# Patient Record
Sex: MALE | Race: WHITE | NOT HISPANIC OR LATINO | Employment: OTHER | URBAN - METROPOLITAN AREA
[De-identification: names, ages, dates, MRNs, and addresses within clinical notes are randomized per-mention and may not be internally consistent; named-entity substitution may affect disease eponyms.]

---

## 2019-06-08 ENCOUNTER — APPOINTMENT (OUTPATIENT)
Dept: GENERAL RADIOLOGY | Facility: HOSPITAL | Age: 74
End: 2019-06-08

## 2019-06-08 ENCOUNTER — HOSPITAL ENCOUNTER (INPATIENT)
Facility: HOSPITAL | Age: 74
LOS: 11 days | Discharge: HOME HEALTH | End: 2019-06-19
Attending: INTERNAL MEDICINE | Admitting: INTERNAL MEDICINE

## 2019-06-08 ENCOUNTER — APPOINTMENT (OUTPATIENT)
Dept: CARDIOLOGY | Facility: HOSPITAL | Age: 74
End: 2019-06-08

## 2019-06-08 ENCOUNTER — ANCILLARY PROCEDURE (OUTPATIENT)
Dept: SPEECH THERAPY | Facility: HOSPITAL | Age: 74
End: 2019-06-08

## 2019-06-08 DIAGNOSIS — D49.1 LARYNGEAL NEOPLASM: ICD-10-CM

## 2019-06-08 DIAGNOSIS — Z74.09 IMPAIRED MOBILITY AND ADLS: ICD-10-CM

## 2019-06-08 DIAGNOSIS — I48.91 ATRIAL FIBRILLATION WITH RVR (HCC): Primary | ICD-10-CM

## 2019-06-08 DIAGNOSIS — I48.91 ATRIAL FIBRILLATION WITH RVR (HCC): ICD-10-CM

## 2019-06-08 DIAGNOSIS — Z78.9 IMPAIRED MOBILITY AND ADLS: ICD-10-CM

## 2019-06-08 DIAGNOSIS — Z74.09 IMPAIRED FUNCTIONAL MOBILITY, BALANCE, GAIT, AND ENDURANCE: ICD-10-CM

## 2019-06-08 PROBLEM — L03.90 CELLULITIS: Status: ACTIVE | Noted: 2019-06-08

## 2019-06-08 PROBLEM — J44.9 COPD (CHRONIC OBSTRUCTIVE PULMONARY DISEASE) (HCC): Status: ACTIVE | Noted: 2019-06-08

## 2019-06-08 LAB
ALBUMIN SERPL-MCNC: 3.4 G/DL (ref 3.5–5.2)
ALBUMIN/GLOB SERPL: 1.1 G/DL
ALP SERPL-CCNC: 158 U/L (ref 39–117)
ALT SERPL W P-5'-P-CCNC: 47 U/L (ref 1–41)
AMYLASE SERPL-CCNC: 65 U/L (ref 28–100)
ANION GAP SERPL CALCULATED.3IONS-SCNC: 11 MMOL/L
APTT PPP: 150.8 SECONDS (ref 85–120)
ARTERIAL PATENCY WRIST A: ABNORMAL
ARTERIAL PATENCY WRIST A: ABNORMAL
AST SERPL-CCNC: 34 U/L (ref 1–40)
ATMOSPHERIC PRESS: ABNORMAL MMHG
ATMOSPHERIC PRESS: ABNORMAL MMHG
BASE EXCESS BLDA CALC-SCNC: -2 MMOL/L (ref 0–2)
BASE EXCESS BLDA CALC-SCNC: 1 MMOL/L (ref 0–2)
BASOPHILS # BLD AUTO: 0.05 10*3/MM3 (ref 0–0.2)
BASOPHILS # BLD AUTO: 0.07 10*3/MM3 (ref 0–0.2)
BASOPHILS NFR BLD AUTO: 0.4 % (ref 0–1.5)
BASOPHILS NFR BLD AUTO: 0.6 % (ref 0–1.5)
BDY SITE: ABNORMAL
BDY SITE: ABNORMAL
BH CV ECHO MEAS - AO ROOT AREA (BSA CORRECTED): 1.9
BH CV ECHO MEAS - AO ROOT AREA: 10.3 CM^2
BH CV ECHO MEAS - AO ROOT DIAM: 3.6 CM
BH CV ECHO MEAS - BSA(HAYCOCK): 1.9 M^2
BH CV ECHO MEAS - BSA: 1.9 M^2
BH CV ECHO MEAS - BZI_BMI: 27.6 KILOGRAMS/M^2
BH CV ECHO MEAS - BZI_METRIC_HEIGHT: 167.6 CM
BH CV ECHO MEAS - BZI_METRIC_WEIGHT: 77.6 KG
BH CV ECHO MEAS - EDV(CUBED): 65.1 ML
BH CV ECHO MEAS - EDV(MOD-SP2): 141 ML
BH CV ECHO MEAS - EDV(MOD-SP4): 107 ML
BH CV ECHO MEAS - EDV(TEICH): 70.9 ML
BH CV ECHO MEAS - EF(CUBED): 14.8 %
BH CV ECHO MEAS - EF(MOD-BP): 33 %
BH CV ECHO MEAS - EF(MOD-SP2): 39 %
BH CV ECHO MEAS - EF(MOD-SP4): 30.8 %
BH CV ECHO MEAS - EF(TEICH): 11.9 %
BH CV ECHO MEAS - ESV(CUBED): 55.5 ML
BH CV ECHO MEAS - ESV(MOD-SP2): 86 ML
BH CV ECHO MEAS - ESV(MOD-SP4): 74 ML
BH CV ECHO MEAS - ESV(TEICH): 62.5 ML
BH CV ECHO MEAS - FS: 5.2 %
BH CV ECHO MEAS - IVS/LVPW: 1
BH CV ECHO MEAS - IVSD: 1.1 CM
BH CV ECHO MEAS - LA DIMENSION: 3.7 CM
BH CV ECHO MEAS - LA/AO: 1
BH CV ECHO MEAS - LAD MAJOR: 4.5 CM
BH CV ECHO MEAS - LAT PEAK E' VEL: 10.2 CM/SEC
BH CV ECHO MEAS - LATERAL E/E' RATIO: 9
BH CV ECHO MEAS - LV DIASTOLIC VOL/BSA (35-75): 57.2 ML/M^2
BH CV ECHO MEAS - LV MASS(C)D: 155.5 GRAMS
BH CV ECHO MEAS - LV MASS(C)DI: 83.1 GRAMS/M^2
BH CV ECHO MEAS - LV MAX PG: 3.1 MMHG
BH CV ECHO MEAS - LV MEAN PG: 1.2 MMHG
BH CV ECHO MEAS - LV SYSTOLIC VOL/BSA (12-30): 39.5 ML/M^2
BH CV ECHO MEAS - LV V1 MAX: 88.4 CM/SEC
BH CV ECHO MEAS - LV V1 MEAN: 48.8 CM/SEC
BH CV ECHO MEAS - LV V1 VTI: 12.2 CM
BH CV ECHO MEAS - LVIDD: 4 CM
BH CV ECHO MEAS - LVIDS: 3.8 CM
BH CV ECHO MEAS - LVLD AP2: 8.9 CM
BH CV ECHO MEAS - LVLD AP4: 8.6 CM
BH CV ECHO MEAS - LVLS AP2: 8.1 CM
BH CV ECHO MEAS - LVLS AP4: 7.2 CM
BH CV ECHO MEAS - LVOT AREA (M): 3.5 CM^2
BH CV ECHO MEAS - LVOT AREA: 3.4 CM^2
BH CV ECHO MEAS - LVOT DIAM: 2.1 CM
BH CV ECHO MEAS - LVPWD: 1.1 CM
BH CV ECHO MEAS - MED PEAK E' VEL: 5.8 CM/SEC
BH CV ECHO MEAS - MEDIAL E/E' RATIO: 15.7
BH CV ECHO MEAS - MR MAX PG: 72 MMHG
BH CV ECHO MEAS - MR MAX VEL: 422.9 CM/SEC
BH CV ECHO MEAS - MR MEAN PG: 46.1 MMHG
BH CV ECHO MEAS - MR MEAN VEL: 316.8 CM/SEC
BH CV ECHO MEAS - MR VTI: 120.5 CM
BH CV ECHO MEAS - MV DEC TIME: 0.13 SEC
BH CV ECHO MEAS - MV E MAX VEL: 93.3 CM/SEC
BH CV ECHO MEAS - RAP SYSTOLE: 8 MMHG
BH CV ECHO MEAS - RVDD: 3.1 CM
BH CV ECHO MEAS - RVSP: 38 MMHG
BH CV ECHO MEAS - SI(CUBED): 5.1 ML/M^2
BH CV ECHO MEAS - SI(LVOT): 22.4 ML/M^2
BH CV ECHO MEAS - SI(MOD-SP2): 29.4 ML/M^2
BH CV ECHO MEAS - SI(MOD-SP4): 17.6 ML/M^2
BH CV ECHO MEAS - SI(TEICH): 4.5 ML/M^2
BH CV ECHO MEAS - SV(CUBED): 9.6 ML
BH CV ECHO MEAS - SV(LVOT): 42 ML
BH CV ECHO MEAS - SV(MOD-SP2): 55 ML
BH CV ECHO MEAS - SV(MOD-SP4): 33 ML
BH CV ECHO MEAS - SV(TEICH): 8.4 ML
BH CV ECHO MEAS - TAPSE (>1.6): 1.8 CM2
BH CV ECHO MEAS - TR MAX PG: 30 MMHG
BH CV ECHO MEAS - TR MAX VEL: 272.6 CM/SEC
BH CV ECHO MEASUREMENTS AVERAGE E/E' RATIO: 11.66
BH CV LOWER VASCULAR LEFT COMMON FEMORAL AUGMENT: NORMAL
BH CV LOWER VASCULAR LEFT COMMON FEMORAL COMPRESS: NORMAL
BH CV LOWER VASCULAR LEFT COMMON FEMORAL PHASIC: NORMAL
BH CV LOWER VASCULAR LEFT COMMON FEMORAL SPONT: NORMAL
BH CV LOWER VASCULAR LEFT DISTAL FEMORAL AUGMENT: NORMAL
BH CV LOWER VASCULAR LEFT DISTAL FEMORAL COMPRESS: NORMAL
BH CV LOWER VASCULAR LEFT DISTAL FEMORAL PHASIC: NORMAL
BH CV LOWER VASCULAR LEFT DISTAL FEMORAL SPONT: NORMAL
BH CV LOWER VASCULAR LEFT GASTRONEMIUS COMPRESS: NORMAL
BH CV LOWER VASCULAR LEFT GREATER SAPH AK COMPRESS: NORMAL
BH CV LOWER VASCULAR LEFT GREATER SAPH BK COMPRESS: NORMAL
BH CV LOWER VASCULAR LEFT LESSER SAPH COMPRESS: NORMAL
BH CV LOWER VASCULAR LEFT MID FEMORAL AUGMENT: NORMAL
BH CV LOWER VASCULAR LEFT MID FEMORAL COMPRESS: NORMAL
BH CV LOWER VASCULAR LEFT MID FEMORAL PHASIC: NORMAL
BH CV LOWER VASCULAR LEFT MID FEMORAL SPONT: NORMAL
BH CV LOWER VASCULAR LEFT POPLITEAL AUGMENT: NORMAL
BH CV LOWER VASCULAR LEFT POPLITEAL COMPRESS: NORMAL
BH CV LOWER VASCULAR LEFT POPLITEAL PHASIC: NORMAL
BH CV LOWER VASCULAR LEFT POPLITEAL SPONT: NORMAL
BH CV LOWER VASCULAR LEFT PROFUNDA FEMORAL COMPRESS: NORMAL
BH CV LOWER VASCULAR LEFT PROFUNDA FEMORAL PHASIC: NORMAL
BH CV LOWER VASCULAR LEFT PROFUNDA FEMORAL SPONT: NORMAL
BH CV LOWER VASCULAR LEFT PROXIMAL FEMORAL AUGMENT: NORMAL
BH CV LOWER VASCULAR LEFT PROXIMAL FEMORAL COMPRESS: NORMAL
BH CV LOWER VASCULAR LEFT PROXIMAL FEMORAL PHASIC: NORMAL
BH CV LOWER VASCULAR LEFT PROXIMAL FEMORAL SPONT: NORMAL
BH CV LOWER VASCULAR LEFT SAPHENOFEMORAL JUNCTION COMPRESS: NORMAL
BH CV LOWER VASCULAR LEFT SAPHENOFEMORAL JUNCTION PHASIC: NORMAL
BH CV LOWER VASCULAR LEFT SAPHENOFEMORAL JUNCTION SPONT: NORMAL
BH CV LOWER VASCULAR RIGHT COMMON FEMORAL COMPRESS: NORMAL
BH CV LOWER VASCULAR RIGHT COMMON FEMORAL PHASIC: NORMAL
BH CV LOWER VASCULAR RIGHT COMMON FEMORAL SPONT: NORMAL
BH CV LOWER VASCULAR RIGHT DISTAL FEMORAL AUGMENT: NORMAL
BH CV LOWER VASCULAR RIGHT DISTAL FEMORAL COMPRESS: NORMAL
BH CV LOWER VASCULAR RIGHT DISTAL FEMORAL PHASIC: NORMAL
BH CV LOWER VASCULAR RIGHT DISTAL FEMORAL SPONT: NORMAL
BH CV LOWER VASCULAR RIGHT GASTRONEMIUS COMPRESS: NORMAL
BH CV LOWER VASCULAR RIGHT GREATER SAPH AK COMPRESS: NORMAL
BH CV LOWER VASCULAR RIGHT GREATER SAPH BK COMPRESS: NORMAL
BH CV LOWER VASCULAR RIGHT LESSER SAPH COMPRESS: NORMAL
BH CV LOWER VASCULAR RIGHT MID FEMORAL AUGMENT: NORMAL
BH CV LOWER VASCULAR RIGHT MID FEMORAL COMPRESS: NORMAL
BH CV LOWER VASCULAR RIGHT MID FEMORAL PHASIC: NORMAL
BH CV LOWER VASCULAR RIGHT MID FEMORAL SPONT: NORMAL
BH CV LOWER VASCULAR RIGHT POPLITEAL AUGMENT: NORMAL
BH CV LOWER VASCULAR RIGHT POPLITEAL COMPRESS: NORMAL
BH CV LOWER VASCULAR RIGHT POPLITEAL PHASIC: NORMAL
BH CV LOWER VASCULAR RIGHT POPLITEAL SPONT: NORMAL
BH CV LOWER VASCULAR RIGHT PROFUNDA FEMORAL COMPRESS: NORMAL
BH CV LOWER VASCULAR RIGHT PROFUNDA FEMORAL PHASIC: NORMAL
BH CV LOWER VASCULAR RIGHT PROFUNDA FEMORAL SPONT: NORMAL
BH CV LOWER VASCULAR RIGHT PROXIMAL FEMORAL AUGMENT: NORMAL
BH CV LOWER VASCULAR RIGHT PROXIMAL FEMORAL COMPRESS: NORMAL
BH CV LOWER VASCULAR RIGHT PROXIMAL FEMORAL PHASIC: NORMAL
BH CV LOWER VASCULAR RIGHT PROXIMAL FEMORAL SPONT: NORMAL
BH CV LOWER VASCULAR RIGHT SAPHENOFEMORAL JUNCTION COMPRESS: NORMAL
BH CV LOWER VASCULAR RIGHT SAPHENOFEMORAL JUNCTION PHASIC: NORMAL
BH CV LOWER VASCULAR RIGHT SAPHENOFEMORAL JUNCTION SPONT: NORMAL
BH CV XLRA - RV BASE: 5.3 CM
BH CV XLRA - RV LENGTH: 6.9 CM
BH CV XLRA - RV MID: 3.6 CM
BH CV XLRA - TDI S': 17.1 CM/SEC
BILIRUB SERPL-MCNC: 0.7 MG/DL (ref 0.2–1.2)
BODY TEMPERATURE: 37 C
BODY TEMPERATURE: 37 C
BUN BLD-MCNC: 18 MG/DL (ref 8–23)
BUN/CREAT SERPL: 12.6 (ref 7–25)
CA-I SERPL ISE-MCNC: 1.25 MMOL/L (ref 1.12–1.32)
CALCIUM SPEC-SCNC: 9 MG/DL (ref 8.6–10.5)
CHLORIDE SERPL-SCNC: 101 MMOL/L (ref 98–107)
CHOLEST SERPL-MCNC: 108 MG/DL (ref 0–200)
CK SERPL-CCNC: 231 U/L (ref 20–200)
CO2 BLDA-SCNC: 26.2 MMOL/L (ref 23–27)
CO2 BLDA-SCNC: 26.7 MMOL/L (ref 23–27)
CO2 SERPL-SCNC: 26 MMOL/L (ref 22–29)
COHGB MFR BLD: 1.9 % (ref 0–2)
COHGB MFR BLD: 2.9 % (ref 0–2)
CREAT BLD-MCNC: 1.43 MG/DL (ref 0.76–1.27)
D-LACTATE SERPL-SCNC: 0.8 MMOL/L (ref 0.5–2)
DEPRECATED RDW RBC AUTO: 49.1 FL (ref 37–54)
DEPRECATED RDW RBC AUTO: 50 FL (ref 37–54)
EOSINOPHIL # BLD AUTO: 0.29 10*3/MM3 (ref 0–0.4)
EOSINOPHIL # BLD AUTO: 0.29 10*3/MM3 (ref 0–0.4)
EOSINOPHIL NFR BLD AUTO: 2.6 % (ref 0.3–6.2)
EOSINOPHIL NFR BLD AUTO: 2.7 % (ref 0.3–6.2)
ERYTHROCYTE [DISTWIDTH] IN BLOOD BY AUTOMATED COUNT: 15.3 % (ref 12.3–15.4)
ERYTHROCYTE [DISTWIDTH] IN BLOOD BY AUTOMATED COUNT: 15.5 % (ref 12.3–15.4)
GFR SERPL CREATININE-BSD FRML MDRD: 48 ML/MIN/1.73
GLOBULIN UR ELPH-MCNC: 3 GM/DL
GLUCOSE BLD-MCNC: 127 MG/DL (ref 65–99)
GLUCOSE BLDC GLUCOMTR-MCNC: 121 MG/DL (ref 70–130)
GLUCOSE BLDC GLUCOMTR-MCNC: 130 MG/DL (ref 70–130)
HBA1C MFR BLD: 6.6 % (ref 4.8–5.6)
HCO3 BLDA-SCNC: 24.7 MMOL/L (ref 20–26)
HCO3 BLDA-SCNC: 25.5 MMOL/L (ref 20–26)
HCT VFR BLD AUTO: 48 % (ref 37.5–51)
HCT VFR BLD AUTO: 48.9 % (ref 37.5–51)
HCT VFR BLD CALC: 47.9 %
HCT VFR BLD CALC: 48.2 %
HDLC SERPL-MCNC: 59 MG/DL (ref 40–60)
HGB BLD-MCNC: 15.2 G/DL (ref 13–17.7)
HGB BLD-MCNC: 15.3 G/DL (ref 13–17.7)
HGB BLDA-MCNC: 15.6 G/DL (ref 13.5–17.5)
HGB BLDA-MCNC: 15.7 G/DL (ref 13.5–17.5)
HOROWITZ INDEX BLD+IHG-RTO: 36 %
HOROWITZ INDEX BLD+IHG-RTO: 44 %
IMM GRANULOCYTES # BLD AUTO: 0.04 10*3/MM3 (ref 0–0.05)
IMM GRANULOCYTES # BLD AUTO: 0.05 10*3/MM3 (ref 0–0.05)
IMM GRANULOCYTES NFR BLD AUTO: 0.4 % (ref 0–0.5)
IMM GRANULOCYTES NFR BLD AUTO: 0.5 % (ref 0–0.5)
INR PPP: 1.3 (ref 0.85–1.16)
INR PPP: 1.34 (ref 0.85–1.16)
LDLC SERPL CALC-MCNC: 37 MG/DL (ref 0–100)
LDLC/HDLC SERPL: 0.62 {RATIO}
LIPASE SERPL-CCNC: 46 U/L (ref 13–60)
LYMPHOCYTES # BLD AUTO: 3.5 10*3/MM3 (ref 0.7–3.1)
LYMPHOCYTES # BLD AUTO: 3.66 10*3/MM3 (ref 0.7–3.1)
LYMPHOCYTES NFR BLD AUTO: 32.2 % (ref 19.6–45.3)
LYMPHOCYTES NFR BLD AUTO: 32.4 % (ref 19.6–45.3)
MAGNESIUM SERPL-MCNC: 2.1 MG/DL (ref 1.6–2.4)
MAXIMAL PREDICTED HEART RATE: 144 BPM
MCH RBC QN AUTO: 27.7 PG (ref 26.6–33)
MCH RBC QN AUTO: 27.9 PG (ref 26.6–33)
MCHC RBC AUTO-ENTMCNC: 31.3 G/DL (ref 31.5–35.7)
MCHC RBC AUTO-ENTMCNC: 31.7 G/DL (ref 31.5–35.7)
MCV RBC AUTO: 88.2 FL (ref 79–97)
MCV RBC AUTO: 88.4 FL (ref 79–97)
METHGB BLD QL: 0.5 % (ref 0–1.5)
METHGB BLD QL: 0.5 % (ref 0–1.5)
MODALITY: ABNORMAL
MODALITY: ABNORMAL
MONOCYTES # BLD AUTO: 0.79 10*3/MM3 (ref 0.1–0.9)
MONOCYTES # BLD AUTO: 0.81 10*3/MM3 (ref 0.1–0.9)
MONOCYTES NFR BLD AUTO: 7 % (ref 5–12)
MONOCYTES NFR BLD AUTO: 7.5 % (ref 5–12)
NEUTROPHILS # BLD AUTO: 6.15 10*3/MM3 (ref 1.7–7)
NEUTROPHILS # BLD AUTO: 6.47 10*3/MM3 (ref 1.7–7)
NEUTROPHILS NFR BLD AUTO: 56.5 % (ref 42.7–76)
NEUTROPHILS NFR BLD AUTO: 57.2 % (ref 42.7–76)
NOTE: ABNORMAL
NOTE: ABNORMAL
NRBC BLD AUTO-RTO: 0 /100 WBC (ref 0–0.2)
NRBC BLD AUTO-RTO: 0 /100 WBC (ref 0–0.2)
NT-PROBNP SERPL-MCNC: 4193 PG/ML (ref 5–1800)
OXYHGB MFR BLDV: 86.7 % (ref 94–99)
OXYHGB MFR BLDV: 92.4 % (ref 94–99)
PCO2 BLDA: 39.3 MM HG
PCO2 BLDA: 48.3 MM HG
PCO2 TEMP ADJ BLD: 39.3 MM HG (ref 35–48)
PCO2 TEMP ADJ BLD: 48.3 MM HG (ref 35–48)
PH BLDA: 7.32 PH UNITS (ref 7.35–7.45)
PH BLDA: 7.42 PH UNITS (ref 7.35–7.45)
PH, TEMP CORRECTED: 7.32 PH UNITS
PH, TEMP CORRECTED: 7.42 PH UNITS
PHOSPHATE SERPL-MCNC: 2.8 MG/DL (ref 2.5–4.5)
PLATELET # BLD AUTO: 202 10*3/MM3 (ref 140–450)
PLATELET # BLD AUTO: 212 10*3/MM3 (ref 140–450)
PMV BLD AUTO: 10.9 FL (ref 6–12)
PMV BLD AUTO: 11.8 FL (ref 6–12)
PO2 BLDA: 59.9 MM HG (ref 83–108)
PO2 BLDA: 73 MM HG (ref 83–108)
PO2 TEMP ADJ BLD: 59.9 MM HG (ref 83–108)
PO2 TEMP ADJ BLD: 73 MM HG (ref 83–108)
POTASSIUM BLD-SCNC: 4.8 MMOL/L (ref 3.5–5.2)
PROCALCITONIN SERPL-MCNC: 0.04 NG/ML (ref 0.1–0.25)
PROT SERPL-MCNC: 6.4 G/DL (ref 6–8.5)
PROTHROMBIN TIME: 15.6 SECONDS (ref 11.2–14.3)
PROTHROMBIN TIME: 16 SECONDS (ref 11.2–14.3)
RBC # BLD AUTO: 5.44 10*6/MM3 (ref 4.14–5.8)
RBC # BLD AUTO: 5.53 10*6/MM3 (ref 4.14–5.8)
SODIUM BLD-SCNC: 138 MMOL/L (ref 136–145)
STRESS TARGET HR: 122 BPM
T4 FREE SERPL-MCNC: 1.43 NG/DL (ref 0.93–1.7)
TRIGL SERPL-MCNC: 61 MG/DL (ref 0–150)
TROPONIN T SERPL-MCNC: <0.01 NG/ML (ref 0–0.03)
TSH SERPL DL<=0.05 MIU/L-ACNC: 3.73 MIU/ML (ref 0.27–4.2)
UFH PPP CHRO-ACNC: 0.75 IU/ML (ref 0.3–0.7)
UFH PPP CHRO-ACNC: 0.86 IU/ML (ref 0.3–0.7)
UFH PPP CHRO-ACNC: 0.89 IU/ML (ref 0.3–0.7)
VENTILATOR MODE: ABNORMAL
VENTILATOR MODE: ABNORMAL
VLDLC SERPL-MCNC: 12.2 MG/DL
WBC NRBC COR # BLD: 10.87 10*3/MM3 (ref 3.4–10.8)
WBC NRBC COR # BLD: 11.3 10*3/MM3 (ref 3.4–10.8)

## 2019-06-08 PROCEDURE — 85610 PROTHROMBIN TIME: CPT | Performed by: NURSE PRACTITIONER

## 2019-06-08 PROCEDURE — 93306 TTE W/DOPPLER COMPLETE: CPT

## 2019-06-08 PROCEDURE — 36600 WITHDRAWAL OF ARTERIAL BLOOD: CPT

## 2019-06-08 PROCEDURE — 87070 CULTURE OTHR SPECIMN AEROBIC: CPT | Performed by: INTERNAL MEDICINE

## 2019-06-08 PROCEDURE — 87040 BLOOD CULTURE FOR BACTERIA: CPT | Performed by: NURSE PRACTITIONER

## 2019-06-08 PROCEDURE — 85520 HEPARIN ASSAY: CPT

## 2019-06-08 PROCEDURE — 92612 ENDOSCOPY SWALLOW (FEES) VID: CPT

## 2019-06-08 PROCEDURE — 94799 UNLISTED PULMONARY SVC/PX: CPT

## 2019-06-08 PROCEDURE — 99223 1ST HOSP IP/OBS HIGH 75: CPT | Performed by: INTERNAL MEDICINE

## 2019-06-08 PROCEDURE — 82150 ASSAY OF AMYLASE: CPT | Performed by: NURSE PRACTITIONER

## 2019-06-08 PROCEDURE — 93970 EXTREMITY STUDY: CPT

## 2019-06-08 PROCEDURE — 99291 CRITICAL CARE FIRST HOUR: CPT | Performed by: INTERNAL MEDICINE

## 2019-06-08 PROCEDURE — 71045 X-RAY EXAM CHEST 1 VIEW: CPT

## 2019-06-08 PROCEDURE — 25010000002 HEPARIN (PORCINE) IN NACL 25000-0.45 UT/250ML-% SOLUTION

## 2019-06-08 PROCEDURE — 84484 ASSAY OF TROPONIN QUANT: CPT | Performed by: NURSE PRACTITIONER

## 2019-06-08 PROCEDURE — 99221 1ST HOSP IP/OBS SF/LOW 40: CPT | Performed by: INTERNAL MEDICINE

## 2019-06-08 PROCEDURE — 93970 EXTREMITY STUDY: CPT | Performed by: INTERNAL MEDICINE

## 2019-06-08 PROCEDURE — 93005 ELECTROCARDIOGRAM TRACING: CPT | Performed by: NURSE PRACTITIONER

## 2019-06-08 PROCEDURE — 82962 GLUCOSE BLOOD TEST: CPT

## 2019-06-08 PROCEDURE — 83605 ASSAY OF LACTIC ACID: CPT | Performed by: NURSE PRACTITIONER

## 2019-06-08 PROCEDURE — 25010000002 HEPARIN (PORCINE) IN NACL 25000-0.45 UT/250ML-% SOLUTION: Performed by: NURSE PRACTITIONER

## 2019-06-08 PROCEDURE — 94640 AIRWAY INHALATION TREATMENT: CPT

## 2019-06-08 PROCEDURE — 84443 ASSAY THYROID STIM HORMONE: CPT | Performed by: NURSE PRACTITIONER

## 2019-06-08 PROCEDURE — 87205 SMEAR GRAM STAIN: CPT | Performed by: NURSE PRACTITIONER

## 2019-06-08 PROCEDURE — 25010000002 MEROPENEM

## 2019-06-08 PROCEDURE — 80053 COMPREHEN METABOLIC PANEL: CPT | Performed by: NURSE PRACTITIONER

## 2019-06-08 PROCEDURE — 25010000002 AMIODARONE IN DEXTROSE 5% 360-4.14 MG/200ML-% SOLUTION: Performed by: NURSE PRACTITIONER

## 2019-06-08 PROCEDURE — 82805 BLOOD GASES W/O2 SATURATION: CPT

## 2019-06-08 PROCEDURE — 84145 PROCALCITONIN (PCT): CPT | Performed by: NURSE PRACTITIONER

## 2019-06-08 PROCEDURE — 84100 ASSAY OF PHOSPHORUS: CPT | Performed by: NURSE PRACTITIONER

## 2019-06-08 PROCEDURE — 94660 CPAP INITIATION&MGMT: CPT

## 2019-06-08 PROCEDURE — 92610 EVALUATE SWALLOWING FUNCTION: CPT

## 2019-06-08 PROCEDURE — 85025 COMPLETE CBC W/AUTO DIFF WBC: CPT | Performed by: NURSE PRACTITIONER

## 2019-06-08 PROCEDURE — 25010000002 VANCOMYCIN 10 G RECONSTITUTED SOLUTION

## 2019-06-08 PROCEDURE — 93306 TTE W/DOPPLER COMPLETE: CPT | Performed by: INTERNAL MEDICINE

## 2019-06-08 PROCEDURE — 85520 HEPARIN ASSAY: CPT | Performed by: NURSE PRACTITIONER

## 2019-06-08 PROCEDURE — 25010000002 AMIODARONE IN DEXTROSE 5% 360-4.14 MG/200ML-% SOLUTION

## 2019-06-08 PROCEDURE — 83690 ASSAY OF LIPASE: CPT | Performed by: NURSE PRACTITIONER

## 2019-06-08 PROCEDURE — 84439 ASSAY OF FREE THYROXINE: CPT | Performed by: NURSE PRACTITIONER

## 2019-06-08 PROCEDURE — 87070 CULTURE OTHR SPECIMN AEROBIC: CPT | Performed by: NURSE PRACTITIONER

## 2019-06-08 PROCEDURE — 80061 LIPID PANEL: CPT | Performed by: NURSE PRACTITIONER

## 2019-06-08 PROCEDURE — 5A09357 ASSISTANCE WITH RESPIRATORY VENTILATION, LESS THAN 24 CONSECUTIVE HOURS, CONTINUOUS POSITIVE AIRWAY PRESSURE: ICD-10-PCS | Performed by: INTERNAL MEDICINE

## 2019-06-08 PROCEDURE — 25010000002 DIGOXIN PER 500 MCG: Performed by: INTERNAL MEDICINE

## 2019-06-08 PROCEDURE — 83735 ASSAY OF MAGNESIUM: CPT | Performed by: NURSE PRACTITIONER

## 2019-06-08 PROCEDURE — 87205 SMEAR GRAM STAIN: CPT | Performed by: INTERNAL MEDICINE

## 2019-06-08 PROCEDURE — 82330 ASSAY OF CALCIUM: CPT | Performed by: NURSE PRACTITIONER

## 2019-06-08 PROCEDURE — 82550 ASSAY OF CK (CPK): CPT | Performed by: NURSE PRACTITIONER

## 2019-06-08 PROCEDURE — 83036 HEMOGLOBIN GLYCOSYLATED A1C: CPT | Performed by: NURSE PRACTITIONER

## 2019-06-08 PROCEDURE — 83880 ASSAY OF NATRIURETIC PEPTIDE: CPT | Performed by: NURSE PRACTITIONER

## 2019-06-08 PROCEDURE — 25010000002 AMIODARONE IN DEXTROSE 5% 150-4.21 MG/100ML-% SOLUTION: Performed by: NURSE PRACTITIONER

## 2019-06-08 PROCEDURE — 93010 ELECTROCARDIOGRAM REPORT: CPT | Performed by: INTERNAL MEDICINE

## 2019-06-08 PROCEDURE — 85730 THROMBOPLASTIN TIME PARTIAL: CPT | Performed by: NURSE PRACTITIONER

## 2019-06-08 RX ORDER — METOPROLOL TARTRATE 50 MG/1
50 TABLET, FILM COATED ORAL 2 TIMES DAILY
COMMUNITY
End: 2019-06-19 | Stop reason: HOSPADM

## 2019-06-08 RX ORDER — IPRATROPIUM BROMIDE AND ALBUTEROL SULFATE 2.5; .5 MG/3ML; MG/3ML
3 SOLUTION RESPIRATORY (INHALATION)
Status: DISCONTINUED | OUTPATIENT
Start: 2019-06-08 | End: 2019-06-08

## 2019-06-08 RX ORDER — BUMETANIDE 0.25 MG/ML
2.5 INJECTION INTRAMUSCULAR; INTRAVENOUS ONCE
Status: COMPLETED | OUTPATIENT
Start: 2019-06-08 | End: 2019-06-08

## 2019-06-08 RX ORDER — FUROSEMIDE 20 MG/1
20 TABLET ORAL DAILY
COMMUNITY
End: 2019-09-27 | Stop reason: DRUGHIGH

## 2019-06-08 RX ORDER — SODIUM CHLORIDE 0.9 % (FLUSH) 0.9 %
3-10 SYRINGE (ML) INJECTION AS NEEDED
Status: DISCONTINUED | OUTPATIENT
Start: 2019-06-08 | End: 2019-06-19

## 2019-06-08 RX ORDER — ESOMEPRAZOLE MAGNESIUM 40 MG/1
40 CAPSULE, DELAYED RELEASE ORAL
COMMUNITY
End: 2020-01-03 | Stop reason: ALTCHOICE

## 2019-06-08 RX ORDER — ATORVASTATIN CALCIUM 20 MG/1
20 TABLET, FILM COATED ORAL NIGHTLY
COMMUNITY
End: 2020-01-10 | Stop reason: SDUPTHER

## 2019-06-08 RX ORDER — HEPARIN SODIUM 1000 [USP'U]/ML
40 INJECTION, SOLUTION INTRAVENOUS; SUBCUTANEOUS AS NEEDED
Status: DISCONTINUED | OUTPATIENT
Start: 2019-06-08 | End: 2019-06-08 | Stop reason: SDUPTHER

## 2019-06-08 RX ORDER — LABETALOL HYDROCHLORIDE 5 MG/ML
20 INJECTION, SOLUTION INTRAVENOUS
Status: DISCONTINUED | OUTPATIENT
Start: 2019-06-08 | End: 2019-06-12

## 2019-06-08 RX ORDER — METOPROLOL TARTRATE 5 MG/5ML
5 INJECTION INTRAVENOUS ONCE
Status: COMPLETED | OUTPATIENT
Start: 2019-06-08 | End: 2019-06-08

## 2019-06-08 RX ORDER — AMITRIPTYLINE HYDROCHLORIDE 100 MG/1
100 TABLET, FILM COATED ORAL NIGHTLY
COMMUNITY
End: 2019-06-27 | Stop reason: DRUGHIGH

## 2019-06-08 RX ORDER — LABETALOL HYDROCHLORIDE 5 MG/ML
10 INJECTION, SOLUTION INTRAVENOUS ONCE
Status: COMPLETED | OUTPATIENT
Start: 2019-06-08 | End: 2019-06-08

## 2019-06-08 RX ORDER — AMLODIPINE BESYLATE 2.5 MG/1
2.5 TABLET ORAL DAILY
COMMUNITY
End: 2019-06-19 | Stop reason: HOSPADM

## 2019-06-08 RX ORDER — LEVALBUTEROL TARTRATE 45 UG/1
1-2 AEROSOL, METERED ORAL EVERY 6 HOURS PRN
COMMUNITY
End: 2021-01-01

## 2019-06-08 RX ORDER — HEPARIN SODIUM 1000 [USP'U]/ML
80 INJECTION, SOLUTION INTRAVENOUS; SUBCUTANEOUS AS NEEDED
Status: DISCONTINUED | OUTPATIENT
Start: 2019-06-08 | End: 2019-06-08 | Stop reason: SDUPTHER

## 2019-06-08 RX ORDER — PANTOPRAZOLE SODIUM 40 MG/10ML
40 INJECTION, POWDER, LYOPHILIZED, FOR SOLUTION INTRAVENOUS
Status: DISCONTINUED | OUTPATIENT
Start: 2019-06-09 | End: 2019-06-13

## 2019-06-08 RX ORDER — AMITRIPTYLINE HYDROCHLORIDE 25 MG/1
25 TABLET, FILM COATED ORAL NIGHTLY
Status: DISCONTINUED | OUTPATIENT
Start: 2019-06-08 | End: 2019-06-19

## 2019-06-08 RX ORDER — IPRATROPIUM BROMIDE AND ALBUTEROL SULFATE 2.5; .5 MG/3ML; MG/3ML
3 SOLUTION RESPIRATORY (INHALATION)
Status: DISCONTINUED | OUTPATIENT
Start: 2019-06-08 | End: 2019-06-12

## 2019-06-08 RX ORDER — DIGOXIN 0.25 MG/ML
500 INJECTION INTRAMUSCULAR; INTRAVENOUS ONCE
Status: COMPLETED | OUTPATIENT
Start: 2019-06-08 | End: 2019-06-08

## 2019-06-08 RX ORDER — SODIUM CHLORIDE 0.9 % (FLUSH) 0.9 %
3 SYRINGE (ML) INJECTION EVERY 12 HOURS SCHEDULED
Status: DISCONTINUED | OUTPATIENT
Start: 2019-06-08 | End: 2019-06-19

## 2019-06-08 RX ADMIN — LABETALOL 20 MG/4 ML (5 MG/ML) INTRAVENOUS SYRINGE 20 MG: at 18:21

## 2019-06-08 RX ADMIN — DIGOXIN 500 MCG: 0.25 INJECTION INTRAMUSCULAR; INTRAVENOUS at 08:15

## 2019-06-08 RX ADMIN — HEPARIN SODIUM 18 UNITS/KG/HR: 10000 INJECTION, SOLUTION INTRAVENOUS at 05:33

## 2019-06-08 RX ADMIN — VANCOMYCIN HYDROCHLORIDE 1500 MG: 10 INJECTION, POWDER, LYOPHILIZED, FOR SOLUTION INTRAVENOUS at 04:32

## 2019-06-08 RX ADMIN — LABETALOL 20 MG/4 ML (5 MG/ML) INTRAVENOUS SYRINGE 20 MG: at 10:49

## 2019-06-08 RX ADMIN — IPRATROPIUM BROMIDE AND ALBUTEROL SULFATE 3 ML: 2.5; .5 SOLUTION RESPIRATORY (INHALATION) at 07:04

## 2019-06-08 RX ADMIN — AMIODARONE HYDROCHLORIDE 1 MG/MIN: 1.8 INJECTION, SOLUTION INTRAVENOUS at 03:04

## 2019-06-08 RX ADMIN — LABETALOL 20 MG/4 ML (5 MG/ML) INTRAVENOUS SYRINGE 20 MG: at 14:17

## 2019-06-08 RX ADMIN — LABETALOL 20 MG/4 ML (5 MG/ML) INTRAVENOUS SYRINGE 10 MG: at 09:58

## 2019-06-08 RX ADMIN — MEROPENEM 1 G: 1 INJECTION, POWDER, FOR SOLUTION INTRAVENOUS at 18:18

## 2019-06-08 RX ADMIN — HEPARIN SODIUM 16 UNITS/KG/HR: 10000 INJECTION, SOLUTION INTRAVENOUS at 21:55

## 2019-06-08 RX ADMIN — METOPROLOL TARTRATE 5 MG: 5 INJECTION INTRAVENOUS at 05:33

## 2019-06-08 RX ADMIN — IPRATROPIUM BROMIDE AND ALBUTEROL SULFATE 3 ML: 2.5; .5 SOLUTION RESPIRATORY (INHALATION) at 12:55

## 2019-06-08 RX ADMIN — AMIODARONE HYDROCHLORIDE 1 MG/MIN: 1.8 INJECTION, SOLUTION INTRAVENOUS at 08:16

## 2019-06-08 RX ADMIN — LABETALOL 20 MG/4 ML (5 MG/ML) INTRAVENOUS SYRINGE 20 MG: at 22:29

## 2019-06-08 RX ADMIN — SODIUM CHLORIDE, PRESERVATIVE FREE 3 ML: 5 INJECTION INTRAVENOUS at 20:07

## 2019-06-08 RX ADMIN — IPRATROPIUM BROMIDE AND ALBUTEROL SULFATE 3 ML: 2.5; .5 SOLUTION RESPIRATORY (INHALATION) at 21:03

## 2019-06-08 RX ADMIN — BUMETANIDE 2.5 MG: 0.25 INJECTION INTRAMUSCULAR; INTRAVENOUS at 09:57

## 2019-06-08 RX ADMIN — LABETALOL 20 MG/4 ML (5 MG/ML) INTRAVENOUS SYRINGE 20 MG: at 22:10

## 2019-06-08 RX ADMIN — MEROPENEM 1 G: 1 INJECTION, POWDER, FOR SOLUTION INTRAVENOUS at 03:59

## 2019-06-08 RX ADMIN — AMIODARONE HYDROCHLORIDE 0.5 MG/MIN: 1.8 INJECTION, SOLUTION INTRAVENOUS at 14:17

## 2019-06-08 RX ADMIN — LABETALOL 20 MG/4 ML (5 MG/ML) INTRAVENOUS SYRINGE 20 MG: at 21:49

## 2019-06-08 RX ADMIN — MEROPENEM 1 G: 1 INJECTION, POWDER, FOR SOLUTION INTRAVENOUS at 09:59

## 2019-06-08 RX ADMIN — AMIODARONE HYDROCHLORIDE 150 MG: 1.5 INJECTION, SOLUTION INTRAVENOUS at 03:03

## 2019-06-08 RX ADMIN — AMITRIPTYLINE HYDROCHLORIDE 25 MG: 25 TABLET, FILM COATED ORAL at 21:07

## 2019-06-08 NOTE — PLAN OF CARE
Problem: Patient Care Overview  Goal: Plan of Care Review  Outcome: Ongoing (interventions implemented as appropriate)   06/08/19 0549   Coping/Psychosocial   Plan of Care Reviewed With patient;daughter;significant other   Plan of Care Review   Progress no change   OTHER   Outcome Summary Patient to floor from [facility] ED at 0245. Alert/oriented x 4, able to make needs known, answers questions appropriately. Sat was 82% on RA, increased to 91% on 4L. Patient was tachypnic with audible expiratory wheezing. Admits to chronic SOA at home, worsening with activity, no O2 use at home, chronic smoker. He does have 3+ edema in lower extremities and redness to RLE with heat, possibly cellulitis, which is why he presented to ED initially. Family at bedside do not seem aware of health diagnoses and medications at home. Patient on Heparin drip and Cardizem on admission, switched to Amiodorone IV. Chest XR and wound culture of RLE performed, results pending. HR maintains in 130's-160's in atrial fibrillation with RVR. Plan is for further diagnostic testing including venous duplex and possible Echo today. Will continue to monitor.        Problem: Fall Risk (Adult)  Goal: Identify Related Risk Factors and Signs and Symptoms  Outcome: Ongoing (interventions implemented as appropriate)   06/08/19 0549   Fall Risk (Adult)   Related Risk Factors (Fall Risk) age-related changes;depression/anxiety;fatigue/slow reaction;gait/mobility problems;homeostatic imbalance;environment unfamiliar   Signs and Symptoms (Fall Risk) presence of risk factors     Goal: Absence of Fall  Outcome: Ongoing (interventions implemented as appropriate)   06/08/19 0549   Fall Risk (Adult)   Absence of Fall making progress toward outcome       Problem: Skin Injury Risk (Adult)  Goal: Identify Related Risk Factors and Signs and Symptoms  Outcome: Ongoing (interventions implemented as appropriate)   06/08/19 0549   Skin Injury Risk (Adult)   Related Risk Factors  (Skin Injury Risk) advanced age;critical care admission;edema;mobility impaired;tissue perfusion altered     Goal: Skin Health and Integrity  Outcome: Ongoing (interventions implemented as appropriate)   06/08/19 0508   Skin Injury Risk (Adult)   Skin Health and Integrity making progress toward outcome       Problem: Chronic Obstructive Pulmonary Disease (Adult)  Goal: Signs and Symptoms of Listed Potential Problems Will be Absent, Minimized or Managed (Chronic Obstructive Pulmonary Disease)  Outcome: Ongoing (interventions implemented as appropriate)   06/08/19 0598   Goal/Outcome Evaluation   Problems Assessed (Chronic Obstructive Pulmonary Disease (COPD)) all   Problems Present (COPD, Bronch/Emphy) functional deficit;respiratory compromise

## 2019-06-08 NOTE — H&P
"  CRITICAL CARE ADMISSION NOTE    Chief Complaint     Atrial fibrillation with RVR (CMS/[facility])    History of Present Illness     Mr. Jones is a 73 yo male with with limited knowledge of PMH but known COPD and HTN who presents as a transfer from [facility] secondary to Atrial Fibrillation with RVR and cellulitis of right lower calf and foot. According to patient and family who are poor historians, patient developed bilateral foot swelling approximately 1 week ago. Around that time he was placed on Bactrim, a steroid and pain medications with minimal and possibly worsening symptoms per family. With his ongoing issues he presented to OSH for medical attention. On arrival to ED he was found to be in A. Fib with RVR. It appears he was given Diltiazem pushes a couple of times however he remained in A. Fib with RVR. At some point he was started on a Heparin gtt however documentation sent from OSH is very limited. He was transferred to [facility] for further evaluation and management.    Of note, patient had what sounds like a syncopal episode around the same day that he developed his leg swelling. According to fiance and daughter he often has episodes of \"smothering\" and they believe this episode of passing out was as a result of his breathing difficulties.     On arrival patient remains in A. Fib with RVR. He is hemodynamically stable otherwise. He is on supplemental oxygen.  He notes in the past that he has had intermittent leg swelling.    Problem List, Surgical History, Family, Social History, and ROS     Past Medical History:   Diagnosis Date   • COPD (chronic obstructive pulmonary disease) (CMS/[facility])    • Hearing loss    • Hoarse voice quality    • Hypertension      Past Surgical History:   Procedure Laterality Date   • HEMORRHOIDECTOMY         Allergies   Allergen Reactions   • Amoxicillin Unknown (See Comments)     unknown   • Penicillins Unknown (See Comments)     unknown     No current facility-administered " "medications on file prior to encounter.      Current Outpatient Medications on File Prior to Encounter   Medication Sig   • amitriptyline (ELAVIL) 100 MG tablet Take 100 mg by mouth Every Night.   • amLODIPine (NORVASC) 2.5 MG tablet Take 2.5 mg by mouth Daily.   • atorvastatin (LIPITOR) 20 MG tablet Take 20 mg by mouth Every Night.   • esomeprazole (nexIUM) 40 MG capsule Take 40 mg by mouth Every Morning Before Breakfast.   • Fluticasone Furoate-Vilanterol (BREO ELLIPTA) 100-25 MCG/INH inhaler Inhale 1 puff Daily.   • furosemide (LASIX) 20 MG tablet Take 20 mg by mouth Daily.   • ipratropium (ATROVENT) 0.02 % nebulizer solution Take 500 mcg by nebulization 4 (Four) Times a Day.   • levalbuterol (XOPENEX HFA) 45 MCG/ACT inhaler Inhale 1-2 puffs Every 6 (Six) Hours As Needed for Wheezing or Shortness of Air.   • metoprolol tartrate (LOPRESSOR) 50 MG tablet Take 50 mg by mouth 2 (Two) Times a Day.     MEDICATION LIST AND ALLERGIES REVIEWED.    Family History   Problem Relation Age of Onset   • Heart disease Mother      Social History     Tobacco Use   • Smoking status: Current Every Day Smoker   Substance Use Topics   • Alcohol use: No     Frequency: Never   • Drug use: No     Social History     Social History Narrative   • Not on file     FAMILY AND SOCIAL HISTORY REVIEWED.    Review of Systems   Respiratory: Positive for cough, choking, shortness of breath and wheezing.    Musculoskeletal: Positive for gait problem.   Skin: Positive for wound (right lower extremity).     AS ABOVE OR ALL OTHER SYSTEMS REVIEWED AND ARE NEGATIVE.    Physical Exam and Clinical Information   /78 (BP Location: Left arm, Patient Position: Lying)   Pulse (!) 140   Temp 99.1 °F (37.3 °C) (Oral)   Resp 24   Ht 167.6 cm (66\")   Wt 77.9 kg (171 lb 11.8 oz)   SpO2 93%   BMI 27.72 kg/m²     Objective:  General Appearance:  In no acute distress.    Vital signs: (most recent): Blood pressure 108/78, pulse (!) 140, temperature 99.1 °F " "(37.3 °C), temperature source Oral, resp. rate 26, height 167.6 cm (66\"), weight 77.9 kg (171 lb 11.8 oz), SpO2 93 %.    HEENT: Normal HEENT exam.  (Nasal cannula O2)    Lungs:  Normal effort and normal respiratory rate.  He is not in respiratory distress.  There are decreased breath sounds.  No rales, wheezes or rhonchi.    Heart: Tachycardia.  Irregular rhythm.  S1 normal and S2 normal.  No murmur, gallop or friction rub.   Chest: Symmetric chest wall expansion.   Abdomen: Abdomen is soft and non-distended.  Bowel sounds are normal.   There is no abdominal tenderness.   There is no mass. There is no splenomegaly. There is no hepatomegaly.   Extremities: There is dependent edema.  There is no deformity.  (Bilateral lower extremity edema right greater than left with some erythema and slight bruising of right foot particularly laterally.  There are no open wounds.  Appreciate no venous cords)  Neurological: Patient is alert and oriented to person, place and time.    Pupils:  Pupils are equal, round, and reactive to light.    Skin:  Warm and dry.              Lab Results   Component Value Date    LACTATE 0.8 06/08/2019        IMAGES: Vascular congestion with possible chronic changes.  Cardiac silhouette at the upper limits of normal in size    I reviewed the patient's results and images.     Assesment     Active Hospital Problems    Diagnosis   • **Atrial fibrillation with RVR (CMS/[facility])   • Cellulitis of Right Lower Leg and Foot   • COPD (chronic obstructive pulmonary disease) (CMS/[facility])     Plan/Recommendations     Gema Duong, HOLLY, AGACNP-BC, APRN    74-year-old male presents to [facility] emergency department with atrial fibrillation and RVR as well as lower extremity edema and possible right lower extremity cellulitis.  Another possibility is DVT.  He has no known cardiac history.  Ventricular rate has been difficult to control with amiodarone.  He does appear to have underlying COPD.    1. ICU " admission  2. Vancomycin and Merrem  3. Bronchodilators  4. Amiodarone drip  5. Heparin drip  6. Echocardiogram  7. Lower extremity duplex Dopplers  8. Blood cultures  9. Cardiology consultation  10. Discussed with patient and family    Critical Care time spent in direct patient care: 40 minutes (excluding procedure time, if applicable) including high complexity decision making to assess, manipulate, and support vital organ system failure in this individual who has impairment of one or more vital organ systems such that there is a high probability of imminent or life threatening deterioration in the patient’s condition.  I have seen and examined patient, performing a face-to-face diagnostic evaluation with plan of care reviewed and developed with APRN and nursing staff. I have addended and modified the above history of present illness, physical examination, and assessment and plan to reflect my findings and impressions.    Aashish William MD  Pulmonary and Critical Care Medicine

## 2019-06-08 NOTE — PLAN OF CARE
Problem: Patient Care Overview  Goal: Plan of Care Review  Outcome: Ongoing (interventions implemented as appropriate)   06/08/19 4593   Coping/Psychosocial   Plan of Care Reviewed With patient;family   SLP FEES evaluation completed. Will continue to address dysphagia. Please see note for further details and recommendations.

## 2019-06-08 NOTE — PROGRESS NOTES
"Pharmacokinetic Consult - Vancomycin Dosing  Stef Jones is a 74 y.o. male who has been consulted for vancomycin dosing for complicated skin and soft tissue infection (goal trough 10-15 mcg/mL).  Ht - 167.6 cm (66\")  Wt - 77.9 kg (171 lb 11.8 oz)    Current Antimicrobial Therapy  Meropenem 1 gm IV q8h (extended infusion) - day 1 (started 6/8)  Vancomycin 1250 mg IV q24h - day 1 (started 6/8)    Allergies  Amoxicillin and Penicillins    Microbiology and Radiology  Microbiology Results (last 10 days)       Procedure Component Value - Date/Time    Wound Culture - Wound, Foot, Right [[id number]] Collected:  06/08/19 0349    Lab Status:  Preliminary result Specimen:  Wound from Foot, Right Updated:  06/08/19 0555     Gram Stain No WBCs or organisms seen          Relevant clinical data and objective history reviewed:  Results from last 7 days   Lab Units 06/08/19  0343   BUN mg/dL 18   CREATININE mg/dL 1.43*    Estimated Creatinine Clearance: 43.1 mL/min (A) (by C-G formula based on SCr of 1.43 mg/dL (H)).   Intake & Output (last 3 days)         06/05 0701 - 06/06 0700 06/06 0701 - 06/07 0700 06/07 0701 - 06/08 0700    P.O.   40    IV Piggyback   100    Total Intake(mL/kg)   140 (1.8)    Urine (mL/kg/hr)   200    Total Output   200    Net   -60                 Asessment/Plan  1. Will give vancomycin 1500 mg IV once (given in ED 6/8 at 0432)                                                followed by      Vancomycin 1250 mg IV q 24 hours    2. Vancomycin trough is scheduled 6/10 at 0530 prior to the 3rd dose    Cb Eli, PharmD, BCPS  6/8/2019  6:11 AM   "

## 2019-06-08 NOTE — MBS/VFSS/FEES
Acute Care - Speech Language Pathology   Swallow Initial Evaluation [facility]   Fiberoptic Endoscopic Evaluation of Swallowing (FEES)     Patient Name: Stef Jones  : 1945  MRN: [id number]  Today's Date: 2019               Admit Date: 2019                   Visit Dx:   No diagnosis found.  Patient Active Problem List   Diagnosis   • Cellulitis of Right Lower Leg and Foot   • A Fib w/RVR   • COPD (chronic obstructive pulmonary disease) (CMS/HCC)     Past Medical History:   Diagnosis Date   • COPD (chronic obstructive pulmonary disease) (CMS/HCC)    • Hearing loss    • Hoarse voice quality    • Hypertension      Past Surgical History:   Procedure Laterality Date   • HEMORRHOIDECTOMY          SWALLOW EVALUATION (last 72 hours)      SLP Adult Swallow Evaluation     Row Name 19 1440 19 0900          Document Type  evaluation  -MP  evaluation  -MP    Subjective Information  no complaints  -MP  no complaints  -MP    Patient Observations  alert;cooperative  -MP  alert;cooperative  -MP    Patient/Family Observations  Family present  -MP  Dtr present  -MP    Patient Effort  good  -MP  good  -MP          Patient Profile Reviewed  yes  -MP  yes  -MP    Pertinent History Of Current Problem  See previous eval  -MP  Adm  w/ Afib w/ RVR. Hx COPD, HTN. Possible DVT. Failed [place] Swallow screen.  -MP    Current Method of Nutrition  --  NPO  -MP    Precautions/Limitations, Vision  --  WFL;for purposes of eval  -MP    Precautions/Limitations, Hearing  --  WFL;for purposes of eval  -MP    Prior Level of Function-Communication  --  other (see comments) baseline unknown  -MP    Prior Level of Function-Swallowing  --  other (see comments) pt/dtr report occasional coughing/choking w/ liquids  -MP    Plans/Goals Discussed with  --  patient;family;agreed upon  -MP    Barriers to Rehab  --  none identified  -MP    Patient's Goals for Discharge  --  patient did not state  -MP    Family Goals for Discharge   --  family did not state  -MP          Additional Documentation  Pain Scale: FACES Pre/Post-Treatment (Group)  -MP  Pain Scale: FACES Pre/Post-Treatment (Group)  -MP          Pain: FACES Scale, Pretreatment  0-->no hurt  -MP  2-->hurts little bit  -MP    Pain: FACES Scale, Post-Treatment  0-->no hurt  -MP  2-->hurts little bit  -MP          Dentition Assessment  --  missing teeth;teeth are in poor condition  -MP    Secretion Management  --  WNL/WFL  -MP    Mucosal Quality  --  moist, healthy  -MP          Oral Motor General Assessment  --  WFL  -MP          Respiratory Support Currently in Use  --  nasal cannula  -MP    Eating/Swallowing Skills  --  fed by SLP  -MP    Positioning During Eating  --  upright in bed  -MP    Utensils Used  --  spoon;cup  -MP    Consistencies Trialed  --  thin liquids;nectar/syrup-thick liquids;pureed  -MP          Pharyngeal Phase  --  suspected pharyngeal impairment  -MP    Clinical Swallow Evaluation Summary  --  Clinical swallow evaluation completed. Pt given trials of ice chipx1, thin via tsp, NTL via tsp/cup, and puree. Throat clear following ice chip. Immediate cough w/ thin, NTL via cup, and puree. Cannot make safe diet recommendation w/o instrumental evaluation. Pt/family in agreement w/ FEES. Spoke to RN @ ~1130 and pt had been placed on BiPAP. SLP will proceed w/ FEES when pt appropriate. Until FEES, rec NPO w/ meds alternate route.  -MP          Pharyngeal Phase Concerns  --  throat clear;cough  -MP    Cough  --  thin;nectar;pudding  -MP    Throat Clear  --  thin  -MP          Risks/Benefits Reviewed  risks/benefits explained;patient;agreed to eval  -MP  --    Nasal Entry  right:  -MP  --    Special Considerations  Scope #918  -MP  --          Anatomic Considerations  anatomic deviation observed (see comments);other (see comments)  -MP  --    Comment  Upon entry of pharynx, observed what appeared to be thick dried secretions t/o hypopharynx. Pt given tsp thin H2O in attempt  to loosen secretions. Pt could not cough up to mouth. RN NT suctioned, which cleared a portion of secretions. ? if remaining material in laryngeal vestibule & on vocal folds were dried secretions or abnormal lesions. Please see pictures.  -MP  --    Velopharyngeal  WFL  -MP  --    Base of Tongue  symmetrical  -MP  --    Epiglottis  WFL  -MP  --    Laryngeal Function Breathing  symmetrical  -MP  --    Laryngeal Function Phonation  symmetrical  -MP  --    Laryngeal Function to Breath Hold  other (see comments) DNA  -MP  --    Secretion Rating Scale (Trey et al. 1996)  3- secretions inside the laryngeal vestibule/aspiration, not cleared Please see antomic considerations comment  -MP  --    Secretion Description  thick;discolored  -MP  --    Ice Chips  aspirated  -MP  --    Spontaneous Swallow  frequency adequate  -MP  --    Sensory  sensed scope  -MP  --    Utensils Used  Spoon;Cup  -MP  --    Consistencies Trialed  thin liquids;nectar-thick liquids;honey-thick liquids;pudding/puree  -MP  --          Oral Phase  WFL  -MP  --    Oral Phase, Comment  for liquids & puree. DNT solids. Edentulous  -MP  --          Initiation of Pharyngeal Swallow  bolus in valleculae  -MP  --    Pharyngeal Phase  impaired pharyngeal phase of swallowing  -MP  --    Penetration During the Swallow  nectar-thick liquids;secondary to delayed swallow initiation or mistiming;secondary to reduced vestibular closure;other (see comments) deep  -MP  --    Aspiration During the Swallow  thin liquids;secondary to delayed swallow initiation or mistiming;other (see comments);secondary to reduced vestibular closure  -MP  --    Response to Aspiration  cough  -MP  --    Attempted Compensatory Maneuvers  bolus size;bolus presentation style  -MP  --    Response to Attempted Compensatory Maneuvers  did not prevent penetration;did not prevent aspiration  -MP  --    FEES Summary  Pt presents w/ moderate pharyngeal dysphagia. Please see anatomic considerations  comment above re: secretions v. ? abnormal lesions. Delay & reduced laryngeal vestibular closure, as well as ? some discoordination of breathing/swallowing, resulted in aspiration during the swallow w/ thin liquid and deep penetration during w/ nectar-thick liquid. Unable to fully clear w/ cued cough/throat clear. No penetration/aspiration w/ HTL or pudding. DNT solid as pt agitation w/ eval began to increase, though do not suspect significant pharyngeal difficuly w/ solids. Observed what appeared to be retrograde flow to the pyriforms (c/w known GERD), which pt was able to clear w/ cued swallow, and did not appear to significantly impact swallow safety. Rec dysphagia level III diet w/ HTL. Meds crushed in pudding. SLP will continue to follow.  -MP  --          SLP Swallowing Diagnosis  moderate;pharyngeal dysfunction  -MP  suspected pharyngeal dysfunction  -MP    Functional Impact  risk of aspiration/pneumonia  -MP  risk of aspiration/pneumonia  -MP    Rehab Potential/Prognosis, Swallowing  good, to achieve stated therapy goals  -MP  good, to achieve stated therapy goals  -MP    Swallow Criteria for Skilled Therapeutic Interventions Met  demonstrates skilled criteria  -MP  demonstrates skilled criteria  -MP          Therapy Frequency (Swallow)  5 days per week  -MP  --    Predicted Duration Therapy Intervention (Days)  until discharge  -MP  --    SLP Diet Recommendation  puree;honey thick liquids  -MP  NPO  -MP    Recommended Diagnostics  --  FEES  -MP    Recommended Precautions and Strategies  upright posture during/after eating;small bites of food and sips of liquid;no straw;other (see comments) reflux precautions  -MP  other (see comments) Oral care BID/PRN  -MP    SLP Rec. for Method of Medication Administration  meds crushed;with pudding or applesauce  -MP  meds via alternate route  -MP    Monitor for Signs of Aspiration  yes;notify SLP if any concerns  -MP  --    Anticipated Dischage Disposition   unknown;anticipate therapy at next level of care  -MP  unknown  -MP      User Key  (r) = Recorded By, (t) = Taken By, (c) = Cosigned By    Initials Name Effective Dates    Claude Hawkins, MS, CFY-SLP 08/15/18 -           EDUCATION  The patient has been educated in the following areas:   Dysphagia (Swallowing Impairment) Modified Diet Instruction.    SLP Recommendation and Plan  SLP Swallowing Diagnosis: moderate, pharyngeal dysfunction  SLP Diet Recommendation: puree, honey thick liquids  Recommended Precautions and Strategies: upright posture during/after eating, small bites of food and sips of liquid, no straw, other (see comments)(reflux precautions)  SLP Rec. for Method of Medication Administration: meds crushed, with pudding or applesauce     Monitor for Signs of Aspiration: yes, notify SLP if any concerns  Recommended Diagnostics: FEES  Swallow Criteria for Skilled Therapeutic Interventions Met: demonstrates skilled criteria  Anticipated Dischage Disposition: unknown, anticipate therapy at next level of care  Rehab Potential/Prognosis, Swallowing: good, to achieve stated therapy goals  Therapy Frequency (Swallow): 5 days per week  Predicted Duration Therapy Intervention (Days): until discharge       Plan of Care Reviewed With: patient, family  Plan of Care Review  Plan of Care Reviewed With: patient, family    SLP GOALS     Row Name 06/08/19 1440             Oral Nutrition/Hydration Goal 1 (SLP)    Oral Nutrition/Hydration Goal 1, SLP  LTG: Pt will return to regular diet, thin liquids w/ no overt s/sxs aspiration or distress w/ 100% acc and no cues  -MP      Time Frame (Oral Nutrition/Hydration Goal 1, SLP)  by discharge  -MP         Oral Nutrition/Hydration Goal 2 (SLP)    Oral Nutrition/Hydration Goal 2, SLP  Pt will tolerate puree, some mashed and HTL w/ no overt s/sxs aspiration or distress w/ 100% acc and no cues  -MP      Time Frame (Oral Nutrition/Hydration Goal 2, SLP)  short term goal (STG);by  discharge  -MP         Oral Nutrition/Hydration Goal (SLP)    Oral Nutrition/Hydration Goal, SLP  Pt will tolerate therapeutic trials of thin H2O w/ no overt s/sxs aspiration or distress w/ 60% acc and no cues in order to determine readiness for repeat instrumental eval  -MP      Time Frame (Oral Nutrition/Hydration Goal, SLP)  short term goal (STG);by discharge  -MP         Pharyngeal Strengthening Exercise Goal 1 (SLP)    Activity (Pharyngeal Strengthening Goal 1, SLP)  increase timing;increase closure at entrance to airway/closure of airway at glottis  -MP      Increase Timing  prepping - 3 second prep or suck swallow or 3-step swallow  -MP      Increase Closure at Entrance to Airway/Closure of Airway at Glottis  super-supraglottic swallow;hard effortful swallow  -MP      [place]/Accuracy (Pharyngeal Strengthening Goal 1, SLP)  with minimal cues (75-90% accuracy)  -MP      Time Frame (Pharyngeal Strengthening Goal 1, SLP)  short term goal (STG);by discharge  -MP        User Key  (r) = Recorded By, (t) = Taken By, (c) = Cosigned By    Initials Name Provider Type    Claude Hawkins MS, CFY-SLP Speech and Language Pathologist             Time Calculation:   Time Calculation- SLP     Row Name 06/08/19 1616 06/08/19 1256          Time Calculation- SLP    SLP Start Time  1440  -MP  0900  -MP     SLP Received On  06/08/19  -MP  06/08/19  -MP       User Key  (r) = Recorded By, (t) = Taken By, (c) = Cosigned By    Initials Name Provider Type    Claude Hawkins MS, CFY-SLP Speech and Language Pathologist          Therapy Charges for Today     Code Description Service Date Service Provider Modifiers Qty    31905264025 HC ST EVAL ORAL PHARYNG SWALLOW 3 6/8/2019 Claude Pagan MS, ALEJANDRA-SLP GN 1    [id number] HC ST FIBEROPTIC ENDO EVAL SWALL 5 6/8/2019 Claude Pagan MS, CFY-SLP GN 1               Claude Pagan MS, CFY-SLP  6/8/2019

## 2019-06-08 NOTE — CONSULTS
"[facility]  Cardiology Consult Note  [facility] 2B ICU          Patient Identification:  Stef Jones      [id number]  74 y.o.        male  1945       Date of Consultation:  06/08/19    Reason for Consultation:  Afib with RVR  PCP: Lexx Monson MD  Primary cardiologist: n/a  History of Present Illness:     Mr. Jones is a 73 y/o male with pmhx of hypertension and COPD who was transferred from [facility] for Afib with RVR and cellulitis of right lower calf/foot. He reportedly developed bilateral foot swelling about one week ago and was treated with bactrim, steroid and pain medications with minimal improvement. He therefore presented to OSH for further evaluation at which time he was noted to be in Afib with RVR. He was given IV diltiazem without improvement. He was placed on heparin gtt and transferred to [facility] for higher level of care. He also had a syncopal episode yesterday \"due to smothering\" and coughing. Cardiology is consulted for Afib with RVR. He was started on amiodarone gtt at 04:00, given one time dose of IV digoxin 500 mcg but remains in RVR.    Currently resting in bed. Reports feeling short of breath. Denies CP, LH, dizziness, palpitations, near syncope, PND or orthopnea. Denies cough, wheezing, fever or chills. + Lower extremity edema x several days. No known h/o CAD, Afib or other cardiac issues. Reports having negative stress test two years ago. No other cardiac workup.  No h/o TIA or CVAs. Denies h/o GI or brain bleeding, frequent falls.     Past History:  Past Medical History:   Diagnosis Date   • COPD (chronic obstructive pulmonary disease) (CMS/HCC)    • Hearing loss    • Hoarse voice quality    • Hypertension      Past Surgical History:   Procedure Laterality Date   • HEMORRHOIDECTOMY       Allergies   Allergen Reactions   • Amoxicillin Unknown (See Comments)     unknown   • Penicillins Unknown (See Comments)     unknown     Social " History     Socioeconomic History   • Marital status:      Spouse name: Not on file   • Number of children: Not on file   • Years of education: Not on file   • Highest education level: Not on file   Tobacco Use   • Smoking status: Current Every Day Smoker   Substance and Sexual Activity   • Alcohol use: No     Frequency: Never   • Drug use: No   • Sexual activity: Defer     Family History   Problem Relation Age of Onset   • Heart disease Mother      Medications:  Medications Prior to Admission   Medication Sig Dispense Refill Last Dose   • amitriptyline (ELAVIL) 100 MG tablet Take 100 mg by mouth Every Night.      • amLODIPine (NORVASC) 2.5 MG tablet Take 2.5 mg by mouth Daily.      • atorvastatin (LIPITOR) 20 MG tablet Take 20 mg by mouth Every Night.      • esomeprazole (nexIUM) 40 MG capsule Take 40 mg by mouth Every Morning Before Breakfast.      • Fluticasone Furoate-Vilanterol (BREO ELLIPTA) 100-25 MCG/INH inhaler Inhale 1 puff Daily.      • furosemide (LASIX) 20 MG tablet Take 20 mg by mouth Daily.      • ipratropium (ATROVENT) 0.02 % nebulizer solution Take 500 mcg by nebulization 4 (Four) Times a Day.      • levalbuterol (XOPENEX HFA) 45 MCG/ACT inhaler Inhale 1-2 puffs Every 6 (Six) Hours As Needed for Wheezing or Shortness of Air.      • metoprolol tartrate (LOPRESSOR) 50 MG tablet Take 50 mg by mouth 2 (Two) Times a Day.        Current medications:    [START ON 6/10/2019] Pharmacy Consult  Does not apply Once   bumetanide 2.5 mg Intravenous Once   ipratropium-albuterol 3 mL Nebulization 4x Daily - RT   labetalol 10 mg Intravenous Once   meropenem 1 g Intravenous Q8H   sodium chloride 3 mL Intravenous Q12H   [START ON 6/9/2019] vancomycin 15 mg/kg Intravenous Q24H     Current IV drips:    amiodarone 0.5 mg/min Last Rate: 0.5 mg/min (06/08/19 0823)   heparin 18 Units/kg/hr Last Rate: 18 Units/kg/hr (06/08/19 4460)   Pharmacy to Dose Heparin     Pharmacy Consult         Review of  "Systems:    Constitutional no fever,  no weight loss   Skin no rash   Otolaryngeal no difficulty swallowing   Cardiovascular See HPI   Pulmonary no cough, no sputum production   Gastrointestinal no constipation, no diarrhea   Genitourinary no dysuria, no hematuria   Hematologic no easy bruisability, no abnormal bleeding   Musculoskeletal no muscle pain   Neurologic no dizziness, no falls     Physical exam:    /86   Pulse (!) 135   Temp 97.7 °F (36.5 °C) (Oral)   Resp 28   Ht 167.6 cm (65.98\")   Wt 77.9 kg (171 lb 11.8 oz)   SpO2 91%   BMI 27.73 kg/m²  Body mass index is 27.73 kg/m².   Oxygen saturation   SpO2  Min: 89 %  Max: 95 %    General Appearance:   · well developed  · well nourished  HENT:   · oropharynx moist  · lips not cyanotic  Neck:  · thyroid not enlarged  · supple  Respiratory:  · no respiratory distress  · normal breath sounds  · no rales  Cardiovascular:  · no jugular venous distention  · irregularly irregular, tachycardic  · apical impulse normal  · S1 normal, S2 normal  · no S3, no S4   · no murmur  · no rub, no thrill  · carotid pulses normal; no bruit  · pedal pulses 1+ DP and TP bilaterally.   · lower extremity edema: 2+    Gastrointestinal:   · bowel sounds normal  · non-tender  · no hepatomegaly, no splenomegaly  Musculoskeletal:  · no clubbing of fingers.   · normocephalic, head atraumatic  Skin:   · warm, dry. Mild erythema to LLE.  Psychiatric:  · judgement and insight appropriate  · normal mood and affect    Cardiographics:     ECG  (personally reviewed) Afib with  bpm   Telemetry:  (personally reviewed) Afib 120-130's   ECHO:         CATH:     CARDIOLITE:      Lab Review:     Results from last 7 days   Lab Units 06/08/19  0343   WBC 10*3/mm3 10.87*   HEMOGLOBIN g/dL 15.2   HEMATOCRIT % 48.0          Results from last 7 days   Lab Units 06/08/19  0343   SODIUM mmol/L 138   BUN mg/dL 18   CREATININE mg/dL 1.43*   GLUCOSE mg/dL 127*      Estimated Creatinine Clearance: " 43.1 mL/min (A) (by C-G formula based on SCr of 1.43 mg/dL (H)).   Lab Results   Component Value Date    CHOL 108 06/08/2019    TRIG 61 06/08/2019    HDL 59 06/08/2019    LDL 37 06/08/2019        Results from last 7 days   Lab Units 06/08/19  0615 06/08/19  0343   INR  1.34* 1.30*        Lab Results   Component Value Date    TSH 3.730 06/08/2019        Lab Results   Component Value Date    HGBA1C 6.60 (H) 06/08/2019           No results found for: DIGOXIN   No results found for: DDIMERQUANT     Imaging:  All pertinent imaging studies were personally reviewed.    Assessment:      A Fib w/RVR    Cellulitis of Right Lower Leg and Foot    COPD (chronic obstructive pulmonary disease) (CMS/[facility])      Initial cardiac assessment:  Mr. Jones is a 73 y/o male with pmhx of hypertension and COPD who was transferred from [facility] for Afib with RVR and cellulitis of right lower calf/foot.    Recommendations:  1. Afib with RVR  - newly diagnosed, incidentally found  - Treated with Cardizem gtt at OSH, now on amio drip and given IV digoxin 500 mcg x 1.   - Asymptomatic and hemodynamically stable.   - CHADSVASC = 3 (age, HTN), on heparin gtt  - Echo shows ejection fraction of mid 30s, moderate RV enlargement and RV dysfunction.  - Continue Amiodarone gtt. To receive one time dose of IV labetalol now. May add esmolol gtt if needed.   - Unclear how long he has been in Afib. Will defer ECV at this time. Consider DENA/ECV on Monday if still in Afib.     Will need to initiate guideline directed medical therapy for systolic dysfunction once blood pressure stable and respiratory status improve, likely will need cardioselective beta-blocker such as bisoprolol    2. Lower extremity edema  - Duplex negative for DVT (preliminary)  - Echo pending  - IV Bumex x 1.     3. Hypertension  - stable    4. Syncope  - reportedly had syncopal episode yesterday.     5. Lower extremity cellulitis  - per admitting  6. COPD  -  Per admitting      Thank  you for allowing us to share in Mr. Jones's care.    Discussed with patient's nurse and patient's family      Scribed for Yovanny Solares MD by HIMA Ruiz, APRN. 6/8/2019  9:47 AM    I,Yovanny Solares M.D., personally performed the services described in this documentation as scribed by the above named individual in my presence, and it is both accurate and complete.    Yovanny Solares MD, [facility]  06/08/19  3:41 PM

## 2019-06-08 NOTE — PROGRESS NOTES
HEPARIN INFUSION  Stef Jones is a  74 y.o. male receiving heparin infusion.     Therapy for (VTE/Cardiac):   VTE  Patient Weight: 77.9 kg  Initial Bolus (Y/N):   No (bolus/drip at OSH)  Any Bolus (Y/N):   Yes         Signs or Symptoms of Bleeding: none noted  VTE (PE/DVT)   Initial Bolus: 80 units/kg (Max 10,000 units)  Initial rate: 18 units/kg/hr (Max 1,500 units/hr)   Anti -Xa (IU/mL) Bolus Dose Stop Infusion Rate Change Repeat Anti-Xa      ?0.19 80 units/kg 0 hrs Increase rate by   4 units/kg/hr 6 hrs      0.2 - 0.9 40 units/kg 0 hrs Increase rate by 2 units/kg/hr 6 hrs    0.3 - 0.7 0 0 hrs No change 6 hrs      0.71 - 0.99 0  0 hrs Decrease rate by 2 units/kg/hr 6 hrs    >1 0 Hold 1 hr Decrease rate by 3 units/kg/hr 6 hrs      Results from last 7 days   Lab Units 06/08/19  0615 06/08/19  0343   INR  1.34* 1.30*   HEMOGLOBIN g/dL  --  15.3  15.2   HEMATOCRIT %  --  48.9  48.0   PLATELETS 10*3/mm3  --  202  212       Date   Time   Anti-Xa Current Rate (Unit/kg/hr) Bolus   (Units) Rate Change   (Unit/kg/hr) New Rate (Unit/kg/hr) Next anti-Xa Comments  Pump Check Daily   6/8 0343 0.75 (was on heparin at OSH but has been off since arriving at [facility]) 6400  (given at OSH) +18 18 1200 Nick 5536 -acb   6/8 1254 0.89 18 0 -2 16 1900 Gaudencio Vinson, no hold                Thank you for this consult.  Lino Pratt IV, PharmD, BCPS  6/8/2019  12:59 PM

## 2019-06-08 NOTE — PROGRESS NOTES
INTENSIVIST   PROGRESS NOTE     Hospital:  LOS: 0 days      S     Mr. Stef Jones, 74 y.o. male is followed for:  No chief complaint on file.      A Fib w/RVR    Cellulitis of Right Lower Leg and Foot    COPD (chronic obstructive pulmonary disease) (CMS/[facility])    As an Intensivist, we provide an integrated approach to the ICU patient and family, medical management of comorbid conditions, lead interdisciplinary rounds and coordinate the care with all other services, including those from other specialists.     Interval History:  He remains in AFib with RVR.  BiPAP started this morning.  Dyspnea  Denies Chest Pain.     The patient's relevant past medical, surgical and social history were reviewed and updated in Epic as appropriate.     ROS:   Constitutional: Negative for fever.   Respiratory: Dyspnea.   Cardiovascular: Negative for chest pain.   Gastrointestinal: Negative for  nausea, vomiting and diarrhea.        O     Vitals:  Temp: 96.9 °F (36.1 °C) (06/08/19 1200) Temp  Min: 96.9 °F (36.1 °C)  Max: 99.1 °F (37.3 °C)   BP: 120/98 (06/08/19 1215) BP  Min: 80/44  Max: 151/111   Pulse: (!) 124 (06/08/19 1255) Pulse  Min: 110  Max: 163   Resp: 26 (06/08/19 1200) Resp  Min: 24  Max: 28   SpO2: 97 % (06/08/19 1255) SpO2  Min: 82 %  Max: 97 %   Device: NPPV/NIV (06/08/19 1255)    Flow Rate: 6 (06/08/19 1000) Flow (L/min)  Min: 2  Max: 6     Intake/Ouptut 24 hrs (7:00AM - 6:59 AM)  Intake/Output       06/07/19 0700 - 06/08/19 0659 06/08/19 0700 - 06/09/19 0659    Intake (ml) 946 346.3    Output (ml) 350 --    Net (ml) 596 346.3    Last Weight  77.9 kg (171 lb 11.8 oz)  77.6 kg (171 lb)           Medications (drips):    amiodarone Last Rate: 0.5 mg/min (06/08/19 0823)   heparin Last Rate: 16 Units/kg/hr (06/08/19 1342)   Pharmacy to Dose Heparin    Pharmacy Consult        Physical Examination  Telemetry:  Rhythm: atrial rhythm (06/08/19 1200)  Atrial Rhythm: atrial fibrillation, atrial tachycardia (06/08/19 1200)       Constitutional:  No acute distress.   Cardiovascular: IRR. Normal heart sounds.  No murmurs, gallop or rub.   Respiratory: No respiratory distress.  Normal breath sounds  No adventitious sounds    Abdominal:  Soft with no tenderness  No distension. No HSM.   Extremities: Warm with no cyanosis.  Edema.   Neurological:   Alert and Oriented to person, place, and time.               Lines/Drains/Airways: Peripheral IV(s)     Hematology:  Results from last 7 days   Lab Units 06/08/19  0343   WBC 10*3/mm3 11.30*  10.87*   HEMOGLOBIN g/dL 15.3  15.2   MCV fL 88.4  88.2   PLATELETS 10*3/mm3 202  212       Chemistry:  Estimated Creatinine Clearance: 43.1 mL/min (A) (by C-G formula based on SCr of 1.43 mg/dL (H)).    Results from last 7 days   Lab Units 06/08/19  0343   SODIUM mmol/L 138   POTASSIUM mmol/L 4.8   CHLORIDE mmol/L 101   CO2 mmol/L 26.0   ANION GAP mmol/L 11.0   GLUCOSE mg/dL 127*   CALCIUM mg/dL 9.0     Results from last 7 days   Lab Units 06/08/19  0343   BUN mg/dL 18   CREATININE mg/dL 1.43*     Results from last 7 days   Lab Units 06/08/19  0343   MAGNESIUM mg/dL 2.1   PHOSPHORUS mg/dL 2.8       Hepatic:  Results from last 7 days   Lab Units 06/08/19  0343   ALK PHOS U/L 158*   BILIRUBIN mg/dL 0.7   ALT (SGPT) U/L 47*   AST (SGOT) U/L 34   ALBUMIN g/dL 3.40*       Arterial Blood Gases:  Results from last 7 days   Lab Units 06/08/19  1106 06/08/19  0331   PH, ARTERIAL pH units 7.318* 7.420   PCO2, ARTERIAL mm Hg 48.3 39.3   PO2 ART mm Hg 59.9* 73.0*   FIO2 % 44 36       Cardiac:  Results from last 7 days   Lab Units 06/08/19  0343   CK TOTAL U/L 231*   TROPONIN T ng/mL <0.010     Results from last 7 days   Lab Units 06/08/19  0343   PROBNP pg/mL 4,193.0*       Lab Results   Lab Value Date/Time    PROCALCITO 0.04 (L) 06/08/2019 0343     Lab Results   Lab Value Date/Time    LACTATE 0.8 06/08/2019 0343      Images:  Xr Chest 1 View    Result Date: 6/8/2019  1. Diffuse and fairly uniform pulmonary  interstitial disease. From this study it is uncertain if this represents a diffuse pattern of mild interstitial edema, pulmonary fibrosis, etc. If the patient's clinical picture is not clear, consider chest CT scan. 2. Clearly calcified small pulmonary nodules and more questionable left upper and mid lung nodules, but favored to be partly calcified also. Either comparison with outside radiograph or follow-up CT is suggested.  DICTATED:   06/08/2019 EDITED/ls :   06/08/2019        Echo:  Results for orders placed during the hospital encounter of 06/08/19   Adult Transthoracic Echo Complete W/ Cont if Necessary Per Protocol    Narrative · Calculated EF = 33.0%.  · Left ventricular systolic function is severely decreased.  · Left ventricular diastolic dysfunction (grade II) consistent with   pseudonormalization.  · Moderately reduced right ventricular systolic function noted.  · Right ventricular cavity is moderately dilated.  · Moderate mitral valve regurgitation is present  · Mild to moderate tricuspid valve regurgitation is present.  · Estimated right ventricular systolic pressure from tricuspid   regurgitation is mildly elevated (35-45 mmHg).          Results: Reviewed.  I reviewed the patient's new laboratory and imaging results.  I independently reviewed the patient's new images.    Medications: Reviewed.    Assessment/Plan   A / P     Assessment:    74 y.o.male, admitted on 6/8/2019 with A-fib (CMS/HCC) [I48.91]  Atrial fibrillation with rapid ventricular response (CMS/HCC) [I48.91]:       1. Respiratory failure  1. COPD  2. Bronchodilators  2. Cardiac:  1. A Fib with RVR  1. Anticoagulation on Heparin gtt.   2. Cardiomyopathy, EF 33%  3. TR  4. HTN on CCB  5. Dyslipidemia on statins  3. Cellulitis R lower extremity and foot.  1. Unlikely sepsis. Negative PCT on admission.  2. Started 1 week prior to admission, and was treated with TMP-SMX  4. Antibiotics: Vanco + MRP  5. GERD on PPI  6. Glucose: T2D per A1c  criteria    Results from last 7 days   Lab Units 06/08/19  1131   GLUCOSE mg/dL 121     Lab Results   Lab Value Date/Time    HGBA1C 6.60 (H) 06/08/2019 0343       Diet: NPO Diet   Advance Directives: Code Status and Medical Interventions:   Ordered at: 06/08/19 0320     Code Status:    CPR     Medical Interventions (Level of Support Prior to Arrest):    Full        Plan:    1. BiPAP   1. Gas exchange in AM  2. Cardiac:  1. Rate control  2. Diuretics as tolerated  3. Follow up proBNP in AM  4. TSH normal on admission.  3. Empiric Abs  1. ? Follow up PCT in AM  4. Goal: Glucose < 180 mg/dL.   1. Start with Correction Insulin Scale  5. Disposition: Keep in ICU.    Plan of care and goals reviewed during interdisciplinary rounds.  I discussed the patient's findings and my recommendations with patient and nursing staff    Level of Risk is High due to:  illness with threat to life or bodily function.     Time: was greater than 40 minutes.    (This excludes time spent performing separately reportable procedures and services).  Patient was at high risk of imminent or life-threatening deterioration in his condition due to Respiratory failure.     Stanislaw Torrez MD, FACP, FCCP, CNSC  Intensive Care Medicine, Nutrition Support and Pulmonary Medicine     [x]  Primary Attending  []  Consultant

## 2019-06-08 NOTE — PLAN OF CARE
Problem: Patient Care Overview  Goal: Plan of Care Review  Outcome: Ongoing (interventions implemented as appropriate)   06/08/19 0549 06/08/19 1800 06/08/19 0706   Coping/Psychosocial   Plan of Care Reviewed With --  patient --    Plan of Care Review   Progress no change --  --    OTHER   Outcome Summary --  --  O2 improved with Bipap, HR maintains in 120's, Family members are anxious and questioning nursing actions, family also trying to direct nursing care provided however they seem to possess little education and knowledge concerning to pt, heparin drip at 16 u/kg/hr, amio on, prn meds also given for HR, BP has remained stable, WCTM.

## 2019-06-08 NOTE — DISCHARGE INSTR - DIET
FEES   6/8/19   Reason for Referral  Patient was referred for a FEES to assess the efficiency of his/her swallow function, rule out aspiration and make recommendations regarding safe dietary consistencies, effective compensatory strategies, and safe eating environment.         Recommendations/Treatment  SLP Swallowing Diagnosis: moderate, pharyngeal dysfunction  Functional Impact: risk of aspiration/pneumonia  Rehab Potential/Prognosis, Swallowing: good, to achieve stated therapy goals  Swallow Criteria for Skilled Therapeutic Interventions Met: demonstrates skilled criteria  Therapy Frequency (Swallow): 5 days per week  Predicted Duration Therapy Intervention (Days): until discharge  SLP Diet Recommendation: puree, honey thick liquids  Recommended Diagnostics: FEES  Recommended Precautions and Strategies: upright posture during/after eating, small bites of food and sips of liquid, no straw, other (see comments)(reflux precautions)  SLP Rec. for Method of Medication Administration: meds crushed, with pudding or applesauce  Monitor for Signs of Aspiration: yes, notify SLP if any concerns  Anticipated Dischage Disposition: unknown, anticipate therapy at next level of care    Instrumental Set-up  Risks/Benefits Reviewed: risks/benefits explained, patient, agreed to eval  Nasal Entry: right:  Anatomic Considerations: anatomic deviation observed (see comments), other (see comments)  Comment: Upon entry of pharynx, observed what appeared to be thick dried secretions t/o hypopharynx. Pt given tsp thin H2O in attempt to loosen secretions. Pt could not cough up to mouth. RN NT suctioned, which cleared a portion of secretions. ? if remaining material in laryngeal vestibule & on vocal folds were dried secretions or abnormal lesions. Please see pictures.  Utensils Used: Spoon, Cup  Consistencies Trialed: thin liquids, nectar-thick liquids, honey-thick liquids, pudding/puree    Oral Preparation/ Oral Phase  Oral Phase: WFL  Oral  Phase, Comment: for liquids & puree. DNT solids. Edentulous    Pharyngeal Phase  Initiation of Pharyngeal Swallow: bolus in valleculae  Pharyngeal Phase: impaired pharyngeal phase of swallowing  Penetration During the Swallow: nectar-thick liquids, secondary to delayed swallow initiation or mistiming, secondary to reduced vestibular closure, other (see comments)(deep)  Aspiration During the Swallow: thin liquids, secondary to delayed swallow initiation or mistiming, other (see comments), secondary to reduced vestibular closure  Response to Aspiration: cough  Attempted Compensatory Maneuvers: bolus size, bolus presentation style  Response to Attempted Compensatory Maneuvers: did not prevent penetration, did not prevent aspiration  FEES Summary: Pt presents w/ moderate pharyngeal dysphagia. Please see anatomic considerations comment above re: secretions v. ? abnormal lesions. Delay & reduced laryngeal vestibular closure, as well as ? some discoordination of breathing/swallowing, resulted in aspiration during the swallow w/ thin liquid and deep penetration during w/ nectar-thick liquid. Unable to fully clear w/ cued cough/throat clear. No penetration/aspiration w/ HTL or pudding. DNT solid as pt agitation w/ eval began to increase, though do not suspect significant pharyngeal difficuly w/ solids. Observed what appeared to be retrograde flow to the pyriforms (c/w known GERD), which pt was able to clear w/ cued swallow, and did not appear to significantly impact swallow safety. Rec dysphagia level III diet w/ HTL. Meds crushed in pudding. SLP will continue to follow.

## 2019-06-08 NOTE — NURSING NOTE
"  ACC REVIEW REPORT: [facility]        PATIENT NAME: Stef Jones    PATIENT ID: [id number]    BED: [id number]    BED TYPE: ICU    BED GIVEN TO: RUSLAN SALMON RN    TIME BED GIVEN: 0030    YOB: 1945    AGE: 74    GENDER: MALE    PREVIOUS ADMIT TO [facility]: N    PREVIOUS ADMISSION DATE:     PATIENT CLASS: INPT    TODAY'S DATE: 6/8/2019    TRANSFER DATE: 6/8/19    ETA: WILL CALL WHEN PT LEAVES OSH    TRANSFERRING FACILITY: [facility] FACILITY PHONE # : [phone]    TRANSFERRING MD: WOLF    DATE/TIME REQUEST RECEIVED: 6/7/19 @7966    [facility] RN: DIVYA PARRA RN    REPORT FROM: RUSLAN SALMON RN    TIME REPORT TAKEN: 0030    DIAGNOSIS: AFIB RVR    REASON FOR TRANSFER TO [facility]: HIGHER LEVEL OF CARE     TRANSPORTATION: AMBULANCE    CLINICAL REASON FOR TRANSFER TO [facility]:       CLINICAL INFORMATION    HEIGHT: 5'6\"    WEIGHT: 80KG    ALLERGIES: PCN    SMALL: N    INFECTIOUS DISEASE: N    ISOLATION: N    1ST VITAL SIGNS:   TIME:   TEMP:   PULSE:   B/P:   RESP:     LAST VITAL SIGNS:  TIME: 0030  TEMP: 98.1  PULSE: 130-160  B/P: 104/70  RESP:     LAB INFORMATION: D-DIMER 866      CULTURE INFORMATION:     MEDS/IV FLUIDS: 18G RAC  CARDIZIEM 10MG/HR  HEPARIN 28.8ML/HR  HEPARIN 6400 UNIT BOLUS      CARDIAC SYSTEM:    CHEST PAIN: N    RATE: N    SCALE: N    RHYTHM: AFIB    Is patient taking or has patient been given any drugs that could increase bleeding? N  (Plavix, Brilinta, Effient, Eliquis, Xarelto, Warfarin, Integrilin, Angiomax)    DRUG:      DOSE/FREQUENCY:     CARDIAC ENZYMES:    DATE: 6/7/19  TIME: 2215  CK:   CKMB: 9.1  JIM:   TROP:     DATE:   TIME:   CK:   CKMB:   JIM:   TROP:       HEART CATH:     HEART CATH DATE:     HEART CATH RESULTS:     LAD:   RCA:   CX:   LMAIN:   EF:     SWAN:     SITE:   SIZE:    DATE INSERTED:     ARTLINE:     SITE:   SIZE:   DATE INSERTED:     SHEATH:    SITE:   SIZE:   DATE INSERTED:         VASOSEAL:    SITE:   DATE INSERTED:     EXTERNAL PACEMAKER: "     RATE:   EXT PACER ON:     MODE:    DATE INSERTED:   OUTPUT SETTING:   SENSITIVITY SETTING:   SENSITIVITY TYPE:     IABP:    SITE:   AUG PRESSURE:   DATE INSERTED:     CARDIAC NOTES:       RESPIRATORY SYSTEM:    LUNG SOUNDS:    CLEAR:   CRACKLES:   WHEEZES: THROUGHOUT  RHONCHI:   DIMINISHED:     ABG DATE:         ABG TIME:     ABG RESULTS:    PH:   PO2:   PCO2:   HCO3:   O2 SAT:       ETT:     ETT SIZE:     ORAL:     NASAL:     SECURED AT MEASUREMENT (CM):     ON VENTILATOR:    TV:   FI02:   RATE:   PEEP:     OXYGEN: 2L    O2 SAT: 92    ADMINISTRATION ROUTE: NASAL CANNULA    IMAGING FINDINGS:     PNEUMO LOCATION:     PNEUMO SIZE:     PNEUMO CHEST TUBE SEAL TYPE:     RADIOLOGY RESULTS:     RESPIRATORY STATUS:       CNS/MUSCULOSKELETAL    ALERT AND ORIENTED:    PERSON: Y  PLACE: Y  TIME: Y    INJURY:  WHERE:     ANGELY COMA SCALE:    E: 4  M: 6  V: 5    STROKE SCALE:     SIZE OF HEMORRHAGE:     SYMPTOMS: (CHOOSE APPROPRIATE)    ASPHASIA:   ATAXIA:   DYSARTHRIA:   DYSPHASIA:   HEADACHE:   PARALYSIS:   SEIZURE:   SYNCOPE:   VERTIGO:   VISION CHANGE:        EXTREMITY WEAKNESS:    LEFT ARM:   RIGHT ARM:   LEFT LEG:   RIGHT LEG:     CAT SCAN RESULTS:     MRI RESULTS:     CNS/MUSCULOSKELETAL NOTES:       GI//GY      ABDOMINAL PAIN: N    VOMITING: N    DIARRHEA: N    NAUSEA: N    BOWEL SOUNDS: ACTIVE    OCCULT STOOL: N    VAGINAL BLEEDING: N/A    TESTICULAR PAIN: N    HEMATURIA: N    NG TUBE:    SIZE:   DATE INSERTED:       ULTRASOUND:     ULTRASOUND RESULTS:       ACUTE ABDOMEN:     ACUTE ABDOMEN RESULTS:       CT SCAN:     CT SCAN RESULTS:       GI//GY NOTES:     PAST MEDICAL HISTORY: COPD    OTHER SYMPTOM NOTES:     ADDITIONAL NOTES:           Mary Jane Robbins RN  6/8/2019  12:37 AM

## 2019-06-08 NOTE — THERAPY EVALUATION
Acute Care - Speech Language Pathology   Swallow Initial Evaluation [facility]   Clinical Swallow Evaluation     Patient Name: Stef Jones  : 1945  MRN: [id number]  Today's Date: 2019               Admit Date: 2019    Visit Dx:   No diagnosis found.  Patient Active Problem List   Diagnosis   • Cellulitis of Right Lower Leg and Foot   • A Fib w/RVR   • COPD (chronic obstructive pulmonary disease) (CMS/HCC)     Past Medical History:   Diagnosis Date   • COPD (chronic obstructive pulmonary disease) (CMS/HCC)    • Hearing loss    • Hoarse voice quality    • Hypertension      Past Surgical History:   Procedure Laterality Date   • HEMORRHOIDECTOMY          SWALLOW EVALUATION (last 72 hours)      SLP Adult Swallow Evaluation     Row Name 19 0900                   Rehab Evaluation    Document Type  evaluation  -MP        Subjective Information  no complaints  -MP        Patient Observations  alert;cooperative  -MP        Patient/Family Observations  Dtr present  -MP        Patient Effort  good  -MP           General Information    Patient Profile Reviewed  yes  -MP        Pertinent History Of Current Problem  Adm  w/ Afib w/ RVR. Hx COPD, HTN. Possible DVT. Failed [facility] Swallow screen.  -MP        Current Method of Nutrition  NPO  -MP        Precautions/Limitations, Vision  WFL;for purposes of eval  -MP        Precautions/Limitations, Hearing  WFL;for purposes of eval  -MP        Prior Level of Function-Communication  other (see comments) baseline unknown  -MP        Prior Level of Function-Swallowing  other (see comments) pt/dtr report occasional coughing/choking w/ liquids  -MP        Plans/Goals Discussed with  patient;family;agreed upon  -MP        Barriers to Rehab  none identified  -MP        Patient's Goals for Discharge  patient did not state  -MP        Family Goals for Discharge  family did not state  -MP           Pain Assessment    Additional Documentation  Pain Scale: FACES  Pre/Post-Treatment (Group)  -MP           Pain Scale: FACES Pre/Post-Treatment    Pain: FACES Scale, Pretreatment  2-->hurts little bit  -MP        Pain: FACES Scale, Post-Treatment  2-->hurts little bit  -MP           Oral Motor and Function    Dentition Assessment  missing teeth;teeth are in poor condition  -MP        Secretion Management  WNL/WFL  -MP        Mucosal Quality  moist, healthy  -MP           Oral Musculature and Cranial Nerve Assessment    Oral Motor General Assessment  WFL  -MP           General Eating/Swallowing Observations    Respiratory Support Currently in Use  nasal cannula  -MP        Eating/Swallowing Skills  fed by SLP  -MP        Positioning During Eating  upright in bed  -MP        Utensils Used  spoon;cup  -MP        Consistencies Trialed  thin liquids;nectar/syrup-thick liquids;pureed  -MP           Clinical Swallow Eval    Pharyngeal Phase  suspected pharyngeal impairment  -MP        Clinical Swallow Evaluation Summary  Clinical swallow evaluation completed. Pt given trials of ice chipx1, thin via tsp, NTL via tsp/cup, and puree. Throat clear following ice chip. Immediate cough w/ thin, NTL via cup, and puree. Cannot make safe diet recommendation w/o instrumental evaluation. Pt/family in agreement w/ FEES. Spoke to RN @ ~1130 and pt had been placed on BiPAP. SLP will proceed w/ FEES when pt appropriate. Until FEES, rec NPO w/ meds alternate route.  -MP           Pharyngeal Phase Concerns    Pharyngeal Phase Concerns  throat clear;cough  -MP        Cough  thin;nectar;pudding  -MP        Throat Clear  thin  -MP           Clinical Impression    SLP Swallowing Diagnosis  suspected pharyngeal dysfunction  -MP        Functional Impact  risk of aspiration/pneumonia  -MP        Rehab Potential/Prognosis, Swallowing  good, to achieve stated therapy goals  -MP        Swallow Criteria for Skilled Therapeutic Interventions Met  demonstrates skilled criteria  -MP           Recommendations    SLP  Diet Recommendation  NPO  -MP        Recommended Diagnostics  FEES  -MP        Recommended Precautions and Strategies  other (see comments) Oral care BID/PRN  -MP        SLP Rec. for Method of Medication Administration  meds via alternate route  -MP        Anticipated Dischage Disposition  unknown  -          User Key  (r) = Recorded By, (t) = Taken By, (c) = Cosigned By    Initials Name Effective Dates    Claude Hawkins MS, ALEJANDRA-SLP 08/15/18 -           EDUCATION  The patient has been educated in the following areas:   Dysphagia (Swallowing Impairment) NPO rationale.    SLP Recommendation and Plan  SLP Swallowing Diagnosis: suspected pharyngeal dysfunction  SLP Diet Recommendation: NPO  Recommended Precautions and Strategies: other (see comments)(Oral care BID/PRN)  SLP Rec. for Method of Medication Administration: meds via alternate route        Recommended Diagnostics: FEES  Swallow Criteria for Skilled Therapeutic Interventions Met: demonstrates skilled criteria  Anticipated Dischage Disposition: unknown  Rehab Potential/Prognosis, Swallowing: good, to achieve stated therapy goals             Plan of Care Reviewed With: patient, family  Plan of Care Review  Plan of Care Reviewed With: patient, family           Time Calculation:   Time Calculation- SLP     Row Name 06/08/19 1256             Time Calculation- SLP    SLP Start Time  0900  -      SLP Received On  06/08/19  -        User Key  (r) = Recorded By, (t) = Taken By, (c) = Cosigned By    Initials Name Provider Type    Claude Hawkins MS, CFY-SLP Speech and Language Pathologist          Therapy Charges for Today     Code Description Service Date Service Provider Modifiers Qty    [id number] HC ST EVAL ORAL PHARYNG SWALLOW 3 6/8/2019 Claude Pagan MS, CFDALTON-SLP GN 1               Claude Pagna MS, ALEJANDRA-SLP  6/8/2019

## 2019-06-08 NOTE — PLAN OF CARE
Problem: Patient Care Overview  Goal: Plan of Care Review  Outcome: Ongoing (interventions implemented as appropriate)   06/08/19 4482   Coping/Psychosocial   Plan of Care Reviewed With patient;family   SLP evaluation completed. Will address dysphagia via FEES when pt appropriate. Please see note for further details and recommendations.

## 2019-06-09 ENCOUNTER — APPOINTMENT (OUTPATIENT)
Dept: GENERAL RADIOLOGY | Facility: HOSPITAL | Age: 74
End: 2019-06-09

## 2019-06-09 LAB
ANION GAP SERPL CALCULATED.3IONS-SCNC: 11 MMOL/L
ARTERIAL PATENCY WRIST A: ABNORMAL
ATMOSPHERIC PRESS: ABNORMAL MMHG
BASE EXCESS BLDA CALC-SCNC: 1.1 MMOL/L (ref 0–2)
BASOPHILS # BLD AUTO: 0.06 10*3/MM3 (ref 0–0.2)
BASOPHILS NFR BLD AUTO: 0.6 % (ref 0–1.5)
BDY SITE: ABNORMAL
BODY TEMPERATURE: 37 C
BUN BLD-MCNC: 18 MG/DL (ref 8–23)
BUN/CREAT SERPL: 15.1 (ref 7–25)
CALCIUM SPEC-SCNC: 8.4 MG/DL (ref 8.6–10.5)
CHLORIDE SERPL-SCNC: 104 MMOL/L (ref 98–107)
CO2 BLDA-SCNC: 29.7 MMOL/L (ref 23–27)
CO2 SERPL-SCNC: 24 MMOL/L (ref 22–29)
COHGB MFR BLD: 1.3 % (ref 0–2)
CREAT BLD-MCNC: 1.19 MG/DL (ref 0.76–1.27)
DEPRECATED RDW RBC AUTO: 49.8 FL (ref 37–54)
EOSINOPHIL # BLD AUTO: 0.22 10*3/MM3 (ref 0–0.4)
EOSINOPHIL NFR BLD AUTO: 2.4 % (ref 0.3–6.2)
EPAP: 6
ERYTHROCYTE [DISTWIDTH] IN BLOOD BY AUTOMATED COUNT: 15.1 % (ref 12.3–15.4)
GFR SERPL CREATININE-BSD FRML MDRD: 59 ML/MIN/1.73
GLUCOSE BLD-MCNC: 90 MG/DL (ref 65–99)
GLUCOSE BLDC GLUCOMTR-MCNC: 108 MG/DL (ref 70–130)
GLUCOSE BLDC GLUCOMTR-MCNC: 117 MG/DL (ref 70–130)
GLUCOSE BLDC GLUCOMTR-MCNC: 149 MG/DL (ref 70–130)
GLUCOSE BLDC GLUCOMTR-MCNC: 80 MG/DL (ref 70–130)
HCO3 BLDA-SCNC: 28.1 MMOL/L (ref 20–26)
HCT VFR BLD AUTO: 46.7 % (ref 37.5–51)
HCT VFR BLD CALC: 45.5 %
HGB BLD-MCNC: 14.1 G/DL (ref 13–17.7)
HGB BLDA-MCNC: 14.8 G/DL (ref 13.5–17.5)
HOROWITZ INDEX BLD+IHG-RTO: 55 %
IMM GRANULOCYTES # BLD AUTO: 0.04 10*3/MM3 (ref 0–0.05)
IMM GRANULOCYTES NFR BLD AUTO: 0.4 % (ref 0–0.5)
IPAP: 14
LYMPHOCYTES # BLD AUTO: 2.79 10*3/MM3 (ref 0.7–3.1)
LYMPHOCYTES NFR BLD AUTO: 29.8 % (ref 19.6–45.3)
MAGNESIUM SERPL-MCNC: 2 MG/DL (ref 1.6–2.4)
MCH RBC QN AUTO: 27.4 PG (ref 26.6–33)
MCHC RBC AUTO-ENTMCNC: 30.2 G/DL (ref 31.5–35.7)
MCV RBC AUTO: 90.9 FL (ref 79–97)
METHGB BLD QL: 0.8 % (ref 0–1.5)
MODALITY: ABNORMAL
MONOCYTES # BLD AUTO: 0.74 10*3/MM3 (ref 0.1–0.9)
MONOCYTES NFR BLD AUTO: 7.9 % (ref 5–12)
NEUTROPHILS # BLD AUTO: 5.5 10*3/MM3 (ref 1.7–7)
NEUTROPHILS NFR BLD AUTO: 58.9 % (ref 42.7–76)
NOTE: ABNORMAL
NRBC BLD AUTO-RTO: 0 /100 WBC (ref 0–0.2)
NT-PROBNP SERPL-MCNC: 4729 PG/ML (ref 5–1800)
OXYHGB MFR BLDV: 96.3 % (ref 94–99)
PCO2 BLDA: 52.8 MM HG
PCO2 TEMP ADJ BLD: 52.8 MM HG (ref 35–48)
PH BLDA: 7.33 PH UNITS (ref 7.35–7.45)
PH, TEMP CORRECTED: 7.33 PH UNITS
PHOSPHATE SERPL-MCNC: 3.2 MG/DL (ref 2.5–4.5)
PLATELET # BLD AUTO: 163 10*3/MM3 (ref 140–450)
PMV BLD AUTO: 10.8 FL (ref 6–12)
PO2 BLDA: 106 MM HG (ref 83–108)
PO2 TEMP ADJ BLD: 106 MM HG (ref 83–108)
POTASSIUM BLD-SCNC: 4.2 MMOL/L (ref 3.5–5.2)
PROCALCITONIN SERPL-MCNC: 0.07 NG/ML (ref 0.1–0.25)
RBC # BLD AUTO: 5.14 10*6/MM3 (ref 4.14–5.8)
SODIUM BLD-SCNC: 139 MMOL/L (ref 136–145)
TROPONIN T SERPL-MCNC: <0.01 NG/ML (ref 0–0.03)
UFH PPP CHRO-ACNC: 0.52 IU/ML (ref 0.3–0.7)
UFH PPP CHRO-ACNC: 0.71 IU/ML (ref 0.3–0.7)
UFH PPP CHRO-ACNC: 0.73 IU/ML (ref 0.3–0.7)
VENTILATOR MODE: ABNORMAL
WBC NRBC COR # BLD: 9.35 10*3/MM3 (ref 3.4–10.8)

## 2019-06-09 PROCEDURE — 71045 X-RAY EXAM CHEST 1 VIEW: CPT

## 2019-06-09 PROCEDURE — 99291 CRITICAL CARE FIRST HOUR: CPT | Performed by: INTERNAL MEDICINE

## 2019-06-09 PROCEDURE — 84145 PROCALCITONIN (PCT): CPT | Performed by: INTERNAL MEDICINE

## 2019-06-09 PROCEDURE — 25010000002 VANCOMYCIN 10 G RECONSTITUTED SOLUTION

## 2019-06-09 PROCEDURE — 25010000002 AMIODARONE IN DEXTROSE 5% 150-4.21 MG/100ML-% SOLUTION: Performed by: INTERNAL MEDICINE

## 2019-06-09 PROCEDURE — 83880 ASSAY OF NATRIURETIC PEPTIDE: CPT | Performed by: INTERNAL MEDICINE

## 2019-06-09 PROCEDURE — 36600 WITHDRAWAL OF ARTERIAL BLOOD: CPT

## 2019-06-09 PROCEDURE — 85025 COMPLETE CBC W/AUTO DIFF WBC: CPT | Performed by: INTERNAL MEDICINE

## 2019-06-09 PROCEDURE — 83735 ASSAY OF MAGNESIUM: CPT | Performed by: INTERNAL MEDICINE

## 2019-06-09 PROCEDURE — 85520 HEPARIN ASSAY: CPT

## 2019-06-09 PROCEDURE — 82805 BLOOD GASES W/O2 SATURATION: CPT

## 2019-06-09 PROCEDURE — 25010000002 AMIODARONE IN DEXTROSE 5% 360-4.14 MG/200ML-% SOLUTION: Performed by: NURSE PRACTITIONER

## 2019-06-09 PROCEDURE — 84484 ASSAY OF TROPONIN QUANT: CPT | Performed by: INTERNAL MEDICINE

## 2019-06-09 PROCEDURE — 94660 CPAP INITIATION&MGMT: CPT

## 2019-06-09 PROCEDURE — 99233 SBSQ HOSP IP/OBS HIGH 50: CPT | Performed by: INTERNAL MEDICINE

## 2019-06-09 PROCEDURE — 82962 GLUCOSE BLOOD TEST: CPT

## 2019-06-09 PROCEDURE — 84100 ASSAY OF PHOSPHORUS: CPT | Performed by: INTERNAL MEDICINE

## 2019-06-09 PROCEDURE — 94799 UNLISTED PULMONARY SVC/PX: CPT

## 2019-06-09 PROCEDURE — 25010000002 HEPARIN (PORCINE) IN NACL 25000-0.45 UT/250ML-% SOLUTION

## 2019-06-09 PROCEDURE — 25010000002 DIGOXIN PER 500 MCG: Performed by: INTERNAL MEDICINE

## 2019-06-09 PROCEDURE — 25010000002 MEROPENEM

## 2019-06-09 PROCEDURE — 80048 BASIC METABOLIC PNL TOTAL CA: CPT | Performed by: INTERNAL MEDICINE

## 2019-06-09 RX ORDER — BUMETANIDE 0.25 MG/ML
2.5 INJECTION INTRAMUSCULAR; INTRAVENOUS ONCE
Status: COMPLETED | OUTPATIENT
Start: 2019-06-09 | End: 2019-06-09

## 2019-06-09 RX ORDER — DIGOXIN 0.25 MG/ML
125 INJECTION INTRAMUSCULAR; INTRAVENOUS EVERY 6 HOURS
Status: COMPLETED | OUTPATIENT
Start: 2019-06-09 | End: 2019-06-09

## 2019-06-09 RX ORDER — DIGOXIN 0.25 MG/ML
250 INJECTION INTRAMUSCULAR; INTRAVENOUS ONCE
Status: COMPLETED | OUTPATIENT
Start: 2019-06-09 | End: 2019-06-09

## 2019-06-09 RX ADMIN — PANTOPRAZOLE SODIUM 40 MG: 40 INJECTION, POWDER, FOR SOLUTION INTRAVENOUS at 06:02

## 2019-06-09 RX ADMIN — LABETALOL 20 MG/4 ML (5 MG/ML) INTRAVENOUS SYRINGE 20 MG: at 15:34

## 2019-06-09 RX ADMIN — MEROPENEM 1 G: 1 INJECTION, POWDER, FOR SOLUTION INTRAVENOUS at 01:49

## 2019-06-09 RX ADMIN — IPRATROPIUM BROMIDE AND ALBUTEROL SULFATE 3 ML: 2.5; .5 SOLUTION RESPIRATORY (INHALATION) at 13:12

## 2019-06-09 RX ADMIN — HEPARIN SODIUM 13 UNITS/KG/HR: 10000 INJECTION, SOLUTION INTRAVENOUS at 20:48

## 2019-06-09 RX ADMIN — AMITRIPTYLINE HYDROCHLORIDE 25 MG: 25 TABLET, FILM COATED ORAL at 20:47

## 2019-06-09 RX ADMIN — MEROPENEM 1 G: 1 INJECTION, POWDER, FOR SOLUTION INTRAVENOUS at 09:00

## 2019-06-09 RX ADMIN — DIGOXIN 125 MCG: 0.25 INJECTION INTRAMUSCULAR; INTRAVENOUS at 21:00

## 2019-06-09 RX ADMIN — DIGOXIN 250 MCG: 0.25 INJECTION INTRAMUSCULAR; INTRAVENOUS at 08:44

## 2019-06-09 RX ADMIN — BUMETANIDE 2.5 MG: 0.25 INJECTION INTRAMUSCULAR; INTRAVENOUS at 12:59

## 2019-06-09 RX ADMIN — AMIODARONE HYDROCHLORIDE 0.5 MG/MIN: 1.8 INJECTION, SOLUTION INTRAVENOUS at 01:49

## 2019-06-09 RX ADMIN — SODIUM CHLORIDE, PRESERVATIVE FREE 3 ML: 5 INJECTION INTRAVENOUS at 08:48

## 2019-06-09 RX ADMIN — MEROPENEM 1 G: 1 INJECTION, POWDER, FOR SOLUTION INTRAVENOUS at 17:49

## 2019-06-09 RX ADMIN — IPRATROPIUM BROMIDE AND ALBUTEROL SULFATE 3 ML: 2.5; .5 SOLUTION RESPIRATORY (INHALATION) at 08:47

## 2019-06-09 RX ADMIN — SODIUM CHLORIDE, PRESERVATIVE FREE 3 ML: 5 INJECTION INTRAVENOUS at 21:02

## 2019-06-09 RX ADMIN — DIGOXIN 125 MCG: 0.25 INJECTION INTRAMUSCULAR; INTRAVENOUS at 15:36

## 2019-06-09 RX ADMIN — AMIODARONE HYDROCHLORIDE 0.5 MG/MIN: 1.8 INJECTION, SOLUTION INTRAVENOUS at 12:47

## 2019-06-09 RX ADMIN — IPRATROPIUM BROMIDE AND ALBUTEROL SULFATE 3 ML: 2.5; .5 SOLUTION RESPIRATORY (INHALATION) at 16:32

## 2019-06-09 RX ADMIN — VANCOMYCIN HYDROCHLORIDE 1250 MG: 10 INJECTION, POWDER, LYOPHILIZED, FOR SOLUTION INTRAVENOUS at 06:02

## 2019-06-09 RX ADMIN — AMIODARONE HYDROCHLORIDE 150 MG: 1.5 INJECTION, SOLUTION INTRAVENOUS at 11:18

## 2019-06-09 NOTE — PROGRESS NOTES
INTENSIVIST   PROGRESS NOTE     Hospital:  LOS: 1 day      S     Mr. Stef Jones, 74 y.o. male is followed for:  No chief complaint on file.      A Fib w/RVR    Cellulitis of Right Lower Leg and Foot    COPD (chronic obstructive pulmonary disease) (CMS/[facility])    As an Intensivist, we provide an integrated approach to the ICU patient and family, medical management of comorbid conditions, lead interdisciplinary rounds and coordinate the care with all other services, including those from other specialists.     Interval History:  He looks more comfortable than yesterday.  Still high HR ~ 140s, but no chest pain.  Labetalol, did not work, controlling his HR.     The patient's relevant past medical, surgical and social history were reviewed and updated in Epic as appropriate.     ROS:   Constitutional: Negative for fever.   Respiratory: Dyspnea.   Cardiovascular: Negative for chest pain.   Gastrointestinal: Negative for  nausea, vomiting and diarrhea.        O     Vitals:  Temp: 97.2 °F (36.2 °C) (06/09/19 0400) Temp  Min: 97.2 °F (36.2 °C)  Max: 97.9 °F (36.6 °C)   BP: 94/75 (06/09/19 1100) BP  Min: 56/42  Max: 139/58   Pulse: (!) 134 (06/09/19 1100) Pulse  Min: 112  Max: 156   Resp: 24 (06/09/19 1000) Resp  Min: 15  Max: 26   SpO2: 93 % (06/09/19 1100) SpO2  Min: 89 %  Max: 100 %   Device: humidified, nasal cannula (06/09/19 1100)    Flow Rate: 4 (06/09/19 1100) Flow (L/min)  Min: 4  Max: 6     Intake/Ouptut 24 hrs (7:00AM - 6:59 AM)  Intake/Output       06/08/19 0700 - 06/09/19 0659 06/09/19 0700 - 06/10/19 0659    Intake (ml) 1910 1033.4    Output (ml) 2850 250    Net (ml) -940 783.4    Last Weight  76.7 kg (169 lb 1.5 oz)  --           Medications (drips):    amiodarone Last Rate: 0.5 mg/min (06/09/19 0149)   heparin Last Rate: 14 Units/kg/hr (06/08/19 2322)   Pharmacy to Dose Heparin    Pharmacy Consult        Physical Examination  Telemetry:  Rhythm: atrial rhythm, sinus tachycardia (06/09/19 0800)  Atrial  Rhythm: atrial fibrillation(afib vs tachycardia) (06/09/19 0800)      Constitutional:  No acute distress.   Cardiovascular: IRR. Normal heart sounds.  No murmurs, gallop or rub.   Respiratory: No respiratory distress.  Normal breath sounds  No adventitious sounds    Abdominal:  Soft with no tenderness  No distension. No HSM.   Extremities: Warm with no cyanosis.  Edema.   Neurological:   Alert and Oriented to person, place, and time.               Lines/Drains/Airways: Peripheral IV(s)     Hematology:  Results from last 7 days   Lab Units 06/09/19  0615 06/08/19  0343   WBC 10*3/mm3 9.35 11.30*  10.87*   HEMOGLOBIN g/dL 14.1 15.3  15.2   MCV fL 90.9 88.4  88.2   PLATELETS 10*3/mm3 163 202  212       Chemistry:  Estimated Creatinine Clearance: 51.5 mL/min (by C-G formula based on SCr of 1.19 mg/dL).    Results from last 7 days   Lab Units 06/09/19  0615 06/08/19  0343   SODIUM mmol/L 139 138   POTASSIUM mmol/L 4.2 4.8   CHLORIDE mmol/L 104 101   CO2 mmol/L 24.0 26.0   ANION GAP mmol/L 11.0 11.0   GLUCOSE mg/dL 90 127*   CALCIUM mg/dL 8.4* 9.0     Results from last 7 days   Lab Units 06/09/19  0615 06/08/19  0343   BUN mg/dL 18 18   CREATININE mg/dL 1.19 1.43*     Results from last 7 days   Lab Units 06/09/19  0615 06/08/19  0343   MAGNESIUM mg/dL 2.0 2.1   PHOSPHORUS mg/dL 3.2 2.8     Cardiac:  Results from last 7 days   Lab Units 06/09/19  0615 06/08/19  0343   CK TOTAL U/L  --  231*   TROPONIN T ng/mL <0.010 <0.010     Results from last 7 days   Lab Units 06/09/19  0615 06/08/19  0343   PROBNP pg/mL 4,729.0* 4,193.0*       Lab Results   Lab Value Date/Time    PROCALCITO 0.07 (L) 06/09/2019 0615    PROCALCITO 0.04 (L) 06/08/2019 0343     Images:  Xr Chest 1 View    Result Date: 6/9/2019  Increased markings again seen diffusely throughout the lung fields bilaterally. No no focal right opacification present.       Xr Chest 1 View    Result Date: 6/8/2019  1. Diffuse and fairly uniform pulmonary interstitial  disease. From this study it is uncertain if this represents a diffuse pattern of mild interstitial edema, pulmonary fibrosis, etc. If the patient's clinical picture is not clear, consider chest CT scan. 2. Clearly calcified small pulmonary nodules and more questionable left upper and mid lung nodules, but favored to be partly calcified also. Either comparison with outside radiograph or follow-up CT is suggested.  DICTATED:   06/08/2019 EDITED/ls :   06/08/2019  This report was finalized on 6/8/2019 10:53 PM by DR. Og Garay MD.        Echo:  Results for orders placed during the hospital encounter of 06/08/19   Adult Transthoracic Echo Complete W/ Cont if Necessary Per Protocol    Narrative · Calculated EF = 33.0%.  · Left ventricular systolic function is severely decreased.  · Left ventricular diastolic dysfunction (grade II) consistent with   pseudonormalization.  · Moderately reduced right ventricular systolic function noted.  · Right ventricular cavity is moderately dilated.  · Moderate mitral valve regurgitation is present  · Mild to moderate tricuspid valve regurgitation is present.  · Estimated right ventricular systolic pressure from tricuspid   regurgitation is mildly elevated (35-45 mmHg).          Results: Reviewed.  I reviewed the patient's new laboratory and imaging results.  I independently reviewed the patient's new images.    Medications: Reviewed.    Assessment/Plan   A / P     Assessment:    74 y.o.male, admitted on 6/8/2019 with A-fib (CMS/HCC) [I48.91]  Atrial fibrillation with rapid ventricular response (CMS/HCC) [I48.91]:       1. Respiratory failure  1. COPD  2. Bronchodilators  2. Cardiac:  1. A Fib with RVR  1. Anticoagulation on Heparin gtt.   2. Cardiomyopathy, EF 33%  3. TR  4. HTN on CCB  5. Dyslipidemia on statins  3. Cellulitis R lower extremity and foot.  1. Unlikely sepsis. Negative PCT on admission.  2. Started 1 week prior to admission, and was treated with TMP-SMX  4. Antibiotics:  Vanco + MRP  5. GERD on PPI  6. Glucose: T2D per A1c criteria    Results from last 7 days   Lab Units 06/09/19  1122 06/08/19  1959 06/08/19  1131   GLUCOSE mg/dL 149* 130 121     Lab Results   Lab Value Date/Time    HGBA1C 6.60 (H) 06/08/2019 0343       Diet: Diet Dysphagia; III - Pureed With Some Mashed; Honey Thick; Cardiac, Consistent Carbohydrate  NPO Diet   Advance Directives: Code Status and Medical Interventions:   Ordered at: 06/08/19 0320     Code Status:    CPR     Medical Interventions (Level of Support Prior to Arrest):    Full        Plan:    1. BiPAP prn  2. Cardiac:  1. Rate control  2. Diuretics as tolerated  3. Follow up proBNP in AM  4. Probable DENA and ECV tomorrow, if no better, as per Cardiology.  3. Continue Empiric Abs  4. Goal: Glucose < 180 mg/dL.   5. Disposition: Keep in ICU.    Plan of care and goals reviewed during interdisciplinary rounds.  I discussed the patient's findings and my recommendations with patient and nursing staff    Level of Risk is High due to:  illness with threat to life or bodily function.     Time: was greater than 32 minutes.    (This excludes time spent performing separately reportable procedures and services).  Patient was at high risk of imminent or life-threatening deterioration in his condition due to Respiratory failure.     Stanislaw Torrez MD, FACP, FCCP, CNSC  Intensive Care Medicine, Nutrition Support and Pulmonary Medicine     [x]  Primary Attending  []  Consultant

## 2019-06-09 NOTE — PLAN OF CARE
Problem: Patient Care Overview  Goal: Plan of Care Review  Outcome: Ongoing (interventions implemented as appropriate)   06/09/19 4562   Coping/Psychosocial   Plan of Care Reviewed With patient;daughter   Plan of Care Review   Progress improving   OTHER   Outcome Summary Patient VSS tonight though does continue to be tachycardic vs atrial fibrillation at times. HR averaging 130's. He was tried on Labetalol, but after 4 consecutive doses with no change to HR this was abandoned and he continues on Amiodorone gtt. Plan for this is potential cardioversion with Cardiology who is consulted for this. He has been compliant with BiPAP tonight and O2 has been >95%. Continues on Heparin gtt. Adequate UO tonight and has not required cath, but practitioner has advised that if we need to cath him again to anchor due to his hypospadia and potential for trauma. Lower extremities still edematous and RLE redness/rash contiues but seems improved. Family continues to ask repetitive questions and require constant education. They seem to spread misinformation among themselves and attempt to direct care based on these incorrect assumptions. I have spent quite some time attempting to clue them in on patient plan but at this time I am unsure if they are receptive to this education. I have asked them to please pick a central member to be responsible for providing the rest of the family information and healthcare team will direct our education with this person. Марина, youngest daughter will be this contact and her information was added to patient chart with his permission. I asked most of his family to leave early on in the shift, and allowed only one person to stay with him as they seem to be quite disruptive to patient rest. Patient agreeable to this. I have asked him to consider his treatment options and to take more active role in decision-making. While reviewing his FEES imaging I do have some concern for possible cancerous lesions.  Will recommend GI consult. Will continue to monitor patient progress.

## 2019-06-09 NOTE — PROGRESS NOTES
HEPARIN INFUSION  Stef Jones is a  74 y.o. male receiving heparin infusion.     Therapy for (VTE/Cardiac):   VTE  Patient Weight: 77.9 kg  Initial Bolus (Y/N):   No (bolus/drip at OSH)  Any Bolus (Y/N):   Yes         Signs or Symptoms of Bleeding: none noted  VTE (PE/DVT)   Initial Bolus: 80 units/kg (Max 10,000 units)  Initial rate: 18 units/kg/hr (Max 1,500 units/hr)   Anti -Xa (IU/mL) Bolus Dose Stop Infusion Rate Change Repeat Anti-Xa      ?0.19 80 units/kg 0 hrs Increase rate by   4 units/kg/hr 6 hrs      0.2 - 0.9 40 units/kg 0 hrs Increase rate by 2 units/kg/hr 6 hrs    0.3 - 0.7 0 0 hrs No change 6 hrs      0.71 - 0.99 0  0 hrs Decrease rate by 2 units/kg/hr 6 hrs    >1 0 Hold 1 hr Decrease rate by 3 units/kg/hr 6 hrs      Results from last 7 days   Lab Units 06/09/19  0615 06/08/19  0615 06/08/19  0343   INR   --  1.34* 1.30*   HEMOGLOBIN g/dL 14.1  --  15.3  15.2   HEMATOCRIT % 46.7  --  48.9  48.0   PLATELETS 10*3/mm3 163  --  202  212       Date   Time   Anti-Xa Current Rate (Unit/kg/hr) Bolus   (Units) Rate Change   (Unit/kg/hr) New Rate (Unit/kg/hr) Next anti-Xa Comments  Pump Check Daily   6/8 0343 0.75 (was on heparin at OSH but has been off since arriving at [facility]) 6400  (given at OSH) +18 18 1200 [place] [id number] -b   6/8 1254 0.89 18 0 -2 16 1900 Gaudencio Vinson, no hold    1344 -- 18 0 -2 16 2000 Gaudencio Vinson, adjusted and confirmed pump. Cosigned.   6/8 2014 0.86 16 -- -2 14 0600 [place] [id number] -b   6/9 0739 0.71 14 -- -- 14 1200 Gaudencio Bustillos, no change   6/9 1307 0.73 14 -- -1 13 1900 Gaudencio Bustillos     Thank you for this consult.  Lino Pratt IV, PharmD, BCPS  6/9/2019  1:09 PM

## 2019-06-09 NOTE — PROGRESS NOTES
[facility] at [facility]  Cardiology Progress Note      Chief Complaint/Reason for service:    · Afib with RVR         No acute events overnight. Reports breathing is a little improved. Denies palpitations, lh, dizziness, chest pain.     Past medical, surgical, social and family history reviewed in the patient's electronic medical record.    Review of Systems:   The following systems were reviewed and negative;  constitution, respiratory and cardiovascular except as noted in HPI       Vital Sign Min/Max for last 24 hours  Temp  Min: 96.9 °F (36.1 °C)  Max: 97.9 °F (36.6 °C)   BP  Min: 56/42  Max: 151/111   Pulse  Min: 110  Max: 156   Resp  Min: 15  Max: 28   SpO2  Min: 82 %  Max: 100 %   Flow (L/min)  Min: 6  Max: 6      Intake/Output Summary (Last 24 hours) at 6/9/2019 0815  Last data filed at 6/9/2019 0602  Gross per 24 hour   Intake 1802 ml   Output 2850 ml   Net -1048 ml           Physical Exam   Constitutional: He appears well-developed and well-nourished.   HENT:   Head: Normocephalic and atraumatic.   Neck: Neck supple.   Cardiovascular: An irregularly irregular rhythm present. Tachycardia present.   Pulmonary/Chest: Effort normal and breath sounds normal.   Abdominal: Soft. Bowel sounds are normal.   Musculoskeletal: He exhibits no edema.   Neurological: He is alert.   Skin: Skin is warm and dry.       Tele:  Afib 130-140's    Results Review (reviewed the patient's recent labs in the electronic medical record):     EKG:  NSR     CXR: reviewed      Results from last 7 days   Lab Units 06/09/19  0615 06/08/19  0343   SODIUM mmol/L 139 138   POTASSIUM mmol/L 4.2 4.8   CHLORIDE mmol/L 104 101   BUN mg/dL 18 18   CREATININE mg/dL 1.19 1.43*   MAGNESIUM mg/dL 2.0 2.1     Results from last 7 days   Lab Units 06/09/19  0615 06/08/19  0343   TROPONIN T ng/mL <0.010 <0.010     Results from last 7 days   Lab Units 06/09/19  0615 06/08/19  0343   WBC 10*3/mm3 9.35 11.30*  10.87*   HEMOGLOBIN  g/dL 14.1 15.3  15.2   HEMATOCRIT % 46.7 48.9  48.0   PLATELETS 10*3/mm3 163 202  212       Lab Results   Component Value Date    HGBA1C 6.60 (H) 06/08/2019       Lab Results   Component Value Date    CHOL 108 06/08/2019    TRIG 61 06/08/2019    HDL 59 06/08/2019    LDL 37 06/08/2019       Active Hospital Problems    Diagnosis POA   • **A Fib w/RVR [I48.91] Yes   • Cellulitis of Right Lower Leg and Foot [L03.90] Yes   • COPD (chronic obstructive pulmonary disease) (CMS/HCC) [J44.9] Yes     Initial cardiac assessment:  Mr. Jones is a 75 y/o male with pmhx of hypertension and COPD who was transferred from [facility] for Afib with RVR and cellulitis of right lower calf/foot.     Recommendations:  1. Afib with RVR  - newly diagnosed, incidentally found  - Treated with Cardizem gtt at OSH, now on amio drip and given IV digoxin 500 mcg x 1.   - Asymptomatic and hemodynamically stable.   - CHADSVASC = 3 (age, HTN), on heparin gtt  - Echo shows ejection fraction of mid 30s, moderate RV enlargement and RV dysfunction.  - Continue Amiodarone gtt.  BP marginal and prevents use of esmolol gtt. Consider additional IV digoxin. Re-bolus amiodarone if needed  - Plan for DENA/ECV on Monday if still in Afib.     2. Acute systolic heart failure  - Echo shows ejection fraction of mid 30s, moderate RV enlargement and RV dysfunction.  - could be tachycardia induced. Will need repeat echo once maintaining NSR, may also need ischemic evaluation.  - Will need to initiate guideline directed medical therapy for systolic dysfunction once blood pressure stable and respiratory status improve, likely will need cardioselective beta-blocker such as bisoprolol    3. Hypertension  - stable     4. Syncope  - reportedly had syncopal episode seems to have been triggered by prolonged coughing..      5. Lower extremity cellulitis  - per admitting    6. COPD  -  Per admitting     Electronically signed by MAYI Borges, 06/09/19, 8:16  "AM.    ___________________________________________________________  The patient was seen and examined by me.  Agree and verified/discussed key components of E/M as outlined by the Nurse practitioner/PA.    BP 99/64   Pulse (!) 137   Temp 97.2 °F (36.2 °C) (Axillary)   Resp 20   Ht 167.6 cm (66\")   Wt 76.7 kg (169 lb 1.5 oz)   SpO2 95%   BMI 27.29 kg/m²     General Appearance:   · well developed  · well nourished  Neck:  · thyroid not enlarged  · supple  Respiratory:  · no respiratory distress  · normal breath sounds  · no rales  Cardiovascular:  · no jugular venous distention  · regular rhythm  · apical impulse normal  · S1 normal, S2 normal  · no S3, no S4   · no murmur  · no rub, no thrill  · carotid pulses normal; no bruit  · pedal pulses normal  · lower extremity edema: none  .   Skin:   · warm, dry        Plan:    Remains critically ill with hypoxic respiratory failure  A. fib with RVR, rate still in the 140s  Digoxin IV will continue today, monitor digoxin level every other day  Reload with amiodarone 150 mg IV over 10 minutes today  Plan for DENA cardioversion tomorrow if still in A. fib with RVR  Patient has newly diagnosed cardiomyopathy, EF 25 to 30%, will need ischemic evaluation once respiratory status is stable.    Yovanny Solares MD, FACC  [facility]       "

## 2019-06-09 NOTE — PLAN OF CARE
Problem: Patient Care Overview  Goal: Plan of Care Review  Outcome: Ongoing (interventions implemented as appropriate)   06/09/19 1791   Coping/Psychosocial   Plan of Care Reviewed With patient;family   Plan of Care Review   Progress improving   OTHER   Outcome Summary Pt BP/SpO2/Temp remained stable throughout the shift. HR remains 120s-180s afib/sinus tach. Pt was weaned down to 3L NC. Remains on Heparin and Amio gtts. UO was still every hour 100-200 mL, but was having some distention and pain, bladder scanned 382 mL, then anchored a tripp because it would of been his third in&out cath. RLE redness/rash still present. Dr. Solares will do a DENA and possible cardioversion tomorrow. Will continue to monitor.     Goal: Individualization and Mutuality  Outcome: Ongoing (interventions implemented as appropriate)    Goal: Discharge Needs Assessment  Outcome: Ongoing (interventions implemented as appropriate)    Goal: Interprofessional Rounds/Family Conf  Outcome: Ongoing (interventions implemented as appropriate)

## 2019-06-10 ENCOUNTER — APPOINTMENT (OUTPATIENT)
Dept: GENERAL RADIOLOGY | Facility: HOSPITAL | Age: 74
End: 2019-06-10

## 2019-06-10 ENCOUNTER — APPOINTMENT (OUTPATIENT)
Dept: CARDIOLOGY | Facility: HOSPITAL | Age: 74
End: 2019-06-10

## 2019-06-10 PROBLEM — R13.10 DYSPHAGIA: Status: ACTIVE | Noted: 2019-06-10

## 2019-06-10 LAB
ALBUMIN SERPL-MCNC: 2.9 G/DL (ref 3.5–5.2)
ALBUMIN SERPL-MCNC: 3.1 G/DL (ref 3.5–5.2)
ALBUMIN/GLOB SERPL: 1 G/DL
ALBUMIN/GLOB SERPL: 1.1 G/DL
ALP SERPL-CCNC: 139 U/L (ref 39–117)
ALP SERPL-CCNC: 150 U/L (ref 39–117)
ALT SERPL W P-5'-P-CCNC: 30 U/L (ref 1–41)
ALT SERPL W P-5'-P-CCNC: 32 U/L (ref 1–41)
ANION GAP SERPL CALCULATED.3IONS-SCNC: 11 MMOL/L
ANION GAP SERPL CALCULATED.3IONS-SCNC: 9 MMOL/L
AST SERPL-CCNC: 21 U/L (ref 1–40)
AST SERPL-CCNC: 23 U/L (ref 1–40)
BACTERIA SPEC AEROBE CULT: NORMAL
BACTERIA SPEC RESP CULT: NORMAL
BASOPHILS # BLD AUTO: 0.04 10*3/MM3 (ref 0–0.2)
BASOPHILS NFR BLD AUTO: 0.5 % (ref 0–1.5)
BH CV ECHO MEAS - AO ROOT DIAM: 3.5 CM
BH CV ECHO MEAS - BSA(HAYCOCK): 1.9 M^2
BH CV ECHO MEAS - BSA: 1.9 M^2
BH CV ECHO MEAS - BZI_BMI: 28.5 KILOGRAMS/M^2
BH CV ECHO MEAS - BZI_METRIC_HEIGHT: 165.1 CM
BH CV ECHO MEAS - BZI_METRIC_WEIGHT: 77.6 KG
BH CV ECHO MEAS - RVSP: 42 MMHG
BH CV ECHO MEAS - TR MAX PG: 39 MMHG
BH CV ECHO MEAS - TR MAX VEL: 315.9 CM/SEC
BILIRUB SERPL-MCNC: 0.5 MG/DL (ref 0.2–1.2)
BILIRUB SERPL-MCNC: 0.6 MG/DL (ref 0.2–1.2)
BUN BLD-MCNC: 16 MG/DL (ref 8–23)
BUN BLD-MCNC: 16 MG/DL (ref 8–23)
BUN/CREAT SERPL: 12.8 (ref 7–25)
BUN/CREAT SERPL: 13 (ref 7–25)
CALCIUM SPEC-SCNC: 8.6 MG/DL (ref 8.6–10.5)
CALCIUM SPEC-SCNC: 8.7 MG/DL (ref 8.6–10.5)
CHLORIDE SERPL-SCNC: 100 MMOL/L (ref 98–107)
CHLORIDE SERPL-SCNC: 101 MMOL/L (ref 98–107)
CO2 SERPL-SCNC: 29 MMOL/L (ref 22–29)
CO2 SERPL-SCNC: 31 MMOL/L (ref 22–29)
CREAT BLD-MCNC: 1.23 MG/DL (ref 0.76–1.27)
CREAT BLD-MCNC: 1.25 MG/DL (ref 0.76–1.27)
DEPRECATED RDW RBC AUTO: 48.9 FL (ref 37–54)
DIGOXIN SERPL-MCNC: 1.87 NG/ML (ref 0.6–1.2)
EOSINOPHIL # BLD AUTO: 0.15 10*3/MM3 (ref 0–0.4)
EOSINOPHIL NFR BLD AUTO: 1.7 % (ref 0.3–6.2)
ERYTHROCYTE [DISTWIDTH] IN BLOOD BY AUTOMATED COUNT: 15 % (ref 12.3–15.4)
GFR SERPL CREATININE-BSD FRML MDRD: 56 ML/MIN/1.73
GFR SERPL CREATININE-BSD FRML MDRD: 57 ML/MIN/1.73
GLOBULIN UR ELPH-MCNC: 2.8 GM/DL
GLOBULIN UR ELPH-MCNC: 2.9 GM/DL
GLUCOSE BLD-MCNC: 96 MG/DL (ref 65–99)
GLUCOSE BLD-MCNC: 99 MG/DL (ref 65–99)
GLUCOSE BLDC GLUCOMTR-MCNC: 102 MG/DL (ref 70–130)
GLUCOSE BLDC GLUCOMTR-MCNC: 111 MG/DL (ref 70–130)
GLUCOSE BLDC GLUCOMTR-MCNC: 86 MG/DL (ref 70–130)
GRAM STN SPEC: NORMAL
HCT VFR BLD AUTO: 49.4 % (ref 37.5–51)
HGB BLD-MCNC: 15.4 G/DL (ref 13–17.7)
IMM GRANULOCYTES # BLD AUTO: 0.02 10*3/MM3 (ref 0–0.05)
IMM GRANULOCYTES NFR BLD AUTO: 0.2 % (ref 0–0.5)
LYMPHOCYTES # BLD AUTO: 2.22 10*3/MM3 (ref 0.7–3.1)
LYMPHOCYTES NFR BLD AUTO: 25.1 % (ref 19.6–45.3)
MAGNESIUM SERPL-MCNC: 1.9 MG/DL (ref 1.6–2.4)
MAGNESIUM SERPL-MCNC: 1.9 MG/DL (ref 1.6–2.4)
MCH RBC QN AUTO: 27.6 PG (ref 26.6–33)
MCHC RBC AUTO-ENTMCNC: 31.2 G/DL (ref 31.5–35.7)
MCV RBC AUTO: 88.5 FL (ref 79–97)
MONOCYTES # BLD AUTO: 0.82 10*3/MM3 (ref 0.1–0.9)
MONOCYTES NFR BLD AUTO: 9.3 % (ref 5–12)
NEUTROPHILS # BLD AUTO: 5.58 10*3/MM3 (ref 1.7–7)
NEUTROPHILS NFR BLD AUTO: 63.2 % (ref 42.7–76)
NRBC BLD AUTO-RTO: 0 /100 WBC (ref 0–0.2)
NT-PROBNP SERPL-MCNC: 3904 PG/ML (ref 5–1800)
PHOSPHATE SERPL-MCNC: 2.7 MG/DL (ref 2.5–4.5)
PHOSPHATE SERPL-MCNC: 2.8 MG/DL (ref 2.5–4.5)
PLATELET # BLD AUTO: 175 10*3/MM3 (ref 140–450)
PMV BLD AUTO: 10.7 FL (ref 6–12)
POTASSIUM BLD-SCNC: 4.3 MMOL/L (ref 3.5–5.2)
POTASSIUM BLD-SCNC: 4.4 MMOL/L (ref 3.5–5.2)
PROT SERPL-MCNC: 5.7 G/DL (ref 6–8.5)
PROT SERPL-MCNC: 6 G/DL (ref 6–8.5)
RBC # BLD AUTO: 5.58 10*6/MM3 (ref 4.14–5.8)
SODIUM BLD-SCNC: 140 MMOL/L (ref 136–145)
SODIUM BLD-SCNC: 141 MMOL/L (ref 136–145)
UFH PPP CHRO-ACNC: 0.3 IU/ML (ref 0.3–0.7)
UFH PPP CHRO-ACNC: 0.38 IU/ML (ref 0.3–0.7)
UFH PPP CHRO-ACNC: 0.46 IU/ML (ref 0.3–0.7)
UFH PPP CHRO-ACNC: 0.55 IU/ML (ref 0.3–0.7)
VANCOMYCIN TROUGH SERPL-MCNC: 11.6 MCG/ML (ref 5–20)
WBC NRBC COR # BLD: 8.83 10*3/MM3 (ref 3.4–10.8)

## 2019-06-10 PROCEDURE — 99233 SBSQ HOSP IP/OBS HIGH 50: CPT | Performed by: INTERNAL MEDICINE

## 2019-06-10 PROCEDURE — 25010000002 VANCOMYCIN 10 G RECONSTITUTED SOLUTION

## 2019-06-10 PROCEDURE — 25010000002 AMIODARONE IN DEXTROSE 5% 150-4.21 MG/100ML-% SOLUTION: Performed by: INTERNAL MEDICINE

## 2019-06-10 PROCEDURE — 25010000002 MIDAZOLAM PER 1 MG: Performed by: INTERNAL MEDICINE

## 2019-06-10 PROCEDURE — 25010000002 HEPARIN (PORCINE) IN NACL 25000-0.45 UT/250ML-% SOLUTION

## 2019-06-10 PROCEDURE — 83735 ASSAY OF MAGNESIUM: CPT | Performed by: INTERNAL MEDICINE

## 2019-06-10 PROCEDURE — 25010000002 FENTANYL CITRATE (PF) 100 MCG/2ML SOLUTION: Performed by: INTERNAL MEDICINE

## 2019-06-10 PROCEDURE — 93312 ECHO TRANSESOPHAGEAL: CPT

## 2019-06-10 PROCEDURE — 93005 ELECTROCARDIOGRAM TRACING: CPT | Performed by: INTERNAL MEDICINE

## 2019-06-10 PROCEDURE — 97165 OT EVAL LOW COMPLEX 30 MIN: CPT

## 2019-06-10 PROCEDURE — 82962 GLUCOSE BLOOD TEST: CPT

## 2019-06-10 PROCEDURE — 25010000002 AMIODARONE IN DEXTROSE 5% 360-4.14 MG/200ML-% SOLUTION: Performed by: INTERNAL MEDICINE

## 2019-06-10 PROCEDURE — 93312 ECHO TRANSESOPHAGEAL: CPT | Performed by: INTERNAL MEDICINE

## 2019-06-10 PROCEDURE — 85025 COMPLETE CBC W/AUTO DIFF WBC: CPT | Performed by: INTERNAL MEDICINE

## 2019-06-10 PROCEDURE — 93325 DOPPLER ECHO COLOR FLOW MAPG: CPT | Performed by: INTERNAL MEDICINE

## 2019-06-10 PROCEDURE — 84100 ASSAY OF PHOSPHORUS: CPT | Performed by: INTERNAL MEDICINE

## 2019-06-10 PROCEDURE — 92960 CARDIOVERSION ELECTRIC EXT: CPT | Performed by: INTERNAL MEDICINE

## 2019-06-10 PROCEDURE — 97116 GAIT TRAINING THERAPY: CPT

## 2019-06-10 PROCEDURE — 94799 UNLISTED PULMONARY SVC/PX: CPT

## 2019-06-10 PROCEDURE — 80053 COMPREHEN METABOLIC PANEL: CPT | Performed by: NURSE PRACTITIONER

## 2019-06-10 PROCEDURE — 83880 ASSAY OF NATRIURETIC PEPTIDE: CPT | Performed by: INTERNAL MEDICINE

## 2019-06-10 PROCEDURE — 80053 COMPREHEN METABOLIC PANEL: CPT | Performed by: INTERNAL MEDICINE

## 2019-06-10 PROCEDURE — 84100 ASSAY OF PHOSPHORUS: CPT | Performed by: NURSE PRACTITIONER

## 2019-06-10 PROCEDURE — 71045 X-RAY EXAM CHEST 1 VIEW: CPT

## 2019-06-10 PROCEDURE — 97162 PT EVAL MOD COMPLEX 30 MIN: CPT

## 2019-06-10 PROCEDURE — 93010 ELECTROCARDIOGRAM REPORT: CPT | Performed by: INTERNAL MEDICINE

## 2019-06-10 PROCEDURE — 85520 HEPARIN ASSAY: CPT

## 2019-06-10 PROCEDURE — 25010000002 MEROPENEM

## 2019-06-10 PROCEDURE — 80162 ASSAY OF DIGOXIN TOTAL: CPT | Performed by: NURSE PRACTITIONER

## 2019-06-10 PROCEDURE — 93325 DOPPLER ECHO COLOR FLOW MAPG: CPT

## 2019-06-10 PROCEDURE — 25010000002 MAGNESIUM SULFATE IN D5W 1G/100ML (PREMIX) 1-5 GM/100ML-% SOLUTION: Performed by: NURSE PRACTITIONER

## 2019-06-10 PROCEDURE — 25010000002 MIDAZOLAM PER 1 MG

## 2019-06-10 PROCEDURE — 25010000002 FENTANYL CITRATE (PF) 100 MCG/2ML SOLUTION

## 2019-06-10 PROCEDURE — 25010000002 AMIODARONE IN DEXTROSE 5% 360-4.14 MG/200ML-% SOLUTION: Performed by: NURSE PRACTITIONER

## 2019-06-10 PROCEDURE — 93321 DOPPLER ECHO F-UP/LMTD STD: CPT

## 2019-06-10 PROCEDURE — 5A2204Z RESTORATION OF CARDIAC RHYTHM, SINGLE: ICD-10-PCS | Performed by: INTERNAL MEDICINE

## 2019-06-10 PROCEDURE — 94660 CPAP INITIATION&MGMT: CPT

## 2019-06-10 PROCEDURE — 80202 ASSAY OF VANCOMYCIN: CPT

## 2019-06-10 PROCEDURE — B24BZZ4 ULTRASONOGRAPHY OF HEART WITH AORTA, TRANSESOPHAGEAL: ICD-10-PCS | Performed by: INTERNAL MEDICINE

## 2019-06-10 PROCEDURE — 92526 ORAL FUNCTION THERAPY: CPT

## 2019-06-10 PROCEDURE — 93321 DOPPLER ECHO F-UP/LMTD STD: CPT | Performed by: INTERNAL MEDICINE

## 2019-06-10 PROCEDURE — 83735 ASSAY OF MAGNESIUM: CPT | Performed by: NURSE PRACTITIONER

## 2019-06-10 RX ORDER — MIDAZOLAM HYDROCHLORIDE 1 MG/ML
1 INJECTION INTRAMUSCULAR; INTRAVENOUS ONCE
Status: COMPLETED | OUTPATIENT
Start: 2019-06-10 | End: 2019-06-10

## 2019-06-10 RX ORDER — MAGNESIUM SULFATE 1 G/100ML
1 INJECTION INTRAVENOUS ONCE
Status: COMPLETED | OUTPATIENT
Start: 2019-06-10 | End: 2019-06-10

## 2019-06-10 RX ORDER — OXYMETAZOLINE HYDROCHLORIDE 0.05 G/100ML
2 SPRAY NASAL 2 TIMES DAILY PRN
Status: DISPENSED | OUTPATIENT
Start: 2019-06-10 | End: 2019-06-13

## 2019-06-10 RX ORDER — MIDAZOLAM HYDROCHLORIDE 1 MG/ML
INJECTION INTRAMUSCULAR; INTRAVENOUS
Status: COMPLETED
Start: 2019-06-10 | End: 2019-06-10

## 2019-06-10 RX ORDER — MIDAZOLAM HYDROCHLORIDE 1 MG/ML
2 INJECTION INTRAMUSCULAR; INTRAVENOUS ONCE
Status: COMPLETED | OUTPATIENT
Start: 2019-06-10 | End: 2019-06-10

## 2019-06-10 RX ORDER — FENTANYL CITRATE 50 UG/ML
25 INJECTION, SOLUTION INTRAMUSCULAR; INTRAVENOUS ONCE
Status: COMPLETED | OUTPATIENT
Start: 2019-06-10 | End: 2019-06-10

## 2019-06-10 RX ORDER — AMIODARONE HYDROCHLORIDE 200 MG/1
200 TABLET ORAL EVERY 12 HOURS SCHEDULED
Status: DISCONTINUED | OUTPATIENT
Start: 2019-06-10 | End: 2019-06-12

## 2019-06-10 RX ORDER — ESMOLOL HYDROCHLORIDE 10 MG/ML
50-300 INJECTION, SOLUTION INTRAVENOUS
Status: DISCONTINUED | OUTPATIENT
Start: 2019-06-10 | End: 2019-06-11

## 2019-06-10 RX ORDER — FENTANYL CITRATE 50 UG/ML
INJECTION, SOLUTION INTRAMUSCULAR; INTRAVENOUS
Status: COMPLETED
Start: 2019-06-10 | End: 2019-06-10

## 2019-06-10 RX ORDER — FENTANYL CITRATE 50 UG/ML
50 INJECTION, SOLUTION INTRAMUSCULAR; INTRAVENOUS ONCE
Status: COMPLETED | OUTPATIENT
Start: 2019-06-10 | End: 2019-06-10

## 2019-06-10 RX ADMIN — IPRATROPIUM BROMIDE AND ALBUTEROL SULFATE 3 ML: 2.5; .5 SOLUTION RESPIRATORY (INHALATION) at 15:11

## 2019-06-10 RX ADMIN — VANCOMYCIN HYDROCHLORIDE 1250 MG: 10 INJECTION, POWDER, LYOPHILIZED, FOR SOLUTION INTRAVENOUS at 07:09

## 2019-06-10 RX ADMIN — IPRATROPIUM BROMIDE AND ALBUTEROL SULFATE 3 ML: 2.5; .5 SOLUTION RESPIRATORY (INHALATION) at 09:56

## 2019-06-10 RX ADMIN — MIDAZOLAM HYDROCHLORIDE 1 MG: 1 INJECTION, SOLUTION INTRAMUSCULAR; INTRAVENOUS at 11:48

## 2019-06-10 RX ADMIN — AMIODARONE HYDROCHLORIDE 200 MG: 200 TABLET ORAL at 15:28

## 2019-06-10 RX ADMIN — PANTOPRAZOLE SODIUM 40 MG: 40 INJECTION, POWDER, FOR SOLUTION INTRAVENOUS at 06:33

## 2019-06-10 RX ADMIN — AMIODARONE HYDROCHLORIDE 200 MG: 200 TABLET ORAL at 20:14

## 2019-06-10 RX ADMIN — FENTANYL CITRATE 50 MCG: 50 INJECTION, SOLUTION INTRAMUSCULAR; INTRAVENOUS at 11:37

## 2019-06-10 RX ADMIN — SODIUM CHLORIDE, PRESERVATIVE FREE 3 ML: 5 INJECTION INTRAVENOUS at 20:15

## 2019-06-10 RX ADMIN — AMIODARONE HYDROCHLORIDE 0.5 MG/MIN: 1.8 INJECTION, SOLUTION INTRAVENOUS at 00:43

## 2019-06-10 RX ADMIN — AMIODARONE HYDROCHLORIDE 1 MG/MIN: 1.8 INJECTION, SOLUTION INTRAVENOUS at 09:35

## 2019-06-10 RX ADMIN — MIDAZOLAM HYDROCHLORIDE 2 MG: 1 INJECTION INTRAMUSCULAR; INTRAVENOUS at 11:34

## 2019-06-10 RX ADMIN — IPRATROPIUM BROMIDE AND ALBUTEROL SULFATE 3 ML: 2.5; .5 SOLUTION RESPIRATORY (INHALATION) at 20:38

## 2019-06-10 RX ADMIN — AMITRIPTYLINE HYDROCHLORIDE 25 MG: 25 TABLET, FILM COATED ORAL at 20:14

## 2019-06-10 RX ADMIN — HEPARIN SODIUM 14 UNITS/KG/HR: 10000 INJECTION, SOLUTION INTRAVENOUS at 22:00

## 2019-06-10 RX ADMIN — FENTANYL CITRATE 25 MCG: 50 INJECTION, SOLUTION INTRAMUSCULAR; INTRAVENOUS at 11:49

## 2019-06-10 RX ADMIN — METHOHEXITAL SODIUM 30 MG: 500 INJECTION, POWDER, LYOPHILIZED, FOR SOLUTION INTRAMUSCULAR; INTRAVENOUS; RECTAL at 11:33

## 2019-06-10 RX ADMIN — Medication 30 MG: at 11:33

## 2019-06-10 RX ADMIN — AMIODARONE HYDROCHLORIDE 150 MG: 1.5 INJECTION, SOLUTION INTRAVENOUS at 03:20

## 2019-06-10 RX ADMIN — MAGNESIUM SULFATE HEPTAHYDRATE 1 G: 1 INJECTION, SOLUTION INTRAVENOUS at 02:32

## 2019-06-10 RX ADMIN — MEROPENEM 1 G: 1 INJECTION, POWDER, FOR SOLUTION INTRAVENOUS at 01:43

## 2019-06-10 RX ADMIN — MIDAZOLAM HYDROCHLORIDE 2 MG: 1 INJECTION, SOLUTION INTRAMUSCULAR; INTRAVENOUS at 11:34

## 2019-06-10 RX ADMIN — ESMOLOL HYDROCHLORIDE 50 MCG/KG/MIN: 10 INJECTION INTRAVENOUS at 03:22

## 2019-06-10 NOTE — CONSULTS
ENT Consult Note    Referring Provider: Aashish William        Patient Care Team:  Lexx Monson MD as PCP - General (Family Medicine)          Subjective .  Hoarseness    History of present illness:    Mr. Stef Jones is a 74-year-old male seen tonight in consultation for concerns of hoarseness.  While his history is vague, he believes he has been hoarse for more than 1 year.    He was transferred to [facility] 2 days ago for tachycardia with atrial fibrillation and cellulitis of the right lower extremity.    Additionally he has had choking episodes on eating and more frequently with drinking.  He denies hemoptysis and hematemesis.        Past Medical History:   Diagnosis Date   • COPD (chronic obstructive pulmonary disease) (CMS/[facility])    • Hearing loss    • Hoarse voice quality    • Hypertension      Past Surgical History:   Procedure Laterality Date   • HEMORRHOIDECTOMY       Allergies   Allergen Reactions   • Amoxicillin Unknown (See Comments)     unknown   • Penicillins Unknown (See Comments)     unknown       Social History     Socioeconomic History   • Marital status:      Spouse name: Not on file   • Number of children: Not on file   • Years of education: Not on file   • Highest education level: Not on file   Tobacco Use   • Smoking status: Current Every Day Smoker   Substance and Sexual Activity   • Alcohol use: No     Frequency: Never   • Drug use: No   • Sexual activity: Defer     Family History   Problem Relation Age of Onset   • Heart disease Mother        REVIEW OF SYMPTOMS    Systemic Symptoms: No fever, no recent weight loss  Head Symptoms: No headache, no facial pain, no facial weakness  Eye Symptoms: No blurry vision, full range of motion  ENT Symptoms: ADMITS  TO POOR HEARING, HOARSENESS x1 year  Cardiovascular Symptoms: No cold hands or feet  Pulmonary Symptoms:  dyspnea but no  stridor  GI Symptoms: Dysphasia and choking  Endocrine Symptoms: No heat or cold  intolerance  Neurological Symptoms: Disequilibrium but  no vertigo, no taste or smell disturbances  Skin Symptoms: No abnormal lumps      Vital Signs   Temp:  [98.2 °F (36.8 °C)-98.7 °F (37.1 °C)] 98.2 °F (36.8 °C)  Heart Rate:  [] 78  Resp:  [17-26] 18  BP: ()/() 125/87  FiO2 (%):  [35 %] 35 %  77.6 kg (171 lb)  Body mass index is 27.61 kg/m².      Physical Exam:     General Appearance:    Alert, cooperative, obviously hard of hearing, poor historian speaks with hoarse voice   Head:   Nasal cannula in place normocephalic, without obvious abnormality, atraumatic   Eyes:          conjunctivae and sclerae normal, corneas clear, PERRLA   Ears:   Ear canals clear, right TM normal, left TM normal   Oral Exam:   Normal tongue, tonsils , ecchymosis on soft palate (presumably from DENA)   Laryngeal:     Flexible laryngoscopy reveals poor left vocal cord mobility with irregularity on left false cord and left laryngeal surface of the epiglottis worrisome for neoplasm   Neck:   No adenopathy, supple, trachea midline, no thyromegaly, no   carotid bruit   Salivary Glands:     No palpable masses   Lungs:    Crackles    Heart:    Regular rhythm and normal rate   Abdomen:     Soft non-tender, non-distended, no guarding   Pulses:  Deferred-cellulitis right lower extremity   Skin:  Ecchymosis   Neurologic:   Cranial nerves II-XII grossly intact, no spontaneous   nystagmus       Results Review:   I reviewed the patient's new clinical results.    Results from last 7 days   Lab Units 06/10/19  0203   WBC 10*3/mm3 8.83   HEMOGLOBIN g/dL 15.4   HEMATOCRIT % 49.4   PLATELETS 10*3/mm3 175     Results from last 7 days   Lab Units 06/10/19  0203   SODIUM mmol/L 140   POTASSIUM mmol/L 4.3   CHLORIDE mmol/L 100   CO2 mmol/L 29.0   BUN mg/dL 16   CREATININE mg/dL 1.23   GLUCOSE mg/dL 96   CALCIUM mg/dL 8.7     Blood Culture   Date Value Ref Range Status   06/08/2019 No growth at 2 days  Preliminary   06/08/2019 No growth at 2  days  Preliminary                       Wound Culture   Date Value Ref Range Status   06/08/2019 Scant growth (1+) Normal Skin Adrianna  Final       Imaging Results (last 72 hours)     Procedure Component Value Units Date/Time    XR Chest 1 View [[id number]] Collected:  06/10/19 0828     Updated:  06/10/19 0936    Narrative:       EXAMINATION: XR CHEST 1 VW- 06/10/2019      INDICATION: Respiratory failure; I48.91-Unspecified atrial fibrillation      COMPARISON: 06/09/2019     FINDINGS: Portable chest reveals heart to be borderline enlarged.  Increased markings seen diffusely throughout the lung fields unchanged  in the interval with no new focal parenchymal opacification present.  Degenerative changes seen within the spine. No definite pleural  effusion.           Impression:       Stable chest as above.     D:  06/10/2019  E:  06/10/2019     This report was finalized on 6/10/2019 9:33 AM by Dr. Bessie Camp MD.       XR Chest 1 View [[id number]] Collected:  06/09/19 0912     Updated:  06/10/19 0934    Narrative:          EXAMINATION: XR CHEST 1 VW - 06/09/2019     INDICATION: Pulmonary edema.      COMPARISON: 06/08/2019     FINDINGS: Portable chest reveals cardiac and mediastinal silhouettes  within normal limits. Increased pulmonary vascularity is seen throughout  the lung fields bilaterally. Underlying chronic and emphysematous  changes are seen diffusely throughout the lung fields. There is  calcified granuloma identified within the right lower lung field. No  definite pleural effusion or pneumothorax.           Impression:       Increased markings again seen diffusely throughout the lung  fields bilaterally. No new focal parenchymal opacification present.     DICTATED:   06/09/2019  EDITED/ls :   06/09/2019      This report was finalized on 6/10/2019 9:30 AM by Dr. Bessie Camp MD.       XR Chest 1 View [[id number]] Collected:  06/08/19 0812     Updated:  06/08/19 2257    Narrative:           EXAMINATION: XR CHEST 1 VW - 06/08/2019     INDICATION: COPD.     COMPARISON: NONE     FINDINGS: Heart is borderline enlarged. There is a diffuse pulmonary  interstitial disease pattern, which could be either acute or chronic,  moderate in extent. A few granulomatous calcification are noted. There  are a couple of slightly less dense nodules, most 6 mm in diameter in  the left upper and mid lung, favored to represent granulomas as well. No  lung consolidation or focal air space disease is seen. No effusion or  pneumothorax is seen. The vasculature is difficult to evaluate due to  interstitial disease, but may be mildly increased.       Impression:       1. Diffuse and fairly uniform pulmonary interstitial disease. From this  study it is uncertain if this represents a diffuse pattern of mild  interstitial edema, pulmonary fibrosis, etc. If the patient's clinical  picture is not clear, consider chest CT scan.  2. Clearly calcified small pulmonary nodules and more questionable left  upper and mid lung nodules, but favored to be partly calcified also.  Either comparison with outside radiograph or follow-up CT is suggested.     DICTATED:   06/08/2019  EDITED/ls :   06/08/2019      This report was finalized on 6/8/2019 10:53 PM by DR. Og Garay MD.       Fiberoptic Endo (fees) [[id number]] Resulted:  06/08/19 1513     Updated:  06/08/19 1513    Narrative:       This procedure was auto-finalized with no dictation required.        Procedure: Flexible laryngoscopy was performed after obtaining informed consent.  Results recorded on physical exam    Assessment/Plan :    Hoarseness secondary to supraglottic lesion with left glottic extension worrisome for neoplasm    As he is just been cardioverted this afternoon and anticoagulated, direct laryngoscopy with biopsy under anesthesia will be deferred .  His multiple comorbidities will delay further diagnosis but it is likely he will require a biopsy under anesthesia and this is  likely to be a neoplasm such as a squamous cell carcinoma for which radiation would be offered.  PET scanning will be beneficial for staging. Surgical resection would not be indicated in the setting          I discussed the patients findings and my recommendations with patient.     Glen Gutiérrez MD, [facility]  Otolaryngology  o [phone]  c [phone]    Glen Gutiérrez MD  06/10/19  7:49 PM    Time: Total face-to-face/floor time: 45m

## 2019-06-10 NOTE — PLAN OF CARE
Problem: Patient Care Overview  Goal: Plan of Care Review  Outcome: Ongoing (interventions implemented as appropriate)   06/10/19 8309   Coping/Psychosocial   Plan of Care Reviewed With patient;family   Plan of Care Review   Progress improving   OTHER   Outcome Summary Pt had DENA and had cardioversion. He has since been in SB/SR. Other VSS, on 2L NC. He has transitioned to PO amio. Still on heparin gtts 14u/kg/hr. He has ambulated several times. UO adequate, urine color is clearing up, only a little pink. Had a BM. Dr. ESTRADA with ENT was supposed to be coming by to check out the patient's throat, see FEEs. Will continue to monitor.     Goal: Individualization and Mutuality  Outcome: Ongoing (interventions implemented as appropriate)    Goal: Discharge Needs Assessment  Outcome: Ongoing (interventions implemented as appropriate)    Goal: Interprofessional Rounds/Family Conf  Outcome: Ongoing (interventions implemented as appropriate)

## 2019-06-10 NOTE — SIGNIFICANT NOTE
Bedside nurse called to notify me that patient has been having ectopy on top of 's-170's. This is causing runs of v-tach. Labs were checked and all WNL except Mag which was 1.9. 1 gram mag ordered. Patient continuing to have runs of vt. Cardiology paged via RN. Dr. Velazquez spoke with RN and verbally ordered Amio bolus, increase gtt to 1mg/min and start Esmolol as needed.

## 2019-06-10 NOTE — PLAN OF CARE
Problem: Patient Care Overview  Goal: Plan of Care Review  Outcome: Ongoing (interventions implemented as appropriate)   06/10/19 0430   Coping/Psychosocial   Plan of Care Reviewed With patient;daughter   OTHER   Outcome Summary pt demos decreased functional mobility during PT IE today. pt indicated for skilled PT to increase bed mobility, transfers, gait, and balance independence. pt medical symptoms are evolving. PT recommend D/C to home with home health/assist based on findings of today's PT IE.

## 2019-06-10 NOTE — PLAN OF CARE
Problem: Patient Care Overview  Goal: Plan of Care Review  Outcome: Ongoing (interventions implemented as appropriate)   06/10/19 6538   Coping/Psychosocial   Plan of Care Reviewed With patient;family   SLP treatment completed. Will continue to address dysphagia. Please see note for further details and recommendations.

## 2019-06-10 NOTE — PROGRESS NOTES
[facility]  Cardiology Progress Note      Chief Complaint/Reason for service:    · Afib with RVR         Runs of NSVT vs. aberrantly conducted beats. Amio re-bolused and rate increased to 1 mg/min. Low dose esmolol gtt added as well. Remains in Afib with RVR this am. Breathing is about the same. No palpitations, lh, dizziness, fatigue.     Past medical, surgical, social and family history reviewed in the patient's electronic medical record.    Review of Systems:   The following systems were reviewed and negative;  constitution, respiratory and cardiovascular except as noted in HPI       Vital Sign Min/Max for last 24 hours  Temp  Min: 97.6 °F (36.4 °C)  Max: 98.7 °F (37.1 °C)   BP  Min: 90/58  Max: 178/96   Pulse  Min: 122  Max: 168   Resp  Min: 17  Max: 26   SpO2  Min: 91 %  Max: 98 %   Flow (L/min)  Min: 3  Max: 5      Intake/Output Summary (Last 24 hours) at 6/10/2019 0848  Last data filed at 6/10/2019 0818  Gross per 24 hour   Intake 2229.6 ml   Output 3740 ml   Net -1510.4 ml           Physical Exam   Constitutional: He appears well-developed and well-nourished.   HENT:   Head: Normocephalic and atraumatic.   Neck: Neck supple.   Cardiovascular: An irregularly irregular rhythm present. Tachycardia present.   Pulmonary/Chest: Effort normal and breath sounds normal.   Abdominal: Soft. Bowel sounds are normal.   Musculoskeletal: He exhibits no edema.   Neurological: He is alert.   Skin: Skin is warm and dry.   Nursing note and vitals reviewed.      Tele:  Afib 130-140's    Results Review (reviewed the patient's recent labs in the electronic medical record):     EKG:  NSR     CXR: reviewed      Results from last 7 days   Lab Units 06/10/19  0203 06/10/19  0030 06/09/19  0615 06/08/19  0343   SODIUM mmol/L 140 141 139 138   POTASSIUM mmol/L 4.3 4.4 4.2 4.8   CHLORIDE mmol/L 100 101 104 101   BUN mg/dL 16 16 18 18   CREATININE mg/dL 1.23 1.25 1.19 1.43*   MAGNESIUM mg/dL 1.9 1.9  2.0 2.1     Results from last 7 days   Lab Units 06/09/19  0615 06/08/19  0343   TROPONIN T ng/mL <0.010 <0.010     Results from last 7 days   Lab Units 06/10/19  0203 06/09/19  0615 06/08/19  0343   WBC 10*3/mm3 8.83 9.35 11.30*  10.87*   HEMOGLOBIN g/dL 15.4 14.1 15.3  15.2   HEMATOCRIT % 49.4 46.7 48.9  48.0   PLATELETS 10*3/mm3 175 163 202  212       Lab Results   Component Value Date    HGBA1C 6.60 (H) 06/08/2019       Lab Results   Component Value Date    CHOL 108 06/08/2019    TRIG 61 06/08/2019    HDL 59 06/08/2019    LDL 37 06/08/2019       Active Hospital Problems    Diagnosis POA   • **A Fib w/RVR [I48.91] Yes   • Cellulitis of Right Lower Leg and Foot [L03.90] Yes   • COPD (chronic obstructive pulmonary disease) (CMS/HCC) [J44.9] Yes     Initial cardiac assessment:  Mr. Jones is a 73 y/o male with pmhx of hypertension and COPD who was transferred from [facility] for Afib with RVR and cellulitis of right lower calf/foot.     Recommendations:  1. Afib with RVR  - newly diagnosed, incidentally found  - Treated with Cardizem gtt at OSH, now on amio drip and given IV digoxin 500 mcg x 1.   - Asymptomatic and hemodynamically stable.   - CHADSVASC = 3 (age, HTN), on heparin gtt  - Echo shows ejection fraction of mid 30s, moderate RV enlargement and RV dysfunction.  - Remains on amiodarone gtt.   - BP marginal at times which limits use of esmolol gtt. Loaded with IV digoxin again yesterday.     2. Acute systolic heart failure  - Echo shows ejection fraction of mid 30s, moderate RV enlargement and RV dysfunction.  - could be tachycardia induced. Will need repeat echo once maintaining NSR, may also need ischemic evaluation.  - Will need to initiate guideline directed medical therapy for systolic dysfunction once blood pressure stable and respiratory status improve, likely will need cardioselective beta-blocker such as bisoprolol...hopefully after ECV.   - ProBNP trending down. (-) 1.5 liters  "overnight.    3. Hypertension  - stable     4. Syncope  - reportedly had syncopal episode seems to have been triggered by prolonged coughing.     5. Lower extremity cellulitis  - per admitting    6. COPD  -  Per admitting     Plan:   Will check Dig level today.  - Plan for DENA/ECV today.The risks, benefits, and alternatives of the procedure have been reviewed and the patient wishes to proceed.   - transition amiodarone to oral if ECV successful.   - Remains critically ill with hypoxic respiratory failure  - Will likely need ischemic evaluation in the near future for new systolic heart failure.       Electronically signed by MAYI Borges, 06/10/19, 8:53 AM.    ___________________________________________________________  The patient was seen and examined by me.  Agree and verified/discussed key components of E/M as outlined by the Nurse practitioner/PA.    /82   Pulse (!) 131   Temp 98.2 °F (36.8 °C) (Oral)   Resp 19   Ht 167.6 cm (65.98\")   Wt 78 kg (171 lb 15.3 oz)   SpO2 97%   BMI 27.77 kg/m²     General Appearance:   · well developed  · well nourished  Neck:  · thyroid not enlarged  · supple  Respiratory:  · no respiratory distress  · Decreased breath sounds bilateral, wheezes expiratory  · no rales  Cardiovascular:  · no jugular venous distention  · Irregular irregular rhythm  · apical impulse normal  · S1 normal, S2 normal  · no S3, no S4   · no murmur  · no rub, no thrill  · carotid pulses normal; no bruit  · pedal pulses normal  · lower extremity edema: none  .   Skin:   · warm, dry        Plan:    Frequent nonsustained runs of VT versus A. fib with aberrancy overnight, rates remain elevated in the 130s  Respiratory status remains unchanged currently on CPAP  Given marginal blood pressure we are limited in rate control agents  We re-bolused amnio overnight, letter lites were relatively normal  Digoxin loaded yesterday, he continues with IV digoxin  Plans to proceed with " DENA/cardioversion today with Dr. Leung    Eventually would like to transition to guideline directed medical therapy with bisoprolol (due to pulmonary disease we will select a cardioselective beta-blocker)   Will need an ischemic evaluation eventually, maybe as outpatient.    Yovanny Solares MD, [facility]

## 2019-06-10 NOTE — THERAPY EVALUATION
Acute Care - Occupational Therapy Initial Evaluation  [facility]     Patient Name: Stef Jones  : 1945  MRN: [id number]  Today's Date: 6/10/2019  Onset of Illness/Injury or Date of Surgery: (P) 19  Date of Referral to OT: 19  Referring Physician: MD Torrez (P)    Admit Date: 2019       ICD-10-CM ICD-9-CM   1. A Fib w/RVR I48.91 427.31   2. Impaired functional mobility, balance, gait, and endurance Z74.09 V49.89   3. Impaired mobility and ADLs Z74.09 799.89     Patient Active Problem List   Diagnosis   • Cellulitis of Right Lower Leg and Foot   • A Fib w/RVR   • COPD (chronic obstructive pulmonary disease) (CMS/HCC)   • Dysphagia     Past Medical History:   Diagnosis Date   • COPD (chronic obstructive pulmonary disease) (CMS/HCC)    • Hearing loss    • Hoarse voice quality    • Hypertension      Past Surgical History:   Procedure Laterality Date   • HEMORRHOIDECTOMY            OT ASSESSMENT FLOWSHEET (last 12 hours)      Occupational Therapy Evaluation     Row Name 06/10/19 1349                   OT Evaluation Time/Intention    Subjective Information  no complaints  -CL        Document Type  evaluation  -CL        Mode of Treatment  occupational therapy  -CL        Patient Effort  excellent  -CL        Symptoms Noted During/After Treatment  none  -CL           General Information    Patient Profile Reviewed?  yes  -CL        Onset of Illness/Injury or Date of Surgery  19  -CL        Referring Physician  MD Shan  -CL        Prior Level of Function  independent:;all household mobility;transfer;ADL's;dressing;bathing  -CL        Equipment Currently Used at Home  none  -CL        Pertinent History of Current Functional Problem  Pt presented to an OSH 2/2 to BLE pain/swelling. Pt noted to be in A-fib w/ RVR. Pt t/f to [facility] for HLOC. Pt s/p cardioversion 06/10.   -CL        Existing Precautions/Restrictions  fall;oxygen therapy device and L/min  -CL        Risks Reviewed  patient  and family:;LOB;nausea/vomiting;dizziness;increased discomfort;change in vital signs;lines disloged  -CL        Benefits Reviewed  patient and family:;improve function;increase balance;increase strength;increase independence;decrease pain;increase knowledge  -CL        Barriers to Rehab  none identified  -CL           Relationship/Environment    Lives With  alone  -CL           Resource/Environmental Concerns    Current Living Arrangements  home/apartment/condo  -CL           Home Main Entrance    Number of Stairs, Main Entrance  other (see comments) 17  -CL           Cognitive Assessment/Interventions    Additional Documentation  Cognitive Assessment/Intervention (Group)  -CL           Cognitive Assessment/Intervention- PT/OT    Affect/Mental Status (Cognitive)  WFL  -CL        Orientation Status (Cognition)  oriented x 3  -CL        Follows Commands (Cognition)  follows one step commands;over 90% accuracy;repetition of directions required;verbal cues/prompting required  -CL        Cognitive Function (Cognitive)  safety deficit  -CL        Safety Deficit (Cognitive)  mild deficit;awareness of need for assistance;safety precautions awareness  -CL        Personal Safety Interventions  fall prevention program maintained;gait belt;nonskid shoes/slippers when out of bed  -CL           Bed Mobility Assessment/Treatment    Bed Mobility Assessment/Treatment  supine-sit  -CL        Supine-Sit [place] (Bed Mobility)  minimum assist (75% patient effort);verbal cues  -CL        Assistive Device (Bed Mobility)  bed rails;head of bed elevated  -CL           Functional Mobility    Functional Mobility- Ind. Level  contact guard assist;1 person + 1 person to manage equipment;verbal cues required  -CL        Functional Mobility- Device  rolling walker  -CL        Functional Mobility-Distance (Feet)  -- in hallway  -CL        Functional Mobility- Comment  Pt transitioned to PT treatment.   -CL           Transfer  Assessment/Treatment    Transfer Assessment/Treatment  stand-sit transfer;sit-stand transfer  -CL           Sit-Stand Transfer    Sit-Stand [place] (Transfers)  contact guard;verbal cues  -CL        Assistive Device (Sit-Stand Transfers)  walker, front-wheeled  -CL           Stand-Sit Transfer    Stand-Sit [place] (Transfers)  contact guard;verbal cues  -CL        Assistive Device (Stand-Sit Transfers)  walker, front-wheeled  -CL           ADL Assessment/Intervention    BADL Assessment/Intervention  lower body dressing  -CL           Lower Body Dressing Assessment/Training    Lower Body Dressing [place] Level  don;socks;dependent (less than 25% patient effort)  -CL        Lower Body Dressing Position  supine  -CL           General ROM    GENERAL ROM COMMENTS  BUE grossly WFL.   -CL           MMT (Manual Muscle Testing)    General MMT Comments  BUE grossly 4-/5.   -CL           Motor Assessment/Interventions    Additional Documentation  Balance (Group);Therapeutic Exercise (Group)  -CL           Balance    Balance  static sitting balance;static standing balance  -CL           Static Sitting Balance    Level of [place] (Unsupported Sitting, Static Balance)  supervision  -CL        Sitting Position (Unsupported Sitting, Static Balance)  sitting on edge of bed  -CL           Static Standing Balance    Level of [place] (Supported Standing, Static Balance)  contact guard assist  -CL        Assistive Device Utilized (Supported Standing, Static Balance)  walker, rolling  -CL           Sensory Assessment/Intervention    Sensory General Assessment  no sensation deficits identified  -CL           Positioning and Restraints    Pre-Treatment Position  in bed  -CL        Post Treatment Position  other  -CL        Other Position  with other staff ambulating w/ PT  -CL           Pain Scale: Numbers Pre/Post-Treatment    Pain Scale: Numbers, Pretreatment  0/10 - no pain  -CL        Pain Scale: Numbers,  Post-Treatment  0/10 - no pain  -CL           Clinical Impression (OT)    Date of Referral to OT  06/08/19  -CL        OT Diagnosis  Decreased independence in ADLs.   -CL        Patient/Family Goals Statement (OT Eval)  Return to PLOF.   -CL        Criteria for Skilled Therapeutic Interventions Met (OT Eval)  yes;treatment indicated  -CL        Rehab Potential (OT Eval)  good, to achieve stated therapy goals  -CL        Therapy Frequency (OT Eval)  daily  -CL        Anticipated Equipment Needs at Discharge (OT)  -- TBA further  -CL        Anticipated Discharge Disposition (OT)  home with assist;home with home health  -CL           Vital Signs    Pre Systolic BP Rehab  111  -CL        Pre Treatment Diastolic BP  76  -CL        Pretreatment Heart Rate (beats/min)  74  -CL        Pre SpO2 (%)  96  -CL        O2 Delivery Pre Treatment  supplemental O2  -CL           OT Goals    Transfer Goal Selection (OT)  transfer, OT goal 1  -CL        Dressing Goal Selection (OT)  dressing, OT goal 1  -CL        Toileting Goal Selection (OT)  toileting, OT goal 1  -CL        Activity Tolerance Goal Selection (OT)  activity tolerance, OT goal 1  -CL        Additional Documentation  Activity Tolerance Goal Selection (OT) (Row)  -CL           Transfer Goal 1 (OT)    Activity/Assistive Device (Transfer Goal 1, OT)  sit-to-stand/stand-to-sit;toilet  -CL        [place] Level/Cues Needed (Transfer Goal 1, OT)  supervision required;verbal cues required  -CL        Time Frame (Transfer Goal 1, OT)  by discharge;long term goal (LTG)  -CL        Progress/Outcome (Transfer Goal 1, OT)  goal ongoing  -CL           Dressing Goal 1 (OT)    Activity/Assistive Device (Dressing Goal 1, OT)  lower body dressing don/doff socks w/ AAD  -CL        [place]/Cues Needed (Dressing Goal 1, OT)  supervision required;verbal cues required  -CL        Time Frame (Dressing Goal 1, OT)  by discharge;long term goal (LTG)  -CL        Progress/Outcome  (Dressing Goal 1, OT)  goal ongoing  -CL           Toileting Goal 1 (OT)    Activity/Device (Toileting Goal 1, OT)  adjust/manage clothing;perform perineal hygiene  -CL        [place] Level/Cues Needed (Toileting Goal 1, OT)  contact guard assist;verbal cues required  -CL        Time Frame (Toileting Goal 1, OT)  by discharge;long term goal (LTG)  -CL        Progress/Outcome (Toileting Goal 1, OT)  goal ongoing  -CL            Activity Tolerance Goal 1 (OT)    Activity Tolerance Goal 1 (OT)  Pt will tolerate 15 mins of functional task performance w/ 2 rest break and O2 sats >89%.   -CL        Activity Level (Endurance Goal 1, OT)  15 min activity  -CL        Time Frame (Activity Tolerance Goal 1, OT)  by discharge;long term goal (LTG)  -CL        Progress/Outcome (Activity Tolerance Goal 1, OT)  goal ongoing  -CL           Living Environment    Home Accessibility  stairs to enter home  -CL          User Key  (r) = Recorded By, (t) = Taken By, (c) = Cosigned By    Initials Name Effective Dates    CL Harper Nelson OT 04/03/18 -          Occupational Therapy Education     Title: PT OT SLP Therapies (In Progress)     Topic: Occupational Therapy (In Progress)     Point: ADL training (In Progress)     Description: Instruct learner(s) on proper safety adaptation and remediation techniques during self care or transfers.   Instruct in proper use of assistive devices.    Learning Progress Summary           Patient Acceptance, E, NR by CL at 6/10/2019  1:49 PM    Comment:  Pt educated on appropriate safety precautions, t/f techniques, role of OT, and benefits of therapy.   Family Acceptance, E, NR by CL at 6/10/2019  1:49 PM    Comment:  Pt educated on appropriate safety precautions, t/f techniques, role of OT, and benefits of therapy.                   Point: Precautions (In Progress)     Description: Instruct learner(s) on prescribed precautions during self-care and functional transfers.    Learning Progress Summary            Patient Acceptance, E, NR by CL at 6/10/2019  1:49 PM    Comment:  Pt educated on appropriate safety precautions, t/f techniques, role of OT, and benefits of therapy.   Family Acceptance, E, NR by CL at 6/10/2019  1:49 PM    Comment:  Pt educated on appropriate safety precautions, t/f techniques, role of OT, and benefits of therapy.                   Point: Body mechanics (In Progress)     Description: Instruct learner(s) on proper positioning and spine alignment during self-care, functional mobility activities and/or exercises.    Learning Progress Summary           Patient Acceptance, E, NR by CL at 6/10/2019  1:49 PM    Comment:  Pt educated on appropriate safety precautions, t/f techniques, role of OT, and benefits of therapy.   Family Acceptance, E, NR by CL at 6/10/2019  1:49 PM    Comment:  Pt educated on appropriate safety precautions, t/f techniques, role of OT, and benefits of therapy.                               User Key     Initials Effective Dates Name Provider Type Discipline     04/03/18 -  Harper Nelson, OT Occupational Therapist OT                  OT Recommendation and Plan  Outcome Summary/Treatment Plan (OT)  Anticipated Equipment Needs at Discharge (OT): (TBA further)  Anticipated Discharge Disposition (OT): home with assist, home with home health  Therapy Frequency (OT Eval): daily  Plan of Care Review  Plan of Care Reviewed With: patient, family  Plan of Care Reviewed With: patient, family  Outcome Summary: OT completed a breif chart review. Pt Min A for bed mobility and CGA for t/fs, limited d/t generalized weakness and fatigue. Recommend cont skilled IPOT POC. Recommend pt DC home w/ assist and HH services.     Outcome Measures     Row Name 06/10/19 2510 06/10/19 9984          How much help from another person do you currently need...    Turning from your back to your side while in flat bed without using bedrails?  3  (Pended)   -KL  --     Moving from lying on back to sitting on  the side of a flat bed without bedrails?  3  (Pended)   -KL  --     Moving to and from a bed to a chair (including a wheelchair)?  3  (Pended)   -KL  --     Standing up from a chair using your arms (e.g., wheelchair, bedside chair)?  3  (Pended)   -KL  --     Climbing 3-5 steps with a railing?  2  (Pended)   -KL  --     To walk in hospital room?  3  (Pended)   -KL  --     AM-PAC 6 Clicks Score  17  (Pended)   -CL (r) KL (t)  --        How much help from another is currently needed...    Putting on and taking off regular lower body clothing?  --  1  -CL     Bathing (including washing, rinsing, and drying)  --  2  -CL     Toileting (which includes using toilet bed pan or urinal)  --  2  -CL     Putting on and taking off regular upper body clothing  --  3  -CL     Taking care of personal grooming (such as brushing teeth)  --  3  -CL     Eating meals  --  4  -CL     Score  --  15  -CL        Functional Assessment    Outcome Measure Options  AM-PAC 6 Clicks Basic Mobility (PT)  (Pended)   -KL  AM-PAC 6 Clicks Daily Activity (OT)  -CL       User Key  (r) = Recorded By, (t) = Taken By, (c) = Cosigned By    Initials Name Provider Type    CL Harper Nelson OT Occupational Therapist    Antonio Rodríguez, PT Student PT Student          Time Calculation:   Time Calculation- OT     Row Name 06/10/19 1350 06/10/19 1349          Time Calculation- OT    OT Start Time  --  1349  -CL     OT Received On  --  06/10/19  -CL     OT Goal Re-Cert Due Date  --  06/20/19  -CL        Timed Charges    84865 - Gait Training Minutes   15  (Pended)   -  --       User Key  (r) = Recorded By, (t) = Taken By, (c) = Cosigned By    Initials Name Provider Type    CL Harper Nelson OT Occupational Therapist    Antonio Rodríguez, PT Student PT Student        Therapy Charges for Today     Code Description Service Date Service Provider Modifiers Qty    [id number]  OT EVAL LOW COMPLEXITY 4 6/10/2019 Harper Nelson OT GO 1               Harper Nelson  OT  6/10/2019

## 2019-06-10 NOTE — PLAN OF CARE
Problem: Patient Care Overview  Goal: Plan of Care Review  Outcome: Ongoing (interventions implemented as appropriate)   06/10/19 0197   Coping/Psychosocial   Plan of Care Reviewed With patient;family   Plan of Care Review   Progress improving   OTHER   Outcome Summary OT completed a breif chart review. Pt Min A for bed mobility and CGA for t/fs, limited d/t generalized weakness and fatigue. Recommend cont skilled IPOT POC. Recommend pt DC home w/ assist and HH services.

## 2019-06-10 NOTE — THERAPY EVALUATION
Acute Care - Physical Therapy Initial Evaluation  [facility]     Patient Name: Stef Jones  : 1945  MRN: [id number]  Today's Date: 6/10/2019   Onset of Illness/Injury or Date of Surgery: (P) 19  Date of Referral to PT: (P) 06/10/19  Referring Physician: MD Torrez (P)      Admit Date: 2019    Visit Dx:     ICD-10-CM ICD-9-CM   1. A Fib w/RVR I48.91 427.31   2. Impaired functional mobility, balance, gait, and endurance Z74.09 V49.89     Patient Active Problem List   Diagnosis   • Cellulitis of Right Lower Leg and Foot   • A Fib w/RVR   • COPD (chronic obstructive pulmonary disease) (CMS/HCC)   • Dysphagia     Past Medical History:   Diagnosis Date   • COPD (chronic obstructive pulmonary disease) (CMS/HCC)    • Hearing loss    • Hoarse voice quality    • Hypertension      Past Surgical History:   Procedure Laterality Date   • HEMORRHOIDECTOMY          PT ASSESSMENT (last 12 hours)      Physical Therapy Evaluation     Row Name 06/10/19 1350          PT Evaluation Time/Intention    Subjective Information  complains of;swelling  (Pended)  R foot/calf, significantly better than previous day  -     Document Type  evaluation  (Pended)   -     Mode of Treatment  physical therapy  (Pended)   -     Patient Effort  excellent  (Pended)   -     Symptoms Noted During/After Treatment  none  (Pended)   -     Row Name 06/10/19 1350          General Information    Patient Profile Reviewed?  yes  (Pended)   -     Onset of Illness/Injury or Date of Surgery  19  (Pended)   -     Referring Physician  MD Shan  (Pended)   -     Patient Observations  alert;cooperative;agree to therapy  (Pended)   -     Patient/Family Observations  family present (daughter, son)  (Pended)   -     General Observations of Patient  pt supine in bed at start of eval  (Pended)   -     Prior Level of Function  independent:;gait;transfer;bed mobility;ADL's  (Pended)   -     Equipment Currently Used at Home   none  (Pended)   -     Pertinent History of Current Functional Problem  pt admitted 6/8 from [facility] for further monitoring and care for A fib with RVR. pt also has Hx of COPD and recent onset of cellulitis R calf/foot with subsequent B swelling (R>L). pt on supplemental O2 (6L nc). pt cardioverted this AM due to VTach.   (Pended)   -     Existing Precautions/Restrictions  fall;oxygen therapy device and L/min  (Pended)   -     Limitations/Impairments  swallowing  (Pended)   -     Risks Reviewed  patient:;family:;LOB;increased discomfort;change in vital signs  (Pended)   -     Benefits Reviewed  patient:;family:;improve function;increase independence;increase strength;increase balance;decrease pain  (Pended)   -     Barriers to Rehab  none identified  (Pended)   -     Row Name 06/10/19 1350          Relationship/Environment    Primary Source of Support/Comfort  child(omer)  (Pended)   -     Lives With  alone  (Pended)   -     Family Caregiver if Needed  child(omer), adult  (Pended)   -     Row Name 06/10/19 1350          Resource/Environmental Concerns    Current Living Arrangements  home/apartment/condo  (Pended)   -     Resource/Environmental Concerns  home accessibility  (Pended)   -     Home Accessibility Concerns  stairs to enter home;not wheelchair accessible  (Pended)  2nd floor apt  -     Transportation Concerns  car, none  (Pended)   -     Row Name 06/10/19 1350          Home Main Entrance    Number of Stairs, Main Entrance  other (see comments)  (Pended)  17  -     Row Name 06/10/19 1350          Cognitive Assessment/Interventions    Additional Documentation  Cognitive Assessment/Intervention (Group)  (Pended)   -     Row Name 06/10/19 1350          Cognitive Assessment/Intervention- PT/OT    Affect/Mental Status (Cognitive)  WFL  (Pended)   -     Orientation Status (Cognition)  oriented x 4;oriented to;person;place;situation;time  (Pended)   -     Follows  Commands (Cognition)  follows one step commands;over 90% accuracy  (Pended)   -     Cognitive Function (Cognitive)  safety deficit  (Pended)   -     Safety Deficit (Cognitive)  mild deficit;safety precautions awareness;safety precautions follow-through/compliance  (Pended)   -     Personal Safety Interventions  fall prevention program maintained;gait belt;nonskid shoes/slippers when out of bed  (Pended)   -     Row Name 06/10/19 1350          Safety Issues, Functional Mobility    Safety Issues Affecting Function (Mobility)  safety precaution awareness;safety precautions follow-through/compliance  (Pended)   -     Impairments Affecting Function (Mobility)  coordination;endurance/activity tolerance;strength  (Pended)   -     Row Name 06/10/19 1350          Bed Mobility Assessment/Treatment    Bed Mobility Assessment/Treatment  rolling left;supine-sit;scooting/bridging  (Pended)   -     Rolling Left [place] (Bed Mobility)  supervision  (Pended)   -     Scooting/Bridging [place] (Bed Mobility)  independent  (Pended)   -     Supine-Sit [place] (Bed Mobility)  minimum assist (75% patient effort);1 person assist  (Pended)   -     Bed Mobility, Safety Issues  decreased use of arms for pushing/pulling;decreased use of legs for bridging/pushing  (Pended)   -     Assistive Device (Bed Mobility)  bed rails;head of bed elevated  (Pended)   -     Comment (Bed Mobility)  pt required minor VC to properly sequence supine-sit.  (Pended)   -     Row Name 06/10/19 6708          Transfer Assessment/Treatment    Transfer Assessment/Treatment  bed-chair transfer;sit-stand transfer;stand-sit transfer  (Pended)   -     Comment (Transfers)  pt demos transfers with min A x1 and FWW. pt needs minor cuing for safety   (Pended)   -     Bed-Chair [place] (Transfers)  minimum assist (75% patient effort);1 person assist  (Pended)   -     Assistive Device (Bed-Chair Transfers)  walker,  front-wheeled  (Pended)   -     Sit-Stand [place] (Transfers)  contact guard  (Pended)   -     Stand-Sit [place] (Transfers)  contact guard  (Pended)   -KL     Row Name 06/10/19 1350          Sit-Stand Transfer    Assistive Device (Sit-Stand Transfers)  walker, front-wheeled  (Pended)   -KL     Row Name 06/10/19 1350          Stand-Sit Transfer    Assistive Device (Stand-Sit Transfers)  walker, front-wheeled  (Pended)   -KL     Row Name 06/10/19 1350          Gait/Stairs Assessment/Training    82962 - Gait Training Minutes   15  (Pended)   -     Gait/Stairs Assessment/Training  gait/ambulation independence  (Pended)   -     [place] Level (Gait)  minimum assist (75% patient effort);1 person assist  (Pended)   -     Assistive Device (Gait)  walker, front-wheeled  (Pended)   -     Distance in Feet (Gait)  130  (Pended)   -     Pattern (Gait)  step-through  (Pended)   -     Deviations/Abnormal Patterns (Gait)  base of support, wide;stride length decreased  (Pended)   -KL     Bilateral Gait Deviations  heel strike decreased  (Pended)   -KL     Comment (Gait/Stairs)  pt required VC to stay within walker frame and for safety during ambulation.   (Pended)   -KL     Row Name 06/10/19 1350          General ROM    GENERAL ROM COMMENTS  BLE grossly WFL  (Pended)   -KL     Row Name 06/10/19 1350          MMT (Manual Muscle Testing)    General MMT Comments  BLE grossly 4/5  (Pended)   -KL     Row Name 06/10/19 1350          Sensory Assessment/Intervention    Sensory General Assessment  no sensation deficits identified  (Pended)   -KL     Row Name 06/10/19 1350          Pain Assessment    Additional Documentation  Pain Scale: Numbers Pre/Post-Treatment (Group)  (Pended)   -KL     Row Name 06/10/19 1350          Pain Scale: Numbers Pre/Post-Treatment    Pain Scale: Numbers, Pretreatment  0/10 - no pain  (Pended)   -     Pain Scale: Numbers, Post-Treatment  0/10 - no pain  (Pended)   -KL     Row Name  06/10/19 1350          Coping    Observed Emotional State  accepting;calm;cooperative  (Pended)   -     Verbalized Emotional State  acceptance  (Pended)   -     Row Name 06/10/19 1350          Plan of Care Review    Plan of Care Reviewed With  patient;daughter  (Pended)   -     Row Name 06/10/19 1350          Physical Therapy Clinical Impression    Date of Referral to PT  06/10/19  (Pended)   -     PT Diagnosis (PT Clinical Impression)  impaired functional mobility   (Pended)   -     Criteria for Skilled Interventions Met (PT Clinical Impression)  yes;treatment indicated  (Pended)   -     Impairments Found (describe specific impairments)  aerobic capacity/endurance;gait, locomotion, and balance;ROM  (Pended)   -     Rehab Potential (PT Clinical Summary)  good, to achieve stated therapy goals  (Pended)   -     Care Plan Review (PT)  evaluation/treatment results reviewed;care plan/treatment goals reviewed;risks/benefits reviewed;patient/other agree to care plan  (Pended)   -     Care Plan Review, Other Participant (PT Clinical Impression)  daughter  (Pended)   -     Row Name 06/10/19 1350          Vital Signs    Pre Systolic BP Rehab  111  (Pended)   -     Pre Treatment Diastolic BP  76  (Pended)   -     Post Systolic BP Rehab  114  (Pended)   -     Post Treatment Diastolic BP  76  (Pended)   -     Pretreatment Heart Rate (beats/min)  73  (Pended)   -     Posttreatment Heart Rate (beats/min)  73  (Pended)   -     Pre SpO2 (%)  97  (Pended)   -     O2 Delivery Pre Treatment  nasal cannula  (Pended)  6L  -     Post SpO2 (%)  95  (Pended)   -     O2 Delivery Post Treatment  nasal cannula  (Pended)  6L  -KL     Pre Patient Position  Supine  (Pended)   -     Intra Patient Position  Standing  (Pended)   -     Post Patient Position  Sitting  (Pended)   -     Row Name 06/10/19 1350          Physical Therapy Goals    Bed Mobility Goal Selection (PT)  bed mobility, PT goal 1  (Pended)    -KL     Transfer Goal Selection (PT)  transfer, PT goal 1  (Pended)   -     Gait Training Goal Selection (PT)  gait training, PT goal 1  (Pended)   -     Stairs Goal Selection (PT)  stairs, PT goal 1  (Pended)   -     Additional Documentation  Stairs Goal Selection (PT) (Row)  (Pended)   -     Row Name 06/10/19 3234          Bed Mobility Goal 1 (PT)    Activity/Assistive Device (Bed Mobility Goal 1, PT)  sit to supine/supine to sit  (Pended)   -KL     [place] Level/Cues Needed (Bed Mobility Goal 1, PT)  independent  (Pended)   -KL     Time Frame (Bed Mobility Goal 1, PT)  2 weeks  (Pended)   -KL     Progress/Outcomes (Bed Mobility Goal 1, PT)  goal ongoing  (Pended)   -     Row Name 06/10/19 8508          Transfer Goal 1 (PT)    Activity/Assistive Device (Transfer Goal 1, PT)  sit-to-stand/stand-to-sit  (Pended)   -KL     [place]/Cues Needed (Transfer Goal 1, PT)  independent  (Pended)   -KL     Time Frame (Transfer Goal 1, PT)  2 weeks  (Pended)   -KL     Progress/Outcome (Transfer Goal 1, PT)  goal ongoing  (Pended)   -     Row Name 06/10/19 5045          Gait Training Goal 1 (PT)    Activity/Assistive Device (Gait Training Goal 1, PT)  gait (walking locomotion)  (Pended)   -KL     [place] Level (Gait Training Goal 1, PT)  independent  (Pended)   -KL     Distance (Gait Goal 1, PT)  300  (Pended)   -KL     Time Frame (Gait Training Goal 1, PT)  2 weeks  (Pended)   -KL     Progress/Outcome (Gait Training Goal 1, PT)  goal ongoing  (Pended)   -     Row Name 06/10/19 1415          Stairs Goal 1 (PT)    Activity/Assistive Device (Stairs Goal 1, PT)  stairs, all skills  (Pended)   -KL     [place]/Cues Needed (Stairs Goal 1, PT)  conditional independence  (Pended)  with AD/handrail  -     Number of Stairs (Stairs Goal 1, PT)  17  (Pended)   -KL     Time Frame (Stairs Goal 1, PT)  2 weeks  (Pended)   -KL     Progress/Outcome (Stairs Goal 1, PT)  goal ongoing  (Pended)    -     Row Name 06/10/19 1560          Patient Education Goal (PT)    Activity (Patient Education Goal, PT)  HEP  (Pended)   -     [place]/Cues/Accuracy (Memory Goal 2, PT)  demonstrates adequately;verbalizes understanding  (Pended)   -     Time Frame (Patient Education Goal, PT)  2 weeks  (Pended)   -     Progress/Outcome (Patient Education Goal, PT)  goal ongoing  (Pended)   -     Row Name 06/10/19 8311          Positioning and Restraints    Pre-Treatment Position  in bed  (Pended)   -     Post Treatment Position  chair  (Pended)   -KL     In Chair  notified nsg;reclined;sitting;exit alarm on;encouraged to call for assist;call light within reach;with family/caregiver;waffle cushion;legs elevated;heels elevated  (Pended)   -     Row Name 06/10/19 9168          Living Environment    Home Accessibility  not wheelchair accessible;stairs to enter home  (Pended)   -       User Key  (r) = Recorded By, (t) = Taken By, (c) = Cosigned By    Initials Name Provider Type     Antonio Torres, PT Student PT Student        Physical Therapy Education     Title: PT OT SLP Therapies (In Progress)     Topic: Physical Therapy (In Progress)     Point: Mobility training (In Progress)     Learning Progress Summary           Patient Acceptance, TB, NR by  at 6/10/2019  1:50 PM                   Point: Body mechanics (In Progress)     Learning Progress Summary           Patient Acceptance, TB, NR by  at 6/10/2019  1:50 PM                   Point: Precautions (In Progress)     Learning Progress Summary           Patient Acceptance, TB, NR by  at 6/10/2019  1:50 PM                               User Key     Initials Effective Dates Name Provider Type [facility] 05/15/19 -  Antonio Torres P, PT Student PT Student PT              PT Recommendation and Plan  Anticipated Discharge Disposition (PT): (P) home with home health, home with assist  Planned Therapy Interventions (PT Eval): (P) balance training, bed  mobility training, gait training, home exercise program, patient/family education, stair training, strengthening, transfer training  Therapy Frequency (PT Clinical Impression): (P) daily  Outcome Summary/Treatment Plan (PT)  Anticipated Discharge Disposition (PT): (P) home with home health, home with assist  Plan of Care Reviewed With: (P) patient, daughter  Outcome Summary: (P) pt demos decreased functional mobility during PT eval today. pt indicated for skilled PT to increase bed mobility, transfers, gait, and balance independence. pt medical symptoms are evolving. PT recommend D/C to home with home health/assist.   Outcome Measures     Row Name 06/10/19 1350             How much help from another person do you currently need...    Turning from your back to your side while in flat bed without using bedrails?  3  (Pended)   -KL      Moving from lying on back to sitting on the side of a flat bed without bedrails?  3  (Pended)   -KL      Moving to and from a bed to a chair (including a wheelchair)?  3  (Pended)   -KL      Standing up from a chair using your arms (e.g., wheelchair, bedside chair)?  3  (Pended)   -KL      Climbing 3-5 steps with a railing?  2  (Pended)   -KL      To walk in hospital room?  3  (Pended)   -KL      AM-PAC 6 Clicks Score  17  (Pended)   -         Functional Assessment    Outcome Measure Options  AM-PAC 6 Clicks Basic Mobility (PT)  (Pended)   -        User Key  (r) = Recorded By, (t) = Taken By, (c) = Cosigned By    Initials Name Provider Type    Antonio Rodríguez, PT Student PT Student         Time Calculation:   PT Charges     Row Name 06/10/19 1350             Time Calculation    Start Time  1350  (Pended)   -      PT Received On  06/10/19  (Pended)   -      PT Goal Re-Cert Due Date  06/20/19  (Pended)   -         Time Calculation- PT    Total Timed Code Minutes- PT  15 minute(s)  (Pended)   -         Timed Charges    39003 - Gait Training Minutes   15  (Pended)   -GEETA         User Key  (r) = Recorded By, (t) = Taken By, (c) = Cosigned By    Initials Name Provider Type     Antonio Torres, PT Student PT Student        Therapy Charges for Today     Code Description Service Date Service Provider Modifiers Qty    [id number]  GAIT TRAINING EA 15 MIN 6/10/2019 Antonio Torres, PT Student GP 1    [id number] HC PT EVAL MOD COMPLEXITY 4 6/10/2019 Antonio Torres, PT Student GP 1          PT G-Codes  Outcome Measure Options: (P) AM-PAC 6 Clicks Basic Mobility (PT)  AM-PAC 6 Clicks Score: (P) 17      Antonio Torres PT Student  6/10/2019

## 2019-06-10 NOTE — PROGRESS NOTES
Intensive Care Follow-up     Hospital:  LOS: 2 days   Mr. Stef Jones, 74 y.o. male is followed for:   Atrial fibrillation with RVR (CMS/HCC)        Subjective   Interval History:  The chart has been reviewed and full.  The patient remains on amiodarone.  He has not tolerated esmolol secondary to hypotension.  He has remained afebrile.  All cultures have remained negative.  It was mentioned to me that it was felt there was a laryngeal abnormality on his fees assessment yesterday and they were unable to suction what initially appeared to be mucus from around his interarytenoid area.    The patient's past medical, surgical and social history were reviewed and updated in Epic as appropriate.        Objective     Infusions:    amiodarone 1 mg/min Last Rate: 1 mg/min (06/10/19 0935)   esmolol  mcg/kg/min Last Rate: 25 mcg/kg/min (06/10/19 0938)   heparin 14 Units/kg/hr Last Rate: 14 Units/kg/hr (06/10/19 0728)   Pharmacy to Dose Heparin     Pharmacy Consult       Medications:    Pharmacy Consult  Does not apply Once   amitriptyline 25 mg Oral Nightly   insulin lispro 0-14 Units Subcutaneous 4x Daily With Meals & Nightly   ipratropium-albuterol 3 mL Nebulization 4x Daily - RT   pantoprazole 40 mg Intravenous Q AM   sodium chloride 3 mL Intravenous Q12H   vancomycin 15 mg/kg Intravenous Q24H       Vital Sign Min/Max for last 24 hours  Temp  Min: 97.6 °F (36.4 °C)  Max: 98.7 °F (37.1 °C)   BP  Min: 94/78  Max: 178/96   Pulse  Min: 122  Max: 168   Resp  Min: 17  Max: 26   SpO2  Min: 91 %  Max: 98 %   Flow (L/min)  Min: 3  Max: 4       Input/Output for last 24 hour shift  06/09 0701 - 06/10 0700  In: 1903.8 [P.O.:480; I.V.:1096.2]  Out: 3615 [Urine:3615]   FiO2 (%):  [35 %] 35 %  S RR:  [10] 10  Objective:  General Appearance:  Uncomfortable, ill-appearing and in no acute distress.    Vital signs: (most recent): Blood pressure 126/87, pulse (!) 135, temperature 98.3 °F (36.8 °C), temperature source Oral, resp. rate  "22, height 167.6 cm (65.98\"), weight 78 kg (171 lb 15.3 oz), SpO2 94 %.    HEENT: Normal HEENT exam.    Lungs:  Normal effort and normal respiratory rate.  He is not in respiratory distress.  There are wheezes.  No decreased breath sounds.    Heart: Tachycardia.  Irregular rhythm.  S1 normal and S2 normal.  No murmur.   Abdomen: Abdomen is soft and non-distended.  Bowel sounds are normal.   There is no abdominal tenderness.     Extremities: Normal range of motion.  (There is minimal pedal edema bilaterally.  There is some erythema of his right foot.  No lesions are noted.)  Neurological: Patient is alert and oriented to person, place and time.    Pupils:  Pupils are equal, round, and reactive to light.  Pupils are equal.   Skin:  Warm and dry.              Results from last 7 days   Lab Units 06/10/19  0203 06/09/19  0615 06/08/19  0343   WBC 10*3/mm3 8.83 9.35 11.30*  10.87*   HEMOGLOBIN g/dL 15.4 14.1 15.3  15.2   PLATELETS 10*3/mm3 175 163 202  212     Results from last 7 days   Lab Units 06/10/19  0203 06/10/19  0030 06/09/19  0615   SODIUM mmol/L 140 141 139   POTASSIUM mmol/L 4.3 4.4 4.2   CO2 mmol/L 29.0 31.0* 24.0   BUN mg/dL 16 16 18   CREATININE mg/dL 1.23 1.25 1.19   MAGNESIUM mg/dL 1.9 1.9 2.0   PHOSPHORUS mg/dL 2.8 2.7 3.2   GLUCOSE mg/dL 96 99 90     Estimated Creatinine Clearance: 50.2 mL/min (by C-G formula based on SCr of 1.23 mg/dL).    Results from last 7 days   Lab Units 06/09/19  0345   PH, ARTERIAL pH units 7.334*   PCO2, ARTERIAL mm Hg 52.8   PO2 ART mm Hg 106.0         I reviewed the patient's results and images.     Assessment/Plan   Impression        A Fib w/RVR    Cellulitis of Right Lower Leg and Foot    COPD (chronic obstructive pulmonary disease) (CMS/HCC)    Possible Laryngeal abnormality     Plan        The plan will be for transesophageal echocardiogram today and possibly DC cardioversion.  Stop meropenem at this point and we will continue vancomycin for possible cellulitis.  I " have been able to review the laryngoscopy images from the patient's swallow evaluation and there does appear to be some abnormality to the structures.  I will go ahead and ask otolaryngology to evaluate him with a more formal endoscopy.  Continue with heparin drip.  As needed bronchodilator therapy.  Patient remains high risk for worsening secondary to infection as well as his underlying uncontrolled atrial fibrillation.    Plan of care and goals reviewed with mulitdisciplinary/antibiotic stewardship team during rounds.   I discussed the patient's findings and my recommendations with patient and nursing staff     High level of risk due to:  severe exacerbation of chronic illness and illness with threat to life or bodily function.    Aroldo Glez MD, [facility]  Pulmonology and Critical Care Medicine

## 2019-06-10 NOTE — PROGRESS NOTES
Discharge Planning Assessment  [facility]     Patient Name: Stef Jones  MRN: [id number]  Today's Date: 6/10/2019    Admit Date: 6/8/2019    Discharge Needs Assessment     Row Name 06/10/19 1017       Living Environment    Lives With  alone    Name(s) of Who Lives With Patient  Lives in [facility]. alone in a 2nd floor apt.  He has to go up 17 steps to his apt.  Family are considering getting him an apt. on 1st floor when available.    Current Living Arrangements  home/apartment/condo    Primary Care Provided by  self    Provides Primary Care For  no one, unable/limited ability to care for self    Family Caregiver if Needed  child(omer), adult    Quality of Family Relationships  supportive    Able to Return to Prior Arrangements  yes       Resource/Environmental Concerns    Resource/Environmental Concerns  home accessibility    Home Accessibility Concerns  stairs to enter home;not wheelchair accessible    Transportation Concerns  car, none       Transition Planning    Patient/Family Anticipates Transition to  home with help/services;home with family    Patient/Family Anticipated Services at Transition      Transportation Anticipated  family or friend will provide       Discharge Needs Assessment    Concerns to be Addressed  adjustment to diagnosis/illness;basic needs    Equipment Currently Used at Home  nebulizer;grab bar        Discharge Plan     Row Name 06/10/19 1021       Plan    Plan  Home c HH vs/ IP rehab    Patient/Family in Agreement with Plan  yes    Plan Comments  Talked to Mr. Jones at . He lives in a 2nd floor apt. in [facility]. alone.  He goes up 17 steps to his apt.  Talked with his daughter/son and they are in agreement that he needs a 1st floor apt.  They are going to work on getting this for him.  His family is supportive.  His granddaughter lives close to his home.  His PCP is Dr. Lexx Monson.  He gets his Rx filled at [facility].  He has difficulty paying the  co-pay on meds at times.  His d/c plan is TBD.  Will discuss with PT.OT when his assesesment has been done.        Destination      No service coordination in this encounter.      Durable Medical Equipment      No service coordination in this encounter.      Dialysis/Infusion      No service coordination in this encounter.      Home Medical Care      No service coordination in this encounter.      Therapy      No service coordination in this encounter.      Community Resources      No service coordination in this encounter.          Demographic Summary     Row Name 06/10/19 0733       General Information    Admission Type  inpatient    Arrived From  hospital    Referral Source  admission list    Reason for Consult  discharge planning    Preferred Language  English    General Information Comments  His PCP is Dr. Lexx Monson.       Contact Information    Permission Granted to Share Info With          Functional Status     Row Name 06/10/19 1017       Functional Status    Usual Activity Tolerance  moderate    Current Activity Tolerance  fair       Functional Status, IADL    Medications  independent    Meal Preparation  independent    Housekeeping  independent    Laundry  independent    Shopping  independent       Mental Status Summary    Recent Changes in Mental Status/Cognitive Functioning  unable to assess       Employment/    Employment Status  retired        Psychosocial    No documentation.       Abuse/Neglect    No documentation.       Legal    No documentation.       Substance Abuse    No documentation.       Patient Forms    No documentation.           Jessica Jones RN

## 2019-06-10 NOTE — PROGRESS NOTES
Adult Nutrition  Assessment/PES    Patient Name:  Stef Jones  YOB: 1945  MRN: [id number]  Admit Date:  6/8/2019    Assessment Date:  6/10/2019    Comments: RD will monitor adequacy of po intake w/ dysphagia level 3 ordered; ENT consult pending.  Reason for Assessment     Row Name 06/10/19 1020          Reason for Assessment    Reason For Assessment  per organizational policy;identified at risk by screening criteria     Diagnosis  cardiac disease Pt adm w a fib w/ RVR; syncopal episode, LE cellulitis Hx: HTN, chr hoarseness, COPD, hearing loss     Identified At Risk by Screening Criteria  difficulty chewing/swallowing         Nutrition/Diet History     Row Name 06/10/19 1021          Nutrition/Diet History    Typical Food/Fluid Intake  RN reports pt NPO for DENA this morning; SLP requested MD to review FEES w/ abnormal findings. MD ordered ENT consult.     Food Preferences  Pt reports eating most foods , states he gets food hung in throat     Factors Affecting Nutritional Intake  difficulty/impaired swallowing         Anthropometrics     Row Name 06/10/19 0600          Anthropometrics    Weight  78 kg (171 lb 15.3 oz)         Labs/Tests/Procedures/Meds     Row Name 06/10/19 1025          Labs/Procedures/Meds    Lab Results Reviewed  reviewed, pertinent     Lab Results Comments  elev BNP noted        Diagnostic Tests/Procedures    Diagnostic Test/Procedure Reviewed  reviewed, pertinent     Diagnostic Test/Procedures Comments  FEES reviewed ; SLP evaluation noted; DENA pending        Medications    Pertinent Medications Reviewed  reviewed, pertinent     Pertinent Medications Comments  amio gtt         Physical Findings     Row Name 06/10/19 1039          Physical Findings    Overall Physical Appearance  overweight     Skin  other (see comments);edema R LE cellulitis; radha LE edema           Nutrition Prescription Ordered     Row Name 06/10/19 1040          Nutrition Prescription PO    Current PO Diet   NPO         Evaluation of Received Nutrient/Fluid Intake     Row Name 06/10/19 1040          PO Evaluation    Number of Days PO Intake Evaluated  Insufficient Data               Problem/Interventions:  Problem 1     Row Name 06/10/19 1041          Nutrition Diagnoses Problem 1    Problem 1  Swallowing Difficulty     Etiology (related to)  Functional Diagnosis     Functional Diagnosis  Dysphagia     Signs/Symptoms (evidenced by)  SLP/Swallow eval;PO diet not tolerated Pt reports food gets caught in throat     Swallow eval status  Done     Type of SLP Evaluation  -- FEES                 Intervention Goal     Row Name 06/10/19 1043          Intervention Goal    General  Meet nutritional needs for age/condition     PO  Tolerate PO;Establish PO;Modify texture/consistency         Nutrition Intervention     Row Name 06/10/19 1043          Nutrition Intervention    RD/Tech Action  Advise alternate selection;Interview for preference;Follow Tx progress;Care plan reviewd;Await begin PO Pt NPO for DENA           Education/Evaluation     Row Name 06/10/19 1044          Monitor/Evaluation    Monitor  Per protocol;PO intake;Symptoms           Electronically signed by:  Gayle Grajeda MS,RD,LD  06/10/19 10:45 AM

## 2019-06-10 NOTE — NURSING NOTE
1939 : Dr. Nieto at bedside examining ears, nose, throat with laryngoscope, pt. Tolerated well with no immediate complications

## 2019-06-10 NOTE — PROGRESS NOTES
HEPARIN INFUSION  Stef Jones is a  74 y.o. male receiving heparin infusion.     Therapy for (VTE/Cardiac):   VTE  Patient Weight: 77.9 kg  Initial Bolus (Y/N):   No (bolus/drip at OSH)  Any Bolus (Y/N):   Yes         Signs or Symptoms of Bleeding: Patient had red tinged urine 6/10 at 0030; heparin drip was held from 0040 to 0140 on 6/10 for this reason.    VTE (PE/DVT)   Initial Bolus: 80 units/kg (Max 10,000 units)  Initial rate: 18 units/kg/hr (Max 1,500 units/hr)   Anti -Xa (IU/mL) Bolus Dose Stop Infusion Rate Change Repeat Anti-Xa      ?0.19 80 units/kg 0 hrs Increase rate by   4 units/kg/hr 6 hrs      0.2 - 0.9 40 units/kg 0 hrs Increase rate by 2 units/kg/hr 6 hrs    0.3 - 0.7 0 0 hrs No change 6 hrs      0.71 - 0.99 0  0 hrs Decrease rate by 2 units/kg/hr 6 hrs    >1 0 Hold 1 hr Decrease rate by 3 units/kg/hr 6 hrs      Results from last 7 days   Lab Units 06/10/19  0203 06/09/19  0615 06/08/19  0615 06/08/19  0343   INR   --   --  1.34* 1.30*   HEMOGLOBIN g/dL 15.4 14.1  --  15.3  15.2   HEMATOCRIT % 49.4 46.7  --  48.9  48.0   PLATELETS 10*3/mm3 175 163  --  202  212       Date   Time   Anti-Xa Current Rate (Unit/kg/hr) Bolus   (Units) Rate Change   (Unit/kg/hr) New Rate (Unit/kg/hr) Next anti-Xa Comments  Pump Check Daily   6/8 0343 0.75 (was on heparin at OSH but has been off since arriving at [facility]) 6400  (given at OSH) +18 18 1200 Nick 5536 -b   6/8 1254 0.89 18 0 -2 16 1900 Gaudencio Vinson, no hold    1344 -- 18 0 -2 16 2000 Gaudencio Vinson  adjusted and confirmed pump. Cosigned.   6/8 2014 0.86 16 -- -2 14 0600 Nick 5536 -b   6/9 0739 0.71 14 -- -- 14 1200 Gaudencio Bustillos, no change   6/9 1307 0.73 14 -- -1 13 1900 Gaudencio Bustillos   6/9 1919 0.52 13 -- -- 13 0100 GAUDENCIO Buckner   6/10 0030 0.46 13 -- -- 13 0600 Sita 2461 -acb held for 1 hour due to red tinged urine   6/10 0532 0.30 13 -- -- 14 1400 Discussed w/ RN -zgn   6/10 1344 0.38 14 -- -- 14 2100 Sw JEROMY Bustillos, pump checked   6/10                                                                                                                            Thanks,  Sai Pandya, PharmD  Pharmacy Resident  6/10/2019  3:13 PM

## 2019-06-10 NOTE — PROCEDURES
PRE-ELECTROPHYSIOLOGY STUDY DIAGNOSIS: Atrial fibrillation.    PROCEDURE PERFORMED: External electrical cardioversion.    Anesthesia: Sedation with:  1.3 mg Versed  2. 30 mg Brevital  3.  75mcg Fentanyl    Estimated Blood Loss: Less than 10 mL     Specimens: None    PROCEDURE IN DETAIL: The patient was brought into the [facility] in a fasting  state. The patient was given moderate sedation during which he received 200  joules of external electrical cardioversion that converted atrial  fibrillation to normal sinus heart rhythm.  The patient underwent a DENA prior to the procedure which showed no evidence of left atrial or left atrial appendage thrombus.    IMPRESSION: Successful conversion of atrial fibrillation to normal sinus  heart rhythm.

## 2019-06-10 NOTE — THERAPY TREATMENT NOTE
Acute Care - Speech Language Pathology   Swallow Treatment Note [facility]     Patient Name: Stef Jones  : 1945  MRN: [id number]  Today's Date: 6/10/2019  Onset of Illness/Injury or Date of Surgery: (P) 19     Referring Physician: MD Torrez (P)      Admit Date: 2019    Visit Dx:      ICD-10-CM ICD-9-CM   1. A Fib w/RVR I48.91 427.31   2. Impaired functional mobility, balance, gait, and endurance Z74.09 V49.89   3. Impaired mobility and ADLs Z74.09 799.89     Patient Active Problem List   Diagnosis   • Cellulitis of Right Lower Leg and Foot   • A Fib w/RVR   • COPD (chronic obstructive pulmonary disease) (CMS/HCC)   • Dysphagia       Therapy Treatment  Rehabilitation Treatment Summary     Row Name 06/10/19 1550             Treatment Time/Intention    Discipline  speech language pathologist  -MP      Document Type  therapy note (daily note)  -MP      Subjective Information  no complaints  -MP      Mode of Treatment  individual therapy;speech-language pathology  -MP      Patient/Family Observations  Family present  -MP      Therapy Frequency (Swallow)  5 days per week  -MP      Patient Effort  good  -MP      Recorded by [MP] Claude Pagan MS, CF-SLP 06/10/19 1616      Row Name 06/10/19 1550             Pain Assessment    Additional Documentation  Pain Scale: FACES Pre/Post-Treatment (Group)  -MP      Recorded by [MP] Claude Pagan MS, CFDALTON-SLP 06/10/19 1616      Row Name 06/10/19 1550             Pain Scale: FACES Pre/Post-Treatment    Pain: FACES Scale, Pretreatment  0-->no hurt  -MP      Pain: FACES Scale, Post-Treatment  0-->no hurt  -MP      Recorded by [MP] Claude Pagan MS, CFDALTON-SLP 06/10/19 1616      Row Name 06/10/19 1550             Outcome Summary/Treatment Plan (SLP)    Daily Summary of Progress (SLP)  progress toward functional goals is good  -MP      Plan for Continued Treatment (SLP)  Pt seen for dysphagia tx. Tolerating HTL w/ no s/sxs aspiration. Provided handout  of dysphagia exercises, reviewed & completed w/ pt. Rec continue level III diet w/ HTL. SLP will continue to follow for tx.  -MP      Anticipated Dischage Disposition  unknown;anticipate therapy at next level of care  -MP      Recorded by [MP] Claude Pagan MS, CFY-SLP 06/10/19 1616        User Key  (r) = Recorded By, (t) = Taken By, (c) = Cosigned By    Initials Name Effective Dates Discipline    MP Claude Pagan MS, CFY-SLP 08/15/18 -  SLP          Outcome Summary  Outcome Summary/Treatment Plan (SLP)  Daily Summary of Progress (SLP): progress toward functional goals is good (06/10/19 1550 : Claude Pagan MS, CFY-SLP)  Plan for Continued Treatment (SLP): Pt seen for dysphagia tx. Tolerating HTL w/ no s/sxs aspiration. Provided handout of dysphagia exercises, reviewed & completed w/ pt. Rec continue level III diet w/ HTL. SLP will continue to follow for tx. (06/10/19 1550 : Claude Pagan MS, CFY-SLP)  Anticipated Dischage Disposition: unknown, anticipate therapy at next level of care (06/10/19 1550 : Claude Pagan MS, CFY-SLP)      SLP GOALS     Row Name 06/10/19 1550 06/08/19 1440          Oral Nutrition/Hydration Goal 1 (SLP)    Oral Nutrition/Hydration Goal 1, SLP  LTG: Pt will return to regular diet, thin liquids w/ no overt s/sxs aspiration or distress w/ 100% acc and no cues  -MP  LTG: Pt will return to regular diet, thin liquids w/ no overt s/sxs aspiration or distress w/ 100% acc and no cues  -MP     Time Frame (Oral Nutrition/Hydration Goal 1, SLP)  by discharge  -MP  by discharge  -MP     Progress/Outcomes (Oral Nutrition/Hydration Goal 1, SLP)  continuing progress toward goal  -MP  --        Oral Nutrition/Hydration Goal 2 (SLP)    Oral Nutrition/Hydration Goal 2, SLP  Pt will tolerate puree, some mashed and HTL w/ no overt s/sxs aspiration or distress w/ 100% acc and no cues  -MP  Pt will tolerate puree, some mashed and HTL w/ no overt s/sxs aspiration or distress w/ 100% acc and no  cues  -MP     Time Frame (Oral Nutrition/Hydration Goal 2, SLP)  short term goal (STG);by discharge  -MP  short term goal (STG);by discharge  -MP     Barriers (Oral Nutrition/Hydration Goal 2, SLP)  Tolerating HTL w/ no s/sxs  -MP  --     Progress/Outcomes (Oral Nutrition/Hydration Goal 2, SLP)  good progress toward goal  -MP  --        Oral Nutrition/Hydration Goal (SLP)    Oral Nutrition/Hydration Goal, SLP  Pt will tolerate therapeutic trials of thin H2O w/ no overt s/sxs aspiration or distress w/ 60% acc and no cues in order to determine readiness for repeat instrumental eval  -MP  Pt will tolerate therapeutic trials of thin H2O w/ no overt s/sxs aspiration or distress w/ 60% acc and no cues in order to determine readiness for repeat instrumental eval  -MP     Time Frame (Oral Nutrition/Hydration Goal, SLP)  short term goal (STG);by discharge  -MP  short term goal (STG);by discharge  -MP     Progress/Outcomes (Oral Nutrition/Hydration Goal, SLP)  goal ongoing  -MP  --        Pharyngeal Strengthening Exercise Goal 1 (SLP)    Activity (Pharyngeal Strengthening Goal 1, SLP)  increase timing;increase closure at entrance to airway/closure of airway at glottis  -MP  increase timing;increase closure at entrance to airway/closure of airway at glottis  -MP     Increase Timing  prepping - 3 second prep or suck swallow or 3-step swallow  -MP  prepping - 3 second prep or suck swallow or 3-step swallow  -MP     Increase Closure at Entrance to Airway/Closure of Airway at Glottis  super-supraglottic swallow;hard effortful swallow  -MP  super-supraglottic swallow;hard effortful swallow  -MP     [place]/Accuracy (Pharyngeal Strengthening Goal 1, SLP)  with minimal cues (75-90% accuracy)  -MP  with minimal cues (75-90% accuracy)  -MP     Time Frame (Pharyngeal Strengthening Goal 1, SLP)  short term goal (STG);by discharge  -MP  short term goal (STG);by discharge  -MP     Barriers (Pharyngeal Strengthening Goal 1, SLP)   Reviewed & completed w/ pt. Provided handout & encouraged independent practice  -MP  --     Progress/Outcomes (Pharyngeal Strengthening Goal 1, SLP)  good progress toward goal  -MP  --       User Key  (r) = Recorded By, (t) = Taken By, (c) = Cosigned By    Initials Name Provider Type    Claude Hawkins MS, CFY-SLP Speech and Language Pathologist          EDUCATION  The patient has been educated in the following areas:   Dysphagia (Swallowing Impairment) Modified Diet Instruction.    SLP Recommendation and Plan  Daily Summary of Progress (SLP): progress toward functional goals is good     Plan for Continued Treatment (SLP): Pt seen for dysphagia tx. Tolerating HTL w/ no s/sxs aspiration. Provided handout of dysphagia exercises, reviewed & completed w/ pt. Rec continue level III diet w/ HTL. SLP will continue to follow for tx.  Anticipated Dischage Disposition: unknown, anticipate therapy at next level of care                    Time Calculation:   Time Calculation- SLP     Row Name 06/10/19 1617             Time Calculation- SLP    SLP Start Time  1550  -MP      SLP Received On  06/10/19  -        User Key  (r) = Recorded By, (t) = Taken By, (c) = Cosigned By    Initials Name Provider Type    Claude Hawkins MS, CFY-SLP Speech and Language Pathologist          Therapy Charges for Today     Code Description Service Date Service Provider Modifiers Qty    [id number] HC ST TREATMENT SWALLOW 3 6/10/2019 Claude Pagan MS, CFDALTON-SLP GN 1                 Claude Pagan MS, ALEJANDRA-SLP  6/10/2019

## 2019-06-10 NOTE — PROGRESS NOTES
"Pharmacy Consult-Vancomycin Dosing  Stef Jones is a  74 y.o. male receiving vancomycin therapy.     Indication: Complicated skin and soft tissue infection  Consulting Provider: Gema SEE  ID Consult: No    Goal Trough: 10-15 mcg/mL    Current Antimicrobial Therapy  Pharmacy to Dose Vancomycin Started 6/8  Meropenem 1g every 8 hours per extended infusion protocol      Allergies  Allergies as of 06/08/2019 - Reviewed 06/08/2019   Allergen Reaction Noted   • Amoxicillin Unknown (See Comments) 06/08/2019   • Penicillins Unknown (See Comments) 06/08/2019     Labs  Results from last 7 days   Lab Units 06/10/19  0203 06/10/19  0030 06/09/19  0615   BUN mg/dL 16 16 18   CREATININE mg/dL 1.23 1.25 1.19       Results from last 7 days   Lab Units 06/10/19  0203 06/09/19  0615 06/08/19  0343   WBC 10*3/mm3 8.83 9.35 11.30*  10.87*     Evaluation of Dosing  Last Dose Received in the ED/Outside Facility: 1500mg x 1 loading dose    Ht - 167.6 cm (65.98\")  Wt - 78 kg (171 lb 15.3 oz)    Estimated Creatinine Clearance: 50.2 mL/min (by C-G formula based on SCr of 1.23 mg/dL).    Intake & Output (last 3 days)         06/07 0701 - 06/08 0700 06/08 0701 - 06/09 0700 06/09 0701 - 06/10 0700 06/10 0701 - 06/11 0700    P.O. 40 400 480     I.V. (mL/kg) 216 (2.8) 955 (12.5) 1096.2 (14.1) 95.4 (1.2)    IV Piggyback 690 555 327.6 230.4    Total Intake(mL/kg) 946 (12.1) 1910 (24.9) 1903.8 (24.4) 325.8 (4.2)    Urine (mL/kg/hr) 350 2950 (1.6) 3615 (1.9) 125 (0.8)    Stool   0     Total Output 350 2950 3615 125    Net +596 -1040 -1711.2 +200.8            Stool Unmeasured Occurrence   1 x             Microbiology and Radiology  Microbiology Results (last 10 days)       Procedure Component Value - Date/Time    Respiratory Culture - Sputum, Cough [[id number]] Collected:  06/08/19 0809    Lab Status:  Final result Specimen:  Sputum from Cough Updated:  06/10/19 0856     Respiratory Culture Scant growth (1+) Normal Respiratory Adrianna     " Gram Stain Few (2+) Epithelial cells per low power field      Occasional WBCs per low power field      Moderate (3+) Mixed bacterial morphotypes seen on Gram Stain    Blood Culture - Blood, Hand, Right [[id number]] Collected:  06/08/19 0615    Lab Status:  Preliminary result Specimen:  Blood from Hand, Right Updated:  06/09/19 1016     Blood Culture No growth at 24 hours    Blood Culture - Blood, Hand, Left [[id number]] Collected:  06/08/19 0615    Lab Status:  Preliminary result Specimen:  Blood from Hand, Left Updated:  06/10/19 0646     Blood Culture No growth at 2 days    Wound Culture - Wound, Foot, Right [[id number]] Collected:  06/08/19 0349    Lab Status:  Preliminary result Specimen:  Wound from Foot, Right Updated:  06/09/19 1241     Wound Culture Scant growth (1+) Normal Skin Adrianna     Gram Stain No WBCs or organisms seen            Evaluation of Level  Lab Results   Component Value Date    [facility] 11.60 06/10/2019        Assessment/Plan:    1. Vancomycin for SSTI with goal trough 10-15mcg/mL.   2. Current maintenance dose of 1250mg IV (16 mg/kg) every 24 hours based on age, renal function and indication. Patient was loaded with 1500mg (19.2mg/kg) vancomycin in the emergency department on 6/8 0432.   3. Trough therapeutic drawn prior to 3rd dose. Continue current dose.  4. Scr 1.23, WBC 8.8 and patient afebrile.  5. Monitor renal function, cultures and sensitivities, and clinical status, and adjust regimen as necessary. Pharmacy will continue to follow.    Thanks,  Sai Pandya, PharmD  Pharmacy Resident  6/10/2019  9:00 AM

## 2019-06-10 NOTE — PLAN OF CARE
Problem: Patient Care Overview  Goal: Plan of Care Review  Outcome: Ongoing (interventions implemented as appropriate)   06/10/19 0400 06/10/19 0536   Coping/Psychosocial   Plan of Care Reviewed With patient;family --    Plan of Care Review   Progress --  no change   OTHER   Outcome Summary --  Pt. continues to sustain -170's in atrial fibrillation. Tolerating 3LNC and CPAP overnight. Remains on Heparin and Amio gtt. Iman Velazquez MD overnight due to frequent ventricular runs - ordered to bolus 150mg amiodarone and increase maintenance rate to 1mg/min, added low dose esmolol. [place] output turned red overnight, Sukhi SEE aware - CBC monitored and Heparin Xa. Plan for DENA with potential cardioversion at 10 AM today. Pt. NPO since midnight     Goal: Individualization and Mutuality  Outcome: Ongoing (interventions implemented as appropriate)    Goal: Discharge Needs Assessment  Outcome: Ongoing (interventions implemented as appropriate)      Problem: Fall Risk (Adult)  Goal: Identify Related Risk Factors and Signs and Symptoms  Outcome: Ongoing (interventions implemented as appropriate)    Goal: Absence of Fall  Outcome: Ongoing (interventions implemented as appropriate)      Problem: Skin Injury Risk (Adult)  Goal: Identify Related Risk Factors and Signs and Symptoms  Outcome: Ongoing (interventions implemented as appropriate)    Goal: Skin Health and Integrity  Outcome: Ongoing (interventions implemented as appropriate)      Problem: Chronic Obstructive Pulmonary Disease (Adult)  Goal: Signs and Symptoms of Listed Potential Problems Will be Absent, Minimized or Managed (Chronic Obstructive Pulmonary Disease)  Outcome: Ongoing (interventions implemented as appropriate)

## 2019-06-10 NOTE — PATIENT CARE CONFERENCE
ICU Rounds: OT/PT consults pending this date. Pt w/ cardioversion and DENA this AM, will defer therapy participation until PM.

## 2019-06-11 ENCOUNTER — ANESTHESIA EVENT (OUTPATIENT)
Dept: PERIOP | Facility: HOSPITAL | Age: 74
End: 2019-06-11

## 2019-06-11 PROBLEM — J38.7 LARYNGEAL MASS: Status: ACTIVE | Noted: 2019-06-11

## 2019-06-11 LAB
ANION GAP SERPL CALCULATED.3IONS-SCNC: 8 MMOL/L
BASOPHILS # BLD AUTO: 0.03 10*3/MM3 (ref 0–0.2)
BASOPHILS NFR BLD AUTO: 0.4 % (ref 0–1.5)
BUN BLD-MCNC: 10 MG/DL (ref 8–23)
BUN/CREAT SERPL: 10.3 (ref 7–25)
CALCIUM SPEC-SCNC: 8.7 MG/DL (ref 8.6–10.5)
CHLORIDE SERPL-SCNC: 101 MMOL/L (ref 98–107)
CO2 SERPL-SCNC: 31 MMOL/L (ref 22–29)
CREAT BLD-MCNC: 0.97 MG/DL (ref 0.76–1.27)
DEPRECATED RDW RBC AUTO: 48.2 FL (ref 37–54)
EOSINOPHIL # BLD AUTO: 0.19 10*3/MM3 (ref 0–0.4)
EOSINOPHIL NFR BLD AUTO: 2.6 % (ref 0.3–6.2)
ERYTHROCYTE [DISTWIDTH] IN BLOOD BY AUTOMATED COUNT: 14.8 % (ref 12.3–15.4)
GFR SERPL CREATININE-BSD FRML MDRD: 75 ML/MIN/1.73
GLUCOSE BLD-MCNC: 80 MG/DL (ref 65–99)
GLUCOSE BLDC GLUCOMTR-MCNC: 103 MG/DL (ref 70–130)
GLUCOSE BLDC GLUCOMTR-MCNC: 121 MG/DL (ref 70–130)
GLUCOSE BLDC GLUCOMTR-MCNC: 125 MG/DL (ref 70–130)
GLUCOSE BLDC GLUCOMTR-MCNC: 84 MG/DL (ref 70–130)
HCT VFR BLD AUTO: 49 % (ref 37.5–51)
HGB BLD-MCNC: 15.1 G/DL (ref 13–17.7)
IMM GRANULOCYTES # BLD AUTO: 0.02 10*3/MM3 (ref 0–0.05)
IMM GRANULOCYTES NFR BLD AUTO: 0.3 % (ref 0–0.5)
LYMPHOCYTES # BLD AUTO: 2 10*3/MM3 (ref 0.7–3.1)
LYMPHOCYTES NFR BLD AUTO: 27.8 % (ref 19.6–45.3)
MAGNESIUM SERPL-MCNC: 2 MG/DL (ref 1.6–2.4)
MCH RBC QN AUTO: 27.6 PG (ref 26.6–33)
MCHC RBC AUTO-ENTMCNC: 30.8 G/DL (ref 31.5–35.7)
MCV RBC AUTO: 89.4 FL (ref 79–97)
MONOCYTES # BLD AUTO: 0.61 10*3/MM3 (ref 0.1–0.9)
MONOCYTES NFR BLD AUTO: 8.5 % (ref 5–12)
NEUTROPHILS # BLD AUTO: 4.35 10*3/MM3 (ref 1.7–7)
NEUTROPHILS NFR BLD AUTO: 60.4 % (ref 42.7–76)
NRBC BLD AUTO-RTO: 0 /100 WBC (ref 0–0.2)
PLATELET # BLD AUTO: 162 10*3/MM3 (ref 140–450)
PMV BLD AUTO: 10.6 FL (ref 6–12)
POTASSIUM BLD-SCNC: 4.4 MMOL/L (ref 3.5–5.2)
RBC # BLD AUTO: 5.48 10*6/MM3 (ref 4.14–5.8)
SODIUM BLD-SCNC: 140 MMOL/L (ref 136–145)
UFH PPP CHRO-ACNC: 0.56 IU/ML (ref 0.3–0.7)
WBC NRBC COR # BLD: 7.2 10*3/MM3 (ref 3.4–10.8)

## 2019-06-11 PROCEDURE — 82962 GLUCOSE BLOOD TEST: CPT

## 2019-06-11 PROCEDURE — 94660 CPAP INITIATION&MGMT: CPT

## 2019-06-11 PROCEDURE — 99233 SBSQ HOSP IP/OBS HIGH 50: CPT | Performed by: INTERNAL MEDICINE

## 2019-06-11 PROCEDURE — 80048 BASIC METABOLIC PNL TOTAL CA: CPT | Performed by: INTERNAL MEDICINE

## 2019-06-11 PROCEDURE — 25010000002 VANCOMYCIN 10 G RECONSTITUTED SOLUTION

## 2019-06-11 PROCEDURE — 85025 COMPLETE CBC W/AUTO DIFF WBC: CPT | Performed by: INTERNAL MEDICINE

## 2019-06-11 PROCEDURE — 99232 SBSQ HOSP IP/OBS MODERATE 35: CPT | Performed by: INTERNAL MEDICINE

## 2019-06-11 PROCEDURE — 85520 HEPARIN ASSAY: CPT

## 2019-06-11 PROCEDURE — 25010000002 HEPARIN (PORCINE) IN NACL 25000-0.45 UT/250ML-% SOLUTION: Performed by: INTERNAL MEDICINE

## 2019-06-11 PROCEDURE — 83735 ASSAY OF MAGNESIUM: CPT | Performed by: INTERNAL MEDICINE

## 2019-06-11 PROCEDURE — 94799 UNLISTED PULMONARY SVC/PX: CPT

## 2019-06-11 PROCEDURE — 97116 GAIT TRAINING THERAPY: CPT

## 2019-06-11 RX ORDER — SODIUM CHLORIDE 0.9 % (FLUSH) 0.9 %
1-10 SYRINGE (ML) INJECTION AS NEEDED
Status: CANCELLED | OUTPATIENT
Start: 2019-06-11

## 2019-06-11 RX ORDER — BISOPROLOL FUMARATE 5 MG/1
5 TABLET, FILM COATED ORAL
Status: DISCONTINUED | OUTPATIENT
Start: 2019-06-11 | End: 2019-06-13

## 2019-06-11 RX ORDER — SODIUM CHLORIDE 0.9 % (FLUSH) 0.9 %
3 SYRINGE (ML) INJECTION EVERY 12 HOURS SCHEDULED
Status: CANCELLED | OUTPATIENT
Start: 2019-06-11

## 2019-06-11 RX ORDER — LIDOCAINE HYDROCHLORIDE 10 MG/ML
0.5 INJECTION, SOLUTION EPIDURAL; INFILTRATION; INTRACAUDAL; PERINEURAL ONCE AS NEEDED
Status: CANCELLED | OUTPATIENT
Start: 2019-06-11

## 2019-06-11 RX ADMIN — AMIODARONE HYDROCHLORIDE 200 MG: 200 TABLET ORAL at 20:41

## 2019-06-11 RX ADMIN — AMIODARONE HYDROCHLORIDE 200 MG: 200 TABLET ORAL at 08:31

## 2019-06-11 RX ADMIN — IPRATROPIUM BROMIDE AND ALBUTEROL SULFATE 3 ML: 2.5; .5 SOLUTION RESPIRATORY (INHALATION) at 19:34

## 2019-06-11 RX ADMIN — VANCOMYCIN HYDROCHLORIDE 1250 MG: 10 INJECTION, POWDER, LYOPHILIZED, FOR SOLUTION INTRAVENOUS at 05:29

## 2019-06-11 RX ADMIN — IPRATROPIUM BROMIDE AND ALBUTEROL SULFATE 3 ML: 2.5; .5 SOLUTION RESPIRATORY (INHALATION) at 16:27

## 2019-06-11 RX ADMIN — IPRATROPIUM BROMIDE AND ALBUTEROL SULFATE 3 ML: 2.5; .5 SOLUTION RESPIRATORY (INHALATION) at 12:50

## 2019-06-11 RX ADMIN — SODIUM CHLORIDE, PRESERVATIVE FREE 3 ML: 5 INJECTION INTRAVENOUS at 20:45

## 2019-06-11 RX ADMIN — BISOPROLOL FUMARATE 5 MG: 5 TABLET ORAL at 08:31

## 2019-06-11 RX ADMIN — PANTOPRAZOLE SODIUM 40 MG: 40 INJECTION, POWDER, FOR SOLUTION INTRAVENOUS at 05:29

## 2019-06-11 RX ADMIN — SACUBITRIL AND VALSARTAN 1 TABLET: 24; 26 TABLET, FILM COATED ORAL at 08:40

## 2019-06-11 RX ADMIN — SACUBITRIL AND VALSARTAN 1 TABLET: 24; 26 TABLET, FILM COATED ORAL at 20:41

## 2019-06-11 RX ADMIN — AMITRIPTYLINE HYDROCHLORIDE 25 MG: 25 TABLET, FILM COATED ORAL at 20:41

## 2019-06-11 RX ADMIN — IPRATROPIUM BROMIDE AND ALBUTEROL SULFATE 3 ML: 2.5; .5 SOLUTION RESPIRATORY (INHALATION) at 08:35

## 2019-06-11 RX ADMIN — HEPARIN SODIUM 14 UNITS/KG/HR: 10000 INJECTION, SOLUTION INTRAVENOUS at 22:24

## 2019-06-11 NOTE — PLAN OF CARE
Problem: Patient Care Overview  Goal: Plan of Care Review  Outcome: Ongoing (interventions implemented as appropriate)   06/11/19 1010   Coping/Psychosocial   Plan of Care Reviewed With patient   Plan of Care Review   Progress improving   OTHER   Outcome Summary Pt demonstrated increased indep with transfers and gait; progressed forward ambulation distance to 230 total ft with use of RWx and CGA. Gave good effort with LE ther ex. Will cont PT POC.

## 2019-06-11 NOTE — PROGRESS NOTES
[facility]  Progress Note       LOS: 3 days   Patient Care Team:  Lexx Monson MD as PCP - General (Family Medicine)    Chief Complaint:  AFIB with RVR    Subjective     Patient had ECV to normal sinus rhythm on 6/10/2019.  IV amiodarone transition to p.o. amiodarone.  Patient has remained in normal sinus rhythm.  He is sitting up in bed comfortable.  Denies current chest pain or shortness of breath.  He is feeling better. He is on Heparin gtt for anticoagulation.         Review of Systems:   Pertinent positives in HPI, all others reviewed and negative.      Objective       Current Facility-Administered Medications:   •  ! Vancomycin trough 6/10 at 0530.  Please hold vancomycin dose 6/10 at 0600 until pharmacy can evaluate level, , Does not apply, Once, Cb Eli [facility]  •  amiodarone (PACERONE) tablet 200 mg, 200 mg, Oral, Q12H, Conrad Leung MD, 200 mg at 06/10/19 2014  •  amitriptyline (ELAVIL) tablet 25 mg, 25 mg, Oral, Nightly, Gema Duong APRN, 25 mg at 06/10/19 2014  •  esmolol (BREVIBLOC) 2500 mg/250 mL (10 mg/mL) infusion,  mcg/kg/min, Intravenous, Titrated, Robbie Velazquez MD, Last Rate: 11.51 mL/hr at 06/10/19 0938, 25 mcg/kg/min at 06/10/19 0938  •  heparin 19277 units/250 mL (100 units/mL) in 0.45 % NaCl infusion, 14 Units/kg/hr, Intravenous, Titrated, Sai Pandya [facility], Last Rate: 10.9 mL/hr at 06/10/19 2200, 14 Units/kg/hr at 06/10/19 2200  •  insulin lispro (humaLOG) injection 0-14 Units, 0-14 Units, Subcutaneous, 4x Daily With Meals & Nightly, Florentin Leggett [facility]  •  ipratropium-albuterol (DUO-NEB) nebulizer solution 3 mL, 3 mL, Nebulization, 4x Daily - RT, Stanislaw Torrez MD, 3 mL at 06/10/19 2038  •  labetalol (NORMODYNE,TRANDATE) injection 20 mg, 20 mg, Intravenous, Q10 Min PRN, Stanislaw Torrez MD, 20 mg at 06/09/19 1534  •  oxymetazoline (AFRIN) nasal spray 2 spray, 2 spray, Each Nare, BID PRN, Glen Gutiérrez  "MD  •  pantoprazole (PROTONIX) injection 40 mg, 40 mg, Intravenous, Q AM, Stanislaw Torrez MD, 40 mg at 06/11/19 0529  •  Pharmacy to Dose Heparin, , Does not apply, Continuous PRN, Cb Eli, RPH  •  Pharmacy to dose vancomycin and meropenem, , Does not apply, Continuous PRN, Gema Duong, APRN  •  sodium chloride 0.9 % flush 3 mL, 3 mL, Intravenous, Q12H, Gema Duong APRN, 3 mL at 06/10/19 2015  •  sodium chloride 0.9 % flush 3-10 mL, 3-10 mL, Intravenous, PRN, Gema Duong, APRN  •  vancomycin 1250 mg/250 mL 0.9% NS IVPB ([facility]), 15 mg/kg, Intravenous, Q24H, Cb Eli RPH, 1,250 mg at 06/11/19 0529    Vital Sign Min/Max for last 24 hours  Temp  Min: 97.9 °F (36.6 °C)  Max: 98.3 °F (36.8 °C)   BP  Min: 101/74  Max: 166/102   Pulse  Min: 61  Max: 163   Resp  Min: 12  Max: 26   SpO2  Min: 87 %  Max: 97 %   Flow (L/min)  Min: 2  Max: 6   Weight  Min: 77.6 kg (171 lb)  Max: 79 kg (174 lb 2.6 oz)     Flowsheet Rows      First Filed Value   Admission Height  167.6 cm (66\") Documented at 06/08/2019 0245   Admission Weight  77.9 kg (171 lb 11.8 oz) Documented at 06/08/2019 0245            Intake/Output Summary (Last 24 hours) at 6/11/2019 0806  Last data filed at 6/11/2019 0600  Gross per 24 hour   Intake 1333.9 ml   Output 2375 ml   Net -1041.1 ml       Physical Exam:     General Appearance:    Alert, cooperative, in no acute distress   Lungs:     Diminished BS throughout. No rhonchi or rales    Heart:    Regular rhythm and normal rate, normal S1 and S2, no            murmur, no gallop, no rub, no click   Chest Wall:    No abnormalities observed   Abdomen:     Normal bowel sounds, soft nontender   Extremities:   Moves all extremities well, no edema, no cyanosis, no             redness   Pulses:   Pulses palpable and equal bilaterally   Skin:   No bleeding, bruising or rash        Results Review:   Results from last 7 days   Lab Units 06/11/19  0327 06/10/19  0203 06/09/19  0615   WBC 10*3/mm3 7.20 " 8.83 9.35   HEMOGLOBIN g/dL 15.1 15.4 14.1   HEMATOCRIT % 49.0 49.4 46.7   PLATELETS 10*3/mm3 162 175 163     Results from last 7 days   Lab Units 06/11/19  0327 06/10/19  0203 06/10/19  0030   SODIUM mmol/L 140 140 141   POTASSIUM mmol/L 4.4 4.3 4.4   CHLORIDE mmol/L 101 100 101   CO2 mmol/L 31.0* 29.0 31.0*   BUN mg/dL 10 16 16   CREATININE mg/dL 0.97 1.23 1.25   GLUCOSE mg/dL 80 96 99      Results from last 7 days   Lab Units 06/08/19  0343   HEMOGLOBIN A1C % 6.60*     Results from last 7 days   Lab Units 06/08/19  0343   CHOLESTEROL mg/dL 108   TRIGLYCERIDES mg/dL 61   HDL CHOL mg/dL 59     Results from last 7 days   Lab Units 06/08/19  0343   TSH mIU/mL 3.730   FREE T4 ng/dL 1.43     Results from last 7 days   Lab Units 06/10/19  0203   PROBNP pg/mL 3,904.0*     Results from last 7 days   Lab Units 06/08/19  0615 06/08/19  0343   PROTIME Seconds 16.0* 15.6*   INR  1.34* 1.30*   APTT seconds  --  150.8*     Results from last 7 days   Lab Units 06/09/19  0615 06/08/19  0343   CK TOTAL U/L  --  231*   TROPONIN T ng/mL <0.010 <0.010       Intake/Output Summary (Last 24 hours) at 6/11/2019 0806  Last data filed at 6/11/2019 0600  Gross per 24 hour   Intake 1333.9 ml   Output 2375 ml   Net -1041.1 ml       I personally viewed and interpreted the patient's EKG/Telemetry data    EKG: none for review today    Telemetry: NSR HRs 63-85 post ECV 6/10/19    Ejection Fraction  No results found for: EF    Echo EF Estimated  No results found for: ECHOEFEST      Present on Admission:  • Cellulitis of Right Lower Leg and Foot  • A Fib w/RVR  • COPD (chronic obstructive pulmonary disease) (CMS/HCC)    Assessment/Plan   1. Afib with RVR  - newly diagnosed, incidentally found  - Treated with Cardizem gtt at OSH, now on amio drip and given IV digoxin 500 mcg x 1.   - Asymptomatic and hemodynamically stable.   - Echo shows ejection fraction of mid 30s, moderate RV enlargement and RV dysfunction. DENA shows EF 31-35%  - DENA/ECV 6/10/19  with successful return to NSR. IV Amio transitioned to Amiodarone 200 mg PO BID. Remains in NSR.   - CHADSVASC = 3 (age, HTN), on heparin gtt - transition to NOAC if ok with everyone.      2. Acute systolic heart failure  - Echo shows ejection fraction of mid 30s, moderate RV enlargement and RV dysfunction.  - could be tachycardia induced. Will need repeat echo once maintaining NSR, may also need ischemic evaluation.  - Will start Bisoprolol today now that BP is elevating. Due to COPD, choice is Bisoprolol  - ProBNP trending down.   - clinically improved     3. Hypertension  - stable     4. Syncope  - reportedly had syncopal episode seems to have been triggered by prolonged coughing.     5. Lower extremity cellulitis  - per admitting     6. COPD  -  Per admitting          Plan for disposition: Ok to floor per cardiology.     Electronically signed by MEREDITH Dhillon, 06/11/19, 8:20 AM.  The patient does have COPD and uses home nebulizers as well as home inhalers.  He is not on home O2.  He denies any history of exertional chest pain.  He still smokes.  On physical exam he has no JVP, his heart regular rate and rhythm without murmur gallop or click, he does have persistent trace to 1+ lower extremity edema.  Respiratory-he has few basilar crackles  Agree with the above assessment plan.  We will start Eliquis.  Reassess EF with limited echocardiogram

## 2019-06-11 NOTE — THERAPY TREATMENT NOTE
Acute Care - Physical Therapy Treatment Note  [facility]     Patient Name: Stef Jones  : 1945  MRN: [id number]  Today's Date: 2019  Onset of Illness/Injury or Date of Surgery: 19  Date of Referral to PT: 06/10/19  Referring Physician: MD Shan    Admit Date: 2019    Visit Dx:    ICD-10-CM ICD-9-CM   1. A Fib w/RVR I48.91 427.31   2. Impaired functional mobility, balance, gait, and endurance Z74.09 V49.89   3. Impaired mobility and ADLs Z74.09 799.89     Patient Active Problem List   Diagnosis   • Cellulitis of Right Lower Leg and Foot   • A Fib w/RVR   • COPD (chronic obstructive pulmonary disease) (CMS/HCC)   • Dysphagia       Therapy Treatment    Rehabilitation Treatment Summary     Row Name 19 1010             Treatment Time/Intention    Discipline  physical therapist  -LS      Document Type  therapy note (daily note)  -LS      Subjective Information  no complaints  -LS      Mode of Treatment  physical therapy  -LS      Patient Effort  good  -LS      Existing Precautions/Restrictions  fall;cardiac;oxygen therapy device and L/min  -LS      Recorded by [LS] Unique Millan, PT 19 1146      Row Name 19 1010             Vital Signs    Pre Systolic BP Rehab  121  -LS      Pre Treatment Diastolic BP  74  -LS      Pretreatment Heart Rate (beats/min)  73  -LS      Posttreatment Heart Rate (beats/min)  77  -LS      Pre SpO2 (%)  93  -LS      O2 Delivery Pre Treatment  nasal cannula  -LS      Post SpO2 (%)  95  -LS      O2 Delivery Post Treatment  nasal cannula  -LS      Pre Patient Position  Sitting  -LS      Intra Patient Position  Standing  -LS      Post Patient Position  Sitting  -LS      Recorded by [LS] Unique Millan, PT 19 1146      Row Name 19 1010             Cognitive Assessment/Intervention- PT/OT    Affect/Mental Status (Cognitive)  WFL  -LS      Orientation Status (Cognition)  oriented x 4  -LS      Follows Commands (Cognition)  follows one step  commands;over 90% accuracy;verbal cues/prompting required  -LS      Cognitive Function (Cognitive)  safety deficit  -LS      Safety Deficit (Cognitive)  mild deficit;safety precautions awareness  -LS      Personal Safety Interventions  fall prevention program maintained;gait belt;nonskid shoes/slippers when out of bed  -LS      Recorded by [LS] Unique Millan, PT 06/11/19 1146      Row Name 06/11/19 1010             Bed Mobility Assessment/Treatment    Comment (Bed Mobility)  UIC  -LS      Recorded by [LS] Unique Millan, PT 06/11/19 1146      Row Name 06/11/19 1010             Sit-Stand Transfer    Sit-Stand Gilman (Transfers)  contact guard;verbal cues  -LS      Assistive Device (Sit-Stand Transfers)  walker, front-wheeled  -LS      Recorded by [LS] Unique Millan, PT 06/11/19 1146      Row Name 06/11/19 1010             Stand-Sit Transfer    Stand-Sit Gilman (Transfers)  contact guard;verbal cues  -LS      Assistive Device (Stand-Sit Transfers)  walker, front-wheeled  -LS      Recorded by [LS] Unique Millan, PT 06/11/19 1146      Row Name 06/11/19 1010             Gait/Stairs Assessment/Training    22445 - Gait Training Minutes   14  -LS      Gait/Stairs Assessment/Training  gait/ambulation assistive device  -LS      Gilman Level (Gait)  contact guard;verbal cues;1 person to manage equipment  -LS      Assistive Device (Gait)  walker, front-wheeled  -LS      Distance in Feet (Gait)  230  -LS      Pattern (Gait)  step-through  -LS      Deviations/Abnormal Patterns (Gait)  stride length decreased;gait speed decreased  -LS      Bilateral Gait Deviations  forward flexed posture  -LS      Comment (Gait/Stairs)  VC's for upright posture and PLB.   -LS      Recorded by [LS] Unique Millan, PT 06/11/19 1146      Row Name 06/11/19 1010             Motor Skills Assessment/Interventions    Additional Documentation  Balance (Group);Therapeutic Exercise (Group)  -LS      Recorded by [LS] Unique Millan, PT  06/11/19 1146      Row Name 06/11/19 1010             Therapeutic Exercise    Therapeutic Exercise  seated, lower extremities  -LS      Additional Documentation  Therapeutic Exercise (Row)  -LS      63483 - PT Therapeutic Exercise Minutes  4  -LS      Recorded by [LS] Unique Millan, PT 06/11/19 1146      Row Name 06/11/19 1010             Lower Extremity Seated Therapeutic Exercise    Performed, Seated Lower Extremity (Therapeutic Exercise)  LAQ (long arc quad), knee extension;hip flexion/extension;ankle dorsiflexion/plantarflexion  -LS      Exercise Type, Seated Lower Extremity (Therapeutic Exercise)  AROM (active range of motion)  -LS      Sets/Reps Detail, Seated Lower Extremity (Therapeutic Exercise)  1/10  -LS      Recorded by [LS] Unique Millan, PT 06/11/19 1146      Row Name 06/11/19 1010             Static Sitting Balance    Level of [place] (Unsupported Sitting, Static Balance)  supervision  -LS      Sitting Position (Unsupported Sitting, Static Balance)  sitting in chair  -LS      Recorded by [LS] Unique Millan, PT 06/11/19 1146      Row Name 06/11/19 1010             Static Standing Balance    Level of [place] (Supported Standing, Static Balance)  contact guard assist  -LS      Assistive Device Utilized (Supported Standing, Static Balance)  walker, rolling  -LS      Recorded by [LS] Unique Millan, PT 06/11/19 1146      Row Name 06/11/19 1010             Positioning and Restraints    Pre-Treatment Position  sitting in chair/recliner  -LS      Post Treatment Position  chair  -LS      In Chair  notified nsg;reclined;call light within reach;encouraged to call for assist;exit alarm on;waffle cushion;legs elevated  -LS      Recorded by [LS] Unique Millan, PT 06/11/19 1146      Row Name 06/11/19 1010             Pain Scale: Numbers Pre/Post-Treatment    Pain Scale: Numbers, Pretreatment  0/10 - no pain  -LS      Pain Scale: Numbers, Post-Treatment  0/10 - no pain  -LS      Recorded by [LS] Monty  Unique CANALES, PT 06/11/19 1146      Row Name 06/11/19 1010             Outcome Summary/Treatment Plan (PT)    Daily Summary of Progress (PT)  progress toward functional goals is good  -LS      Recorded by [LS] Unique Millan, PT 06/11/19 1146        User Key  (r) = Recorded By, (t) = Taken By, (c) = Cosigned By    Initials Name Effective Dates Discipline     Unique Millan, PT 06/19/15 -  PT                   Physical Therapy Education     Title: PT OT SLP Therapies (In Progress)     Topic: Physical Therapy (In Progress)     Point: Mobility training (In Progress)     Learning Progress Summary           Patient Acceptance, E,D, NR by  at 6/11/2019 10:10 AM    Acceptance, TB, NR by  at 6/10/2019  1:50 PM                   Point: Home exercise program (In Progress)     Learning Progress Summary           Patient Acceptance, E,D, NR by  at 6/11/2019 10:10 AM                   Point: Body mechanics (In Progress)     Learning Progress Summary           Patient Acceptance, E,D, NR by  at 6/11/2019 10:10 AM    Acceptance, TB, NR by  at 6/10/2019  1:50 PM                   Point: Precautions (In Progress)     Learning Progress Summary           Patient Acceptance, E,D, NR by  at 6/11/2019 10:10 AM    Acceptance, TB, NR by  at 6/10/2019  1:50 PM                               User Key     Initials Effective Dates Name Provider Type Discipline     06/19/15 -  Unique Millan, PT Physical Therapist PT     05/15/19 -  Antonio Torres, PT Student PT Student PT                PT Recommendation and Plan     Outcome Summary/Treatment Plan (PT)  Daily Summary of Progress (PT): progress toward functional goals is good  Plan of Care Reviewed With: patient  Progress: improving  Outcome Summary: Pt demonstrated increased indep with transfers and gait; progressed forward ambulation distance to 230 total ft with use of RWx and CGA. Gave good effort with LE ther ex. Will cont PT POC.   Outcome Measures     Row Name 06/11/19  1010 06/10/19 1350 06/10/19 1349       How much help from another person do you currently need...    Turning from your back to your side while in flat bed without using bedrails?  3  -LS  3  -LS (r) KL (t) LS (c)  --    Moving from lying on back to sitting on the side of a flat bed without bedrails?  3  -LS  3  -LS (r) KL (t) LS (c)  --    Moving to and from a bed to a chair (including a wheelchair)?  3  -LS  3  -LS (r) KL (t) LS (c)  --    Standing up from a chair using your arms (e.g., wheelchair, bedside chair)?  3  -LS  3  -LS (r) KL (t) LS (c)  --    Climbing 3-5 steps with a railing?  3  -LS  2  -LS (r) KL (t) LS (c)  --    To walk in hospital room?  3  -LS  3  -LS (r) KL (t) LS (c)  --    AM-PAC 6 Clicks Score  18  -LS  17  -LS (r) KL (t)  --       How much help from another is currently needed...    Putting on and taking off regular lower body clothing?  --  --  1  -CL    Bathing (including washing, rinsing, and drying)  --  --  2  -CL    Toileting (which includes using toilet bed pan or urinal)  --  --  2  -CL    Putting on and taking off regular upper body clothing  --  --  3  -CL    Taking care of personal grooming (such as brushing teeth)  --  --  3  -CL    Eating meals  --  --  4  -CL    Score  --  --  15  -CL       Functional Assessment    Outcome Measure Options  AM-PAC 6 Clicks Basic Mobility (PT)  -LS  AM-PAC 6 Clicks Basic Mobility (PT)  -LS (r) KL (t) LS (c)  AM-PAC 6 Clicks Daily Activity (OT)  -CL      User Key  (r) = Recorded By, (t) = Taken By, (c) = Cosigned By    Initials Name Provider Type    Unique Martínez, PT Physical Therapist    CL Harper Nelson, OT Occupational Therapist    KL Antonio Torres, PT Student PT Student         Time Calculation:   PT Charges     Row Name 06/11/19 1010             Time Calculation    Start Time  1010  -      PT Received On  06/11/19  -         Time Calculation- PT    Total Timed Code Minutes- PT  18 minute(s)  -         Timed Charges    99213 - PT  Therapeutic Exercise Minutes  4  -LS      74250 - Gait Training Minutes   14  -LS        User Key  (r) = Recorded By, (t) = Taken By, (c) = Cosigned By    Initials Name Provider Type    Unique Martínez, PT Physical Therapist        Therapy Charges for Today     Code Description Service Date Service Provider Modifiers Qty    80829540119 HC GAIT TRAINING EA 15 MIN 6/11/2019 Unique Millan, PT GP 1    27995006843 HC PT THER SUPP EA 15 MIN 6/11/2019 Unique Millan, PT GP 1          PT G-Codes  Outcome Measure Options: AM-PAC 6 Clicks Basic Mobility (PT)  AM-PAC 6 Clicks Score: 18  Score: 15    Unique Millan, PT  6/11/2019

## 2019-06-11 NOTE — PLAN OF CARE
Problem: Patient Care Overview  Goal: Plan of Care Review  Outcome: Ongoing (interventions implemented as appropriate)   06/10/19 1845 06/11/19 0400 06/11/19 0508   Coping/Psychosocial   Plan of Care Reviewed With --  patient;family --    Plan of Care Review   Progress improving --  --    OTHER   Outcome Summary --  --  Pt. has remained in sinus rhythm overnight, he did have a few bursts of sinus tach, never sustained. Maintained Heparin gtt 14units/kg/hr. Rested well with CPAP overnight.      Goal: Individualization and Mutuality  Outcome: Ongoing (interventions implemented as appropriate)    Goal: Discharge Needs Assessment  Outcome: Ongoing (interventions implemented as appropriate)      Problem: Fall Risk (Adult)  Goal: Identify Related Risk Factors and Signs and Symptoms  Outcome: Ongoing (interventions implemented as appropriate)    Goal: Absence of Fall  Outcome: Ongoing (interventions implemented as appropriate)      Problem: Skin Injury Risk (Adult)  Goal: Identify Related Risk Factors and Signs and Symptoms  Outcome: Ongoing (interventions implemented as appropriate)    Goal: Skin Health and Integrity  Outcome: Ongoing (interventions implemented as appropriate)      Problem: Chronic Obstructive Pulmonary Disease (Adult)  Goal: Signs and Symptoms of Listed Potential Problems Will be Absent, Minimized or Managed (Chronic Obstructive Pulmonary Disease)  Outcome: Ongoing (interventions implemented as appropriate)

## 2019-06-11 NOTE — PROGRESS NOTES
"Intensive Care Follow-up     Hospital:  LOS: 3 days   Mr. Stef Jones, 74 y.o. male is followed for:   Atrial fibrillation with RVR (CMS/HCC)        Subjective   Interval History:  The chart has been reviewed.  The patient continues to be in atrial fibrillation off and on.  He occasionally is having fast runs to the 140s but for the most part has been controlled.  He has been afebrile.    The patient's past medical, surgical and social history were reviewed and updated in Epic as appropriate.        Objective     Infusions:    heparin 14 Units/kg/hr Last Rate: 14 Units/kg/hr (06/10/19 2200)   Pharmacy to Dose Heparin     Pharmacy Consult       Medications:    Pharmacy Consult  Does not apply Once   amiodarone 200 mg Oral Q12H   amitriptyline 25 mg Oral Nightly   bisoprolol 5 mg Oral Q24H   insulin lispro 0-14 Units Subcutaneous 4x Daily With Meals & Nightly   ipratropium-albuterol 3 mL Nebulization 4x Daily - RT   pantoprazole 40 mg Intravenous Q AM   sacubitril-valsartan 1 tablet Oral Q12H   sodium chloride 3 mL Intravenous Q12H   vancomycin 15 mg/kg Intravenous Q24H       Vital Sign Min/Max for last 24 hours  Temp  Min: 97.9 °F (36.6 °C)  Max: 98.6 °F (37 °C)   BP  Min: 110/81  Max: 166/102   Pulse  Min: 61  Max: 88   Resp  Min: 12  Max: 24   SpO2  Min: 87 %  Max: 97 %   Flow (L/min)  Min: 2  Max: 4       Input/Output for last 24 hour shift  06/10 0701 - 06/11 0700  In: 1333.9 [P.O.:480; I.V.:565.7]  Out: 2375 [Urine:2375]   FiO2 (%):  [35 %] 35 %  S RR:  [10] 10  Objective:  General Appearance:  Ill-appearing, in no acute distress and comfortable.    Vital signs: (most recent): Blood pressure 121/74, pulse 70, temperature 98.6 °F (37 °C), temperature source Axillary, resp. rate 18, height 167.6 cm (65.98\"), weight 79 kg (174 lb 2.6 oz), SpO2 91 %.    HEENT: Normal HEENT exam.    Lungs:  Normal effort and normal respiratory rate.  He is not in respiratory distress.  No decreased breath sounds or wheezes.  "   Heart: Normal rate.  Irregular rhythm.  S1 normal and S2 normal.  No murmur.   Abdomen: Abdomen is soft and non-distended.  Bowel sounds are normal.   There is no abdominal tenderness.     Extremities: Normal range of motion.  (There is minimal pedal edema bilaterally. )  Neurological: Patient is alert and oriented to person, place and time.    Pupils:  Pupils are equal, round, and reactive to light.  Pupils are equal.   Skin:  Warm and dry.              Results from last 7 days   Lab Units 06/11/19  0327 06/10/19  0203 06/09/19  0615   WBC 10*3/mm3 7.20 8.83 9.35   HEMOGLOBIN g/dL 15.1 15.4 14.1   PLATELETS 10*3/mm3 162 175 163     Results from last 7 days   Lab Units 06/11/19  0327 06/10/19  0203 06/10/19  0030 06/09/19  0615   SODIUM mmol/L 140 140 141 139   POTASSIUM mmol/L 4.4 4.3 4.4 4.2   CO2 mmol/L 31.0* 29.0 31.0* 24.0   BUN mg/dL 10 16 16 18   CREATININE mg/dL 0.97 1.23 1.25 1.19   MAGNESIUM mg/dL 2.0 1.9 1.9 2.0   PHOSPHORUS mg/dL  --  2.8 2.7 3.2   GLUCOSE mg/dL 80 96 99 90     Estimated Creatinine Clearance: 64.1 mL/min (by C-G formula based on SCr of 0.97 mg/dL).    Results from last 7 days   Lab Units 06/09/19  0345   PH, ARTERIAL pH units 7.334*   PCO2, ARTERIAL mm Hg 52.8   PO2 ART mm Hg 106.0           I reviewed the patient's results and images.     Assessment/Plan   Impression        A Fib w/RVR    Cellulitis of Right Lower Leg and Foot    COPD (chronic obstructive pulmonary disease) (CMS/HCC)    Laryngeal mass       Plan        Continue vancomycin.  I will plan to stop this after 7 days.  Continue with heparin drip.  We will plan to transition him to Eliquis soon however given that he will likely have a biopsy of his throat soon we will hold off on this.  Continue with physical therapy.  Continue with rate control.  He will remain in the intensive care unit for close monitoring today but likely will be able to transition to the floor tomorrow if everything goes well with his biopsy.    Plan of  care and goals reviewed with mulitdisciplinary/antibiotic stewardship team during rounds.   I discussed the patient's findings and my recommendations with patient and nursing staff     High level of risk due to:  severe exacerbation of chronic illness, illness with threat to life or bodily function and drugs requiring intensive monitoring for toxicity.      Aroldo Glez MD, [facility]  Pulmonology and Critical Care Medicine

## 2019-06-11 NOTE — PROGRESS NOTES
Continued Stay Note  [facility]     Patient Name: Stef Jones  MRN: [id number]  Today's Date: 6/11/2019    Admit Date: 6/8/2019    Discharge Plan     Row Name 06/11/19 0931       Plan    Plan  Social work provided a letter for the patient to get a first floor apartment.    Patient/Family in Agreement with Plan  yes        Discharge Codes    No documentation.       Expected Discharge Date and Time     Expected Discharge Date Expected Discharge Time    Mt 15, 2019             ROYA Mcnamara

## 2019-06-11 NOTE — PLAN OF CARE
Problem: Patient Care Overview  Goal: Plan of Care Review  Outcome: Ongoing (interventions implemented as appropriate)   06/11/19 6945   Coping/Psychosocial   Plan of Care Reviewed With patient;family   Plan of Care Review   Progress improving   OTHER   Outcome Summary VSS. Pt has ambulated several times today with 1 assist and the walker. Dr. Gutiérrez is going to do a laryngoscopy tomorow with biopsy, and the procedure is scheduled to start at 0700. Hep gtts is ordered to be stopped at 0300. Tomorrow after biospy, Dr. Glez will transition patient to PO Eliquis. He is feeling better and we will continue to monitor.       Goal: Individualization and Mutuality  Outcome: Ongoing (interventions implemented as appropriate)    Goal: Discharge Needs Assessment  Outcome: Ongoing (interventions implemented as appropriate)    Goal: Interprofessional Rounds/Family Conf  Outcome: Ongoing (interventions implemented as appropriate)

## 2019-06-11 NOTE — PROGRESS NOTES
"Clinical Nutrition Note      Patient Name: Stef Jones  MRN: [id number]  Admission date: 6/8/2019      Multidisciplinary Rounds    Additional information obtained during MDR:   RN reports pt doing very well  s/p cardioversion no further a fib; eating everything; Dr. ESTRADA needs to proceed w/ biopsy of lesions, suspected neoplasm.    Current diet: Diet Dysphagia; III - Pureed With Some Mashed; Honey Thick; Cardiac, Consistent Carbohydrate    Pertinent medical data reviewed  No nutrition risk identified on nursing screen; MST score \"0\"    Intervention:  Plan of care and goals reviewed    Monitor:  RD to follow per protocol      Gayle Grajeda MS,RD,LD  06/11/19 10:39 AM  Time: 20 mins       "

## 2019-06-12 ENCOUNTER — ANESTHESIA (OUTPATIENT)
Dept: PERIOP | Facility: HOSPITAL | Age: 74
End: 2019-06-12

## 2019-06-12 LAB
ANION GAP SERPL CALCULATED.3IONS-SCNC: 11 MMOL/L
BASOPHILS # BLD AUTO: 0.02 10*3/MM3 (ref 0–0.2)
BASOPHILS NFR BLD AUTO: 0.3 % (ref 0–1.5)
BUN BLD-MCNC: 10 MG/DL (ref 8–23)
BUN/CREAT SERPL: 11 (ref 7–25)
CALCIUM SPEC-SCNC: 8.5 MG/DL (ref 8.6–10.5)
CHLORIDE SERPL-SCNC: 101 MMOL/L (ref 98–107)
CO2 SERPL-SCNC: 26 MMOL/L (ref 22–29)
CREAT BLD-MCNC: 0.91 MG/DL (ref 0.76–1.27)
DEPRECATED RDW RBC AUTO: 47.3 FL (ref 37–54)
DIGOXIN SERPL-MCNC: 0.47 NG/ML (ref 0.6–1.2)
EOSINOPHIL # BLD AUTO: 0.19 10*3/MM3 (ref 0–0.4)
EOSINOPHIL NFR BLD AUTO: 2.7 % (ref 0.3–6.2)
ERYTHROCYTE [DISTWIDTH] IN BLOOD BY AUTOMATED COUNT: 14.6 % (ref 12.3–15.4)
GFR SERPL CREATININE-BSD FRML MDRD: 81 ML/MIN/1.73
GLUCOSE BLD-MCNC: 114 MG/DL (ref 65–99)
GLUCOSE BLDC GLUCOMTR-MCNC: 106 MG/DL (ref 70–130)
GLUCOSE BLDC GLUCOMTR-MCNC: 110 MG/DL (ref 70–130)
GLUCOSE BLDC GLUCOMTR-MCNC: 175 MG/DL (ref 70–130)
GLUCOSE BLDC GLUCOMTR-MCNC: 86 MG/DL (ref 70–130)
GLUCOSE BLDC GLUCOMTR-MCNC: 89 MG/DL (ref 70–130)
HCT VFR BLD AUTO: 48.9 % (ref 37.5–51)
HGB BLD-MCNC: 15.1 G/DL (ref 13–17.7)
IMM GRANULOCYTES # BLD AUTO: 0.03 10*3/MM3 (ref 0–0.05)
IMM GRANULOCYTES NFR BLD AUTO: 0.4 % (ref 0–0.5)
LYMPHOCYTES # BLD AUTO: 1.9 10*3/MM3 (ref 0.7–3.1)
LYMPHOCYTES NFR BLD AUTO: 27.2 % (ref 19.6–45.3)
MCH RBC QN AUTO: 27.4 PG (ref 26.6–33)
MCHC RBC AUTO-ENTMCNC: 30.9 G/DL (ref 31.5–35.7)
MCV RBC AUTO: 88.7 FL (ref 79–97)
MONOCYTES # BLD AUTO: 0.59 10*3/MM3 (ref 0.1–0.9)
MONOCYTES NFR BLD AUTO: 8.4 % (ref 5–12)
NEUTROPHILS # BLD AUTO: 4.26 10*3/MM3 (ref 1.7–7)
NEUTROPHILS NFR BLD AUTO: 61 % (ref 42.7–76)
NRBC BLD AUTO-RTO: 0 /100 WBC (ref 0–0.2)
PLATELET # BLD AUTO: 173 10*3/MM3 (ref 140–450)
PMV BLD AUTO: 10.5 FL (ref 6–12)
POTASSIUM BLD-SCNC: 4.2 MMOL/L (ref 3.5–5.2)
RBC # BLD AUTO: 5.51 10*6/MM3 (ref 4.14–5.8)
SODIUM BLD-SCNC: 138 MMOL/L (ref 136–145)
T4 FREE SERPL-MCNC: 1.33 NG/DL (ref 0.93–1.7)
TSH SERPL DL<=0.05 MIU/L-ACNC: 3.88 MIU/ML (ref 0.27–4.2)
WBC NRBC COR # BLD: 6.99 10*3/MM3 (ref 3.4–10.8)

## 2019-06-12 PROCEDURE — 82962 GLUCOSE BLOOD TEST: CPT

## 2019-06-12 PROCEDURE — 84439 ASSAY OF FREE THYROXINE: CPT | Performed by: NURSE PRACTITIONER

## 2019-06-12 PROCEDURE — 92526 ORAL FUNCTION THERAPY: CPT

## 2019-06-12 PROCEDURE — 93010 ELECTROCARDIOGRAM REPORT: CPT | Performed by: INTERNAL MEDICINE

## 2019-06-12 PROCEDURE — 25010000002 DIGOXIN PER 500 MCG: Performed by: NURSE PRACTITIONER

## 2019-06-12 PROCEDURE — 25010000002 VANCOMYCIN 10 G RECONSTITUTED SOLUTION

## 2019-06-12 PROCEDURE — 88305 TISSUE EXAM BY PATHOLOGIST: CPT | Performed by: OTOLARYNGOLOGY

## 2019-06-12 PROCEDURE — 93005 ELECTROCARDIOGRAM TRACING: CPT | Performed by: INTERNAL MEDICINE

## 2019-06-12 PROCEDURE — 80162 ASSAY OF DIGOXIN TOTAL: CPT | Performed by: NURSE PRACTITIONER

## 2019-06-12 PROCEDURE — 99232 SBSQ HOSP IP/OBS MODERATE 35: CPT | Performed by: INTERNAL MEDICINE

## 2019-06-12 PROCEDURE — 25010000002 DIGOXIN PER 500 MCG: Performed by: INTERNAL MEDICINE

## 2019-06-12 PROCEDURE — 25010000002 PROPOFOL 10 MG/ML EMULSION: Performed by: NURSE ANESTHETIST, CERTIFIED REGISTERED

## 2019-06-12 PROCEDURE — 94660 CPAP INITIATION&MGMT: CPT

## 2019-06-12 PROCEDURE — 85025 COMPLETE CBC W/AUTO DIFF WBC: CPT | Performed by: INTERNAL MEDICINE

## 2019-06-12 PROCEDURE — 80048 BASIC METABOLIC PNL TOTAL CA: CPT | Performed by: INTERNAL MEDICINE

## 2019-06-12 PROCEDURE — 25010000002 NEOSTIGMINE 10 MG/10ML SOLUTION: Performed by: NURSE ANESTHETIST, CERTIFIED REGISTERED

## 2019-06-12 PROCEDURE — 84443 ASSAY THYROID STIM HORMONE: CPT | Performed by: NURSE PRACTITIONER

## 2019-06-12 PROCEDURE — 94799 UNLISTED PULMONARY SVC/PX: CPT

## 2019-06-12 PROCEDURE — 0CBS8ZX EXCISION OF LARYNX, VIA NATURAL OR ARTIFICIAL OPENING ENDOSCOPIC, DIAGNOSTIC: ICD-10-PCS | Performed by: OTOLARYNGOLOGY

## 2019-06-12 RX ORDER — FENTANYL CITRATE 50 UG/ML
50 INJECTION, SOLUTION INTRAMUSCULAR; INTRAVENOUS
Status: DISCONTINUED | OUTPATIENT
Start: 2019-06-12 | End: 2019-06-12 | Stop reason: HOSPADM

## 2019-06-12 RX ORDER — LIDOCAINE HYDROCHLORIDE 10 MG/ML
INJECTION, SOLUTION EPIDURAL; INFILTRATION; INTRACAUDAL; PERINEURAL AS NEEDED
Status: DISCONTINUED | OUTPATIENT
Start: 2019-06-12 | End: 2019-06-12 | Stop reason: SURG

## 2019-06-12 RX ORDER — METOPROLOL TARTRATE 5 MG/5ML
5 INJECTION INTRAVENOUS EVERY 6 HOURS PRN
Status: DISCONTINUED | OUTPATIENT
Start: 2019-06-12 | End: 2019-06-19

## 2019-06-12 RX ORDER — DIGOXIN 0.25 MG/ML
250 INJECTION INTRAMUSCULAR; INTRAVENOUS ONCE
Status: COMPLETED | OUTPATIENT
Start: 2019-06-12 | End: 2019-06-12

## 2019-06-12 RX ORDER — MAGNESIUM HYDROXIDE 1200 MG/15ML
LIQUID ORAL AS NEEDED
Status: DISCONTINUED | OUTPATIENT
Start: 2019-06-12 | End: 2019-06-12 | Stop reason: HOSPADM

## 2019-06-12 RX ORDER — FUROSEMIDE 20 MG/1
20 TABLET ORAL DAILY
Status: DISCONTINUED | OUTPATIENT
Start: 2019-06-12 | End: 2019-06-19 | Stop reason: HOSPADM

## 2019-06-12 RX ORDER — ATORVASTATIN CALCIUM 20 MG/1
20 TABLET, FILM COATED ORAL NIGHTLY
Status: DISCONTINUED | OUTPATIENT
Start: 2019-06-12 | End: 2019-06-19 | Stop reason: HOSPADM

## 2019-06-12 RX ORDER — GLYCOPYRROLATE 0.2 MG/ML
INJECTION INTRAMUSCULAR; INTRAVENOUS AS NEEDED
Status: DISCONTINUED | OUTPATIENT
Start: 2019-06-12 | End: 2019-06-12 | Stop reason: SURG

## 2019-06-12 RX ORDER — NEOSTIGMINE METHYLSULFATE 1 MG/ML
INJECTION, SOLUTION INTRAVENOUS AS NEEDED
Status: DISCONTINUED | OUTPATIENT
Start: 2019-06-12 | End: 2019-06-12 | Stop reason: SURG

## 2019-06-12 RX ORDER — SODIUM CHLORIDE, SODIUM LACTATE, POTASSIUM CHLORIDE, CALCIUM CHLORIDE 600; 310; 30; 20 MG/100ML; MG/100ML; MG/100ML; MG/100ML
9 INJECTION, SOLUTION INTRAVENOUS CONTINUOUS PRN
Status: DISCONTINUED | OUTPATIENT
Start: 2019-06-12 | End: 2019-06-12

## 2019-06-12 RX ORDER — HYDROMORPHONE HYDROCHLORIDE 1 MG/ML
0.5 INJECTION, SOLUTION INTRAMUSCULAR; INTRAVENOUS; SUBCUTANEOUS
Status: DISCONTINUED | OUTPATIENT
Start: 2019-06-12 | End: 2019-06-12 | Stop reason: HOSPADM

## 2019-06-12 RX ORDER — ALBUTEROL SULFATE 2.5 MG/3ML
0.62 SOLUTION RESPIRATORY (INHALATION) EVERY 6 HOURS PRN
Status: DISCONTINUED | OUTPATIENT
Start: 2019-06-12 | End: 2019-06-19

## 2019-06-12 RX ORDER — PROPOFOL 10 MG/ML
VIAL (ML) INTRAVENOUS AS NEEDED
Status: DISCONTINUED | OUTPATIENT
Start: 2019-06-12 | End: 2019-06-12 | Stop reason: SURG

## 2019-06-12 RX ORDER — BUDESONIDE AND FORMOTEROL FUMARATE DIHYDRATE 80; 4.5 UG/1; UG/1
2 AEROSOL RESPIRATORY (INHALATION)
Status: DISCONTINUED | OUTPATIENT
Start: 2019-06-12 | End: 2019-06-19

## 2019-06-12 RX ORDER — ACETAMINOPHEN 325 MG/1
650 TABLET ORAL EVERY 6 HOURS PRN
Status: DISCONTINUED | OUTPATIENT
Start: 2019-06-12 | End: 2019-06-19

## 2019-06-12 RX ORDER — AMIODARONE HYDROCHLORIDE 200 MG/1
400 TABLET ORAL EVERY 12 HOURS SCHEDULED
Status: DISCONTINUED | OUTPATIENT
Start: 2019-06-12 | End: 2019-06-16

## 2019-06-12 RX ORDER — AMLODIPINE BESYLATE 2.5 MG/1
2.5 TABLET ORAL DAILY
Status: DISCONTINUED | OUTPATIENT
Start: 2019-06-12 | End: 2019-06-13

## 2019-06-12 RX ORDER — ONDANSETRON 2 MG/ML
4 INJECTION INTRAMUSCULAR; INTRAVENOUS ONCE AS NEEDED
Status: DISCONTINUED | OUTPATIENT
Start: 2019-06-12 | End: 2019-06-12 | Stop reason: HOSPADM

## 2019-06-12 RX ORDER — ROCURONIUM BROMIDE 10 MG/ML
INJECTION, SOLUTION INTRAVENOUS AS NEEDED
Status: DISCONTINUED | OUTPATIENT
Start: 2019-06-12 | End: 2019-06-12 | Stop reason: SURG

## 2019-06-12 RX ADMIN — ATORVASTATIN CALCIUM 20 MG: 20 TABLET, FILM COATED ORAL at 20:19

## 2019-06-12 RX ADMIN — AMIODARONE HYDROCHLORIDE 400 MG: 200 TABLET ORAL at 20:18

## 2019-06-12 RX ADMIN — VANCOMYCIN HYDROCHLORIDE 1250 MG: 10 INJECTION, POWDER, LYOPHILIZED, FOR SOLUTION INTRAVENOUS at 05:00

## 2019-06-12 RX ADMIN — IPRATROPIUM BROMIDE 0.5 MG: 0.5 SOLUTION RESPIRATORY (INHALATION) at 21:00

## 2019-06-12 RX ADMIN — EPHEDRINE SULFATE 10 MG: 50 INJECTION INTRAMUSCULAR; INTRAVENOUS; SUBCUTANEOUS at 07:07

## 2019-06-12 RX ADMIN — SODIUM CHLORIDE, POTASSIUM CHLORIDE, SODIUM LACTATE AND CALCIUM CHLORIDE: 600; 310; 30; 20 INJECTION, SOLUTION INTRAVENOUS at 07:00

## 2019-06-12 RX ADMIN — LIDOCAINE HYDROCHLORIDE 50 MG: 10 INJECTION, SOLUTION EPIDURAL; INFILTRATION; INTRACAUDAL; PERINEURAL at 07:07

## 2019-06-12 RX ADMIN — APIXABAN 5 MG: 5 TABLET, FILM COATED ORAL at 10:05

## 2019-06-12 RX ADMIN — ACETAMINOPHEN 650 MG: 325 TABLET ORAL at 11:04

## 2019-06-12 RX ADMIN — SODIUM CHLORIDE, PRESERVATIVE FREE 3 ML: 5 INJECTION INTRAVENOUS at 20:19

## 2019-06-12 RX ADMIN — AMITRIPTYLINE HYDROCHLORIDE 25 MG: 25 TABLET, FILM COATED ORAL at 20:19

## 2019-06-12 RX ADMIN — AMIODARONE HYDROCHLORIDE 200 MG: 200 TABLET ORAL at 10:06

## 2019-06-12 RX ADMIN — BUDESONIDE AND FORMOTEROL FUMARATE DIHYDRATE 2 PUFF: 80; 4.5 AEROSOL RESPIRATORY (INHALATION) at 21:00

## 2019-06-12 RX ADMIN — PANTOPRAZOLE SODIUM 40 MG: 40 INJECTION, POWDER, FOR SOLUTION INTRAVENOUS at 05:00

## 2019-06-12 RX ADMIN — ROCURONIUM BROMIDE 25 MG: 10 INJECTION INTRAVENOUS at 07:07

## 2019-06-12 RX ADMIN — SACUBITRIL AND VALSARTAN 1 TABLET: 24; 26 TABLET, FILM COATED ORAL at 10:07

## 2019-06-12 RX ADMIN — APIXABAN 5 MG: 5 TABLET, FILM COATED ORAL at 20:18

## 2019-06-12 RX ADMIN — NEOSTIGMINE METHYLSULFATE 2.5 MG: 1 INJECTION, SOLUTION INTRAVENOUS at 07:31

## 2019-06-12 RX ADMIN — SACUBITRIL AND VALSARTAN 1 TABLET: 24; 26 TABLET, FILM COATED ORAL at 20:18

## 2019-06-12 RX ADMIN — AMLODIPINE BESYLATE 2.5 MG: 2.5 TABLET ORAL at 10:07

## 2019-06-12 RX ADMIN — DIGOXIN 250 MCG: 0.25 INJECTION INTRAMUSCULAR; INTRAVENOUS at 22:45

## 2019-06-12 RX ADMIN — IPRATROPIUM BROMIDE AND ALBUTEROL SULFATE 3 ML: 2.5; .5 SOLUTION RESPIRATORY (INHALATION) at 13:05

## 2019-06-12 RX ADMIN — GLYCOPYRROLATE 0.4 MG: 0.2 INJECTION, SOLUTION INTRAMUSCULAR; INTRAVENOUS at 07:31

## 2019-06-12 RX ADMIN — FUROSEMIDE 20 MG: 20 TABLET ORAL at 10:06

## 2019-06-12 RX ADMIN — METOPROLOL TARTRATE 5 MG: 1 INJECTION, SOLUTION INTRAVENOUS at 12:08

## 2019-06-12 RX ADMIN — DIGOXIN 250 MCG: 0.25 INJECTION INTRAMUSCULAR; INTRAVENOUS at 19:00

## 2019-06-12 RX ADMIN — BISOPROLOL FUMARATE 5 MG: 5 TABLET ORAL at 10:07

## 2019-06-12 RX ADMIN — PROPOFOL 100 MG: 10 INJECTION, EMULSION INTRAVENOUS at 07:07

## 2019-06-12 NOTE — OP NOTE
Operative note: [facility]    Preoperative diagnosis: Left laryngeal lesion  Postoperative diagnosis: Left laryngeal lesion operative procedure: Microscopic direct laryngoscopy and biopsy of left larynx  Surgeon: Glen Gutiérrez  Anesthesia: General endotracheal  Operative indications Mr. Jones is a 74-year-old male with a several month history of hoarseness and dysphasia.  Flexible laryngoscopy preoperatively identified and irregularity and vocal cord dysmotility    Procedure in detail: After Mr. Jones underwent general endotracheal anesthesia, his larynx was exposed with a Kleinsasser laryngoscope.  A biopsy was taken of the left false cord and aryepiglottic junction.  This was submitted to pathology.  Bleeding was minimal.  Telescopic magnification was used.  No subglottic lesions were identified.  He had no complications and he was extubated taken to PACU in stable condition.    Glen Gutiérrez  Otolaryngology

## 2019-06-12 NOTE — ANESTHESIA PREPROCEDURE EVALUATION
Anesthesia Evaluation     Patient summary reviewed and Nursing notes reviewed   NPO Solid Status: > 8 hours  NPO Liquid Status: > 8 hours           Airway   Mallampati: I  Dental    (+) poor dentition and upper dentures        Pulmonary    (+) a smoker Current, COPD (MDI ) moderate,     ROS comment: Laryngeal Mass Hoarse Voice  Recent exacerbation COPD  Cardiovascular     ECG reviewed  Patient on routine beta blocker    (+) hypertension, dysrhythmias Atrial Fib,   (-) past MI, CAD, cardiac stentsPND:      ROS comment: EKG NSR   Possible Left atrial enlargement  Nonspecific T wave abnormality    ECHO  31 - 35%. Global hypokinesis noted.  LA moderately dilated. mildly reduced flow within the left trial appendage c no thrombus.  Moderate-to-severe MVR (2-3+)  Moderate TVR   Estimated RVSP mildly elevated (35-45 mmHg).    Neuro/Psych  (-) seizures, CVA  GI/Hepatic/Renal/Endo    (-) liver disease, diabetes, hypothyroidism    Musculoskeletal     Abdominal    Substance History      OB/GYN          Other        ROS/Med Hx Other: to ER several days ago exacerbation COPD, RLE celluitis and Afib RVR                Anesthesia Plan    ASA 3     general   (Low EF Ephedrine)  intravenous induction   Anesthetic plan, all risks, benefits, and alternatives have been provided, discussed and informed consent has been obtained with: patient.    Plan discussed with CRNA.

## 2019-06-12 NOTE — PLAN OF CARE
Problem: Patient Care Overview  Goal: Plan of Care Review   06/12/19 1510   Coping/Psychosocial   Plan of Care Reviewed With patient;family   SLP treatment completed. Will continue to address dysphagia tx per POC. Please see note for further details and recommendations.

## 2019-06-12 NOTE — PLAN OF CARE
Problem: Patient Care Overview  Goal: Plan of Care Review  Outcome: Ongoing (interventions implemented as appropriate)   06/12/19 0635   Coping/Psychosocial   Plan of Care Reviewed With patient  (Simultaneous filing. User may be unaware of other data.)   Plan of Care Review   Progress no change   OTHER   Outcome Summary VSS. No complaints of pain. Pt transferred to pre-op in the OR at 0615 this AM for his bronch. Will continue to monitor.

## 2019-06-12 NOTE — THERAPY TREATMENT NOTE
Acute Care - Speech Language Pathology   Swallow Treatment Note [facility]     Patient Name: Stef Jones  : 1945  MRN: [id number]  Today's Date: 2019  Onset of Illness/Injury or Date of Surgery: 19     Referring Physician: MD Shan      Admit Date: 2019    Visit Dx:      ICD-10-CM ICD-9-CM   1. A Fib w/RVR I48.91 427.31   2. Impaired functional mobility, balance, gait, and endurance Z74.09 V49.89   3. 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     Patient Active Problem List   Diagnosis   • Cellulitis of Right Lower Leg and Foot   • A Fib w/RVR   • COPD (chronic obstructive pulmonary disease) (CMS/HCC)   • Dysphagia   • Laryngeal mass       Therapy Treatment  Rehabilitation Treatment Summary     Row Name 19 1330             Treatment Time/Intention    Discipline  speech language pathologist  (Pended)   -MS      Document Type  therapy note (daily note)  (Pended)   -MS      Subjective Information  no complaints  (Pended)   -MS      Mode of Treatment  individual therapy;speech-language pathology  (Pended)   -MS      Patient/Family Observations  family present  (Pended)   -MS      Therapy Frequency (Swallow)  5 days per week  (Pended)   -MS      Patient Effort  good  (Pended)   -MS      Recorded by [MS] Keila Song, Speech Therapy Student 19 2143      Row Name 19 1337             Pain Assessment    Additional Documentation  Pain Scale: Numbers Pre/Post-Treatment (Group)  (Pended)   -MS      Recorded by [MS] Keila Song, Speech Therapy Student 19 9673      Row Name 19 5660             Pain Scale: Numbers Pre/Post-Treatment    Pain Scale: Numbers, Pretreatment  0/10 - no pain  (Pended)   -MS      Pain Scale: Numbers, Post-Treatment  0/10 - no pain  (Pended)   -MS      Recorded by [MS] Keila Song, Speech Therapy Student 19 9602      Row Name 19 9027             Outcome Summary/Treatment Plan  (SLP)    Daily Summary of Progress (SLP)  progress toward functional goals is good  (Pended)   -MS      Plan for Continued Treatment (SLP)  Pt seen for dysphagia tx. Tolerating HTL w/ no s/sxs aspiration. Provided handout of dysphagia exercises, reviewed & completed w/ pt. Rec continue level III diet w/ HTL. SLP will continue to follow for tx.  (Pended)   -MS      Anticipated Dischage Disposition  unknown;anticipate therapy at next level of care  (Pended)   -MS      Recorded by [MS] Keila Song, Speech Therapy Student 06/12/19 5194        User Key  (r) = Recorded By, (t) = Taken By, (c) = Cosigned By    Initials Name Effective Dates Discipline    MS Keila Song, Speech Therapy Student 05/31/19 -  SLP          Outcome Summary  Outcome Summary/Treatment Plan (SLP)  Daily Summary of Progress (SLP): (P) progress toward functional goals is good (06/12/19 1330 : Keila Song, Speech Therapy Student)  Plan for Continued Treatment (SLP): (P) Pt seen for dysphagia tx. Tolerating HTL w/ no s/sxs aspiration. Provided handout of dysphagia exercises, reviewed & completed w/ pt. Rec continue level III diet w/ HTL. SLP will continue to follow for tx. (06/12/19 1330 : Keila Song, Speech Therapy Student)  Anticipated Dischage Disposition: (P) unknown, anticipate therapy at next level of care (06/12/19 1330 : Keila Song, Speech Therapy Student)      SLP GOALS     Row Name 06/12/19 1330 06/10/19 1550          Oral Nutrition/Hydration Goal 1 (SLP)    Oral Nutrition/Hydration Goal 1, SLP  LTG: Pt will return to regular diet, thin liquids w/ no overt s/sxs aspiration or distress w/ 100% acc and no cues  (Pended)   -MS  LTG: Pt will return to regular diet, thin liquids w/ no overt s/sxs aspiration or distress w/ 100% acc and no cues  -MP     Time Frame (Oral Nutrition/Hydration Goal 1, SLP)  by discharge  (Pended)   -MS  by discharge  -MP     Progress/Outcomes (Oral Nutrition/Hydration Goal 1, SLP)   continuing progress toward goal  (Pended)   -MS  continuing progress toward goal  -MP        Oral Nutrition/Hydration Goal 2 (SLP)    Oral Nutrition/Hydration Goal 2, SLP  Pt will tolerate puree, some mashed and HTL w/ no overt s/sxs aspiration or distress w/ 100% acc and no cues  (Pended)   -MS  Pt will tolerate puree, some mashed and HTL w/ no overt s/sxs aspiration or distress w/ 100% acc and no cues  -MP     Time Frame (Oral Nutrition/Hydration Goal 2, SLP)  short term goal (STG);by discharge  (Pended)   -MS  short term goal (STG);by discharge  -MP     Barriers (Oral Nutrition/Hydration Goal 2, SLP)  Tolerating HTL w/ no s/sxs  (Pended)   -MS  Tolerating HTL w/ no s/sxs  -MP     Progress/Outcomes (Oral Nutrition/Hydration Goal 2, SLP)  good progress toward goal  (Pended)   -MS  good progress toward goal  -MP        Oral Nutrition/Hydration Goal (SLP)    Oral Nutrition/Hydration Goal, SLP  Pt will tolerate therapeutic trials of thin H2O w/ no overt s/sxs aspiration or distress w/ 60% acc and no cues in order to determine readiness for repeat instrumental eval  (Pended)   -MS  Pt will tolerate therapeutic trials of thin H2O w/ no overt s/sxs aspiration or distress w/ 60% acc and no cues in order to determine readiness for repeat instrumental eval  -MP     Time Frame (Oral Nutrition/Hydration Goal, SLP)  short term goal (STG);by discharge  (Pended)   -MS  short term goal (STG);by discharge  -MP     Progress/Outcomes (Oral Nutrition/Hydration Goal, SLP)  continuing progress toward goal  (Pended)   -MS  goal ongoing  -MP        Pharyngeal Strengthening Exercise Goal 1 (SLP)    Activity (Pharyngeal Strengthening Goal 1, SLP)  increase timing;increase closure at entrance to airway/closure of airway at glottis  (Pended)   -MS  increase timing;increase closure at entrance to airway/closure of airway at glottis  -MP     Increase Timing  prepping - 3 second prep or suck swallow or 3-step swallow  (Pended)   -MS  prepping -  3 second prep or suck swallow or 3-step swallow  -MP     Increase Closure at Entrance to Airway/Closure of Airway at Glottis  super-supraglottic swallow;hard effortful swallow  (Pended)   -MS  super-supraglottic swallow;hard effortful swallow  -MP     Appling/Accuracy (Pharyngeal Strengthening Goal 1, SLP)  with minimal cues (75-90% accuracy)  (Pended)   -MS  with minimal cues (75-90% accuracy)  -MP     Time Frame (Pharyngeal Strengthening Goal 1, SLP)  short term goal (STG);by discharge  (Pended)   -MS  short term goal (STG);by discharge  -MP     Barriers (Pharyngeal Strengthening Goal 1, SLP)  Reviewed & completed w/ pt. Provided instruction on at-home thickener & encouraged independent practice with pt & family  (Pended)   -MS  Reviewed & completed w/ pt. Provided handout & encouraged independent practice  -MP     Progress/Outcomes (Pharyngeal Strengthening Goal 1, SLP)  good progress toward goal  (Pended)   -MS  good progress toward goal  -MP       User Key  (r) = Recorded By, (t) = Taken By, (c) = Cosigned By    Initials Name Provider Type    MP Claude Pagan, MS, CFY-SLP Speech and Language Pathologist    Keila Zamora, Speech Therapy Student Speech Therapy Student          EDUCATION  The patient has been educated in the following areas:   Dysphagia (Swallowing Impairment) Oral Care/Hydration Modified Diet Instruction.    SLP Recommendation and Plan  Daily Summary of Progress (SLP): (P) progress toward functional goals is good     Plan for Continued Treatment (SLP): (P) Pt seen for dysphagia tx. Tolerating HTL w/ no s/sxs aspiration. Provided handout of dysphagia exercises, reviewed & completed w/ pt. Rec continue level III diet w/ HTL. SLP will continue to follow for tx.  Anticipated Dischage Disposition: (P) unknown, anticipate therapy at next level of care                    Time Calculation:   Time Calculation- SLP     Row Name 06/12/19 3116             Time Calculation- SLP    SLP Start  Time  1330  (Pended)   -MS      SLP Received On  06/12/19  (Pended)   -MS        User Key  (r) = Recorded By, (t) = Taken By, (c) = Cosigned By    Initials Name Provider Type    Keila Zamora, Speech Therapy Student Speech Therapy Student          Therapy Charges for Today     Code Description Service Date Service Provider Modifiers Qty    [id number] HC ST TREATMENT SWALLOW 3 6/12/2019 Keila Song, Speech Therapy Student GN 1                 Keila Song Speech Therapy Student  6/12/2019

## 2019-06-12 NOTE — ANESTHESIA PROCEDURE NOTES
Airway  Urgency: elective    Airway not difficult    General Information and Staff    Patient location during procedure: OR    Indications and Patient Condition  Indications for airway management: airway protection    Preoxygenated: yes  MILS not maintained throughout  Mask difficulty assessment: 1 - vent by mask    Final Airway Details  Final airway type: endotracheal airway      Successful airway: ETT  Cuffed: yes   Successful intubation technique: direct laryngoscopy  Endotracheal tube insertion site: oral  Blade: Itz  Blade size: 3  ETT size (mm): 5.0  Cormack-Lehane Classification: grade I - full view of glottis  Placement verified by: chest auscultation and capnometry   Measured from: lips  ETT to lips (cm): 23  Number of attempts at approach: 1    Additional Comments  Negative epigastric sounds, Breath sound equal bilaterally with symmetric chest rise and fall

## 2019-06-12 NOTE — PROGRESS NOTES
Continued Stay Note  [facility]     Patient Name: Stef Jones  MRN: [id number]  Today's Date: 6/12/2019    Admit Date: 6/8/2019    Discharge Plan     Row Name 06/12/19 1010       Plan    Plan Comments  At this time plan for discharge is to return home. CM will continue to follow to arrange any HH or DME needs at time of discharge.        Discharge Codes    No documentation.       Expected Discharge Date and Time     Expected Discharge Date Expected Discharge Time    Mt 15, 2019             Veronica Lei RN

## 2019-06-12 NOTE — PROGRESS NOTES
Preop note    Mr. 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  He additionally has cardiac insufficiency with mitral valve regurgitation and hypokinesis with recent cardioversion for atrial fibrillation and anticoagulation.    The options goals and risks have been discussed with him in detail and he wished to proceed with a diagnostic biopsy to ascertain whether the supraglottic lesion is malignant and to develop a strategy for dealing with the lesion.  He accepts and wishes to proceed with biopsy.    Glen Gutiérrez MD, [facility]  Otolaryngology  o [phone]  c [phone]

## 2019-06-12 NOTE — PROGRESS NOTES
"Intensive Care Follow-up     Hospital:  LOS: 4 days   Mr. Stef Jones, 74 y.o. male is followed for:   Atrial fibrillation with RVR (CMS/HCC)        Subjective   Interval History:  The chart has been reviewed.  The patient continues to be in and out of atrial fibrillation.  His rate has been controlled.  He is just returned from his laryngeal biopsy.  He states that he is feeling relatively well today.    The patient's past medical, surgical and social history were reviewed and updated in Epic as appropriate.        Objective     Infusions:    lactated ringers 9 mL/hr   Pharmacy Consult      Medications:    amiodarone 200 mg Oral Q12H   amitriptyline 25 mg Oral Nightly   amLODIPine 2.5 mg Oral Daily   apixaban 5 mg Oral Q12H   atorvastatin 20 mg Oral Nightly   bisoprolol 5 mg Oral Q24H   budesonide-formoterol 2 puff Inhalation BID - RT   furosemide 20 mg Oral Daily   insulin lispro 0-14 Units Subcutaneous 4x Daily With Meals & Nightly   ipratropium 500 mcg Nebulization 4x Daily - RT   ipratropium-albuterol 3 mL Nebulization 4x Daily - RT   pantoprazole 40 mg Intravenous Q AM   sacubitril-valsartan 1 tablet Oral Q12H   sodium chloride 3 mL Intravenous Q12H   vancomycin 15 mg/kg Intravenous Q24H       Vital Sign Min/Max for last 24 hours  Temp  Min: 97.6 °F (36.4 °C)  Max: 99.7 °F (37.6 °C)   BP  Min: 102/56  Max: 172/103   Pulse  Min: 60  Max: 84   Resp  Min: 15  Max: 22   SpO2  Min: 89 %  Max: 98 %   Flow (L/min)  Min: 2  Max: 4       Input/Output for last 24 hour shift  06/11 0701 - 06/12 0700  In: 424.5 [P.O.:240; I.V.:184.5]  Out: 2850 [Urine:2850]   FiO2 (%):  [35 %] 35 %  S RR:  [10] 10  LA SUP:  [8 cm H20] 8 cm H20  Objective:  General Appearance:  Ill-appearing, in no acute distress and comfortable.    Vital signs: (most recent): Blood pressure 108/60, pulse 68, temperature 97.8 °F (36.6 °C), temperature source Temporal, resp. rate 20, height 167.6 cm (66\"), weight 78.9 kg (174 lb), SpO2 93 %.    HEENT: " Normal HEENT exam.    Lungs:  Normal effort and normal respiratory rate.  He is not in respiratory distress.  No decreased breath sounds or wheezes.    Heart: Normal rate.  Regular rhythm.  S1 normal and S2 normal.  No murmur.   Abdomen: Abdomen is soft and non-distended.  Bowel sounds are normal.   There is no abdominal tenderness.     Extremities: Normal range of motion.  There is dependent edema.    Neurological: Patient is alert and oriented to person, place and time.    Pupils:  Pupils are equal, round, and reactive to light.  Pupils are equal.   Skin:  Warm and dry.              Results from last 7 days   Lab Units 06/12/19  0518 06/11/19  0327 06/10/19  0203   WBC 10*3/mm3 6.99 7.20 8.83   HEMOGLOBIN g/dL 15.1 15.1 15.4   PLATELETS 10*3/mm3 173 162 175     Results from last 7 days   Lab Units 06/12/19  0518 06/11/19  0327 06/10/19  0203 06/10/19  0030 06/09/19  0615   SODIUM mmol/L 138 140 140 141 139   POTASSIUM mmol/L 4.2 4.4 4.3 4.4 4.2   CO2 mmol/L 26.0 31.0* 29.0 31.0* 24.0   BUN mg/dL 10 10 16 16 18   CREATININE mg/dL 0.91 0.97 1.23 1.25 1.19   MAGNESIUM mg/dL  --  2.0 1.9 1.9 2.0   PHOSPHORUS mg/dL  --   --  2.8 2.7 3.2   GLUCOSE mg/dL 114* 80 96 99 90     Estimated Creatinine Clearance: 68.2 mL/min (by C-G formula based on SCr of 0.91 mg/dL).    Results from last 7 days   Lab Units 06/09/19  0345   PH, ARTERIAL pH units 7.334*   PCO2, ARTERIAL mm Hg 52.8   PO2 ART mm Hg 106.0       I reviewed the patient's results and images.     Assessment/Plan   Impression        A Fib w/RVR    Cellulitis of Right Lower Leg and Foot    COPD (chronic obstructive pulmonary disease) (CMS/HCC)    Laryngeal mass       Plan        Today is day 5 of vancomycin we will continue for 2 more days to complete a 7-day course.  We will await final pathology on his laryngeal biopsy.  We will transition from heparin drip over to twice daily Eliquis for his atrial fibrillation.  Continue to mobilize as tolerated with physical  therapy.  I will plan to transition him to the floor today.    Plan of care and goals reviewed with mulitdisciplinary/antibiotic stewardship team during rounds.   I discussed the patient's findings and my recommendations with patient and nursing staff         Aroldo Glez MD, [facility]  Pulmonology and Critical Care Medicine

## 2019-06-12 NOTE — BRIEF OP NOTE
LARYNGOSCOPY  Progress Note    Stef Jones  6/8/2019 - 6/12/2019    Pre-op Diagnosis:   Left laryngeal lesion       Post-Op Diagnosis Codes:  Left laryngeal lesion    Procedure/CPT® Codes:      Microscopic direct laryngoscopy and biopsy    Procedure(s):  MICRO DIRECT LARYNGOSCOPY WITH BIOPSY    Surgeon(s):  Glen Gutiérrez MD    Anesthesia: General    Staff:   Circulator: Honey Nielsen RN; Nat Basilio RN  Scrub Person: Natalee Avelar    Estimated Blood Loss: minimal    Urine Voided: * No values recorded between 6/12/2019  6:57 AM and 6/12/2019  7:34 AM *    Specimens:              Left larynx so told different is not likely get from the next appointment.  I guess that it is still under 6 of this this is a brief note okay RI/close that now            Drains:   Urethral Catheter Non-latex;Temperature probe (Active)   Daily Indications Acute retention 6/12/2019  6:00 AM   Site Assessment Clean;Skin intact 6/12/2019 12:00 AM   Collection Container Standard drainage bag 6/12/2019  6:00 AM   Securement Method Securing device 6/12/2019  6:00 AM   Catheter care complete Yes 6/11/2019  8:00 AM   Output (mL) 125 mL 6/12/2019  5:00 AM       Findings: Irregular left supraglottis    Complications:       Glen Gutiérrez MD     Date: 6/12/2019  Time: 7:39 AM

## 2019-06-12 NOTE — ANESTHESIA POSTPROCEDURE EVALUATION
Patient: Stef Jones    Procedure Summary     Date:  06/12/19 Room / Location:   [facility]    Anesthesia Start:  0700 Anesthesia Stop:      Procedure:  MICRO DIRECT LARYNGOSCOPY WITH BIOPSY (N/A Throat) Diagnosis:      Surgeon:  Glen Gutiérrez MD Provider:  Quique Slater MD    Anesthesia Type:  general ASA Status:  3          Anesthesia Type: general  Last vitals  /83  102/56 (06/12/19 0632)   Temp 97.2  98.2 °F (36.8 °C) (06/12/19 0632)   Pulse 77  69 (06/12/19 0632)   Resp 14  16 (06/12/19 0632)     SpO2 99  92 % (06/12/19 0632)     Post Anesthesia Care and Evaluation    Patient location during evaluation: PACU  Patient participation: complete - patient participated  Level of consciousness: awake and alert  Pain score: 0  Pain management: adequate  Airway patency: patent  Anesthetic complications: No anesthetic complications  PONV Status: none  Cardiovascular status: hemodynamically stable and acceptable  Respiratory status: nonlabored ventilation, acceptable, nasal cannula and face mask  Hydration status: acceptable

## 2019-06-12 NOTE — PROGRESS NOTES
"Clinical Nutrition Note      Patient Name: Stef Jones  MRN: [id number]  Admission date: 6/8/2019      Multidisciplinary Rounds    Additional information obtained during MDR:  RN reports pt in OR this morning for biopsy of suspicious laryngeal lesion. Otherwise he has been doing well.    Current diet: Diet Dysphagia; III - Pureed With Some Mashed; Honey Thick    Pertinent medical data reviewed  No nutrition risk identified on nursing screen; MST score \"0\"    Intervention:  Plan of care and goals reviewed    Monitor:  RD to follow per protocol      Gayle Grajeda MS,RD,LD  06/12/19 10:22 AM  Time: 20 mins       "

## 2019-06-12 NOTE — PROGRESS NOTES
"[facility] at [facility]  Cardiology Progress Note      Chief Complaint/Reason for service:    · Afib with RVR         No acute events overnight. Underwent laryngoscopy and biopsy this AM. Received call from nurse at 12:00, patient flipped into \"SVT\" then Afib with RVR. Received IV lopressor 5 mg x 1. Asymptomatic. Rate sustaining at 130-140's. BP stable.     Past medical, surgical, social and family history reviewed in the patient's electronic medical record.    Review of Systems:   The following systems were reviewed and negative;  constitution, respiratory and cardiovascular except as noted in HPI       Vital Sign Min/Max for last 24 hours  Temp  Min: 97.6 °F (36.4 °C)  Max: 99.7 °F (37.6 °C)   BP  Min: 102/56  Max: 172/103   Pulse  Min: 60  Max: 84   Resp  Min: 15  Max: 22   SpO2  Min: 89 %  Max: 98 %   Flow (L/min)  Min: 2  Max: 4      Intake/Output Summary (Last 24 hours) at 6/12/2019 1105  Last data filed at 6/12/2019 0800  Gross per 24 hour   Intake 404.3 ml   Output 2955 ml   Net -2550.7 ml           Physical Exam   Constitutional: He appears well-developed and well-nourished.   HENT:   Head: Normocephalic and atraumatic.   Neck: Neck supple.   Cardiovascular: Normal rate and regular rhythm.   Pulmonary/Chest: Effort normal and breath sounds normal.   Abdominal: Soft. Bowel sounds are normal.   Musculoskeletal: He exhibits no edema.   Neurological: He is alert.   Skin: Skin is warm and dry.   Nursing note and vitals reviewed.      Tele:  NSR 80's    Results Review (reviewed the patient's recent labs in the electronic medical record):     EKG:  NSR     CXR: reviewed      Results from last 7 days   Lab Units 06/12/19  0518 06/11/19  0327 06/10/19  0203 06/10/19  0030 06/09/19  0615 06/08/19  0343   SODIUM mmol/L 138 140 140 141 139 138   POTASSIUM mmol/L 4.2 4.4 4.3 4.4 4.2 4.8   CHLORIDE mmol/L 101 101 100 101 104 101   BUN mg/dL 10 10 16 16 18 18   CREATININE mg/dL 0.91 0.97 1.23 " 1.25 1.19 1.43*   MAGNESIUM mg/dL  --  2.0 1.9 1.9 2.0 2.1     Results from last 7 days   Lab Units 06/09/19  0615 06/08/19  0343   TROPONIN T ng/mL <0.010 <0.010     Results from last 7 days   Lab Units 06/12/19  0518 06/11/19  0327 06/10/19  0203   WBC 10*3/mm3 6.99 7.20 8.83   HEMOGLOBIN g/dL 15.1 15.1 15.4   HEMATOCRIT % 48.9 49.0 49.4   PLATELETS 10*3/mm3 173 162 175       Lab Results   Component Value Date    HGBA1C 6.60 (H) 06/08/2019       Lab Results   Component Value Date    CHOL 108 06/08/2019    TRIG 61 06/08/2019    HDL 59 06/08/2019    LDL 37 06/08/2019       Active Hospital Problems    Diagnosis POA   • **A Fib w/RVR [I48.91] Yes   • 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 Jones is a 73 y/o male with pmhx of hypertension and COPD who was transferred from [facility] for Afib with RVR and cellulitis of right lower calf/foot.     Recommendations:  1. Afib with RVR  - newly diagnosed, incidentally found  - CHADSVASC = 3 (age, HTN), starting Eliquis today  - Echo: EF mid 30s, moderate RV enlargement and RV dysfunction.  - S/p DENA and successful ECV, 6/10.   - On oral amiodarone and Bisoprolol.   - Had recurrent Afib today at around 12:00. Received one time dose of IV lopressor 5 mg.   - Digoxin level 1.87 on 6/10. Dig discontinued. Will check dig level in AM.  - Will add order for IV lopressor 5 mg Q 6 hrs PRN.   - Plan to increase bisoprolol to 10 mg daily tomorrow if BP allows.    2. Acute systolic heart failure  - Echo shows ejection fraction of mid 30s, moderate RV enlargement and RV dysfunction.  - could be tachycardia induced. Will need repeat echo once maintaining NSR, may also need ischemic evaluation.  - ProBNP trending down.  - DENA: EF 30-35%, Moderate to severe MR, moderate TR, RVSP 35-45mmHg.    3. Hypertension  - stable     4. Syncope  -  reportedly had syncopal episode seems to have been triggered by prolonged coughing.     5. Lower extremity cellulitis  - per admitting    6. COPD  -  Per admitting  7. 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   - Will likely need ischemic evaluation in the near future for new systolic heart failure.     Electronically signed by MAYI Borges, 06/12/19, 11:06 AM.    Yovanny Solares MD

## 2019-06-12 NOTE — PROGRESS NOTES
[facility] at [facility]  Progress Note       LOS: 4 days   Patient Care Team:  Lexx Monson MD as PCP - General (Family Medicine)    Chief Complaint:  AFIB with RVR    Subjective     Patient had direct endoscopy with left larynx biopsy this morning, tolerated well, currently in sinus rhythm, blood pressure stable, dyspnea improving      Review of Systems:   Pertinent positives in HPI, all others reviewed and negative.      Objective       Current Facility-Administered Medications:   •  acetaminophen (TYLENOL) tablet 650 mg, 650 mg, Oral, Q6H PRN, Aroldo Glez MD, 650 mg at 06/12/19 1104  •  albuterol (PROVENTIL) nebulizer solution 0.083% 2.5 mg/3mL, 0.625 mg, Nebulization, Q6H PRN, Glen Gutiérerz MD  •  amiodarone (PACERONE) tablet 200 mg, 200 mg, Oral, Q12H, Conrad Leung MD, 200 mg at 06/12/19 1006  •  amitriptyline (ELAVIL) tablet 25 mg, 25 mg, Oral, Nightly, Gema Duong APRN, 25 mg at 06/11/19 2041  •  amLODIPine (NORVASC) tablet 2.5 mg, 2.5 mg, Oral, Daily, OsGlen ferraro MD, 2.5 mg at 06/12/19 1007  •  apixaban (ELIQUIS) tablet 5 mg, 5 mg, Oral, Q12H, Aroldo Glez MD, 5 mg at 06/12/19 1005  •  atorvastatin (LIPITOR) tablet 20 mg, 20 mg, Oral, Nightly, Glen Gutiérrez MD  •  bisoprolol (ZEBeta) tablet 5 mg, 5 mg, Oral, Q24H, Latricia Mckee PA, 5 mg at 06/12/19 1007  •  budesonide-formoterol (SYMBICORT) 80-4.5 MCG/ACT inhaler 2 puff, 2 puff, Inhalation, BID - RT, Glen Gutiérrez MD  •  furosemide (LASIX) tablet 20 mg, 20 mg, Oral, Daily, Glen Gutiérrez MD, 20 mg at 06/12/19 1006  •  insulin lispro (humaLOG) injection 0-14 Units, 0-14 Units, Subcutaneous, 4x Daily With Meals & Nightly, Florentin Leggett, [facility]  •  ipratropium (ATROVENT) nebulizer solution 0.5 mg, 500 mcg, Nebulization, 4x Daily - RT, Glen Gutiérrez MD  •  ipratropium-albuterol (DUO-NEB) nebulizer solution 3 mL, 3 mL, Nebulization, 4x Daily - RT,  "Stanislaw Torrez MD, 3 mL at 06/11/19 1934  •  labetalol (NORMODYNE,TRANDATE) injection 20 mg, 20 mg, Intravenous, Q10 Min PRN, Stanislaw Torrez MD, 20 mg at 06/09/19 1534  •  oxymetazoline (AFRIN) nasal spray 2 spray, 2 spray, Each Nare, BID PRN, Glen Gutiérrez MD  •  pantoprazole (PROTONIX) injection 40 mg, 40 mg, Intravenous, Q AM, Stanislaw Torrez MD, 40 mg at 06/12/19 0500  •  sacubitril-valsartan (ENTRESTO) 24-26 MG tablet 1 tablet, 1 tablet, Oral, Q12H, Yovanny Herbert MD, 1 tablet at 06/12/19 1007  •  sodium chloride 0.9 % flush 3 mL, 3 mL, Intravenous, Q12H, Gema Duong, APRN, 3 mL at 06/11/19 2045  •  sodium chloride 0.9 % flush 3-10 mL, 3-10 mL, Intravenous, PRN, Gema Duong, APRN  •  vancomycin 1250 mg/250 mL 0.9% NS IVPB ([facility]), 15 mg/kg, Intravenous, Q24H, Aroldo Glez MD, 1,250 mg at 06/12/19 0500    Vital Sign Min/Max for last 24 hours  Temp  Min: 97.6 °F (36.4 °C)  Max: 99.7 °F (37.6 °C)   BP  Min: 102/56  Max: 172/103   Pulse  Min: 60  Max: 140   Resp  Min: 15  Max: 22   SpO2  Min: 89 %  Max: 98 %   Flow (L/min)  Min: 2  Max: 4   Weight  Min: 78.9 kg (174 lb)  Max: 78.9 kg (174 lb)     Flowsheet Rows      First Filed Value   Admission Height  167.6 cm (66\") Documented at 06/08/2019 0245   Admission Weight  77.9 kg (171 lb 11.8 oz) Documented at 06/08/2019 0245            Intake/Output Summary (Last 24 hours) at 6/12/2019 1228  Last data filed at 6/12/2019 0800  Gross per 24 hour   Intake 382.8 ml   Output 2605 ml   Net -2222.2 ml       Physical Exam:     General Appearance:    Alert, cooperative, in no acute distress   Lungs:     Diminished BS throughout. No rhonchi or rales    Heart:    Regular rhythm and normal rate, normal S1 and S2, no            murmur, no gallop, no rub, no click   Chest Wall:    No abnormalities observed   Abdomen:     Normal bowel sounds, soft nontender   Extremities:   Moves all extremities well, no edema, no cyanosis, no             redness   Pulses:  "  Pulses palpable and equal bilaterally   Skin:   No bleeding, bruising or rash        Results Review:   Results from last 7 days   Lab Units 06/12/19  0518 06/11/19  0327 06/10/19  0203   WBC 10*3/mm3 6.99 7.20 8.83   HEMOGLOBIN g/dL 15.1 15.1 15.4   HEMATOCRIT % 48.9 49.0 49.4   PLATELETS 10*3/mm3 173 162 175     Results from last 7 days   Lab Units 06/12/19  0518 06/11/19  0327 06/10/19  0203   SODIUM mmol/L 138 140 140   POTASSIUM mmol/L 4.2 4.4 4.3   CHLORIDE mmol/L 101 101 100   CO2 mmol/L 26.0 31.0* 29.0   BUN mg/dL 10 10 16   CREATININE mg/dL 0.91 0.97 1.23   GLUCOSE mg/dL 114* 80 96      Results from last 7 days   Lab Units 06/08/19  0343   HEMOGLOBIN A1C % 6.60*     Results from last 7 days   Lab Units 06/08/19  0343   CHOLESTEROL mg/dL 108   TRIGLYCERIDES mg/dL 61   HDL CHOL mg/dL 59     Results from last 7 days   Lab Units 06/08/19  0343   TSH mIU/mL 3.730   FREE T4 ng/dL 1.43     Results from last 7 days   Lab Units 06/10/19  0203   PROBNP pg/mL 3,904.0*     Results from last 7 days   Lab Units 06/08/19  0615 06/08/19  0343   PROTIME Seconds 16.0* 15.6*   INR  1.34* 1.30*   APTT seconds  --  150.8*     Results from last 7 days   Lab Units 06/09/19  0615 06/08/19  0343   CK TOTAL U/L  --  231*   TROPONIN T ng/mL <0.010 <0.010       Intake/Output Summary (Last 24 hours) at 6/12/2019 1228  Last data filed at 6/12/2019 0800  Gross per 24 hour   Intake 382.8 ml   Output 2605 ml   Net -2222.2 ml       I personally viewed and interpreted the patient's EKG/Telemetry data    EKG: none for review today    Telemetry: NSR HRs 63-85 post ECV 6/10/19    Ejection Fraction  No results found for: EF    Echo EF Estimated  No results found for: ECHOEFEST      Present on Admission:  • Cellulitis of Right Lower Leg and Foot  • A Fib w/RVR  • COPD (chronic obstructive pulmonary disease) (CMS/HCC)  • Laryngeal mass    Assessment/Plan   1. Afib with RVR  - newly diagnosed, incidentally found  - Treated with Cardizem gtt at OSH,  now on amio drip and given IV digoxin 500 mcg x 1.   - Asymptomatic and hemodynamically stable.   - Echo shows ejection fraction of mid 30s, moderate RV enlargement and RV dysfunction. DENA shows EF 31-35%  - DENA/ECV 6/10/19 with successful return to NSR. IV Amio transitioned to Amiodarone 200 mg PO BID. Remains in NSR.   - CHADSVASC = 3 (age, HTN), on heparin gtt - transition to Eliquis 5 mg twice daily  If A. fib with RVR returns, IV beta-blocker or digoxin may be used.    2. Acute systolic heart failure  - Echo shows ejection fraction of mid 30s, moderate RV enlargement and RV dysfunction.  - could be tachycardia induced. Will need repeat echo once maintaining NSR, may also need ischemic evaluation.  - Due to COPD, choice is Bisoprolol for beta-blockade  - ProBNP trending down.   - clinically improved  Tolerating bisoprolol and Entresto currently    Limited echo prior to discharge     3. Hypertension  - stable     4. Syncope  - reportedly had syncopal episode seems to have been triggered by prolonged coughing.     5. Lower extremity cellulitis  - per admitting     6. COPD  -  Per admitting      Plan for disposition: Ok to floor per cardiology.     Yovanny Solares MD

## 2019-06-13 LAB
ANION GAP SERPL CALCULATED.3IONS-SCNC: 12 MMOL/L
BACTERIA SPEC AEROBE CULT: NORMAL
BACTERIA SPEC AEROBE CULT: NORMAL
BASOPHILS # BLD AUTO: 0.05 10*3/MM3 (ref 0–0.2)
BASOPHILS NFR BLD AUTO: 0.5 % (ref 0–1.5)
BUN BLD-MCNC: 11 MG/DL (ref 8–23)
BUN/CREAT SERPL: 12.5 (ref 7–25)
CALCIUM SPEC-SCNC: 8.5 MG/DL (ref 8.6–10.5)
CHLORIDE SERPL-SCNC: 100 MMOL/L (ref 98–107)
CO2 SERPL-SCNC: 26 MMOL/L (ref 22–29)
CREAT BLD-MCNC: 0.88 MG/DL (ref 0.76–1.27)
DEPRECATED RDW RBC AUTO: 48 FL (ref 37–54)
EOSINOPHIL # BLD AUTO: 0.27 10*3/MM3 (ref 0–0.4)
EOSINOPHIL NFR BLD AUTO: 2.9 % (ref 0.3–6.2)
ERYTHROCYTE [DISTWIDTH] IN BLOOD BY AUTOMATED COUNT: 14.9 % (ref 12.3–15.4)
GFR SERPL CREATININE-BSD FRML MDRD: 84 ML/MIN/1.73
GLUCOSE BLD-MCNC: 114 MG/DL (ref 65–99)
GLUCOSE BLDC GLUCOMTR-MCNC: 104 MG/DL (ref 70–130)
GLUCOSE BLDC GLUCOMTR-MCNC: 108 MG/DL (ref 70–130)
GLUCOSE BLDC GLUCOMTR-MCNC: 122 MG/DL (ref 70–130)
GLUCOSE BLDC GLUCOMTR-MCNC: 133 MG/DL (ref 70–130)
GLUCOSE BLDC GLUCOMTR-MCNC: 96 MG/DL (ref 70–130)
HCT VFR BLD AUTO: 52.3 % (ref 37.5–51)
HGB BLD-MCNC: 16.3 G/DL (ref 13–17.7)
IMM GRANULOCYTES # BLD AUTO: 0.04 10*3/MM3 (ref 0–0.05)
IMM GRANULOCYTES NFR BLD AUTO: 0.4 % (ref 0–0.5)
LYMPHOCYTES # BLD AUTO: 2.07 10*3/MM3 (ref 0.7–3.1)
LYMPHOCYTES NFR BLD AUTO: 22.5 % (ref 19.6–45.3)
MCH RBC QN AUTO: 27.6 PG (ref 26.6–33)
MCHC RBC AUTO-ENTMCNC: 31.2 G/DL (ref 31.5–35.7)
MCV RBC AUTO: 88.5 FL (ref 79–97)
MONOCYTES # BLD AUTO: 0.77 10*3/MM3 (ref 0.1–0.9)
MONOCYTES NFR BLD AUTO: 8.4 % (ref 5–12)
NEUTROPHILS # BLD AUTO: 5.99 10*3/MM3 (ref 1.7–7)
NEUTROPHILS NFR BLD AUTO: 65.3 % (ref 42.7–76)
NRBC BLD AUTO-RTO: 0 /100 WBC (ref 0–0.2)
PLATELET # BLD AUTO: 184 10*3/MM3 (ref 140–450)
PMV BLD AUTO: 10.4 FL (ref 6–12)
POTASSIUM BLD-SCNC: 4.5 MMOL/L (ref 3.5–5.2)
RBC # BLD AUTO: 5.91 10*6/MM3 (ref 4.14–5.8)
SODIUM BLD-SCNC: 138 MMOL/L (ref 136–145)
WBC NRBC COR # BLD: 9.19 10*3/MM3 (ref 3.4–10.8)

## 2019-06-13 PROCEDURE — 25010000002 VANCOMYCIN 10 G RECONSTITUTED SOLUTION: Performed by: INTERNAL MEDICINE

## 2019-06-13 PROCEDURE — 99233 SBSQ HOSP IP/OBS HIGH 50: CPT | Performed by: INTERNAL MEDICINE

## 2019-06-13 PROCEDURE — 94799 UNLISTED PULMONARY SVC/PX: CPT

## 2019-06-13 PROCEDURE — 82962 GLUCOSE BLOOD TEST: CPT

## 2019-06-13 PROCEDURE — 25010000002 DIGOXIN PER 500 MCG: Performed by: NURSE PRACTITIONER

## 2019-06-13 PROCEDURE — 80048 BASIC METABOLIC PNL TOTAL CA: CPT | Performed by: INTERNAL MEDICINE

## 2019-06-13 PROCEDURE — 94660 CPAP INITIATION&MGMT: CPT

## 2019-06-13 PROCEDURE — 97110 THERAPEUTIC EXERCISES: CPT

## 2019-06-13 PROCEDURE — 85025 COMPLETE CBC W/AUTO DIFF WBC: CPT | Performed by: INTERNAL MEDICINE

## 2019-06-13 RX ORDER — BISOPROLOL FUMARATE 5 MG/1
10 TABLET, FILM COATED ORAL
Status: DISCONTINUED | OUTPATIENT
Start: 2019-06-13 | End: 2019-06-13

## 2019-06-13 RX ORDER — DIGOXIN 0.25 MG/ML
125 INJECTION INTRAMUSCULAR; INTRAVENOUS ONCE
Status: COMPLETED | OUTPATIENT
Start: 2019-06-13 | End: 2019-06-13

## 2019-06-13 RX ORDER — METOPROLOL TARTRATE 50 MG/1
50 TABLET, FILM COATED ORAL EVERY 12 HOURS SCHEDULED
Status: DISCONTINUED | OUTPATIENT
Start: 2019-06-13 | End: 2019-06-14

## 2019-06-13 RX ORDER — BISOPROLOL FUMARATE 5 MG/1
5 TABLET, FILM COATED ORAL
Status: DISCONTINUED | OUTPATIENT
Start: 2019-06-14 | End: 2019-06-13

## 2019-06-13 RX ORDER — ESMOLOL HYDROCHLORIDE 10 MG/ML
500 INJECTION INTRAVENOUS ONCE
Status: COMPLETED | OUTPATIENT
Start: 2019-06-13 | End: 2019-06-13

## 2019-06-13 RX ORDER — PANTOPRAZOLE SODIUM 40 MG/1
40 TABLET, DELAYED RELEASE ORAL
Status: DISCONTINUED | OUTPATIENT
Start: 2019-06-14 | End: 2019-06-14 | Stop reason: CLARIF

## 2019-06-13 RX ORDER — ESMOLOL HYDROCHLORIDE 10 MG/ML
50-300 INJECTION, SOLUTION INTRAVENOUS
Status: DISCONTINUED | OUTPATIENT
Start: 2019-06-13 | End: 2019-06-17

## 2019-06-13 RX ADMIN — METOPROLOL TARTRATE 50 MG: 50 TABLET ORAL at 20:05

## 2019-06-13 RX ADMIN — DILTIAZEM HYDROCHLORIDE 30 MG: 30 TABLET, FILM COATED ORAL at 20:05

## 2019-06-13 RX ADMIN — IPRATROPIUM BROMIDE 0.5 MG: 0.5 SOLUTION RESPIRATORY (INHALATION) at 08:15

## 2019-06-13 RX ADMIN — FUROSEMIDE 20 MG: 20 TABLET ORAL at 08:15

## 2019-06-13 RX ADMIN — AMIODARONE HYDROCHLORIDE 400 MG: 200 TABLET ORAL at 08:15

## 2019-06-13 RX ADMIN — BUDESONIDE AND FORMOTEROL FUMARATE DIHYDRATE 2 PUFF: 80; 4.5 AEROSOL RESPIRATORY (INHALATION) at 19:39

## 2019-06-13 RX ADMIN — IPRATROPIUM BROMIDE 0.5 MG: 0.5 SOLUTION RESPIRATORY (INHALATION) at 19:38

## 2019-06-13 RX ADMIN — IPRATROPIUM BROMIDE 0.5 MG: 0.5 SOLUTION RESPIRATORY (INHALATION) at 15:44

## 2019-06-13 RX ADMIN — DIGOXIN 125 MCG: 0.25 INJECTION INTRAMUSCULAR; INTRAVENOUS at 08:34

## 2019-06-13 RX ADMIN — AMIODARONE HYDROCHLORIDE 400 MG: 200 TABLET ORAL at 20:05

## 2019-06-13 RX ADMIN — APIXABAN 5 MG: 5 TABLET, FILM COATED ORAL at 20:05

## 2019-06-13 RX ADMIN — APIXABAN 5 MG: 5 TABLET, FILM COATED ORAL at 08:16

## 2019-06-13 RX ADMIN — METOPROLOL TARTRATE 5 MG: 5 INJECTION INTRAVENOUS at 01:45

## 2019-06-13 RX ADMIN — IPRATROPIUM BROMIDE 0.5 MG: 0.5 SOLUTION RESPIRATORY (INHALATION) at 12:38

## 2019-06-13 RX ADMIN — VANCOMYCIN HYDROCHLORIDE 1250 MG: 10 INJECTION, POWDER, LYOPHILIZED, FOR SOLUTION INTRAVENOUS at 05:50

## 2019-06-13 RX ADMIN — PANTOPRAZOLE SODIUM 40 MG: 40 INJECTION, POWDER, FOR SOLUTION INTRAVENOUS at 05:50

## 2019-06-13 RX ADMIN — SODIUM CHLORIDE, PRESERVATIVE FREE 3 ML: 5 INJECTION INTRAVENOUS at 20:13

## 2019-06-13 RX ADMIN — AMITRIPTYLINE HYDROCHLORIDE 25 MG: 25 TABLET, FILM COATED ORAL at 20:05

## 2019-06-13 RX ADMIN — BUDESONIDE AND FORMOTEROL FUMARATE DIHYDRATE 2 PUFF: 80; 4.5 AEROSOL RESPIRATORY (INHALATION) at 08:15

## 2019-06-13 RX ADMIN — ATORVASTATIN CALCIUM 20 MG: 20 TABLET, FILM COATED ORAL at 20:05

## 2019-06-13 RX ADMIN — ESMOLOL HYDROCHLORIDE 39.5 MG: 10 INJECTION, SOLUTION INTRAVENOUS at 13:52

## 2019-06-13 NOTE — PLAN OF CARE
Problem: Patient Care Overview  Goal: Plan of Care Review  Outcome: Ongoing (interventions implemented as appropriate)   06/13/19 0714   OTHER   Outcome Summary Pt HR remained elevated 120-150's. Paged Dr. Patel ordered Digoxin 250mcg. Gave Lopressor 5 IV at 0145 will little effect. Pt wore Bipap for about 4 hours. Currently on 4L NC Will continue to monitor.        Problem: Fall Risk (Adult)  Goal: Absence of Fall  Outcome: Ongoing (interventions implemented as appropriate)

## 2019-06-13 NOTE — PLAN OF CARE
Problem: Patient Care Overview  Goal: Plan of Care Review  Outcome: Ongoing (interventions implemented as appropriate)   06/13/19 8776   Coping/Psychosocial   Plan of Care Reviewed With patient   Plan of Care Review   Progress no change   OTHER   Outcome Summary pt tolerated all seated exercise EOB with vitals staying at baseline (130 HR). pt displays AFib symptoms thus gait deferred today. Pt transferred bed to bedside commode CGAx1. Pt bed mobility is increasing, req less assistance to perform supine to sit. pt AFib will be monitored colsely so that PT can increase activity when appropriate. cont POC.

## 2019-06-13 NOTE — PROGRESS NOTES
"Intensive Care Follow-up     Hospital:  LOS: 5 days   Mr. Stef Jones, 74 y.o. male is followed for:   Atrial fibrillation with RVR (CMS/HCC)        Subjective   Interval History:  The chart has been reviewed. The patient remains in atrial fibrillation, sometimes with a max rate of >150 bpm. He is without complaints other than a sore throat. He is eating well.    The patient's past medical, surgical and social history were reviewed and updated in Epic as appropriate.        Objective     Infusions:     Medications:    amiodarone 400 mg Oral Q12H   amitriptyline 25 mg Oral Nightly   apixaban 5 mg Oral Q12H   atorvastatin 20 mg Oral Nightly   [START ON 6/14/2019] bisoprolol 5 mg Oral Q24H   budesonide-formoterol 2 puff Inhalation BID - RT   furosemide 20 mg Oral Daily   insulin lispro 0-14 Units Subcutaneous 4x Daily With Meals & Nightly   ipratropium 500 mcg Nebulization 4x Daily - RT   [START ON 6/14/2019] pantoprazole 40 mg Oral Q AM   sacubitril-valsartan 1 tablet Oral Q12H   sodium chloride 3 mL Intravenous Q12H   vancomycin 15 mg/kg Intravenous Q24H       Vital Sign Min/Max for last 24 hours  Temp  Min: 97.6 °F (36.4 °C)  Max: 98.4 °F (36.9 °C)   BP  Min: 62/49  Max: 153/105   Pulse  Min: 57  Max: 158   Resp  Min: 18  Max: 22   SpO2  Min: 90 %  Max: 100 %   Flow (L/min)  Min: 4  Max: 4       Input/Output for last 24 hour shift  06/12 0701 - 06/13 0700  In: 570 [P.O.:320; I.V.:250]  Out: 2205 [Urine:2200]   FiO2 (%):  [35 %] 35 %  S RR:  [10] 10  Objective:  General Appearance:  Ill-appearing, in no acute distress and comfortable.    Vital signs: (most recent): Blood pressure 121/89, pulse (!) 135, temperature 97.6 °F (36.4 °C), temperature source Oral, resp. rate 20, height 167.6 cm (66\"), weight 78.9 kg (174 lb), SpO2 96 %.    HEENT: Normal HEENT exam.    Lungs:  Normal effort and normal respiratory rate.  He is not in respiratory distress.  No decreased breath sounds or wheezes.    Heart: Tachycardia.  " Irregular rhythm.  S1 normal and S2 normal.  No murmur.   Abdomen: Abdomen is soft and non-distended.  Bowel sounds are normal.   There is no abdominal tenderness.     Extremities: Normal range of motion.  There is dependent edema.    Neurological: Patient is alert and oriented to person, place and time.    Pupils:  Pupils are equal, round, and reactive to light.  Pupils are equal.   Skin:  Warm and dry.              Results from last 7 days   Lab Units 06/13/19  0317 06/12/19  0518 06/11/19  0327   WBC 10*3/mm3 9.19 6.99 7.20   HEMOGLOBIN g/dL 16.3 15.1 15.1   PLATELETS 10*3/mm3 184 173 162     Results from last 7 days   Lab Units 06/13/19  0317 06/12/19  0518 06/11/19  0327 06/10/19  0203 06/10/19  0030 06/09/19  0615   SODIUM mmol/L 138 138 140 140 141 139   POTASSIUM mmol/L 4.5 4.2 4.4 4.3 4.4 4.2   CO2 mmol/L 26.0 26.0 31.0* 29.0 31.0* 24.0   BUN mg/dL 11 10 10 16 16 18   CREATININE mg/dL 0.88 0.91 0.97 1.23 1.25 1.19   MAGNESIUM mg/dL  --   --  2.0 1.9 1.9 2.0   PHOSPHORUS mg/dL  --   --   --  2.8 2.7 3.2   GLUCOSE mg/dL 114* 114* 80 96 99 90     Estimated Creatinine Clearance: 70.5 mL/min (by C-G formula based on SCr of 0.88 mg/dL).    Results from last 7 days   Lab Units 06/09/19  0345   PH, ARTERIAL pH units 7.334*   PCO2, ARTERIAL mm Hg 52.8   PO2 ART mm Hg 106.0       I reviewed the patient's results and images.     Assessment/Plan   Impression        A Fib w/RVR    Cellulitis of Right Lower Leg and Foot    COPD (chronic obstructive pulmonary disease) (CMS/HCC)    Laryngeal mass       Plan        Await final pathology of the throat biopsy. If indeed it is malignant I will involve the appropriate consultants.  Vancomycin will continue as before for cellulitis.  I agree with the plan for atrial fibrillation per cardiology. Continue on anticoagulation. No sign of bleeding.  Mobilize with PT when heart rate is a bit more stable.  Transition to telemetry for further monitoring.  I suspect that he will require  inpatient physical rehabilitation.    Plan of care and goals reviewed with mulitdisciplinary/antibiotic stewardship team during rounds.   I discussed the patient's findings and my recommendations with patient and nursing staff     High level of risk due to:  severe exacerbation of chronic illness and illness with threat to life or bodily function.      Aroldo Glez MD, [facility]  Pulmonology and Critical Care Medicine

## 2019-06-13 NOTE — PROGRESS NOTES
Adult Nutrition  Assessment/PES    Patient Name:  Stef Jones  YOB: 1945  MRN: [id number]  Admit Date:  6/8/2019    Assessment Date:  6/13/2019    Comments:  Pt w/ adequate po intake on dysphagia level 3 diet and HTL; RD will continue to minor.    Reason for Assessment     Row Name 06/13/19 1212          Reason for Assessment    Reason For Assessment  per organizational policy;identified at risk by screening criteria;follow-up protocol MDR; LOS review ; 30 mins     Diagnosis  cardiac disease Pt adm w a fib w/ RVR; syncopal episode, LE cellulitis Hx: HTN, chr hoarseness, COPD, hearing loss     Identified At Risk by Screening Criteria  difficulty chewing/swallowing         Nutrition/Diet History     Row Name 06/13/19 1213          Nutrition/Diet History    Typical Food/Fluid Intake  RN reports pt w/ a fib and RVR; hypotensive this morning but HR in 150-180's; pathology from laryngeal biopsy is not back.       Food Preferences  Pt reports fairly good appetite; happy w/ food selection     Factors Affecting Nutritional Intake  difficulty/impaired swallowing           Labs/Tests/Procedures/Meds     Row Name 06/13/19 1215          Labs/Procedures/Meds    Lab Results Reviewed  reviewed, pertinent        Diagnostic Tests/Procedures    Diagnostic Test/Procedure Reviewed  reviewed, pertinent     Diagnostic Test/Procedures Comments  OP note        Medications    Pertinent Medications Reviewed  reviewed, pertinent         Physical Findings     Row Name 06/13/19 1216          Physical Findings    Overall Physical Appearance  overweight     Skin  other (see comments) RLE cellulitis ; RN reports it is improving           Nutrition Prescription Ordered     Row Name 06/13/19 1216          Nutrition Prescription PO    Current PO Diet  Dysphagia     Dysphagia Level  3  Pureed with some mashed     Fluid Consistency  Honey thick     Common Modifiers  Cardiac;Consistent Carbohydrate         Evaluation of Received  Nutrient/Fluid Intake     Row Name 06/13/19 1217          PO Evaluation    Number of Days PO Intake Evaluated  Other (comment) LOD review ; 5 days      Number of Meals  7     % PO Intake  75% consumed ; noted NPO x 3 meals               Problem/Interventions:  Problem 1     Row Name 06/13/19 1218          Nutrition Diagnoses Problem 1    Problem 1  Swallowing Difficulty     Etiology (related to)  Functional Diagnosis;Medical Diagnosis     Oncology  Other (comment) laryngeal lesion s/p biopsy r/o malignancy     Functional Diagnosis  Dysphagia     Signs/Symptoms (evidenced by)  SLP/Swallow eval;PO diet not tolerated Pt reports food gets caught in throat     Swallow eval status  Done     Type of SLP Evaluation  Other (comment) FEES                 Intervention Goal     Row Name 06/13/19 1221          Intervention Goal    General  Meet nutritional needs for age/condition     PO  Maintain intake;Modify texture/consistency         Nutrition Intervention     Row Name 06/13/19 1221          Nutrition Intervention    RD/Tech Action  Follow Tx progress;Care plan reviewd;Encourage intake;Interview for preference           Education/Evaluation     Row Name 06/13/19 1222          Monitor/Evaluation    Monitor  Per protocol;PO intake;Symptoms           Electronically signed by:  Gayle Grajeda MS,RD,LD  06/13/19 12:22 PM

## 2019-06-13 NOTE — THERAPY TREATMENT NOTE
Acute Care - Physical Therapy Treatment Note  [facility]     Patient Name: Stef Jones  : 1945  MRN: [id number]  Today's Date: 2019  Onset of Illness/Injury or Date of Surgery: 19  Date of Referral to PT: 06/10/19  Referring Physician: MD Shan    Admit Date: 2019    Visit Dx:    ICD-10-CM ICD-9-CM   1. A Fib w/RVR I48.91 427.31   2. Impaired functional mobility, balance, gait, and endurance Z74.09 V49.89   3. 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     Patient Active Problem List   Diagnosis   • Cellulitis of Right Lower Leg and Foot   • A Fib w/RVR   • COPD (chronic obstructive pulmonary disease) (CMS/HCC)   • Dysphagia   • Laryngeal mass       Therapy Treatment    Rehabilitation Treatment Summary     Row Name 19 1436             Treatment Time/Intention    Discipline  physical therapist  (Pended)   -      Document Type  therapy note (daily note)  (Pended)   -      Subjective Information  no complaints  (Pended)   -      Mode of Treatment  physical therapy  (Pended)   -      Patient/Family Observations  no family in room   (Pended)   -      Care Plan Review  evaluation/treatment results reviewed;care plan/treatment goals reviewed;risks/benefits reviewed;patient/other agree to care plan  (Pended)   -      Therapy Frequency (PT Clinical Impression)  daily  (Pended)   -      Patient Effort  excellent  (Pended)   -      Existing Precautions/Restrictions  fall;cardiac;oxygen therapy device and L/min  (Pended)   -      Treatment Considerations/Comments  pt shows signs of AFib today   (Pended)   -      Patient Response to Treatment  tolerated  (Pended)   -      Recorded by [] Antonio Torres, PT Student 19 1538      Row Name 19 1436             Vital Signs    Pre Systolic BP Rehab  109  (Pended)   -      Pre Treatment Diastolic BP  85  (Pended)   -KL      Post Systolic BP Rehab  111  (Pended)   -      Post  Treatment Diastolic BP  89  (Pended)   -      Pretreatment Heart Rate (beats/min)  135  (Pended)   -KL      Posttreatment Heart Rate (beats/min)  130  (Pended)   -      Pre SpO2 (%)  96  (Pended)   -      O2 Delivery Pre Treatment  nasal cannula  (Pended)   -KL      Post SpO2 (%)  94  (Pended)   -KL      O2 Delivery Post Treatment  nasal cannula  (Pended)   -KL      Pre Patient Position  Supine  (Pended)   -KL      Intra Patient Position  Standing  (Pended)   -KL      Post Patient Position  Sitting  (Pended)   -KL      Recorded by [KL] Antonio Torres, PT Student 06/13/19 1538      Row Name 06/13/19 1436             Cognitive Assessment/Intervention    Additional Documentation  Cognitive Assessment/Intervention (Group)  (Pended)   -KL      Recorded by [KL] Antonio Torres, PT Student 06/13/19 1538      Row Name 06/13/19 1436             Cognitive Assessment/Intervention- PT/OT    Affect/Mental Status (Cognitive)  WFL  (Pended)   -      Orientation Status (Cognition)  oriented x 4  (Pended)   -      Follows Commands (Cognition)  follows one step commands;over 90% accuracy;verbal cues/prompting required  (Pended)   -      Cognitive Function (Cognitive)  safety deficit  (Pended)   -      Safety Deficit (Cognitive)  mild deficit;safety precautions awareness;safety precautions follow-through/compliance  (Pended)   -      Personal Safety Interventions  fall prevention program maintained;gait belt;nonskid shoes/slippers when out of bed  (Pended)   -      Recorded by [KL] Antonio Torres, PT Student 06/13/19 1541      Row Name 06/13/19 1436             Safety Issues, Functional Mobility    Safety Issues Affecting Function (Mobility)  safety precaution awareness;safety precautions follow-through/compliance  (Pended)   -      Impairments Affecting Function (Mobility)  coordination;endurance/activity tolerance;strength  (Pended)   -      Recorded by [KL] Antonio Torres, PT Student 06/13/19 1541      Row  Name 06/13/19 1436             Bed Mobility Assessment/Treatment    Bed Mobility Assessment/Treatment  rolling left;supine-sit;scooting/bridging  (Pended)   -KL      Rolling Left [place] (Bed Mobility)  supervision  (Pended)   -KL      Scooting/Bridging [place] (Bed Mobility)  independent  (Pended)   -KL      Supine-Sit [place] (Bed Mobility)  supervision  (Pended)   -KL      Bed Mobility, Safety Issues  decreased use of arms for pushing/pulling;decreased use of legs for bridging/pushing  (Pended)   -KL      Assistive Device (Bed Mobility)  bed rails;head of bed elevated  (Pended)   -KL      Recorded by [KL] Antonio Torres P, PT Student 06/13/19 1541      Row Name 06/13/19 1436             Transfer Assessment/Treatment    Transfer Assessment/Treatment  sit-stand transfer;stand-sit transfer  (Pended)   -KL      Comment (Transfers)  pt CGA x 1 for STS transfer to bedside commode with no AD  (Pended)   -KL      Recorded by [KL] Antonio Torres P, PT Student 06/13/19 1541      Row Name 06/13/19 1436             Sit-Stand Transfer    Sit-Stand [place] (Transfers)  contact guard;verbal cues  (Pended)   -KL      Recorded by [KL] Antonio Torres P, PT Student 06/13/19 1541      Row Name 06/13/19 1436             Stand-Sit Transfer    Stand-Sit [place] (Transfers)  contact guard;verbal cues  (Pended)   -KL      Recorded by [KL] Antonio Torres, PT Student 06/13/19 1541      Row Name 06/13/19 1436             Gait/Stairs Assessment/Training    Comment (Gait/Stairs)  def. d/t AFib and pt safety  (Pended)   -KL      Recorded by [KL] Antonio Torres, PT Student 06/13/19 1541      Row Name 06/13/19 1436             Motor Skills Assessment/Interventions    Additional Documentation  Therapeutic Exercise (Group)  (Pended)   -KL      Recorded by [KL] Antonio Torres, PT Student 06/13/19 1541      Row Name 06/13/19 1436             Therapeutic Exercise    Therapeutic Exercise  seated, upper extremities;seated,  lower extremities  (Pended)   -KL      18844 - PT Therapeutic Exercise Minutes  10  (Pended)   -KL2      Recorded by [KL] Antonio Torres, PT Student 06/13/19 1541  [KL2] Antonio Torres, PT Student 06/13/19 1542      Row Name 06/13/19 1436             Upper Extremity Seated Therapeutic Exercise    Performed, Seated Upper Extremity (Therapeutic Exercise)  shoulder flexion/extension;shoulder abduction/adduction;elbow flexion/extension  (Pended)   -KL      Exercise Type, Seated Upper Extremity (Therapeutic Exercise)  AROM (active range of motion)  (Pended)   -KL      Sets/Reps Detail, Seated Upper Extremity (Therapeutic Exercise)  1/10  (Pended)   -KL      Recorded by [KL] Antonio Torres, PT Student 06/13/19 1542      Row Name 06/13/19 1436             Lower Extremity Seated Therapeutic Exercise    Performed, Seated Lower Extremity (Therapeutic Exercise)  hip flexion/extension;hip abduction/adduction;knee flexion/extension;ankle dorsiflexion/plantarflexion;LAQ (long arc quad), knee extension  (Pended)  glute/leg push  -KL      Exercise Type, Seated Lower Extremity (Therapeutic Exercise)  AROM (active range of motion)  (Pended)   -KL      Sets/Reps Detail, Seated Lower Extremity (Therapeutic Exercise)  1/10  (Pended)   -KL      Recorded by [KL] Antonio Torres, PT Student 06/13/19 1542      Row Name 06/13/19 1436             Balance    Balance  static sitting balance;static standing balance  (Pended)   -KL      Recorded by [KL] Antonio Torres, PT Student 06/13/19 1542      Row Name 06/13/19 1436             Static Sitting Balance    Level of [place] (Unsupported Sitting, Static Balance)  independent  (Pended)   -KL      Sitting Position (Unsupported Sitting, Static Balance)  sitting on edge of bed  (Pended)   -KL      Time Able to Maintain Position (Unsupported Sitting, Static Balance)  more than 5 minutes  (Pended)   -KL      Recorded by [KL] Antonio Torres, PT Student 06/13/19 1542      Row Name 06/13/19 1436              Static Standing Balance    Level of [place] (Supported Standing, Static Balance)  contact guard assist  (Pended)   -KL      Recorded by [KL] Antonio Torres, PT Student 06/13/19 1542      Row Name 06/13/19 1436             Positioning and Restraints    Pre-Treatment Position  in bed  (Pended)   -KL      Post Treatment Position  bed  (Pended)   -KL      In Bed  notified nsg;supine;call light within reach;encouraged to call for assist;side rails up x3;legs elevated;heels elevated  (Pended)   -KL2      Recorded by [KL] Antonio Torres, PT Student 06/13/19 1542  [KL2] Antonio Torres, PT Student 06/13/19 1544      Row Name 06/13/19 1436             Pain Assessment    Additional Documentation  Pain Scale: Numbers Pre/Post-Treatment (Group)  (Pended)   -KL      Recorded by [KL] Antonio Torres, PT Student 06/13/19 1544      Row Name 06/13/19 1436             Pain Scale: Numbers Pre/Post-Treatment    Pain Scale: Numbers, Pretreatment  0/10 - no pain  (Pended)   -KL      Pain Scale: Numbers, Post-Treatment  0/10 - no pain  (Pended)   -KL      Recorded by [KL] Antonio Torres, PT Student 06/13/19 1544      Row Name 06/13/19 1436             Coping    Observed Emotional State  calm;cooperative;quiet  (Pended)   -KL      Verbalized Emotional State  acceptance  (Pended)   -KL      Recorded by [KL] Antonio Torres, PT Student 06/13/19 1544      Row Name 06/13/19 1436             Plan of Care Review    Plan of Care Reviewed With  patient  (Pended)   -KL      Recorded by [KL] Antonio Torres, PT Student 06/13/19 1544      Row Name 06/13/19 1436             Outcome Summary/Treatment Plan (PT)    Daily Summary of Progress (PT)  progress toward functional goals is gradual  (Pended)   -KL      Barriers to Overall Progress (PT)  --  (Pended)  AFib  -KL      Recorded by [KL] Antonio Torres, PT Student 06/13/19 1544        User Key  (r) = Recorded By, (t) = Taken By, (c) = Cosigned By    Initials Name Effective Dates  Discipline    Antonio Rodríguez, PT Student 05/15/19 -  PT                   Physical Therapy Education     Title: PT OT SLP Therapies (In Progress)     Topic: Physical Therapy (In Progress)     Point: Mobility training (In Progress)     Learning Progress Summary           Patient Acceptance, TB, NR by  at 6/13/2019  2:36 PM    Acceptance, E,D, NR by KATIE at 6/11/2019 10:10 AM    Acceptance, TB, NR by GEETA at 6/10/2019  1:50 PM                   Point: Home exercise program (In Progress)     Learning Progress Summary           Patient Acceptance, E,D, NR by KATIE at 6/11/2019 10:10 AM                   Point: Body mechanics (In Progress)     Learning Progress Summary           Patient Acceptance, TB, NR by  at 6/13/2019  2:36 PM    Acceptance, E,D, NR by KATIE at 6/11/2019 10:10 AM    Acceptance, TB, NR by GEETA at 6/10/2019  1:50 PM                   Point: Precautions (In Progress)     Learning Progress Summary           Patient Acceptance, TB, NR by  at 6/13/2019  2:36 PM    Acceptance, E,D, NR by KATIE at 6/11/2019 10:10 AM    Acceptance, TB, NR by GEETA at 6/10/2019  1:50 PM                               User Key     Initials Effective Dates Name Provider Type Discipline     06/19/15 -  Unique Millan, PT Physical Therapist PT     05/15/19 -  Antonio Torres, PT Student PT Student PT                PT Recommendation and Plan  Anticipated Discharge Disposition (PT): home with home health, home with assist  Planned Therapy Interventions (PT Eval): balance training, bed mobility training, gait training, home exercise program, patient/family education, stair training, strengthening, transfer training  Therapy Frequency (PT Clinical Impression): (P) daily  Outcome Summary/Treatment Plan (PT)  Daily Summary of Progress (PT): (P) progress toward functional goals is gradual  Barriers to Overall Progress (PT): (P) (AFib)  Anticipated Discharge Disposition (PT): home with home health, home with assist  Plan of Care Reviewed  With: (P) patient  Progress: (P) no change  Outcome Summary: (P) pt tolerated all seated exercise EOB with vitals staying at baseline (130 HR). pt displays AFib symptoms thus gait deferred today. pt bed mobility is increasing req less assistance to perform supine to sit. pt AFib will be monitored colsely so that PT can increase activity when appropriate. cont POC.  Outcome Measures     Row Name 06/13/19 1436 06/11/19 1010          How much help from another person do you currently need...    Turning from your back to your side while in flat bed without using bedrails?  3  (Pended)   -KL  3  -LS     Moving from lying on back to sitting on the side of a flat bed without bedrails?  3  (Pended)   -KL  3  -LS     Moving to and from a bed to a chair (including a wheelchair)?  3  (Pended)   -KL  3  -LS     Standing up from a chair using your arms (e.g., wheelchair, bedside chair)?  3  (Pended)   -KL  3  -LS     Climbing 3-5 steps with a railing?  3  (Pended)   -KL  3  -LS     To walk in hospital room?  3  (Pended)   -KL  3  -LS     AM-PAC 6 Clicks Score  18  (Pended)   -KL  18  -LS        Functional Assessment    Outcome Measure Options  AM-PAC 6 Clicks Basic Mobility (PT)  (Pended)   -KL  AM-PAC 6 Clicks Basic Mobility (PT)  -LS       User Key  (r) = Recorded By, (t) = Taken By, (c) = Cosigned By    Initials Name Provider Type    Unique Martínez, PT Physical Therapist    Antonio Rodríguez, PT Student PT Student         Time Calculation:   PT Charges     Row Name 06/13/19 1436             Time Calculation    Start Time  1436  (Pended)   -      PT Received On  06/13/19  (Pended)   -      PT Goal Re-Cert Due Date  06/20/19  (Pended)   -         Time Calculation- PT    Total Timed Code Minutes- PT  10 minute(s)  (Pended)   -         Timed Charges    96355 - PT Therapeutic Exercise Minutes  10  (Pended)   -        User Key  (r) = Recorded By, (t) = Taken By, (c) = Cosigned By    Initials Name Provider Type    GEETA  Antonio Torres, PT Student PT Student        Therapy Charges for Today     Code Description Service Date Service Provider Modifiers Qty    [id number] HC PT THER PROC EA 15 MIN 6/13/2019 Antonio Torres, PT Student GP 1          PT G-Codes  Outcome Measure Options: (P) AM-PAC 6 Clicks Basic Mobility (PT)  AM-PAC 6 Clicks Score: (P) 18  Score: 15    Antonio Torres, PT Student  6/13/2019

## 2019-06-13 NOTE — PROGRESS NOTES
[facility] at [facility]  Progress Note       LOS: 5 days   Patient Care Team:  Lexx Monson MD as PCP - General (Family Medicine)    Chief Complaint:  AFIB with RVR    Subjective : no acute events overnight. Remains in Afib with RVR. Some improvement with IV digoxin last night. Amio increased to 400 mg bid. Continues to be short of breath. Sabino chest pain, LH, dizziness, fatigue. Anxious to go home soon.       Review of Systems:   Denies cough, fevers, chills. See HPI      Objective       Current Facility-Administered Medications:   •  acetaminophen (TYLENOL) tablet 650 mg, 650 mg, Oral, Q6H PRN, Aroldo Glez MD, 650 mg at 06/12/19 1104  •  albuterol (PROVENTIL) nebulizer solution 0.083% 2.5 mg/3mL, 0.625 mg, Nebulization, Q6H PRN, Glen Gutiérrez MD  •  amiodarone (PACERONE) tablet 400 mg, 400 mg, Oral, Q12H, Vonnie Ruiz APRN, 400 mg at 06/12/19 2018  •  amitriptyline (ELAVIL) tablet 25 mg, 25 mg, Oral, Nightly, Gema Duong APRN, 25 mg at 06/12/19 2019  •  amLODIPine (NORVASC) tablet 2.5 mg, 2.5 mg, Oral, Daily, OsGlen ferraro MD, 2.5 mg at 06/12/19 1007  •  apixaban (ELIQUIS) tablet 5 mg, 5 mg, Oral, Q12H, Aroldo Glez MD, 5 mg at 06/12/19 2018  •  atorvastatin (LIPITOR) tablet 20 mg, 20 mg, Oral, Nightly, Glen Gutiérrez MD, 20 mg at 06/12/19 2019  •  bisoprolol (ZEBeta) tablet 5 mg, 5 mg, Oral, Q24H, Latricia Mckee PA, 5 mg at 06/12/19 1007  •  budesonide-formoterol (SYMBICORT) 80-4.5 MCG/ACT inhaler 2 puff, 2 puff, Inhalation, BID - RT, Glen Gutiérrez MD, 2 puff at 06/12/19 2100  •  furosemide (LASIX) tablet 20 mg, 20 mg, Oral, Daily, OsGlen ferraro MD, 20 mg at 06/12/19 1006  •  insulin lispro (humaLOG) injection 0-14 Units, 0-14 Units, Subcutaneous, 4x Daily With Meals & Nightly, Florentin Leggett, [facility]  •  ipratropium (ATROVENT) nebulizer solution 0.5 mg, 500 mcg, Nebulization, 4x Daily - RT, Glen Gutiérrez  "MD JOCELYNE, 0.5 mg at 06/12/19 2100  •  metoprolol tartrate (LOPRESSOR) injection 5 mg, 5 mg, Intravenous, Q6H PRN, Vonnie Ruiz APRN, 5 mg at 06/13/19 0145  •  oxymetazoline (AFRIN) nasal spray 2 spray, 2 spray, Each Nare, BID PRN, Glen Gutiérrez MD  •  pantoprazole (PROTONIX) injection 40 mg, 40 mg, Intravenous, Q AM, Stanislaw Torrez MD, 40 mg at 06/13/19 0550  •  sacubitril-valsartan (ENTRESTO) 24-26 MG tablet 1 tablet, 1 tablet, Oral, Q12H, Yovanny Herbert MD, 1 tablet at 06/12/19 2018  •  sodium chloride 0.9 % flush 3 mL, 3 mL, Intravenous, Q12H, Gema Duong APRN, 3 mL at 06/12/19 2019  •  sodium chloride 0.9 % flush 3-10 mL, 3-10 mL, Intravenous, PRN, Gema Duong APRN  •  vancomycin 1250 mg/250 mL 0.9% NS IVPB ([facility]), 15 mg/kg, Intravenous, Q24H, Aroldo Glez MD, 1,250 mg at 06/13/19 0550    Vital Sign Min/Max for last 24 hours  Temp  Min: 97.7 °F (36.5 °C)  Max: 98.4 °F (36.9 °C)   BP  Min: 62/49  Max: 153/105   Pulse  Min: 57  Max: 152   Resp  Min: 20  Max: 22   SpO2  Min: 90 %  Max: 100 %   Flow (L/min)  Min: 4  Max: 4   No Data Recorded     Flowsheet Rows      First Filed Value   Admission Height  167.6 cm (66\") Documented at 06/08/2019 0245   Admission Weight  77.9 kg (171 lb 11.8 oz) Documented at 06/08/2019 0245            Intake/Output Summary (Last 24 hours) at 6/13/2019 0744  Last data filed at 6/13/2019 0600  Gross per 24 hour   Intake 320 ml   Output 2200 ml   Net -1880 ml       Physical Exam:     General Appearance:    Alert, cooperative, in no acute distress   Lungs:     Diminished BS throughout with expiratory wheezes    Heart:    Irregularly irregular, tachycardic. normal S1 and S2, no            murmur, no gallop, no rub, no click   Chest Wall:    No abnormalities observed   Abdomen:     Normal bowel sounds, soft nontender   Extremities:   Moves all extremities well, no edema, no cyanosis, no             redness   Pulses:   Pulses palpable and equal bilaterally "   Skin:   No bleeding, bruising or rash        Results Review:   Results from last 7 days   Lab Units 06/13/19  0317 06/12/19  0518 06/11/19  0327   WBC 10*3/mm3 9.19 6.99 7.20   HEMOGLOBIN g/dL 16.3 15.1 15.1   HEMATOCRIT % 52.3* 48.9 49.0   PLATELETS 10*3/mm3 184 173 162     Results from last 7 days   Lab Units 06/13/19  0317 06/12/19  0518 06/11/19  0327   SODIUM mmol/L 138 138 140   POTASSIUM mmol/L 4.5 4.2 4.4   CHLORIDE mmol/L 100 101 101   CO2 mmol/L 26.0 26.0 31.0*   BUN mg/dL 11 10 10   CREATININE mg/dL 0.88 0.91 0.97   GLUCOSE mg/dL 114* 114* 80      Results from last 7 days   Lab Units 06/08/19  0343   HEMOGLOBIN A1C % 6.60*     Results from last 7 days   Lab Units 06/08/19  0343   CHOLESTEROL mg/dL 108   TRIGLYCERIDES mg/dL 61   HDL CHOL mg/dL 59     Results from last 7 days   Lab Units 06/12/19  1349   TSH mIU/mL 3.880   FREE T4 ng/dL 1.33     Results from last 7 days   Lab Units 06/10/19  0203   PROBNP pg/mL 3,904.0*     Results from last 7 days   Lab Units 06/08/19  0615 06/08/19  0343   PROTIME Seconds 16.0* 15.6*   INR  1.34* 1.30*   APTT seconds  --  150.8*     Results from last 7 days   Lab Units 06/09/19  0615 06/08/19  0343   CK TOTAL U/L  --  231*   TROPONIN T ng/mL <0.010 <0.010       Intake/Output Summary (Last 24 hours) at 6/13/2019 0744  Last data filed at 6/13/2019 0600  Gross per 24 hour   Intake 320 ml   Output 2200 ml   Net -1880 ml       I personally viewed and interpreted the patient's EKG/Telemetry data    EKG: none for review today    Telemetry: AFib 140-150's    Ejection Fraction  No results found for: EF    Echo EF Estimated  No results found for: ECHOEFEST      Present on Admission:  • Cellulitis of Right Lower Leg and Foot  • A Fib w/RVR  • COPD (chronic obstructive pulmonary disease) (CMS/HCC)  • Laryngeal mass    Assessment/Plan   Initial cardiac assessment:  Mr. Jones is a 75 y/o male with pmhx of hypertension and COPD who was transferred from [facility] for Afib with RVR  and cellulitis of right lower calf/foot.     Recommendations:  1. Afib with RVR  - newly diagnosed, incidentally found  - CHADSVASC = 3 (age, HTN), starting Eliquis today  - Echo: EF mid 30s, moderate RV enlargement and RV dysfunction.  - S/p DENA and successful ECV, 6/10.   - On oral amiodarone and Bisoprolol.   - Had recurrent Afib today at around 12:00 yesterday. Remained in RVR throughout the night. Repeat Dig level was 0.47. Loaded with IV digoxin 250 mcg x 2 overnight. Rates remain high. Will give another 125 this AM and check dig level in AM.  - IV lopressor 5 mg Q 6 hrs PRN. Can restart esmolol gtt if needed and BP allows.   - Increased Amiodarone to 400 mg bid.   - Plan to increase bisoprolol to 10 mg daily once BP allows.      2. Acute systolic heart failure  - TTE shows ejection fraction of mid 30s, moderate RV enlargement and RV dysfunction.  - could be tachycardia induced. Will need repeat echo once maintaining NSR, may also need ischemic evaluation.  - DENA: EF 30-35%, Moderate to severe MR, moderate TR, RVSP 35-45mmHg.     3. Hypertension  - stable     4. Syncope  - reportedly had syncopal episode seems to have been triggered by prolonged coughing.     5. Lower extremity cellulitis  - per admitting     6. COPD  -  Per admitting  7. Laryngeal mass, s/p laryngoscopy and biopsy, 6/12     Plan:   -Continue Entresto, Bisoprolol (cardioselective s/t COPD), amiodarone, Eliquis, IV lasix.   - Will likely need ischemic evaluation in the near future for new systolic heart failure.   - will d/c Norvasc and increase bisoprolol once BP allows.   - appreciate given Digoxin 250 mcg x 2 last night. Will continue load with 125 mcg now then transition to oral dosing tomorrow. Check Dig level every other day.   - Will favor rate control over rhythm now with current respiratory status  - Reviewed TSH and FT4 (WNL).        Electronically signed by Vonnie Ruiz, MAYI, 06/13/19, 7:45  "AM.    ___________________________________________________________  The patient was seen and examined by me.  Agree and verified/discussed key components of E/M as outlined by the Nurse practitioner/PA.    /89   Pulse (!) 135   Temp 97.6 °F (36.4 °C) (Oral)   Resp 20   Ht 167.6 cm (66\")   Wt 78.9 kg (174 lb)   SpO2 96%   BMI 28.08 kg/m²     General Appearance:   · well developed  · well nourished  Neck:  · thyroid not enlarged  · supple  Respiratory:  · no respiratory distress  · normal breath sounds  · no rales  Cardiovascular:  · no jugular venous distention  · Irregular irregular rhythm  · apical impulse normal  · S1 normal, S2 normal  · no S3, no S4   · no murmur  · no rub, no thrill  · carotid pulses normal; no bruit  · pedal pulses normal  · lower extremity edema: none  .   Skin:   · warm, dry        Plan:    Converted back to A. fib with RVR overnight, received IV digoxin 250 mcg x 2, receiving 125 this morning.  On 400 mg twice daily of amiodarone has received multiple IV loads and drip  Continue Eliquis  Start esmolol bolus and drip today for rate control goal heart rate less than 110  Blood pressure marginal therefore this may limit beta-blocker use  Hold bisoprolol and Entresto to allow for rate control  Consult elective physiology service for consideration of AV node ablation and possible biV ICD given heart failure, EF less than 35% on initial echo    Yovanny Solares MD, FACC  [facility]     "

## 2019-06-13 NOTE — PATIENT CARE CONFERENCE
ICU Rounds: Pt w/ irregular HR this AM, not appropriate for therapy participation. Will check back in PM.

## 2019-06-13 NOTE — PROGRESS NOTES
[facility] at [facility]  Progress Note       LOS: 5 days   Patient Care Team:  Lexx Monson MD as PCP - General (Family Medicine)    Chief Complaint:  Atrial fibrillation    Subjective     Interval History: EP asked to see regarding persistent atrial fibrillation with RVR.  Rates currently in the 150's but patient overall asymptomatic.  Denies any c/o palpitations.  Does have SOB at baseline secondary to his COPD but is unsure if it is any worse than normal.  No recent anginal complaints.        Review of Systems:   Pertinent positives in HPI, all others reviewed and negative.      Objective       Current Facility-Administered Medications:   •  acetaminophen (TYLENOL) tablet 650 mg, 650 mg, Oral, Q6H PRN, Aroldo Glez MD, 650 mg at 06/12/19 1104  •  albuterol (PROVENTIL) nebulizer solution 0.083% 2.5 mg/3mL, 0.625 mg, Nebulization, Q6H PRN, Glen Gutiérrez MD  •  amiodarone (PACERONE) tablet 400 mg, 400 mg, Oral, Q12H, Vonnie Ruiz APRN, 400 mg at 06/13/19 0815  •  amitriptyline (ELAVIL) tablet 25 mg, 25 mg, Oral, Nightly, Geam Duong APRN, 25 mg at 06/12/19 2019  •  apixaban (ELIQUIS) tablet 5 mg, 5 mg, Oral, Q12H, Aroldo Glez MD, 5 mg at 06/13/19 0816  •  atorvastatin (LIPITOR) tablet 20 mg, 20 mg, Oral, Nightly, Glen Gutiérrez MD, 20 mg at 06/12/19 2019  •  [START ON 6/14/2019] bisoprolol (ZEBeta) tablet 5 mg, 5 mg, Oral, Q24H, Vonnie Ruiz APRN  •  budesonide-formoterol (SYMBICORT) 80-4.5 MCG/ACT inhaler 2 puff, 2 puff, Inhalation, BID - RT, Glen Gutiérrez MD, 2 puff at 06/13/19 0815  •  esmolol (BREVIBLOC) 2500 mg/250 mL (10 mg/mL) infusion,  mcg/kg/min, Intravenous, Titrated, Yovanny Solares MD  •  esmolol (BREVIBLOC) injection 39.5 mg, 500 mcg/kg, Intravenous, Once, Yovanny Solares MD  •  furosemide (LASIX) tablet 20 mg, 20 mg, Oral, Daily, OsetinsGlen alvarado MD, 20 mg at 06/13/19 0815  •  insulin lispro  "(humaLOG) injection 0-14 Units, 0-14 Units, Subcutaneous, 4x Daily With Meals & Nightly, Florentin Leggett, [facility]  •  ipratropium (ATROVENT) nebulizer solution 0.5 mg, 500 mcg, Nebulization, 4x Daily - RT, Glen Gutiérrez MD, 0.5 mg at 06/13/19 1238  •  metoprolol tartrate (LOPRESSOR) injection 5 mg, 5 mg, Intravenous, Q6H PRN, Vonnie Ruiz, APRN, 5 mg at 06/13/19 0145  •  [START ON 6/14/2019] pantoprazole (PROTONIX) EC tablet 40 mg, 40 mg, Oral, Q AM, Mary Jane Chung [facility]  •  sacubitril-valsartan (ENTRESTO) 24-26 MG tablet 1 tablet, 1 tablet, Oral, Q12H, Yovanny Herbert MD, Stopped at 06/13/19 0815  •  sodium chloride 0.9 % flush 3 mL, 3 mL, Intravenous, Q12H, Gema Duong APRN, 3 mL at 06/12/19 2019  •  sodium chloride 0.9 % flush 3-10 mL, 3-10 mL, Intravenous, PRN, Gema Duong APRN  •  vancomycin 1250 mg/250 mL 0.9% NS IVPB ([facility]), 15 mg/kg, Intravenous, Q24H, Aroldo Glez MD, 1,250 mg at 06/13/19 0550    Vital Sign Min/Max for last 24 hours  Temp  Min: 97.6 °F (36.4 °C)  Max: 98.4 °F (36.9 °C)   BP  Min: 62/49  Max: 153/105   Pulse  Min: 57  Max: 158   Resp  Min: 18  Max: 22   SpO2  Min: 88 %  Max: 100 %   Flow (L/min)  Min: 4  Max: 4   No Data Recorded     Flowsheet Rows      First Filed Value   Admission Height  167.6 cm (66\") Documented at 06/08/2019 0245   Admission Weight  77.9 kg (171 lb 11.8 oz) Documented at 06/08/2019 0245            Intake/Output Summary (Last 24 hours) at 6/13/2019 1330  Last data filed at 6/13/2019 1200  Gross per 24 hour   Intake 490 ml   Output 1850 ml   Net -1360 ml       Physical Exam:     General Appearance:    Alert, cooperative, in no acute distress   Lungs:     Expiratory wheezes anteriorly, diminished bilaterally, respirations regular, even and unlabored    Heart:    Irreg irreg, tachycardic, normal S1 and S2, no            murmur, no gallop, no rub, no click   Chest Wall:    No abnormalities observed   Abdomen:     Normal bowel sounds, no " masses, soft, non-tender, non-distended   Extremities:   Moves all extremities well, no edema, no cyanosis, mild RLE    erythema   Pulses:   Pulses palpable and equal bilaterally   Skin:   No bleeding, bruising or rash        Results Review:   Results from last 7 days   Lab Units 06/13/19  0317 06/12/19  0518 06/11/19  0327   WBC 10*3/mm3 9.19 6.99 7.20   HEMOGLOBIN g/dL 16.3 15.1 15.1   HEMATOCRIT % 52.3* 48.9 49.0   PLATELETS 10*3/mm3 184 173 162     Results from last 7 days   Lab Units 06/13/19  0317 06/12/19  0518 06/11/19  0327   SODIUM mmol/L 138 138 140   POTASSIUM mmol/L 4.5 4.2 4.4   CHLORIDE mmol/L 100 101 101   CO2 mmol/L 26.0 26.0 31.0*   BUN mg/dL 11 10 10   CREATININE mg/dL 0.88 0.91 0.97   GLUCOSE mg/dL 114* 114* 80      Results from last 7 days   Lab Units 06/08/19  0343   HEMOGLOBIN A1C % 6.60*     Results from last 7 days   Lab Units 06/08/19  0343   CHOLESTEROL mg/dL 108   TRIGLYCERIDES mg/dL 61   HDL CHOL mg/dL 59     Results from last 7 days   Lab Units 06/12/19  1349   TSH mIU/mL 3.880   FREE T4 ng/dL 1.33     Results from last 7 days   Lab Units 06/10/19  0203   PROBNP pg/mL 3,904.0*     Results from last 7 days   Lab Units 06/08/19  0615 06/08/19  0343   PROTIME Seconds 16.0* 15.6*   INR  1.34* 1.30*   APTT seconds  --  150.8*     Results from last 7 days   Lab Units 06/09/19  0615 06/08/19  0343   CK TOTAL U/L  --  231*   TROPONIN T ng/mL <0.010 <0.010     Results from last 7 days   Lab Units 06/11/19  0327   MAGNESIUM mg/dL 2.0       Intake/Output Summary (Last 24 hours) at 6/13/2019 1330  Last data filed at 6/13/2019 1200  Gross per 24 hour   Intake 490 ml   Output 1850 ml   Net -1360 ml       I personally viewed and interpreted the patient's EKG/Telemetry data    EKG: None for review today.  EKG from yesterday 6/12 demonstrating atrial fibrillation with RVR,  bpm    Telemetry: Atrial fibrillation, HR  bpm    Ejection Fraction  No results found for: EF    Echo EF Estimated  No  results found for: ECHOEFEST      Present on Admission:  • Cellulitis of Right Lower Leg and Foot  • A Fib w/RVR  • COPD (chronic obstructive pulmonary disease) (CMS/HCC)  • Laryngeal mass    Assessment/Plan   1. Atrial fibrillation with RVR:  - Presented from OSH with atrial fibrillation with RVR.  He underwent successful ECV but now has developed recurrent atrial fibrillation with RVR refractory to IV and po amiodarone, digoxin, and beta blocker therapy.  Up-titration of AVN agents has been limited by hypotension.  - CHADSVASc = 4, initiated on Eliquis    2. Acute systolic congestive heart failure:  - Newly diagnosed heart failure with an EF of 31-35% in the setting of atrial fibrillation with RVR, though also has had no recent ischemic evaluation.  Likely tachycardia induced.    3. Hypertension:  - Now with occasional hypotension in the setting of atrial fibrillation with RVR    4. COPD    5. Laryngeal mass:  - ENT following, biopsy pending    Plan: May ultimately need AVN ablation + Bi-V PPM implant secondary to refractory atrial fibrillation with RVR and acute systolic CHF.    Electronically signed by MAYI Garcia, 06/13/19, 1:30 PM.      I, Lucio Chapman MD, personally performed the services face to face as described and documented by the above named individual. I have made any necessary edits and it is both accurate and complete 6/13/2019  7:22 PM

## 2019-06-13 NOTE — PROGRESS NOTES
Postoperative day #1 status post direct laryngoscopy and biopsy of left larynx    Findings on laryngoscopy revealed induration but I was less convinced that the laryngeal tissue was obviously malignant.  Final pathology still pending.    Since surgery, he has gone back into atrial fibrillation and remains in ICU.    I have spoken with his daughter today who admits that his hoarseness has been present for approximately 1 year and he has had at least 6 months of choking episodes during deglutition.    Should pathology indeed confirm a malignancy, nonsurgical management including radiation is likely his best choice of action.    My partners Winnie Zendejas and Bethany will cover my call as I will be out of town for the next 2 days.  [place] [phone].    If patient is discharged, will follow-up in office next Tuesday or Thursday as discussed with daughter.    Glen Gutiérrez MD, FACS  Otolaryngology  o [phone]  c [phone]

## 2019-06-14 LAB
BASOPHILS # BLD AUTO: 0.04 10*3/MM3 (ref 0–0.2)
BASOPHILS NFR BLD AUTO: 0.3 % (ref 0–1.5)
DEPRECATED RDW RBC AUTO: 47.9 FL (ref 37–54)
EOSINOPHIL # BLD AUTO: 0.35 10*3/MM3 (ref 0–0.4)
EOSINOPHIL NFR BLD AUTO: 3 % (ref 0.3–6.2)
ERYTHROCYTE [DISTWIDTH] IN BLOOD BY AUTOMATED COUNT: 15.4 % (ref 12.3–15.4)
GLUCOSE BLDC GLUCOMTR-MCNC: 108 MG/DL (ref 70–130)
GLUCOSE BLDC GLUCOMTR-MCNC: 150 MG/DL (ref 70–130)
GLUCOSE BLDC GLUCOMTR-MCNC: 82 MG/DL (ref 70–130)
GLUCOSE BLDC GLUCOMTR-MCNC: 94 MG/DL (ref 70–130)
HCT VFR BLD AUTO: 53.9 % (ref 37.5–51)
HGB BLD-MCNC: 17 G/DL (ref 13–17.7)
IMM GRANULOCYTES # BLD AUTO: 0.06 10*3/MM3 (ref 0–0.05)
IMM GRANULOCYTES NFR BLD AUTO: 0.5 % (ref 0–0.5)
LYMPHOCYTES # BLD AUTO: 2.23 10*3/MM3 (ref 0.7–3.1)
LYMPHOCYTES NFR BLD AUTO: 18.9 % (ref 19.6–45.3)
MCH RBC QN AUTO: 27.8 PG (ref 26.6–33)
MCHC RBC AUTO-ENTMCNC: 31.5 G/DL (ref 31.5–35.7)
MCV RBC AUTO: 88.2 FL (ref 79–97)
MONOCYTES # BLD AUTO: 0.98 10*3/MM3 (ref 0.1–0.9)
MONOCYTES NFR BLD AUTO: 8.3 % (ref 5–12)
NEUTROPHILS # BLD AUTO: 8.14 10*3/MM3 (ref 1.7–7)
NEUTROPHILS NFR BLD AUTO: 69 % (ref 42.7–76)
NRBC BLD AUTO-RTO: 0 /100 WBC (ref 0–0.2)
PLATELET # BLD AUTO: 228 10*3/MM3 (ref 140–450)
PMV BLD AUTO: 10.2 FL (ref 6–12)
RBC # BLD AUTO: 6.11 10*6/MM3 (ref 4.14–5.8)
WBC NRBC COR # BLD: 11.8 10*3/MM3 (ref 3.4–10.8)

## 2019-06-14 PROCEDURE — 99233 SBSQ HOSP IP/OBS HIGH 50: CPT | Performed by: INTERNAL MEDICINE

## 2019-06-14 PROCEDURE — 94799 UNLISTED PULMONARY SVC/PX: CPT

## 2019-06-14 PROCEDURE — 63710000001 INSULIN LISPRO (HUMAN) PER 5 UNITS

## 2019-06-14 PROCEDURE — 82962 GLUCOSE BLOOD TEST: CPT

## 2019-06-14 PROCEDURE — 94660 CPAP INITIATION&MGMT: CPT

## 2019-06-14 PROCEDURE — 25010000002 VANCOMYCIN 10 G RECONSTITUTED SOLUTION: Performed by: INTERNAL MEDICINE

## 2019-06-14 PROCEDURE — 85025 COMPLETE CBC W/AUTO DIFF WBC: CPT | Performed by: NURSE PRACTITIONER

## 2019-06-14 RX ORDER — METOPROLOL TARTRATE 100 MG/1
100 TABLET ORAL EVERY 12 HOURS SCHEDULED
Status: DISCONTINUED | OUTPATIENT
Start: 2019-06-14 | End: 2019-06-17

## 2019-06-14 RX ORDER — DIPHENHYDRAMINE HYDROCHLORIDE, ZINC ACETATE 2; .1 G/100G; G/100G
CREAM TOPICAL 3 TIMES DAILY PRN
Status: DISCONTINUED | OUTPATIENT
Start: 2019-06-14 | End: 2019-06-19

## 2019-06-14 RX ORDER — PANTOPRAZOLE SODIUM 40 MG/10ML
40 INJECTION, POWDER, LYOPHILIZED, FOR SOLUTION INTRAVENOUS
Status: DISCONTINUED | OUTPATIENT
Start: 2019-06-14 | End: 2019-06-14

## 2019-06-14 RX ORDER — DILTIAZEM HYDROCHLORIDE 60 MG/1
60 TABLET, FILM COATED ORAL EVERY 8 HOURS SCHEDULED
Status: DISCONTINUED | OUTPATIENT
Start: 2019-06-14 | End: 2019-06-15

## 2019-06-14 RX ORDER — PANTOPRAZOLE SODIUM 40 MG/1
40 TABLET, DELAYED RELEASE ORAL
Status: DISCONTINUED | OUTPATIENT
Start: 2019-06-15 | End: 2019-06-17 | Stop reason: ALTCHOICE

## 2019-06-14 RX ADMIN — INSULIN LISPRO 2 UNITS: 100 INJECTION, SOLUTION INTRAVENOUS; SUBCUTANEOUS at 08:11

## 2019-06-14 RX ADMIN — METOPROLOL TARTRATE 100 MG: 100 TABLET ORAL at 22:16

## 2019-06-14 RX ADMIN — APIXABAN 5 MG: 5 TABLET, FILM COATED ORAL at 20:26

## 2019-06-14 RX ADMIN — APIXABAN 5 MG: 5 TABLET, FILM COATED ORAL at 08:11

## 2019-06-14 RX ADMIN — METOPROLOL TARTRATE 5 MG: 5 INJECTION INTRAVENOUS at 17:54

## 2019-06-14 RX ADMIN — DILTIAZEM HYDROCHLORIDE 60 MG: 60 TABLET, FILM COATED ORAL at 22:14

## 2019-06-14 RX ADMIN — VANCOMYCIN HYDROCHLORIDE 1250 MG: 10 INJECTION, POWDER, LYOPHILIZED, FOR SOLUTION INTRAVENOUS at 05:53

## 2019-06-14 RX ADMIN — DILTIAZEM HYDROCHLORIDE 60 MG: 60 TABLET, FILM COATED ORAL at 13:05

## 2019-06-14 RX ADMIN — SACUBITRIL AND VALSARTAN 1 TABLET: 24; 26 TABLET, FILM COATED ORAL at 08:11

## 2019-06-14 RX ADMIN — FUROSEMIDE 20 MG: 20 TABLET ORAL at 08:11

## 2019-06-14 RX ADMIN — AMIODARONE HYDROCHLORIDE 400 MG: 200 TABLET ORAL at 08:11

## 2019-06-14 RX ADMIN — SACUBITRIL AND VALSARTAN 1 TABLET: 24; 26 TABLET, FILM COATED ORAL at 20:27

## 2019-06-14 RX ADMIN — BUDESONIDE AND FORMOTEROL FUMARATE DIHYDRATE 2 PUFF: 80; 4.5 AEROSOL RESPIRATORY (INHALATION) at 20:09

## 2019-06-14 RX ADMIN — SODIUM CHLORIDE, PRESERVATIVE FREE 3 ML: 5 INJECTION INTRAVENOUS at 20:28

## 2019-06-14 RX ADMIN — IPRATROPIUM BROMIDE 0.5 MG: 0.5 SOLUTION RESPIRATORY (INHALATION) at 12:30

## 2019-06-14 RX ADMIN — IPRATROPIUM BROMIDE 0.5 MG: 0.5 SOLUTION RESPIRATORY (INHALATION) at 16:05

## 2019-06-14 RX ADMIN — IPRATROPIUM BROMIDE 0.5 MG: 0.5 SOLUTION RESPIRATORY (INHALATION) at 20:09

## 2019-06-14 RX ADMIN — AMITRIPTYLINE HYDROCHLORIDE 25 MG: 25 TABLET, FILM COATED ORAL at 20:27

## 2019-06-14 RX ADMIN — AMIODARONE HYDROCHLORIDE 400 MG: 200 TABLET ORAL at 22:14

## 2019-06-14 RX ADMIN — ESMOLOL HYDROCHLORIDE 50 MCG/KG/MIN: 10 INJECTION INTRAVENOUS at 03:11

## 2019-06-14 RX ADMIN — BUDESONIDE AND FORMOTEROL FUMARATE DIHYDRATE 2 PUFF: 80; 4.5 AEROSOL RESPIRATORY (INHALATION) at 07:37

## 2019-06-14 RX ADMIN — IPRATROPIUM BROMIDE 0.5 MG: 0.5 SOLUTION RESPIRATORY (INHALATION) at 07:37

## 2019-06-14 RX ADMIN — PANTOPRAZOLE SODIUM 40 MG: 40 INJECTION, POWDER, FOR SOLUTION INTRAVENOUS at 06:15

## 2019-06-14 RX ADMIN — ATORVASTATIN CALCIUM 20 MG: 20 TABLET, FILM COATED ORAL at 20:26

## 2019-06-14 RX ADMIN — ESMOLOL HYDROCHLORIDE 50 MCG/KG/MIN: 10 INJECTION INTRAVENOUS at 13:05

## 2019-06-14 NOTE — PROGRESS NOTES
"Intensive Care Follow-up     Hospital:  LOS: 6 days   Mr. Stef Jones, 74 y.o. male is followed for:   Atrial fibrillation with RVR (CMS/HCC)        Subjective   Interval History:  The chart has been reviewed.  The patient has been breathing without difficulty.  He has been eating without any problems with his modified diet.  He continues to have problems with rapid atrial fibrillation in the 140s.  He denies chest pain currently.  He has not been tolerating esmolol secondary to hypotension.    The patient's past medical, surgical and social history were reviewed and updated in Epic as appropriate.        Objective     Infusions:    esmolol  mcg/kg/min Last Rate: 50 mcg/kg/min (06/14/19 0311)     Medications:    amiodarone 400 mg Oral Q12H   amitriptyline 25 mg Oral Nightly   apixaban 5 mg Oral Q12H   atorvastatin 20 mg Oral Nightly   budesonide-formoterol 2 puff Inhalation BID - RT   diltiaZEM 60 mg Oral Q8H   furosemide 20 mg Oral Daily   insulin lispro 0-14 Units Subcutaneous 4x Daily With Meals & Nightly   ipratropium 500 mcg Nebulization 4x Daily - RT   metoprolol tartrate 50 mg Oral Q12H   [START ON 6/15/2019] pantoprazole 40 mg Oral Q AM   sacubitril-valsartan 1 tablet Oral Q12H   sodium chloride 3 mL Intravenous Q12H       Vital Sign Min/Max for last 24 hours  Temp  Min: 97.6 °F (36.4 °C)  Max: 98 °F (36.7 °C)   BP  Min: 76/53  Max: 143/118   Pulse  Min: 100  Max: 156   Resp  Min: 16  Max: 21   SpO2  Min: 88 %  Max: 97 %   Flow (L/min)  Min: 4  Max: 4       Input/Output for last 24 hour shift  06/13 0701 - 06/14 0700  In: 677.8 [P.O.:360; I.V.:67.8]  Out: 2050 [Urine:2050]   FiO2 (%):  [35 %] 35 %  S RR:  [10] 10  Objective:  General Appearance:  Ill-appearing, in no acute distress and comfortable.    Vital signs: (most recent): Blood pressure (!) 89/68, pulse (!) 135, temperature 97.9 °F (36.6 °C), temperature source Oral, resp. rate 18, height 167.6 cm (66\"), weight 75.3 kg (166 lb 0.1 oz), SpO2 " 93 %.    HEENT: Normal HEENT exam.    Lungs:  Normal effort and normal respiratory rate.  He is not in respiratory distress.  No decreased breath sounds or wheezes.    Heart: Tachycardia.  Irregular rhythm.  S1 normal and S2 normal.  No murmur.   Abdomen: Abdomen is soft and non-distended.  Bowel sounds are normal.   There is no abdominal tenderness.     Extremities: Normal range of motion.  There is dependent edema.    Neurological: Patient is alert and oriented to person, place and time.    Pupils:  Pupils are equal, round, and reactive to light.  Pupils are equal.   Skin:  Warm and dry.              Results from last 7 days   Lab Units 06/14/19  0331 06/13/19  0317 06/12/19  0518   WBC 10*3/mm3 11.80* 9.19 6.99   HEMOGLOBIN g/dL 17.0 16.3 15.1   PLATELETS 10*3/mm3 228 184 173     Results from last 7 days   Lab Units 06/13/19  0317 06/12/19  0518 06/11/19  0327 06/10/19  0203 06/10/19  0030 06/09/19  0615   SODIUM mmol/L 138 138 140 140 141 139   POTASSIUM mmol/L 4.5 4.2 4.4 4.3 4.4 4.2   CO2 mmol/L 26.0 26.0 31.0* 29.0 31.0* 24.0   BUN mg/dL 11 10 10 16 16 18   CREATININE mg/dL 0.88 0.91 0.97 1.23 1.25 1.19   MAGNESIUM mg/dL  --   --  2.0 1.9 1.9 2.0   PHOSPHORUS mg/dL  --   --   --  2.8 2.7 3.2   GLUCOSE mg/dL 114* 114* 80 96 99 90     Estimated Creatinine Clearance: 76.1 mL/min (by C-G formula based on SCr of 0.88 mg/dL).    Results from last 7 days   Lab Units 06/09/19  0345   PH, ARTERIAL pH units 7.334*   PCO2, ARTERIAL mm Hg 52.8   PO2 ART mm Hg 106.0         I reviewed the patient's results and images.     Assessment/Plan   Impression        A Fib w/RVR    Cellulitis of Right Lower Leg and Foot    COPD (chronic obstructive pulmonary disease) (CMS/HCC)    Laryngeal mass       Plan        We are still awaiting the pathology from his throat biopsy.  His atrial fibrillation continues to be rapid despite multiple interventions.  The elective physiology service is evaluating him currently.  Continue with  antibiotics.  These will be completed soon and will finish his treatment for cellulitis.  I would like to keep him in the intensive care unit secondary to the instability of his atrial fibrillation.  When pathology has been returned we will obtain consultation as appropriate.  Continue diet.  Mobilize with physical therapy.    Plan of care and goals reviewed with mulitdisciplinary/antibiotic stewardship team during rounds.   I discussed the patient's findings and my recommendations with patient and nursing staff     High level of risk due to:  severe exacerbation of chronic illness and illness with threat to life or bodily function.      Aroldo Glez MD, [facility]  Pulmonology and Critical Care Medicine

## 2019-06-14 NOTE — PLAN OF CARE
Problem: Patient Care Overview  Goal: Plan of Care Review  Outcome: Ongoing (interventions implemented as appropriate)   06/14/19 0502 06/14/19 1400 06/14/19 1516   Coping/Psychosocial   Plan of Care Reviewed With --  patient;daughter --    Plan of Care Review   Progress no change --  --    OTHER   Outcome Summary --  --  Patient remains in A-fib. Rate controlled after amiodarone dose this morning and 50 of esmolol. Increased cardizem dosage, and turned off esmolol d/t BP not tolerating. However, rates this afternoon are as high as 150's. Removed tripp. Awaiting plan of possible ablation/and or pacemaker. Other VSS, will continue to monitor.        Problem: Fall Risk (Adult)  Goal: Identify Related Risk Factors and Signs and Symptoms  Outcome: Ongoing (interventions implemented as appropriate)    Goal: Absence of Fall  Outcome: Ongoing (interventions implemented as appropriate)      Problem: Skin Injury Risk (Adult)  Goal: Identify Related Risk Factors and Signs and Symptoms  Outcome: Ongoing (interventions implemented as appropriate)    Goal: Skin Health and Integrity  Outcome: Ongoing (interventions implemented as appropriate)      Problem: Chronic Obstructive Pulmonary Disease (Adult)  Goal: Signs and Symptoms of Listed Potential Problems Will be Absent, Minimized or Managed (Chronic Obstructive Pulmonary Disease)  Outcome: Ongoing (interventions implemented as appropriate)

## 2019-06-14 NOTE — PROGRESS NOTES
[facility] at [facility]  Progress Note       LOS: 6 days   Patient Care Team:  Lexx Monson MD as PCP - General (Family Medicine)    Chief Complaint:  AFIB with RVR    Subjective : no acute events overnight. No complaints this AM. Denies palpitations, LH, dizziness, fatigue. Breathing stable    Review of Systems:   Denies cough, fevers, chills. See HPI      Objective       Current Facility-Administered Medications:   •  acetaminophen (TYLENOL) tablet 650 mg, 650 mg, Oral, Q6H PRN, Aroldo Glez MD, 650 mg at 06/12/19 1104  •  albuterol (PROVENTIL) nebulizer solution 0.083% 2.5 mg/3mL, 0.625 mg, Nebulization, Q6H PRN, Glen Gutiérrez MD  •  amiodarone (PACERONE) tablet 400 mg, 400 mg, Oral, Q12H, Vonnie Ruiz, APRN, 400 mg at 06/14/19 0811  •  amitriptyline (ELAVIL) tablet 25 mg, 25 mg, Oral, Nightly, Gema Duong, APRN, 25 mg at 06/13/19 2005  •  apixaban (ELIQUIS) tablet 5 mg, 5 mg, Oral, Q12H, Aroldo Glez MD, 5 mg at 06/14/19 0811  •  atorvastatin (LIPITOR) tablet 20 mg, 20 mg, Oral, Nightly, Glen Gutiérrez MD, 20 mg at 06/13/19 2005  •  budesonide-formoterol (SYMBICORT) 80-4.5 MCG/ACT inhaler 2 puff, 2 puff, Inhalation, BID - RT, Glen Gutiérrez MD, 2 puff at 06/14/19 0737  •  diltiaZEM (CARDIZEM) tablet 60 mg, 60 mg, Oral, Q8H, Lucio Chapman MD  •  esmolol (BREVIBLOC) 2500 mg/250 mL (10 mg/mL) infusion,  mcg/kg/min, Intravenous, Titrated, Yovanny Solares MD, Last Rate: 23.7 mL/hr at 06/14/19 0311, 50 mcg/kg/min at 06/14/19 0311  •  furosemide (LASIX) tablet 20 mg, 20 mg, Oral, Daily, Glen Gutiérrez MD, 20 mg at 06/14/19 0811  •  insulin lispro (humaLOG) injection 0-14 Units, 0-14 Units, Subcutaneous, 4x Daily With Meals & Nightly, Florentin Leggett, [facility], 2 Units at 06/14/19 0811  •  ipratropium (ATROVENT) nebulizer solution 0.5 mg, 500 mcg, Nebulization, 4x Daily - RT, Glen Gutiérrez MD, 0.5 mg at 06/14/19  "0737  •  metoprolol tartrate (LOPRESSOR) injection 5 mg, 5 mg, Intravenous, Q6H PRN, Vonnie Ruiz APRN, 5 mg at 06/13/19 0145  •  metoprolol tartrate (LOPRESSOR) tablet 50 mg, 50 mg, Oral, Q12H, Lucio Chapman MD, 50 mg at 06/13/19 2005  •  pantoprazole (PROTONIX) injection 40 mg, 40 mg, Intravenous, Q AM, Gema Duong APRN, 40 mg at 06/14/19 0615  •  sacubitril-valsartan (ENTRESTO) 24-26 MG tablet 1 tablet, 1 tablet, Oral, Q12H, Yovanny Herbert MD, 1 tablet at 06/14/19 0811  •  sodium chloride 0.9 % flush 3 mL, 3 mL, Intravenous, Q12H, Gema Duong APRN, 3 mL at 06/13/19 2013  •  sodium chloride 0.9 % flush 3-10 mL, 3-10 mL, Intravenous, PRN, Gema Duong APRN    Vital Sign Min/Max for last 24 hours  Temp  Min: 97.6 °F (36.4 °C)  Max: 98 °F (36.7 °C)   BP  Min: 76/53  Max: 143/118   Pulse  Min: 116  Max: 156   Resp  Min: 16  Max: 21   SpO2  Min: 88 %  Max: 97 %   Flow (L/min)  Min: 4  Max: 4   Weight  Min: 75.3 kg (166 lb 0.1 oz)  Max: 75.3 kg (166 lb 0.1 oz)     Flowsheet Rows      First Filed Value   Admission Height  167.6 cm (66\") Documented at 06/08/2019 0245   Admission Weight  77.9 kg (171 lb 11.8 oz) Documented at 06/08/2019 0245            Intake/Output Summary (Last 24 hours) at 6/14/2019 0845  Last data filed at 6/14/2019 0839  Gross per 24 hour   Intake 797.75 ml   Output 1750 ml   Net -952.25 ml       Physical Exam:     General Appearance:    Alert, cooperative, in no acute distress   Lungs:     Diminished BS throughout. Non labored    Heart:    Irregularly irregular, tachycardic. normal S1 and S2, no            murmur, no gallop, no rub, no click   Chest Wall:    No abnormalities observed   Abdomen:     Normal bowel sounds, soft nontender   Extremities:   Moves all extremities well, no edema, no cyanosis, no             redness   Pulses:   Pulses palpable and equal bilaterally   Skin:   No bleeding, bruising or rash        Results Review:   Results from last 7 days   Lab " Units 06/14/19  0331 06/13/19  0317 06/12/19  0518   WBC 10*3/mm3 11.80* 9.19 6.99   HEMOGLOBIN g/dL 17.0 16.3 15.1   HEMATOCRIT % 53.9* 52.3* 48.9   PLATELETS 10*3/mm3 228 184 173     Results from last 7 days   Lab Units 06/13/19  0317 06/12/19  0518 06/11/19  0327   SODIUM mmol/L 138 138 140   POTASSIUM mmol/L 4.5 4.2 4.4   CHLORIDE mmol/L 100 101 101   CO2 mmol/L 26.0 26.0 31.0*   BUN mg/dL 11 10 10   CREATININE mg/dL 0.88 0.91 0.97   GLUCOSE mg/dL 114* 114* 80      Results from last 7 days   Lab Units 06/08/19  0343   HEMOGLOBIN A1C % 6.60*     Results from last 7 days   Lab Units 06/08/19  0343   CHOLESTEROL mg/dL 108   TRIGLYCERIDES mg/dL 61   HDL CHOL mg/dL 59     Results from last 7 days   Lab Units 06/12/19  1349   TSH mIU/mL 3.880   FREE T4 ng/dL 1.33     Results from last 7 days   Lab Units 06/10/19  0203   PROBNP pg/mL 3,904.0*     Results from last 7 days   Lab Units 06/08/19  0615 06/08/19  0343   PROTIME Seconds 16.0* 15.6*   INR  1.34* 1.30*   APTT seconds  --  150.8*     Results from last 7 days   Lab Units 06/09/19  0615 06/08/19  0343   CK TOTAL U/L  --  231*   TROPONIN T ng/mL <0.010 <0.010       Intake/Output Summary (Last 24 hours) at 6/14/2019 0845  Last data filed at 6/14/2019 0839  Gross per 24 hour   Intake 797.75 ml   Output 1750 ml   Net -952.25 ml       I personally viewed and interpreted the patient's EKG/Telemetry data    EKG: none for review today    Telemetry: Afib 120's    Ejection Fraction  No results found for: EF    Echo EF Estimated  No results found for: ECHOEFEST      Present on Admission:  • Cellulitis of Right Lower Leg and Foot  • A Fib w/RVR  • COPD (chronic obstructive pulmonary disease) (CMS/HCC)  • Laryngeal mass    Assessment/Plan   Initial cardiac assessment:  Mr. Jones is a 75 y/o male with pmhx of hypertension and COPD who was transferred from [facility] for Afib with RVR and cellulitis of right lower calf/foot.     Recommendations:  1. Afib with RVR  - newly  "diagnosed, incidentally found  - CHADSVASC = 3 (age, HTN), starting Eliquis today  - Echo: EF mid 30s, moderate RV enlargement and RV dysfunction.  - S/p DENA and successful ECV, 6/10.   - On oral amiodarone, loaded IV and now on  mg bid. Will continue taper  - Bisoprolol discontinued, metoprolol started for better rate control.   - Cardizem initiated yesterday and increased today.   - Will likely need AVN RFA and BiV PPM implant. EP consulted and following.     2. Acute systolic heart failure  - TTE shows ejection fraction of mid 30s, moderate RV enlargement and RV dysfunction.  - could be tachycardia induced. Will need repeat echo once maintaining NSR, may also need ischemic evaluation.  - DENA: EF 30-35%, Moderate to severe MR, moderate TR, RVSP 35-45mmHg.     3. Hypertension  - stable     4. Syncope  - reportedly had syncopal episode seems to have been triggered by prolonged coughing.     5. Lower extremity cellulitis  - per admitting     6. COPD  -  Per admitting  7. Laryngeal mass, s/p laryngoscopy and biopsy, 6/12         Electronically signed by MAYI Borges, 06/14/19, 8:45 AM.  ___________________________________________________________  The patient was seen and examined by me.  Agree and verified/discussed key components of E/M as outlined by the Nurse practitioner/PA.    BP 96/78   Pulse (!) 129   Temp 97.8 °F (36.6 °C) (Axillary)   Resp 20   Ht 167.6 cm (66\")   Wt 75.3 kg (166 lb 0.1 oz)   SpO2 91%   BMI 26.79 kg/m²     General Appearance:   · well developed  · well nourished  Neck:  · thyroid not enlarged  · supple  Respiratory:  · no respiratory distress  · normal breath sounds  · no rales  Cardiovascular:  · no jugular venous distention  · Irregular irregular rhythm  · apical impulse normal  · S1 normal, S2 normal  · no S3, no S4   · no murmur  · no rub, no thrill  · carotid pulses normal; no bruit  · pedal pulses normal  · lower extremity edema: none  .   Skin:   · warm, " dry        Plan:    Still with A. fib and RVR, rates in the 130s to 140s  Appreciate EP's input, switch bisoprolol to metoprolol and added Cardizem  Blood pressure still low, making rate control difficult  Patient failed rhythm control strategy after DENA/cardioversion  Increase short acting diltiazem as tolerated  Continue amiodarone and digoxin  Monitor dig level every other day  May require AV node ablation and biV PPM eventually.       Yovanny Solares MD, [facility]  [facility]

## 2019-06-14 NOTE — PROGRESS NOTES
Adult Nutrition  Assessment/PES    Patient Name:  Stef Jones  YOB: 1945  MRN: [id number]  Admit Date:  6/8/2019    Assessment Date:  6/14/2019    Comments:  Pt's family concerned about selections and diet upgrade; SLP contacted and pt is scheduled to be re-evaluated tomorrow if unable to do later today. Intake has been adequate given respiratory stus on adm and NPO periods for testing. Pt is not unhappy w/ food. RD will follow.    Reason for Assessment     Row Name 06/14/19 1411          Reason for Assessment    Reason For Assessment  per organizational policy;identified at risk by screening criteria;follow-up protocol;nurse/nurse practitioner consult MDR; po f/u  ; 30 mins     Diagnosis  cardiac disease Pt adm w a fib w/ RVR; syncopal episode, LE cellulitis Hx: HTN, chr hoarseness, COPD, hearing loss     Identified At Risk by Screening Criteria  difficulty chewing/swallowing         Nutrition/Diet History     Row Name 06/14/19 3333          Nutrition/Diet History    Typical Food/Fluid Intake  RN reports pt remains in Afib w/RVR; cardiologist saw him this am ; possible plan for AVN ablation next week; pathology not back from layrngeal biopsy. Pt's daughter concerned re: diet.     Food Preferences  Addressed limited food selection w/ dysphagia diet r/t need for thickened liquids; pt needs SLP re-evaluation before advancing texture this was discussed w/ daughter.     Factors Affecting Nutritional Intake  difficulty/impaired swallowing           Labs/Tests/Procedures/Meds     Row Name 06/14/19 3546          Labs/Procedures/Meds    Lab Results Reviewed  reviewed, pertinent        Diagnostic Tests/Procedures    Diagnostic Test/Procedure Reviewed  reviewed, pertinent     Diagnostic Test/Procedures Comments  laryngeal mass -pathology pending        Medications    Pertinent Medications Reviewed  reviewed, pertinent         Physical Findings     Row Name 06/14/19 0285          Physical Findings     Overall Physical Appearance  overweight     Oral/Mouth Cavity  poor dentition     Skin  other (see comments) improving R LE cellulitis per RN           Nutrition Prescription Ordered     Row Name 06/14/19 1612          Nutrition Prescription PO    Current PO Diet  Dysphagia     Dysphagia Level  3  Pureed with some mashed     Fluid Consistency  Honey thick     Common Modifiers  Cardiac;Consistent Carbohydrate                 Problem/Interventions:  Problem 1     Row Name 06/14/19 1612          Nutrition Diagnoses Problem 1    Problem 1  Swallowing Difficulty     Etiology (related to)  Functional Diagnosis;Medical Diagnosis     Oncology  Other (comment) laryngeal lesion s/p biopsy r/o malignancy     Functional Diagnosis  Dysphagia     Signs/Symptoms (evidenced by)  SLP/Swallow eval;PO Intake     Percent (%) intake recorded  67 %     Over number of meals  9     Swallow eval status  Done     Type of SLP Evaluation  Other (comment) FEES     Resolved?  Yes tolerating dysphagia modifiactions                 Intervention Goal     Row Name 06/14/19 1615          Intervention Goal    General  Meet nutritional needs for age/condition     PO  Increase intake;Modify texture/consistency         Nutrition Intervention     Row Name 06/14/19 1615          Nutrition Intervention    RD/Tech Action  Follow Tx progress;Care plan reviewd;Other (comment);Encourage intake;Interview for preference;Menu adjusted await SLP re-evaluation of swallow for diet  upgrade           Education/Evaluation     Row Name 06/14/19 1617          Monitor/Evaluation    Monitor  Per protocol;PO intake;Symptoms           Electronically signed by:  Gayle Grajeda, MS,RD,LD  06/14/19 4:17 PM

## 2019-06-14 NOTE — PLAN OF CARE
Problem: Patient Care Overview  Goal: Plan of Care Review  Outcome: Ongoing (interventions implemented as appropriate)   06/14/19 0502   Coping/Psychosocial   Plan of Care Reviewed With patient   Plan of Care Review   Progress no change   OTHER   Outcome Summary Pt in AFIB all night. Pt hr 120-140's on Esmolol at 50. BP cannot handle higher dose of Esmolol. UOP. Will continue to monitor.        Problem: Fall Risk (Adult)  Goal: Absence of Fall  Outcome: Ongoing (interventions implemented as appropriate)   06/14/19 0502   Fall Risk (Adult)   Absence of Fall achieves outcome

## 2019-06-14 NOTE — PROGRESS NOTES
Continued Stay Note  [facility]     Patient Name: Stef Jones  MRN: [id number]  Today's Date: 6/14/2019    Admit Date: 6/8/2019    Discharge Plan     Row Name 06/14/19 0912       Plan    Plan Comments  May get ablation and or pacemaker.  Not medically ready for discharge.  Goal is home.  CM will follow for needs.          Discharge Codes    No documentation.       Expected Discharge Date and Time     Expected Discharge Date Expected Discharge Time    Mt 15, 2019             Jessica Jones RN

## 2019-06-15 LAB
ANION GAP SERPL CALCULATED.3IONS-SCNC: 10 MMOL/L
BASOPHILS # BLD AUTO: 0.06 10*3/MM3 (ref 0–0.2)
BASOPHILS NFR BLD AUTO: 0.7 % (ref 0–1.5)
BUN BLD-MCNC: 17 MG/DL (ref 8–23)
BUN/CREAT SERPL: 18.1 (ref 7–25)
CALCIUM SPEC-SCNC: 9.2 MG/DL (ref 8.6–10.5)
CHLORIDE SERPL-SCNC: 96 MMOL/L (ref 98–107)
CO2 SERPL-SCNC: 30 MMOL/L (ref 22–29)
CREAT BLD-MCNC: 0.94 MG/DL (ref 0.76–1.27)
DEPRECATED RDW RBC AUTO: 47.1 FL (ref 37–54)
EOSINOPHIL # BLD AUTO: 0.38 10*3/MM3 (ref 0–0.4)
EOSINOPHIL NFR BLD AUTO: 4.6 % (ref 0.3–6.2)
ERYTHROCYTE [DISTWIDTH] IN BLOOD BY AUTOMATED COUNT: 15.3 % (ref 12.3–15.4)
GFR SERPL CREATININE-BSD FRML MDRD: 78 ML/MIN/1.73
GLUCOSE BLD-MCNC: 96 MG/DL (ref 65–99)
GLUCOSE BLDC GLUCOMTR-MCNC: 100 MG/DL (ref 70–130)
GLUCOSE BLDC GLUCOMTR-MCNC: 125 MG/DL (ref 70–130)
GLUCOSE BLDC GLUCOMTR-MCNC: 165 MG/DL (ref 70–130)
GLUCOSE BLDC GLUCOMTR-MCNC: 98 MG/DL (ref 70–130)
HCT VFR BLD AUTO: 53.1 % (ref 37.5–51)
HGB BLD-MCNC: 16.6 G/DL (ref 13–17.7)
IMM GRANULOCYTES # BLD AUTO: 0.04 10*3/MM3 (ref 0–0.05)
IMM GRANULOCYTES NFR BLD AUTO: 0.5 % (ref 0–0.5)
LYMPHOCYTES # BLD AUTO: 2.58 10*3/MM3 (ref 0.7–3.1)
LYMPHOCYTES NFR BLD AUTO: 31.2 % (ref 19.6–45.3)
MCH RBC QN AUTO: 27.3 PG (ref 26.6–33)
MCHC RBC AUTO-ENTMCNC: 31.3 G/DL (ref 31.5–35.7)
MCV RBC AUTO: 87.3 FL (ref 79–97)
MONOCYTES # BLD AUTO: 0.71 10*3/MM3 (ref 0.1–0.9)
MONOCYTES NFR BLD AUTO: 8.6 % (ref 5–12)
NEUTROPHILS # BLD AUTO: 4.51 10*3/MM3 (ref 1.7–7)
NEUTROPHILS NFR BLD AUTO: 54.4 % (ref 42.7–76)
NRBC BLD AUTO-RTO: 0 /100 WBC (ref 0–0.2)
PLATELET # BLD AUTO: 218 10*3/MM3 (ref 140–450)
PMV BLD AUTO: 10.3 FL (ref 6–12)
POTASSIUM BLD-SCNC: 4.4 MMOL/L (ref 3.5–5.2)
RBC # BLD AUTO: 6.08 10*6/MM3 (ref 4.14–5.8)
SODIUM BLD-SCNC: 136 MMOL/L (ref 136–145)
WBC NRBC COR # BLD: 8.28 10*3/MM3 (ref 3.4–10.8)

## 2019-06-15 PROCEDURE — 94799 UNLISTED PULMONARY SVC/PX: CPT

## 2019-06-15 PROCEDURE — 99232 SBSQ HOSP IP/OBS MODERATE 35: CPT | Performed by: INTERNAL MEDICINE

## 2019-06-15 PROCEDURE — 82962 GLUCOSE BLOOD TEST: CPT

## 2019-06-15 PROCEDURE — 97116 GAIT TRAINING THERAPY: CPT

## 2019-06-15 PROCEDURE — 80048 BASIC METABOLIC PNL TOTAL CA: CPT | Performed by: INTERNAL MEDICINE

## 2019-06-15 PROCEDURE — 85025 COMPLETE CBC W/AUTO DIFF WBC: CPT | Performed by: INTERNAL MEDICINE

## 2019-06-15 PROCEDURE — 94660 CPAP INITIATION&MGMT: CPT

## 2019-06-15 PROCEDURE — 97530 THERAPEUTIC ACTIVITIES: CPT

## 2019-06-15 PROCEDURE — 63710000001 INSULIN LISPRO (HUMAN) PER 5 UNITS

## 2019-06-15 PROCEDURE — P9612 CATHETERIZE FOR URINE SPEC: HCPCS

## 2019-06-15 PROCEDURE — 97110 THERAPEUTIC EXERCISES: CPT

## 2019-06-15 RX ADMIN — PANTOPRAZOLE SODIUM 40 MG: 40 TABLET, DELAYED RELEASE ORAL at 05:59

## 2019-06-15 RX ADMIN — IPRATROPIUM BROMIDE 0.5 MG: 0.5 SOLUTION RESPIRATORY (INHALATION) at 19:55

## 2019-06-15 RX ADMIN — AMIODARONE HYDROCHLORIDE 400 MG: 200 TABLET ORAL at 08:28

## 2019-06-15 RX ADMIN — DILTIAZEM HYDROCHLORIDE 30 MG: 30 TABLET, FILM COATED ORAL at 21:02

## 2019-06-15 RX ADMIN — METOPROLOL TARTRATE 100 MG: 100 TABLET ORAL at 08:28

## 2019-06-15 RX ADMIN — METOPROLOL TARTRATE 100 MG: 100 TABLET ORAL at 21:02

## 2019-06-15 RX ADMIN — SACUBITRIL AND VALSARTAN 1 TABLET: 24; 26 TABLET, FILM COATED ORAL at 19:42

## 2019-06-15 RX ADMIN — ATORVASTATIN CALCIUM 20 MG: 20 TABLET, FILM COATED ORAL at 19:41

## 2019-06-15 RX ADMIN — IPRATROPIUM BROMIDE 0.5 MG: 0.5 SOLUTION RESPIRATORY (INHALATION) at 13:08

## 2019-06-15 RX ADMIN — BUDESONIDE AND FORMOTEROL FUMARATE DIHYDRATE 2 PUFF: 80; 4.5 AEROSOL RESPIRATORY (INHALATION) at 19:56

## 2019-06-15 RX ADMIN — SACUBITRIL AND VALSARTAN 1 TABLET: 24; 26 TABLET, FILM COATED ORAL at 08:46

## 2019-06-15 RX ADMIN — IPRATROPIUM BROMIDE 0.5 MG: 0.5 SOLUTION RESPIRATORY (INHALATION) at 08:20

## 2019-06-15 RX ADMIN — DILTIAZEM HYDROCHLORIDE 60 MG: 60 TABLET, FILM COATED ORAL at 05:59

## 2019-06-15 RX ADMIN — AMIODARONE HYDROCHLORIDE 400 MG: 200 TABLET ORAL at 19:44

## 2019-06-15 RX ADMIN — AMITRIPTYLINE HYDROCHLORIDE 25 MG: 25 TABLET, FILM COATED ORAL at 19:42

## 2019-06-15 RX ADMIN — SODIUM CHLORIDE, PRESERVATIVE FREE 3 ML: 5 INJECTION INTRAVENOUS at 08:29

## 2019-06-15 RX ADMIN — DILTIAZEM HYDROCHLORIDE 30 MG: 30 TABLET, FILM COATED ORAL at 13:59

## 2019-06-15 RX ADMIN — FUROSEMIDE 20 MG: 20 TABLET ORAL at 08:28

## 2019-06-15 RX ADMIN — INSULIN LISPRO 3 UNITS: 100 INJECTION, SOLUTION INTRAVENOUS; SUBCUTANEOUS at 21:02

## 2019-06-15 RX ADMIN — APIXABAN 5 MG: 5 TABLET, FILM COATED ORAL at 19:46

## 2019-06-15 RX ADMIN — IPRATROPIUM BROMIDE 0.5 MG: 0.5 SOLUTION RESPIRATORY (INHALATION) at 16:03

## 2019-06-15 RX ADMIN — SODIUM CHLORIDE, PRESERVATIVE FREE 3 ML: 5 INJECTION INTRAVENOUS at 19:44

## 2019-06-15 RX ADMIN — APIXABAN 5 MG: 5 TABLET, FILM COATED ORAL at 08:28

## 2019-06-15 NOTE — THERAPY TREATMENT NOTE
Acute Care - Physical Therapy Treatment Note  [facility]     Patient Name: Stef Jones  : 1945  MRN: [id number]  Today's Date: 6/15/2019  Onset of Illness/Injury or Date of Surgery: 19  Date of Referral to PT: 06/10/19  Referring Physician: MD Shan    Admit Date: 2019    Visit Dx:    ICD-10-CM ICD-9-CM   1. A Fib w/RVR I48.91 427.31   2. Impaired functional mobility, balance, gait, and endurance Z74.09 V49.89   3. 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     Patient Active Problem List   Diagnosis   • Cellulitis of Right Lower Leg and Foot   • A Fib w/RVR   • COPD (chronic obstructive pulmonary disease) (CMS/HCC)   • Dysphagia   • Laryngeal mass       Therapy Treatment    Rehabilitation Treatment Summary     Row Name 06/15/19 0934 06/15/19 0907          Treatment Time/Intention    Discipline  occupational therapist  -TA  physical therapist  -LS     Document Type  therapy note (daily note)  -TA  therapy note (daily note)  -LS     Subjective Information  no complaints  -TA  no complaints  -LS     Mode of Treatment  occupational therapy  -TA  physical therapy  -LS     Patient/Family Observations  S.O. in room  -TA  --     Care Plan Review  care plan/treatment goals reviewed;risks/benefits reviewed;patient/other agree to care plan  -TA  --     Patient Effort  excellent  -TA  excellent  -LS     Existing Precautions/Restrictions  fall;cardiac;oxygen therapy device and L/min  -TA  cardiac;fall;oxygen therapy device and L/min  -LS     Treatment Considerations/Comments  --  Irregular HR/ low BP noted today; RN aware and gave clearance for mobility.   -LS     Patient Response to Treatment  tolerated  -TA  --     Recorded by [TA] Forrest Echeverria, OT 06/15/19 0957 [LS] Unique Millan PT 06/15/19 1051     Row Name 06/15/19 0934 06/15/19 0907          Vital Signs    Pre Systolic BP Rehab  72  -TA  87  -LS     Pre Treatment Diastolic BP  56  -TA  57  -LS      Post Systolic BP Rehab  94  -TA  72 RN notified and aware; pt nonsymptomatic  -LS     Post Treatment Diastolic BP  61  -TA  56  -LS     Pretreatment Heart Rate (beats/min)  111  -TA  101  -LS     Intratreatment Heart Rate (beats/min)  112  -TA  141  -LS     Posttreatment Heart Rate (beats/min)  105  -TA  105  -LS     Pre SpO2 (%)  95  -TA  94  -LS     O2 Delivery Pre Treatment  supplemental O2  -TA  supplemental O2  -LS     Post SpO2 (%)  94  -TA  95  -LS     O2 Delivery Post Treatment  supplemental O2  -TA  supplemental O2  -LS     Pre Patient Position  Sitting  -TA  Supine  -LS     Intra Patient Position  Sitting  -TA  Standing  -LS     Post Patient Position  Sitting  -TA  Sitting  -LS     Recorded by [TA] Forrest Echeverria, OT 06/15/19 0957 [LS] Unique Millan, PT 06/15/19 1051     Row Name 06/15/19 0934 06/15/19 0907          Cognitive Assessment/Intervention- PT/OT    Affect/Mental Status (Cognitive)  WFL  -TA  WFL  -LS     Orientation Status (Cognition)  oriented x 4  -TA  oriented x 4  -LS     Follows Commands (Cognition)  follows one step commands;over 90% accuracy  -TA  follows one step commands;over 90% accuracy;verbal cues/prompting required  -LS     Cognitive Function (Cognitive)  safety deficit  -TA  --     Safety Deficit (Cognitive)  mild deficit;safety precautions awareness  -TA  mild deficit;insight into deficits/self awareness;safety precautions awareness  -LS     Personal Safety Interventions  fall prevention program maintained;gait belt;muscle strengthening facilitated;nonskid shoes/slippers when out of bed;supervised activity  -TA  fall prevention program maintained;gait belt;nonskid shoes/slippers when out of bed  -LS     Recorded by [TA] Forrest Echeverria, OT 06/15/19 0957 [LS] Unique Millan, PT 06/15/19 1051     Row Name 06/15/19 0934             Safety Issues, Functional Mobility    Safety Issues Affecting Function (Mobility)  safety precaution awareness  -TA      Impairments Affecting  Function (Mobility)  endurance/activity tolerance;strength  -TA      Recorded by [TA] Forrest Echeverria, OT 06/15/19 0957      Row Name 06/15/19 0934 06/15/19 0907          Bed Mobility Assessment/Treatment    Supine-Sit [place] (Bed Mobility)  --  supervision  -LS     Assistive Device (Bed Mobility)  --  bed rails;head of bed elevated  -LS     Comment (Bed Mobility)  Pt UIC  -TA  --     Recorded by [TA] Forrest Echeverria, OT 06/15/19 0957 [LS] Unique Millan, PT 06/15/19 1051     Row Name 06/15/19 0907             Transfer Assessment/Treatment    Transfer Assessment/Treatment  toilet transfer  -LS      Recorded by [LS] Unique Millan, PT 06/15/19 1051      Row Name 06/15/19 0907             Sit-Stand Transfer    Sit-Stand [place] (Transfers)  contact guard;verbal cues  -LS      Assistive Device (Sit-Stand Transfers)  walker, front-wheeled  -LS      Recorded by [LS] Unique Millan, PT 06/15/19 1051      Row Name 06/15/19 0907             Stand-Sit Transfer    Stand-Sit [place] (Transfers)  contact guard;verbal cues  -LS      Assistive Device (Stand-Sit Transfers)  walker, front-wheeled  -LS      Recorded by [LS] Unique Millan, PT 06/15/19 1051      Row Name 06/15/19 0907             Toilet Transfer    Type (Toilet Transfer)  sit-stand;stand-sit  -LS      [place] Level (Toilet Transfer)  contact guard;verbal cues  -LS      Assistive Device (Toilet Transfer)  walker, front-wheeled  -LS      Recorded by [LS] Unique Millan, PT 06/15/19 1051      Row Name 06/15/19 0907             Gait/Stairs Assessment/Training    42189 - Gait Training Minutes   15  -LS      Gait/Stairs Assessment/Training  gait/ambulation assistive device  -LS      [place] Level (Gait)  contact guard;verbal cues  -LS      Assistive Device (Gait)  walker, front-wheeled  -LS      Distance in Feet (Gait)  300  -LS      Pattern (Gait)  step-through  -LS      Bilateral Gait Deviations  forward flexed posture  -LS      Comment  (Gait/Stairs)  Noted irregular/elevated HR thoughout. VC for posture and keeping RW close to body.   -LS      Recorded by [LS] Unique Millan, PT 06/15/19 1051      Row Name 06/15/19 0934             Motor Skills Assessment/Interventions    Additional Documentation  Therapeutic Exercise (Group);Therapeutic Exercise Interventions (Group)  -TA      Recorded by [TA] Forrest Echeverria, OT 06/15/19 0957      Row Name 06/15/19 0934 06/15/19 0907          Therapeutic Exercise    Therapeutic Exercise  seated, upper extremities  -TA  seated, lower extremities  -LS     Additional Documentation  Therapeutic Exercise (Row)  -TA  --     95614 - PT Therapeutic Exercise Minutes  --  3  -LS     41770 - PT Therapeutic Activity Minutes  --  7  -LS     75459 - OT Therapeutic Exercise Minutes  14  -TA  --     Recorded by [TA] Forrest Echeverria, OT 06/15/19 0957 [LS] Unique Millan, PT 06/15/19 1051     Row Name 06/15/19 0934             Upper Extremity Seated Therapeutic Exercise    Performed, Seated Upper Extremity (Therapeutic Exercise)  shoulder flexion/extension;shoulder abduction/adduction;shoulder horizontal abduction/adduction;scapular stabilization;elbow flexion/extension;digit flexion/extension  -TA      Exercise Type, Seated Upper Extremity (Therapeutic Exercise)  AROM (active range of motion)  -TA      Expected Outcomes, Seated Upper Extremity (Therapeutic Exercise)  improve functional tolerance, self-care activity;improve performance, BADLs;improve performance, transfer skills  -TA      Sets/Reps Detail, Seated Upper Extremity (Therapeutic Exercise)  1/15  -TA      Recorded by [TA] Forrest Echeverria, OT 06/15/19 0957      Row Name 06/15/19 0907             Lower Extremity Seated Therapeutic Exercise    Performed, Seated Lower Extremity (Therapeutic Exercise)  hip flexion/extension;LAQ (long arc quad), knee extension;ankle dorsiflexion/plantarflexion  -LS      Exercise Type, Seated Lower Extremity (Therapeutic  Exercise)  AROM (active range of motion)  -LS      Sets/Reps Detail, Seated Lower Extremity (Therapeutic Exercise)  1/10  -LS      Recorded by [LS] Unique Millan, PT 06/15/19 1051      Row Name 06/15/19 0934             Balance    Balance  dynamic sitting balance  -TA      Recorded by [TA] Forrest Echeverria, OT 06/15/19 0957      Row Name 06/15/19 0934 06/15/19 0907          Static Sitting Balance    Level of [place] (Unsupported Sitting, Static Balance)  independent  -TA  independent  -LS     Sitting Position (Unsupported Sitting, Static Balance)  sitting in chair  -TA  sitting on edge of bed  -LS     Time Able to Maintain Position (Unsupported Sitting, Static Balance)  more than 5 minutes  -TA  --     Recorded by [TA] Forrest Echeverria, OT 06/15/19 0957 [LS] Unique Millan, PT 06/15/19 1051     Row Name 06/15/19 0907             Static Standing Balance    Level of [place] (Supported Standing, Static Balance)  supervision  -LS      Assistive Device Utilized (Supported Standing, Static Balance)  walker, rolling  -LS      Recorded by [LS] Unique Millan, PT 06/15/19 1051      Row Name 06/15/19 0934 06/15/19 0907          Positioning and Restraints    Pre-Treatment Position  sitting in chair/recliner  -TA  in bed  -LS     Post Treatment Position  chair  -TA  chair  -LS     In Chair  reclined;call light within reach;encouraged to call for assist;exit alarm on;waffle cushion;legs elevated  -TA  notified nsg;reclined;call light within reach;encouraged to call for assist;exit alarm on;with family/caregiver;legs elevated  -LS     Recorded by [TA] Forrest Echeverria, OT 06/15/19 0957 [LS] Unique Millan, PT 06/15/19 1051     Row Name 06/15/19 0934             Pain Assessment    Additional Documentation  Pain Scale: Numbers Pre/Post-Treatment (Group)  -TA      Recorded by [TA] Forrest Echeverria, OT 06/15/19 0957      Row Name 06/15/19 0934 06/15/19 0907          Pain Scale: Numbers Pre/Post-Treatment     Pain Scale: Numbers, Pretreatment  0/10 - no pain  -TA  0/10 - no pain  -LS     Pain Scale: Numbers, Post-Treatment  0/10 - no pain  -TA  0/10 - no pain  -LS     Pre/Post Treatment Pain Comment  tolerated  -TA  --     Pain Intervention(s)  Repositioned;Ambulation/increased activity  -TA  --     Recorded by [TA] Forrest Echeverria, OT 06/15/19 0957 [LS] Unique Millan, PT 06/15/19 1051     Row Name 06/15/19 0934             Pain Scale: FACES Pre/Post-Treatment    Pain: FACES Scale, Pretreatment  0-->no hurt  -TA      Pain: FACES Scale, Post-Treatment  0-->no hurt  -TA      Recorded by [TA] Forrest Echeverria, OT 06/15/19 0957      Row Name 06/15/19 0934             Coping    Observed Emotional State  calm;cooperative  -TA      Verbalized Emotional State  acceptance  -TA      Recorded by [TA] Forrest Echeverria, OT 06/15/19 0957      Row Name 06/15/19 0934 06/15/19 0907          Plan of Care Review    Plan of Care Reviewed With  patient;significant other  -TA  patient;family  -LS     Recorded by [TA] Forrest Echeverria, OT 06/15/19 0957 [LS] Unique Millan, PT 06/15/19 1051     Row Name 06/15/19 0934             Outcome Summary/Treatment Plan (OT)    Daily Summary of Progress (OT)  progress toward functional goals is good  -TA      Anticipated Discharge Disposition (OT)  home with assist;home with home health  -TA      Recorded by [TA] Forrest Echeverria, OT 06/15/19 0957      Row Name 06/15/19 0907             Outcome Summary/Treatment Plan (PT)    Daily Summary of Progress (PT)  progress toward functional goals is good  -LS      Barriers to Overall Progress (PT)  irreg HR  -LS      Recorded by [LS] Unique Millan, PT 06/15/19 1051        User Key  (r) = Recorded By, (t) = Taken By, (c) = Cosigned By    Initials Name Effective Dates Discipline    LS Unique Millan, PT 06/19/15 -  PT    TA Forrest Echeverria, OT 03/14/16 -  OT          Rash 06/14/19 2000 Left lower lumbar spine papule (Active)   Distribution  generalized 6/15/2019  8:30 AM   Configuration/Shape round;symmetrical 6/15/2019 12:00 AM   Borders indistinct 6/15/2019 12:00 AM   Characteristics dry;itching 6/15/2019 12:00 AM   Color color consistent with skin 6/15/2019 12:00 AM   Care, Rash open to air 6/15/2019 12:00 AM           Physical Therapy Education     Title: PT OT SLP Therapies (Done)     Topic: Physical Therapy (Done)     Point: Mobility training (Done)     Learning Progress Summary           Patient Acceptance, E, VU,NR by  at 6/15/2019  9:07 AM    Acceptance, E,TB, VU,DU,NR by  at 6/14/2019 10:18 PM    Acceptance, TB, NR by  at 6/13/2019  2:36 PM    Acceptance, E,D, NR by  at 6/11/2019 10:10 AM    Acceptance, TB, NR by  at 6/10/2019  1:50 PM   Family Acceptance, E, VU,NR by  at 6/15/2019  9:07 AM    Acceptance, E,TB, VU,DU,NR by  at 6/14/2019 10:18 PM                   Point: Home exercise program (Done)     Learning Progress Summary           Patient Acceptance, E, VU,NR by  at 6/15/2019  9:07 AM    Acceptance, E,TB, VU,DU,NR by  at 6/14/2019 10:18 PM    Acceptance, E,D, NR by  at 6/11/2019 10:10 AM   Family Acceptance, E, VU,NR by  at 6/15/2019  9:07 AM    Acceptance, E,TB, VU,DU,NR by  at 6/14/2019 10:18 PM                   Point: Body mechanics (Done)     Learning Progress Summary           Patient Acceptance, E, VU,NR by  at 6/15/2019  9:07 AM    Acceptance, E,TB, VU,DU,NR by  at 6/14/2019 10:18 PM    Acceptance, TB, NR by  at 6/13/2019  2:36 PM    Acceptance, E,D, NR by  at 6/11/2019 10:10 AM    Acceptance, TB, NR by  at 6/10/2019  1:50 PM   Family Acceptance, E, VU,NR by  at 6/15/2019  9:07 AM    Acceptance, E,TB, VU,DU,NR by  at 6/14/2019 10:18 PM                   Point: Precautions (Done)     Learning Progress Summary           Patient Acceptance, KELIY DALENR by KATIE at 6/15/2019  9:07 AM    SUYAPA Rios TB, VU, DU, NR by LAY at 6/14/2019 10:18 PM    Acceptance, APOLLO NR by GEETA at 6/13/2019  2:36 PM     Acceptance, E,D, NR by  at 6/11/2019 10:10 AM    Acceptance, TB, NR by  at 6/10/2019  1:50 PM   Family Acceptance, E, VU,NR by  at 6/15/2019  9:07 AM    Acceptance, E,TB, VU,DU,NR by  at 6/14/2019 10:18 PM                               User Key     Initials Effective Dates Name Provider Type Discipline     06/19/15 -  Unique Millan, PT Physical Therapist PT     06/16/16 -  Tri White, RN Registered Nurse Nurse     05/15/19 -  Antonio Torres, PT Student PT Student PT                PT Recommendation and Plan     Outcome Summary/Treatment Plan (PT)  Daily Summary of Progress (PT): progress toward functional goals is good  Barriers to Overall Progress (PT): irreg HR  Plan of Care Reviewed With: patient, family  Progress: improving  Outcome Summary: Pt demonstrated increased indep with STS transfer; progressed forward ambulation distance to 300 total ft with use of RW, CGA. Cont to be limited by noted irreg/elevated HR and low BP; pt nonsymptomatic and gave good effort throughout. Will cont PT POC.   Outcome Measures     Row Name 06/15/19 0934 06/15/19 0907 06/13/19 1436       How much help from another person do you currently need...    Turning from your back to your side while in flat bed without using bedrails?  --  3  -  3  -DANE (r) KL (t) DANE (c)    Moving from lying on back to sitting on the side of a flat bed without bedrails?  --  3  -  3  -DANE (r) KL (t) DANE (c)    Moving to and from a bed to a chair (including a wheelchair)?  --  3  -  3  -DANE (r) KL (t) DANE (c)    Standing up from a chair using your arms (e.g., wheelchair, bedside chair)?  --  3  -  3  -DANE (r) KL (t) DANE (c)    Climbing 3-5 steps with a railing?  --  3  -  3  -DANE (r) KL (t) DANE (c)    To walk in hospital room?  --  3  -  3  -DANE (r) KL (t) DANE (c)    AM-PAC 6 Clicks Score  --  18  -  18  -DANE (r) KL (t)       How much help from another is currently needed...    Putting on and taking off regular lower body clothing?   2  -TA  --  --    Bathing (including washing, rinsing, and drying)  2  -TA  --  --    Toileting (which includes using toilet bed pan or urinal)  3  -TA  --  --    Putting on and taking off regular upper body clothing  3  -TA  --  --    Taking care of personal grooming (such as brushing teeth)  3  -TA  --  --    Eating meals  4  -TA  --  --    Score  17  -TA  --  --       Functional Assessment    Outcome Measure Options  AM-PAC 6 Clicks Daily Activity (OT)  -TA  AM-PAC 6 Clicks Basic Mobility (PT)  -LS  AM-PAC 6 Clicks Basic Mobility (PT)  -DANE (r) GEETA (t) DANE (c)      User Key  (r) = Recorded By, (t) = Taken By, (c) = Cosigned By    Initials Name Provider Type    Jackie Koo, PT Physical Therapist    Unique Martínez, PT Physical Therapist    Forrest Castaneda, OT Occupational Therapist    Antonio Rodríguez, PT Student PT Student         Time Calculation:   PT Charges     Row Name 06/15/19 0907             Time Calculation    Start Time  0907  -      PT Received On  06/15/19  -         Time Calculation- PT    Total Timed Code Minutes- PT  25 minute(s)  -         Timed Charges    24595 - PT Therapeutic Exercise Minutes  3  -      17202 - Gait Training Minutes   15  -LS      07710 - PT Therapeutic Activity Minutes  7  -LS        User Key  (r) = Recorded By, (t) = Taken By, (c) = Cosigned By    Initials Name Provider Type     Unique Millan, PT Physical Therapist        Therapy Charges for Today     Code Description Service Date Service Provider Modifiers Qty    39004760357 HC GAIT TRAINING EA 15 MIN 6/15/2019 Unique Millan, PT GP 1    98492850617 HC PT THERAPEUTIC ACT EA 15 MIN 6/15/2019 Unique Millan, PT GP 1    79659265448 HC PT THER SUPP EA 15 MIN 6/15/2019 Unique Millan, PT GP 1          PT G-Codes  Outcome Measure Options: AM-PAC 6 Clicks Daily Activity (OT)  AM-PAC 6 Clicks Score: 18  Score: 17    Unique Millan, PT  6/15/2019

## 2019-06-15 NOTE — PLAN OF CARE
Problem: Patient Care Overview  Goal: Plan of Care Review  Outcome: Ongoing (interventions implemented as appropriate)   06/15/19 0907   Coping/Psychosocial   Plan of Care Reviewed With patient;family   Plan of Care Review   Progress improving   OTHER   Outcome Summary Pt demonstrated increased indep with STS transfer; progressed forward ambulation distance to 300 total ft with use of RW, CGA. Cont to be limited by noted irreg/elevated HR and low BP; pt nonsymptomatic and gave good effort throughout. Will cont PT POC.

## 2019-06-15 NOTE — PLAN OF CARE
Problem: Patient Care Overview  Goal: Plan of Care Review  Outcome: Ongoing (interventions implemented as appropriate)   06/15/19 2419   Coping/Psychosocial   Plan of Care Reviewed With patient   Plan of Care Review   Progress improving   OTHER   Outcome Summary a&o, voice hoarse, 2lnc bipap hs, hr dropped in 70's for several hours afib continues but controlled, hypotension at times, bm x2, rash/itch to bilateral flank cream ordered per aprn, no c/o pain or soa, new iv placed, family still needs education very concerned of bg drop from 150 to 90's upset over waking patient educated on poc and policy--- no complaints from patient

## 2019-06-15 NOTE — THERAPY TREATMENT NOTE
Acute Care - Occupational Therapy Treatment Note  [facility]     Patient Name: Stef Jones  : 1945  MRN: [id number]  Today's Date: 6/15/2019  Onset of Illness/Injury or Date of Surgery: 19  Date of Referral to OT: 19  Referring Physician: MD Shan    Admit Date: 2019       ICD-10-CM ICD-9-CM   1. A Fib w/RVR I48.91 427.31   2. Impaired functional mobility, balance, gait, and endurance Z74.09 V49.89   3. 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     Patient Active Problem List   Diagnosis   • Cellulitis of Right Lower Leg and Foot   • A Fib w/RVR   • COPD (chronic obstructive pulmonary disease) (CMS/HCC)   • Dysphagia   • Laryngeal mass     Past Medical History:   Diagnosis Date   • COPD (chronic obstructive pulmonary disease) (CMS/HCC)    • Hearing loss    • Hoarse voice quality    • Hypertension      Past Surgical History:   Procedure Laterality Date   • HEMORRHOIDECTOMY     • LARYNGOSCOPY N/A 2019    Procedure: MICRO DIRECT LARYNGOSCOPY WITH BIOPSY;  Surgeon: Glen Gutiérrez MD;  Location: [facility];  Service: ENT       Therapy Treatment    Rehabilitation Treatment Summary     Row Name 06/15/19 0934             Treatment Time/Intention    Discipline  occupational therapist  -TA      Document Type  therapy note (daily note)  -TA      Subjective Information  no complaints  -TA      Mode of Treatment  occupational therapy  -TA      Patient/Family Observations  S.O. in room  -TA      Care Plan Review  care plan/treatment goals reviewed;risks/benefits reviewed;patient/other agree to care plan  -TA      Patient Effort  excellent  -TA      Existing Precautions/Restrictions  fall;cardiac;oxygen therapy device and L/min  -TA      Patient Response to Treatment  tolerated  -TA      Recorded by [TA] Forrest Echeverria OT 06/15/19 0957      Row Name 06/15/19 0934             Vital Signs    Pre Systolic BP Rehab  72  -TA      Pre Treatment  Diastolic BP  56  -TA      Post Systolic BP Rehab  94  -TA      Post Treatment Diastolic BP  61  -TA      Pretreatment Heart Rate (beats/min)  111  -TA      Intratreatment Heart Rate (beats/min)  112  -TA      Posttreatment Heart Rate (beats/min)  105  -TA      Pre SpO2 (%)  95  -TA      O2 Delivery Pre Treatment  supplemental O2  -TA      Post SpO2 (%)  94  -TA      O2 Delivery Post Treatment  supplemental O2  -TA      Pre Patient Position  Sitting  -TA      Intra Patient Position  Sitting  -TA      Post Patient Position  Sitting  -TA      Recorded by [TA] Forrest Echeverria, OT 06/15/19 0957      Row Name 06/15/19 0934             Cognitive Assessment/Intervention- PT/OT    Affect/Mental Status (Cognitive)  WFL  -TA      Orientation Status (Cognition)  oriented x 4  -TA      Follows Commands (Cognition)  follows one step commands;over 90% accuracy  -TA      Cognitive Function (Cognitive)  safety deficit  -TA      Safety Deficit (Cognitive)  mild deficit;safety precautions awareness  -TA      Personal Safety Interventions  fall prevention program maintained;gait belt;muscle strengthening facilitated;nonskid shoes/slippers when out of bed;supervised activity  -TA      Recorded by [TA] Forrest Echeverria, OT 06/15/19 0957      Row Name 06/15/19 0934             Safety Issues, Functional Mobility    Safety Issues Affecting Function (Mobility)  safety precaution awareness  -TA      Impairments Affecting Function (Mobility)  endurance/activity tolerance;strength  -TA      Recorded by [TA] Forrest Echeverria, OT 06/15/19 0957      Row Name 06/15/19 0934             Bed Mobility Assessment/Treatment    Comment (Bed Mobility)  Pt UIC  -TA      Recorded by [TA] Forrest Echeverria, OT 06/15/19 0957      Row Name 06/15/19 0934             Motor Skills Assessment/Interventions    Additional Documentation  Therapeutic Exercise (Group);Therapeutic Exercise Interventions (Group)  -TA      Recorded by [TA] Forrest Echeverria,  OT 06/15/19 0957      Row Name 06/15/19 0934             Therapeutic Exercise    Therapeutic Exercise  seated, upper extremities  -TA      Additional Documentation  Therapeutic Exercise (Row)  -TA      65473 - OT Therapeutic Exercise Minutes  14  -TA      Recorded by [TA] Forrest Echeverria, OT 06/15/19 0957      Row Name 06/15/19 0934             Upper Extremity Seated Therapeutic Exercise    Performed, Seated Upper Extremity (Therapeutic Exercise)  shoulder flexion/extension;shoulder abduction/adduction;shoulder horizontal abduction/adduction;scapular stabilization;elbow flexion/extension;digit flexion/extension  -TA      Exercise Type, Seated Upper Extremity (Therapeutic Exercise)  AROM (active range of motion)  -TA      Expected Outcomes, Seated Upper Extremity (Therapeutic Exercise)  improve functional tolerance, self-care activity;improve performance, BADLs;improve performance, transfer skills  -TA      Sets/Reps Detail, Seated Upper Extremity (Therapeutic Exercise)  1/15  -TA      Recorded by [TA] Forrest Echeverria, OT 06/15/19 0957      Row Name 06/15/19 0934             Balance    Balance  dynamic sitting balance  -TA      Recorded by [TA] Forrest Echeverria, OT 06/15/19 0957      Row Name 06/15/19 0934             Static Sitting Balance    Level of [place] (Unsupported Sitting, Static Balance)  independent  -TA      Sitting Position (Unsupported Sitting, Static Balance)  sitting in chair  -TA      Time Able to Maintain Position (Unsupported Sitting, Static Balance)  more than 5 minutes  -TA      Recorded by [TA] Forrest Echeverria, OT 06/15/19 0957      Row Name 06/15/19 0934             Positioning and Restraints    Pre-Treatment Position  sitting in chair/recliner  -TA      Post Treatment Position  chair  -TA      In Chair  reclined;call light within reach;encouraged to call for assist;exit alarm on;waffle cushion;legs elevated  -TA      Recorded by [TA] Forrest Echeverria, OT 06/15/19 0957       Row Name 06/15/19 0934             Pain Assessment    Additional Documentation  Pain Scale: Numbers Pre/Post-Treatment (Group)  -TA      Recorded by [TA] Forrest Echeverria, OT 06/15/19 0957      Row Name 06/15/19 0934             Pain Scale: Numbers Pre/Post-Treatment    Pain Scale: Numbers, Pretreatment  0/10 - no pain  -TA      Pain Scale: Numbers, Post-Treatment  0/10 - no pain  -TA      Pre/Post Treatment Pain Comment  tolerated  -TA      Pain Intervention(s)  Repositioned;Ambulation/increased activity  -TA      Recorded by [TA] Forrest Echeverria, OT 06/15/19 0957      Row Name 06/15/19 0934             Pain Scale: FACES Pre/Post-Treatment    Pain: FACES Scale, Pretreatment  0-->no hurt  -TA      Pain: FACES Scale, Post-Treatment  0-->no hurt  -TA      Recorded by [TA] Forrest Echeverria, OT 06/15/19 0957      Row Name 06/15/19 0934             Coping    Observed Emotional State  calm;cooperative  -TA      Verbalized Emotional State  acceptance  -TA      Recorded by [TA] Forrest Echeverria, OT 06/15/19 0957      Row Name 06/15/19 0934             Plan of Care Review    Plan of Care Reviewed With  patient;significant other  -TA      Recorded by [TA] Forrest Echeverria, OT 06/15/19 0957      Row Name 06/15/19 0934             Outcome Summary/Treatment Plan (OT)    Daily Summary of Progress (OT)  progress toward functional goals is good  -TA      Anticipated Discharge Disposition (OT)  home with assist;home with home health  -TA      Recorded by [TA] Forrest Echeverria, OT 06/15/19 0957        User Key  (r) = Recorded By, (t) = Taken By, (c) = Cosigned By    Initials Name Effective Dates Discipline    TA Forrest Echeverria, OT 03/14/16 -  OT        Rash 06/14/19 2000 Left lower lumbar spine papule (Active)   Distribution generalized 6/15/2019  8:30 AM   Configuration/Shape round;symmetrical 6/15/2019 12:00 AM   Borders indistinct 6/15/2019 12:00 AM   Characteristics dry;itching 6/15/2019 12:00 AM    Color color consistent with skin 6/15/2019 12:00 AM   Care, Rash open to air 6/15/2019 12:00 AM       Occupational Therapy Education     Title: PT OT SLP Therapies (Done)     Topic: Occupational Therapy (Done)     Point: ADL training (Done)     Description: Instruct learner(s) on proper safety adaptation and remediation techniques during self care or transfers.   Instruct in proper use of assistive devices.    Learning Progress Summary           Patient Acceptance, E,TB, VU,DU,NR by CH at 6/14/2019 10:18 PM    Acceptance, E, NR by CL at 6/10/2019  1:49 PM    Comment:  Pt educated on appropriate safety precautions, t/f techniques, role of OT, and benefits of therapy.   Family Acceptance, E,TB, VU,DU,NR by CH at 6/14/2019 10:18 PM    Acceptance, E, NR by CL at 6/10/2019  1:49 PM    Comment:  Pt educated on appropriate safety precautions, t/f techniques, role of OT, and benefits of therapy.                   Point: Home exercise program (Done)     Description: Instruct learner(s) on appropriate technique for monitoring, assisting and/or progressing therapeutic exercises/activities.    Learning Progress Summary           Patient Acceptance, E, VU by TA at 6/15/2019  9:57 AM    Comment:  BUE HEP; renforced need for call for assist with OOB activities.    Acceptance, E,TB, VU,DU,NR by  at 6/14/2019 10:18 PM   Family Acceptance, E,TB, VU,DU,NR by CH at 6/14/2019 10:18 PM                   Point: Precautions (Done)     Description: Instruct learner(s) on prescribed precautions during self-care and functional transfers.    Learning Progress Summary           Patient Acceptance, E, VU by TA at 6/15/2019  9:57 AM    Comment:  BUE HEP; renforced need for call for assist with OOB activities.    Acceptance, E,TB, VU,DU,NR by CH at 6/14/2019 10:18 PM    Acceptance, E, NR by CL at 6/10/2019  1:49 PM    Comment:  Pt educated on appropriate safety precautions, t/f techniques, role of OT, and benefits of therapy.   Family  Acceptance, E,TB, VU,DU,NR by  at 6/14/2019 10:18 PM    Acceptance, E, NR by CL at 6/10/2019  1:49 PM    Comment:  Pt educated on appropriate safety precautions, t/f techniques, role of OT, and benefits of therapy.                   Point: Body mechanics (Done)     Description: Instruct learner(s) on proper positioning and spine alignment during self-care, functional mobility activities and/or exercises.    Learning Progress Summary           Patient Acceptance, E,TB, VU,DU,NR by  at 6/14/2019 10:18 PM    Acceptance, E, NR by CL at 6/10/2019  1:49 PM    Comment:  Pt educated on appropriate safety precautions, t/f techniques, role of OT, and benefits of therapy.   Family Acceptance, E,TB, VU,DU,NR by  at 6/14/2019 10:18 PM    Acceptance, E, NR by CL at 6/10/2019  1:49 PM    Comment:  Pt educated on appropriate safety precautions, t/f techniques, role of OT, and benefits of therapy.                               User Key     Initials Effective Dates Name Provider Type Discipline     03/14/16 -  Forrest Echeverria OT Occupational Therapist OT     06/16/16 -  Tri White RN Registered Nurse Nurse     04/03/18 -  Harper Nelson OT Occupational Therapist OT                OT Recommendation and Plan  Outcome Summary/Treatment Plan (OT)  Daily Summary of Progress (OT): progress toward functional goals is good  Anticipated Discharge Disposition (OT): home with assist, home with home health  Daily Summary of Progress (OT): progress toward functional goals is good  Plan of Care Review  Plan of Care Reviewed With: patient, significant other  Plan of Care Reviewed With: patient, significant other  Outcome Summary: Pt progressing with occupational endurance, tolerated BUE strengthening ex's 15 reps each with >112. Continue per OT POC.   Outcome Measures     Row Name 06/15/19 0934 06/13/19 3878          How much help from another person do you currently need...    Turning from your back to your side  while in flat bed without using bedrails?  --  3  -DANE (r) KL (t) DANE (c)     Moving from lying on back to sitting on the side of a flat bed without bedrails?  --  3  -DANE (r) KL (t) DNAE (c)     Moving to and from a bed to a chair (including a wheelchair)?  --  3  -DANE (r) KL (t) DANE (c)     Standing up from a chair using your arms (e.g., wheelchair, bedside chair)?  --  3  -DANE (r) KL (t) DANE (c)     Climbing 3-5 steps with a railing?  --  3  -DANE (r) KL (t) DANE (c)     To walk in hospital room?  --  3  -DANE (r) KL (t) DANE (c)     AM-[facility] 6 Clicks Score  --  18  -DANE (r) KL (t)        How much help from another is currently needed...    Putting on and taking off regular lower body clothing?  2  -TA  --     Bathing (including washing, rinsing, and drying)  2  -TA  --     Toileting (which includes using toilet bed pan or urinal)  3  -TA  --     Putting on and taking off regular upper body clothing  3  -TA  --     Taking care of personal grooming (such as brushing teeth)  3  -TA  --     Eating meals  4  -TA  --     Score  17  -TA  --        Functional Assessment    Outcome Measure Options  AM-PAC 6 Clicks Daily Activity (OT)  -TA  AM-PAC 6 Clicks Basic Mobility (PT)  -DANE (r) KL (t) DANE (c)       User Key  (r) = Recorded By, (t) = Taken By, (c) = Cosigned By    Initials Name Provider Type    Jackie Koo, PT Physical Therapist    TA Forrest Echeverria, OT Occupational Therapist    Antonio Rodríguez, PT Student PT Student           Time Calculation:   Time Calculation- OT     Row Name 06/15/19 1000 06/15/19 0934          Time Calculation- OT    OT Start Time  0934 ttc 15 minutes  -TA  --     Total Timed Code Minutes- OT  15 minute(s)  -TA  --     OT Received On  06/15/19  -TA  --     OT Goal Re-Cert Due Date  06/20/19  -TA  --        Timed Charges    14021 - OT Therapeutic Exercise Minutes  --  14  -TA       User Key  (r) = Recorded By, (t) = Taken By, (c) = Cosigned By    Initials Name Provider Type    Forrest Castaneda  S, OT Occupational Therapist        Therapy Charges for Today     Code Description Service Date Service Provider Modifiers Qty    [id number] HC OT THER PROC EA 15 MIN 6/15/2019 Forrest Echeverria, OT GO 1               Forrest Echeverria OT  6/15/2019

## 2019-06-15 NOTE — PLAN OF CARE
Problem: Patient Care Overview  Goal: Plan of Care Review  Outcome: Ongoing (interventions implemented as appropriate)   06/15/19 0958 06/15/19 1600 06/15/19 1642   Coping/Psychosocial   Plan of Care Reviewed With --  patient;family --    Plan of Care Review   Progress improving --  --    OTHER   Outcome Summary --  --  HR better today. BP labile throughout the day. Will watch over weekend with possible ablation/pacemaker placement on monday. VSS, Will continue to monitor.        Problem: Fall Risk (Adult)  Goal: Identify Related Risk Factors and Signs and Symptoms  Outcome: Ongoing (interventions implemented as appropriate)      Problem: Skin Injury Risk (Adult)  Goal: Identify Related Risk Factors and Signs and Symptoms  Outcome: Ongoing (interventions implemented as appropriate)    Goal: Skin Health and Integrity  Outcome: Ongoing (interventions implemented as appropriate)      Problem: Chronic Obstructive Pulmonary Disease (Adult)  Goal: Signs and Symptoms of Listed Potential Problems Will be Absent, Minimized or Managed (Chronic Obstructive Pulmonary Disease)  Outcome: Ongoing (interventions implemented as appropriate)

## 2019-06-15 NOTE — PROGRESS NOTES
[facility] at [facility]  Progress Note       LOS: 7 days   Patient Care Team:  Lexx Monson MD as PCP - General (Family Medicine)    Chief Complaint:  Atrial fibrillation    Subjective     Interval History: Remains in atrial fibrillation, heart rates intermittently controlled but currently in RVR with rates in the 120's.  BP has been labile.  Resting comfortably with no new complaints.  Overall no new complaints.  Shortness of breath remains stable.  No chest pain.        Review of Systems:   Pertinent positives in HPI, all others reviewed and negative.      Objective       Current Facility-Administered Medications:   •  acetaminophen (TYLENOL) tablet 650 mg, 650 mg, Oral, Q6H PRN, Aroldo Glez MD, 650 mg at 06/12/19 1104  •  albuterol (PROVENTIL) nebulizer solution 0.083% 2.5 mg/3mL, 0.625 mg, Nebulization, Q6H PRN, Glen Gutiérrez MD  •  amiodarone (PACERONE) tablet 400 mg, 400 mg, Oral, Q12H, Vonnie Ruiz APRN, 400 mg at 06/14/19 2214  •  amitriptyline (ELAVIL) tablet 25 mg, 25 mg, Oral, Nightly, Gema Duong APRN, 25 mg at 06/14/19 2027  •  apixaban (ELIQUIS) tablet 5 mg, 5 mg, Oral, Q12H, Aroldo Glez MD, 5 mg at 06/14/19 2026  •  atorvastatin (LIPITOR) tablet 20 mg, 20 mg, Oral, Nightly, Glen Gutiérrez MD, 20 mg at 06/14/19 2026  •  budesonide-formoterol (SYMBICORT) 80-4.5 MCG/ACT inhaler 2 puff, 2 puff, Inhalation, BID - RT, Glen Gutiérrez MD, 2 puff at 06/14/19 2009  •  diltiaZEM (CARDIZEM) tablet 60 mg, 60 mg, Oral, Q8H, Lucio Chapman MD, 60 mg at 06/15/19 0559  •  diphenhydrAMINE-zinc acetate 2-0.1 % cream, , Topical, TID PRN, Young, Gema L, APRN  •  esmolol (BREVIBLOC) 2500 mg/250 mL (10 mg/mL) infusion,  mcg/kg/min, Intravenous, Titrated, Yovanny Solares MD, Stopped at 06/14/19 1409  •  furosemide (LASIX) tablet 20 mg, 20 mg, Oral, Daily, OsetinskyGlen MD, 20 mg at 06/14/19 0811  •  insulin lispro (humaLOG)  "injection 0-14 Units, 0-14 Units, Subcutaneous, 4x Daily With Meals & Nightly, Florentin Leggett, RP, 2 Units at 06/14/19 0811  •  ipratropium (ATROVENT) nebulizer solution 0.5 mg, 500 mcg, Nebulization, 4x Daily - RT, Glen Gutiérrez MD, 0.5 mg at 06/14/19 2009  •  metoprolol tartrate (LOPRESSOR) injection 5 mg, 5 mg, Intravenous, Q6H PRN, Vonnie Ruiz, APRN, 5 mg at 06/14/19 1754  •  metoprolol tartrate (LOPRESSOR) tablet 100 mg, 100 mg, Oral, Q12H, Lucio Chapman MD, 100 mg at 06/14/19 2216  •  pantoprazole (PROTONIX) EC tablet 40 mg, 40 mg, Oral, Q AM, Mary Jane Chung, RP, 40 mg at 06/15/19 0559  •  sacubitril-valsartan (ENTRESTO) 24-26 MG tablet 1 tablet, 1 tablet, Oral, Q12H, Yovanny Herbert MD, 1 tablet at 06/14/19 2027  •  sodium chloride 0.9 % flush 3 mL, 3 mL, Intravenous, Q12H, Gema Duong APRN, 3 mL at 06/14/19 2028  •  sodium chloride 0.9 % flush 3-10 mL, 3-10 mL, Intravenous, PRN, Gema Duong APRN    Vital Sign Min/Max for last 24 hours  Temp  Min: 97.5 °F (36.4 °C)  Max: 98.7 °F (37.1 °C)   BP  Min: 79/55  Max: 160/120   Pulse  Min: 80  Max: 144   Resp  Min: 16  Max: 20   SpO2  Min: 89 %  Max: 96 %   Flow (L/min)  Min: 2  Max: 4   Weight  Min: 73.2 kg (161 lb 6 oz)  Max: 73.2 kg (161 lb 6 oz)     Flowsheet Rows      First Filed Value   Admission Height  167.6 cm (66\") Documented at 06/08/2019 0245   Admission Weight  77.9 kg (171 lb 11.8 oz) Documented at 06/08/2019 0245            Intake/Output Summary (Last 24 hours) at 6/15/2019 0650  Last data filed at 6/15/2019 0600  Gross per 24 hour   Intake 941 ml   Output 1400 ml   Net -459 ml       Physical Exam:     General Appearance:    Alert, cooperative, in no acute distress   Lungs:    Crackles at both bases otherwise clear.  Respiratory effort normal.    Heart:   Irregular irregular rate and rhythm, tachycardic, normal S1-S2.  PMI laterally displaced.   Chest Wall:    No abnormalities observed   Abdomen:     Normal " bowel sounds, no masses, soft, non-tender, non-distended, benign.   Extremities:   Moves all extremities well, no edema, no cyanosis, no             redness   Pulses:   Pulses palpable and equal bilaterally   Skin:   No bleeding, bruising or rash        Results Review:   Results from last 7 days   Lab Units 06/15/19  0423 06/14/19  0331 06/13/19  0317   WBC 10*3/mm3 8.28 11.80* 9.19   HEMOGLOBIN g/dL 16.6 17.0 16.3   HEMATOCRIT % 53.1* 53.9* 52.3*   PLATELETS 10*3/mm3 218 228 184     Results from last 7 days   Lab Units 06/15/19  0423 06/13/19  0317 06/12/19  0518   SODIUM mmol/L 136 138 138   POTASSIUM mmol/L 4.4 4.5 4.2   CHLORIDE mmol/L 96* 100 101   CO2 mmol/L 30.0* 26.0 26.0   BUN mg/dL 17 11 10   CREATININE mg/dL 0.94 0.88 0.91   GLUCOSE mg/dL 96 114* 114*              Results from last 7 days   Lab Units 06/12/19  1349   TSH mIU/mL 3.880   FREE T4 ng/dL 1.33     Results from last 7 days   Lab Units 06/10/19  0203   PROBNP pg/mL 3,904.0*         Results from last 7 days   Lab Units 06/09/19  0615   TROPONIN T ng/mL <0.010     Results from last 7 days   Lab Units 06/11/19  0327   MAGNESIUM mg/dL 2.0       Intake/Output Summary (Last 24 hours) at 6/15/2019 0650  Last data filed at 6/15/2019 0600  Gross per 24 hour   Intake 941 ml   Output 1400 ml   Net -459 ml       I personally viewed and interpreted the patient's EKG/Telemetry data    EKG: None for review today    Telemetry: Atrial fibrillation, HR  bpm, no VT.    Ejection Fraction  No results found for: EF    Echo EF Estimated  No results found for: ECHOEFEST      Present on Admission:  • Cellulitis of Right Lower Leg and Foot  • A Fib w/RVR  • COPD (chronic obstructive pulmonary disease) (CMS/HCC)  • Laryngeal mass    Assessment/Plan   1. Afib with RVR  - newly diagnosed, incidentally found  - CHADSVASC = 3 (age, HTN), starting Eliquis this admission  - Echo: EF mid 30s, moderate RV enlargement and RV dysfunction.  - S/p DENA and successful ECV, 6/10.   Did develop early recurrence of his atrial fibrillation.  - On oral amiodarone, loaded IV and now on  mg bid. Will continue taper  - Bisoprolol discontinued, metoprolol started for better rate control.   - Cardizem initiated for better rate control; still with RVR.  - Will likely need AVN RFA and BiV PPM implant early next week.     2. Acute systolic heart failure; clinically stable.  - TTE shows ejection fraction of mid 30s, moderate RV enlargement and RV dysfunction.  - could be tachycardia induced. Will need repeat echo once maintaining NSR, may also need ischemic evaluation.  - DENA: EF 30-35%, Moderate to severe MR, moderate TR, RVSP 35-45mmHg.  - Continue Entresto, metoprolol     3. Hypertension; adjust meds as necessary.  - Labile with intermittent hypotension secondary to atrial fibrillation with RVR     4. Syncope  - reportedly had syncopal episode seems to have been triggered by prolonged coughing.  No recurrence.     5. Lower extremity cellulitis  - per admitting     6. COPD  -  Per admitting    7. Laryngeal mass, s/p laryngoscopy and biopsy, 6/12      Plan: Likely will need AVN ablation + Bi-V PPM implant.  Tentatively plan for Monday Keep NPO after midnight on Sunday.    Electronically signed by MAYI Garcia, 06/15/19, 6:50 AM.      I, Lucio Chapman MD, personally performed the services face to face as described and documented by the above named individual. I have made any necessary edits and it is both accurate and complete 6/15/2019  9:50 AM

## 2019-06-15 NOTE — PLAN OF CARE
Problem: Patient Care Overview  Goal: Plan of Care Review  Outcome: Ongoing (interventions implemented as appropriate)   06/15/19 2365   Coping/Psychosocial   Plan of Care Reviewed With patient;significant other   Plan of Care Review   Progress improving   OTHER   Outcome Summary Pt progressing with occupational endurance, tolerated BUE strengthening ex's 15 reps each with >112. Continue per OT POC.

## 2019-06-15 NOTE — PLAN OF CARE
Problem: Chronic Obstructive Pulmonary Disease (Adult)  Goal: Signs and Symptoms of Listed Potential Problems Will be Absent, Minimized or Managed (Chronic Obstructive Pulmonary Disease)  Outcome: Ongoing (interventions implemented as appropriate)   06/15/19 6747   Goal/Outcome Evaluation   Problems Assessed (Chronic Obstructive Pulmonary Disease (COPD)) all   Problems Present (COPD, Bronch/Emphy) situational response

## 2019-06-15 NOTE — PROGRESS NOTES
INTENSIVIST   PROGRESS NOTE     Hospital:  LOS: 7 days      S     Mr. Stef Jones, 74 y.o. male is followed for:  No chief complaint on file.      A Fib w/RVR    Cellulitis of Right Lower Leg and Foot    COPD (chronic obstructive pulmonary disease) (CMS/HCC)    Laryngeal mass    As an Intensivist, we provide an integrated approach to the ICU patient and family, medical management of comorbid conditions, lead interdisciplinary rounds and coordinate the care with all other services, including those from other specialists.     Interval History:  Doing well.  No chest pain.  No dyspnea.  Still tachycardic.     The patient's relevant past medical, surgical and social history were reviewed and updated in Epic as appropriate.     ROS:   Constitutional: Negative for fever.   Respiratory: Dyspnea.   Cardiovascular: Negative for chest pain.   Gastrointestinal: Negative for  nausea, vomiting and diarrhea.        O     Vitals:  Temp: 97.6 °F (36.4 °C) (06/15/19 0800) Temp  Min: 97.5 °F (36.4 °C)  Max: 98.7 °F (37.1 °C)   BP: 113/74 (06/15/19 1045) BP  Min: 72/56  Max: 160/120   Pulse: 92 (06/15/19 1045) Pulse  Min: 80  Max: 144   Resp: 20 (06/15/19 0820) Resp  Min: 16  Max: 20   SpO2: 96 % (06/15/19 1045) SpO2  Min: 91 %  Max: 96 %   Device: nasal cannula (06/15/19 0820)    Flow Rate: 3 (06/15/19 0820) Flow (L/min)  Min: 2  Max: 4     Intake/Ouptut 24 hrs (7:00AM - 6:59 AM)  Intake/Output       06/14/19 0700 - 06/15/19 0659 06/15/19 0700 - 06/16/19 0659    Intake (ml) 941 100    Output (ml) 1400 --    Net (ml) -459 100    Last Weight  73.2 kg (161 lb 6 oz)  --           Medications (drips):    esmolol Last Rate: Stopped (06/14/19 1409)       Physical Examination  Telemetry:  Rhythm: atrial rhythm (06/15/19 1000)  Atrial Rhythm: atrial fibrillation (06/15/19 1000)      Constitutional:  No acute distress.   Cardiovascular: IRR. Normal heart sounds.  No murmurs, gallop or rub.   Respiratory: No respiratory distress.  Normal  breath sounds  No adventitious sounds    Abdominal:  Soft with no tenderness  No distension. No HSM.   Extremities: Warm with no cyanosis.  No edema.   Neurological:   Alert and Oriented to person, place, and time.               Lines/Drains/Airways: Peripheral IV(s)     Hematology:  Results from last 7 days   Lab Units 06/15/19  0423 06/14/19  0331 06/13/19 0317   WBC 10*3/mm3 8.28 11.80* 9.19   HEMOGLOBIN g/dL 16.6 17.0 16.3   MCV fL 87.3 88.2 88.5   PLATELETS 10*3/mm3 218 228 184       Chemistry:  Estimated Creatinine Clearance: 69.2 mL/min (by C-G formula based on SCr of 0.94 mg/dL).    Results from last 7 days   Lab Units 06/15/19  0423 06/13/19  0317 06/12/19  0518   SODIUM mmol/L 136 138 138   POTASSIUM mmol/L 4.4 4.5 4.2   CHLORIDE mmol/L 96* 100 101   CO2 mmol/L 30.0* 26.0 26.0   ANION GAP mmol/L 10.0 12.0 11.0   GLUCOSE mg/dL 96 114* 114*   CALCIUM mg/dL 9.2 8.5* 8.5*     Results from last 7 days   Lab Units 06/15/19  0423 06/13/19  0317 06/12/19  0518   BUN mg/dL 17 11 10   CREATININE mg/dL 0.94 0.88 0.91     Images:  No radiology results for the last day    Echo:  Results for orders placed during the hospital encounter of 06/08/19   Adult Transesophageal Echo (DENA) W/ Cont if Necessary Per Protocol    Narrative · Left ventricular systolic function is severely decreased. Estimated EF   appears to be in the range of 31 - 35%. Global hypokinesis noted.  · Left atrial cavity size is moderately dilated. The left atrial appendage   was visualized through multiple planes. Left atrial appendage was found to   be singularly lobar in nature. Doppler interrogation shows mildly reduced   flow within the left atrial appendage. No left atrial appendage thrombus   present.  · The mitral valve is abnormal in structure. There is mild bileaflet   mitral valve thickening present. Moderate-to-severe mitral valve   regurgitation is present (2-3+)  · Moderate tricuspid valve regurgitation is present. Estimated right    ventricular systolic pressure from tricuspid regurgitation is mildly   elevated (35-45 mmHg).          Results: Reviewed.  I reviewed the patient's new laboratory and imaging results.  I independently reviewed the patient's new images.    Medications: Reviewed.    Assessment/Plan   A / P     Assessment:    74 y.o.male, admitted on 6/8/2019 with A-fib (CMS/[facility]) [I48.91]  Atrial fibrillation with rapid ventricular response (CMS/[facility]) [I48.91]:       1. Respiratory failure } Improved.  1. COPD  2. Bronchodilators  2. Cardiac:  1. A Fib with RVR  1. Anticoagulation on APIxaban   2. Cardiomyopathy, EF 33%  3. TR  4. HTN on CCB  5. Dyslipidemia on statins  3. Cellulitis R lower extremity and foot.  1. Antibiotics completed.  4. Laryngeal Mass } ENT  1. Bx. Pending  5. GERD on PPI  6. Glucose: T2D per A1c criteria    Results from last 7 days   Lab Units 06/15/19  1029 06/15/19  0713 06/14/19 2017 06/14/19  1602 06/14/19  1117 06/14/19  0712   GLUCOSE mg/dL 100 98 94 108 82 150*     Lab Results   Lab Value Date/Time    HGBA1C 6.60 (H) 06/08/2019 0343       Diet: Diet Dysphagia; III - Pureed With Some Mashed; Honey Thick; Cardiac, Consistent Carbohydrate   Advance Directives: Code Status and Medical Interventions:   Ordered at: 06/08/19 0320     Code Status:    CPR     Medical Interventions (Level of Support Prior to Arrest):    Full        Plan:    1. BiPAP prn  2. Cardiac:  1. Discussed with Dr. Chapman. May need AVN (Mondya 6/17/19) if no better over weekend.  3. Goal: Glucose < 180 mg/dL.  4. Disposition: Keep in ICU.    Plan of care and goals reviewed during interdisciplinary rounds.  I discussed the patient's findings and my recommendations with patient and nursing staff    Level of Risk is High due to:  illness with threat to life or bodily function.     Time: was greater than 32 minutes.    (This excludes time spent performing separately reportable procedures and services).  Patient was at high risk of imminent  or life-threatening deterioration in his condition due to Respiratory failure.     Stanislaw Torrez MD, FACP, FCCP, CNSC  Intensive Care Medicine, Nutrition Support and Pulmonary Medicine     [x]  Primary Attending  []  Consultant

## 2019-06-16 LAB
ANION GAP SERPL CALCULATED.3IONS-SCNC: 9 MMOL/L
BASOPHILS # BLD AUTO: 0.06 10*3/MM3 (ref 0–0.2)
BASOPHILS NFR BLD AUTO: 0.6 % (ref 0–1.5)
BUN BLD-MCNC: 19 MG/DL (ref 8–23)
BUN/CREAT SERPL: 17.9 (ref 7–25)
CALCIUM SPEC-SCNC: 9.2 MG/DL (ref 8.6–10.5)
CHLORIDE SERPL-SCNC: 100 MMOL/L (ref 98–107)
CO2 SERPL-SCNC: 28 MMOL/L (ref 22–29)
CREAT BLD-MCNC: 1.06 MG/DL (ref 0.76–1.27)
DEPRECATED RDW RBC AUTO: 46.7 FL (ref 37–54)
EOSINOPHIL # BLD AUTO: 0.5 10*3/MM3 (ref 0–0.4)
EOSINOPHIL NFR BLD AUTO: 4.9 % (ref 0.3–6.2)
ERYTHROCYTE [DISTWIDTH] IN BLOOD BY AUTOMATED COUNT: 14.8 % (ref 12.3–15.4)
GFR SERPL CREATININE-BSD FRML MDRD: 68 ML/MIN/1.73
GLUCOSE BLD-MCNC: 123 MG/DL (ref 65–99)
GLUCOSE BLDC GLUCOMTR-MCNC: 106 MG/DL (ref 70–130)
GLUCOSE BLDC GLUCOMTR-MCNC: 134 MG/DL (ref 70–130)
GLUCOSE BLDC GLUCOMTR-MCNC: 135 MG/DL (ref 70–130)
GLUCOSE BLDC GLUCOMTR-MCNC: 136 MG/DL (ref 70–130)
HCT VFR BLD AUTO: 50.1 % (ref 37.5–51)
HGB BLD-MCNC: 15.9 G/DL (ref 13–17.7)
IMM GRANULOCYTES # BLD AUTO: 0.07 10*3/MM3 (ref 0–0.05)
IMM GRANULOCYTES NFR BLD AUTO: 0.7 % (ref 0–0.5)
LYMPHOCYTES # BLD AUTO: 2.88 10*3/MM3 (ref 0.7–3.1)
LYMPHOCYTES NFR BLD AUTO: 28 % (ref 19.6–45.3)
MAGNESIUM SERPL-MCNC: 1.6 MG/DL (ref 1.6–2.4)
MCH RBC QN AUTO: 27.5 PG (ref 26.6–33)
MCHC RBC AUTO-ENTMCNC: 31.7 G/DL (ref 31.5–35.7)
MCV RBC AUTO: 86.7 FL (ref 79–97)
MONOCYTES # BLD AUTO: 0.93 10*3/MM3 (ref 0.1–0.9)
MONOCYTES NFR BLD AUTO: 9.1 % (ref 5–12)
NEUTROPHILS # BLD AUTO: 5.83 10*3/MM3 (ref 1.7–7)
NEUTROPHILS NFR BLD AUTO: 56.7 % (ref 42.7–76)
NRBC BLD AUTO-RTO: 0 /100 WBC (ref 0–0.2)
PHOSPHATE SERPL-MCNC: 3.2 MG/DL (ref 2.5–4.5)
PLATELET # BLD AUTO: 252 10*3/MM3 (ref 140–450)
PMV BLD AUTO: 10.1 FL (ref 6–12)
POTASSIUM BLD-SCNC: 4.3 MMOL/L (ref 3.5–5.2)
RBC # BLD AUTO: 5.78 10*6/MM3 (ref 4.14–5.8)
SODIUM BLD-SCNC: 137 MMOL/L (ref 136–145)
WBC NRBC COR # BLD: 10.27 10*3/MM3 (ref 3.4–10.8)

## 2019-06-16 PROCEDURE — 94799 UNLISTED PULMONARY SVC/PX: CPT

## 2019-06-16 PROCEDURE — 84100 ASSAY OF PHOSPHORUS: CPT | Performed by: INTERNAL MEDICINE

## 2019-06-16 PROCEDURE — 83735 ASSAY OF MAGNESIUM: CPT | Performed by: INTERNAL MEDICINE

## 2019-06-16 PROCEDURE — 99024 POSTOP FOLLOW-UP VISIT: CPT | Performed by: INTERNAL MEDICINE

## 2019-06-16 PROCEDURE — 80048 BASIC METABOLIC PNL TOTAL CA: CPT | Performed by: INTERNAL MEDICINE

## 2019-06-16 PROCEDURE — 99232 SBSQ HOSP IP/OBS MODERATE 35: CPT | Performed by: INTERNAL MEDICINE

## 2019-06-16 PROCEDURE — 82962 GLUCOSE BLOOD TEST: CPT

## 2019-06-16 PROCEDURE — 85025 COMPLETE CBC W/AUTO DIFF WBC: CPT | Performed by: INTERNAL MEDICINE

## 2019-06-16 RX ORDER — MAGNESIUM SULFATE HEPTAHYDRATE 40 MG/ML
2 INJECTION, SOLUTION INTRAVENOUS AS NEEDED
Status: DISCONTINUED | OUTPATIENT
Start: 2019-06-16 | End: 2019-06-16

## 2019-06-16 RX ORDER — MAGNESIUM SULFATE HEPTAHYDRATE 40 MG/ML
4 INJECTION, SOLUTION INTRAVENOUS AS NEEDED
Status: DISCONTINUED | OUTPATIENT
Start: 2019-06-16 | End: 2019-06-19

## 2019-06-16 RX ORDER — MAGNESIUM SULFATE HEPTAHYDRATE 40 MG/ML
4 INJECTION, SOLUTION INTRAVENOUS AS NEEDED
Status: DISCONTINUED | OUTPATIENT
Start: 2019-06-16 | End: 2019-06-16

## 2019-06-16 RX ADMIN — DILTIAZEM HYDROCHLORIDE 30 MG: 30 TABLET, FILM COATED ORAL at 21:17

## 2019-06-16 RX ADMIN — PANTOPRAZOLE SODIUM 40 MG: 40 TABLET, DELAYED RELEASE ORAL at 05:26

## 2019-06-16 RX ADMIN — FUROSEMIDE 20 MG: 20 TABLET ORAL at 08:34

## 2019-06-16 RX ADMIN — MAGNESIUM SULFATE HEPTAHYDRATE 4 G: 40 INJECTION, SOLUTION INTRAVENOUS at 06:55

## 2019-06-16 RX ADMIN — BUDESONIDE AND FORMOTEROL FUMARATE DIHYDRATE 2 PUFF: 80; 4.5 AEROSOL RESPIRATORY (INHALATION) at 08:27

## 2019-06-16 RX ADMIN — AMIODARONE HYDROCHLORIDE 400 MG: 200 TABLET ORAL at 08:34

## 2019-06-16 RX ADMIN — DILTIAZEM HYDROCHLORIDE 30 MG: 30 TABLET, FILM COATED ORAL at 14:20

## 2019-06-16 RX ADMIN — AMITRIPTYLINE HYDROCHLORIDE 25 MG: 25 TABLET, FILM COATED ORAL at 21:11

## 2019-06-16 RX ADMIN — SACUBITRIL AND VALSARTAN 1 TABLET: 24; 26 TABLET, FILM COATED ORAL at 08:34

## 2019-06-16 RX ADMIN — DIPHENHYDRAMINE HYDROCHLORIDE, ZINC ACETATE: 2; .1 CREAM TOPICAL at 02:00

## 2019-06-16 RX ADMIN — IPRATROPIUM BROMIDE 0.5 MG: 0.5 SOLUTION RESPIRATORY (INHALATION) at 19:40

## 2019-06-16 RX ADMIN — ATORVASTATIN CALCIUM 20 MG: 20 TABLET, FILM COATED ORAL at 21:11

## 2019-06-16 RX ADMIN — IPRATROPIUM BROMIDE 0.5 MG: 0.5 SOLUTION RESPIRATORY (INHALATION) at 08:27

## 2019-06-16 RX ADMIN — APIXABAN 5 MG: 5 TABLET, FILM COATED ORAL at 21:11

## 2019-06-16 RX ADMIN — APIXABAN 5 MG: 5 TABLET, FILM COATED ORAL at 08:34

## 2019-06-16 RX ADMIN — BUDESONIDE AND FORMOTEROL FUMARATE DIHYDRATE 2 PUFF: 80; 4.5 AEROSOL RESPIRATORY (INHALATION) at 19:41

## 2019-06-16 RX ADMIN — IPRATROPIUM BROMIDE 0.5 MG: 0.5 SOLUTION RESPIRATORY (INHALATION) at 12:35

## 2019-06-16 RX ADMIN — IPRATROPIUM BROMIDE 0.5 MG: 0.5 SOLUTION RESPIRATORY (INHALATION) at 15:39

## 2019-06-16 RX ADMIN — DILTIAZEM HYDROCHLORIDE 30 MG: 30 TABLET, FILM COATED ORAL at 05:26

## 2019-06-16 RX ADMIN — METOPROLOL TARTRATE 100 MG: 100 TABLET ORAL at 08:34

## 2019-06-16 NOTE — PROGRESS NOTES
INTENSIVIST   PROGRESS NOTE     Hospital:  LOS: 8 days      S     Mr. Stef Jones, 74 y.o. male is followed for:      A Fib w/RVR    Cellulitis of Right Lower Leg and Foot    COPD (chronic obstructive pulmonary disease) (CMS/[facility])    Laryngeal mass    As an Intensivist, we provide an integrated approach to the ICU patient and family, medical management of comorbid conditions, lead interdisciplinary rounds and coordinate the care with all other services, including those from other specialists.     Interval History:  He remains asymtomatic but HR max 129.  Uneventful night.     The patient's relevant past medical, surgical and social history were reviewed and updated in Epic as appropriate.     ROS:   Constitutional: Negative for fever.   Respiratory: No Dyspnea at present.   Cardiovascular: Negative for chest pain.   Gastrointestinal: Negative for  nausea, vomiting and diarrhea.        O     Vitals:  Temp: 98.1 °F (36.7 °C) (06/16/19 0800) Temp  Min: 97.6 °F (36.4 °C)  Max: 98.1 °F (36.7 °C)   BP: 93/66 (06/16/19 1230) BP  Min: 73/44  Max: 139/99   Pulse: 93 (06/16/19 1235) Pulse  Min: 85  Max: 129   Resp: 20 (06/16/19 1235) Resp  Min: 16  Max: 21   SpO2: 93 % (06/16/19 1235) SpO2  Min: 90 %  Max: 96 %   Device: nasal cannula (06/16/19 1235)    Flow Rate: 4 (06/16/19 1235) Flow (L/min)  Min: 3  Max: 4     Intake/Ouptut 24 hrs (7:00AM - 6:59 AM)  Intake/Output       06/15/19 0700 - 06/16/19 0659 06/16/19 0700 - 06/17/19 0659    Intake (ml) 960 200    Output (ml) 800 450    Net (ml) 160 -250    Last Weight  72.6 kg (160 lb 0.9 oz)  --           Medications (drips):    esmolol Last Rate: Stopped (06/14/19 1409)       Physical Examination  Telemetry:  Rhythm: atrial rhythm (06/16/19 1152)  Atrial Rhythm: atrial fibrillation (06/16/19 1152)      Constitutional:  No acute distress.   Cardiovascular: IRR. Normal heart sounds.  No murmurs, gallop or rub.   Respiratory: No respiratory distress.  Normal breath sounds  No  adventitious sounds    Abdominal:  Soft with no tenderness  No distension. No HSM.   Extremities: Warm with no cyanosis.  No edema.   Neurological:   Alert and Oriented to person, place, and time.               Lines/Drains/Airways: Peripheral IV(s)     Hematology:  Results from last 7 days   Lab Units 06/16/19  0433 06/15/19  0423 06/14/19  0331   WBC 10*3/mm3 10.27 8.28 11.80*   HEMOGLOBIN g/dL 15.9 16.6 17.0   MCV fL 86.7 87.3 88.2   PLATELETS 10*3/mm3 252 218 228       Chemistry:  Estimated Creatinine Clearance: 60.9 mL/min (by C-G formula based on SCr of 1.06 mg/dL).    Results from last 7 days   Lab Units 06/16/19  0433 06/15/19  0423 06/13/19  0317   SODIUM mmol/L 137 136 138   POTASSIUM mmol/L 4.3 4.4 4.5   CHLORIDE mmol/L 100 96* 100   CO2 mmol/L 28.0 30.0* 26.0   ANION GAP mmol/L 9.0 10.0 12.0   GLUCOSE mg/dL 123* 96 114*   CALCIUM mg/dL 9.2 9.2 8.5*     Results from last 7 days   Lab Units 06/16/19  0433 06/15/19  0423 06/13/19  0317   BUN mg/dL 19 17 11   CREATININE mg/dL 1.06 0.94 0.88     Images:  No radiology results for the last day    Echo:  Results for orders placed during the hospital encounter of 06/08/19   Adult Transesophageal Echo (DENA) W/ Cont if Necessary Per Protocol    Narrative · Left ventricular systolic function is severely decreased. Estimated EF   appears to be in the range of 31 - 35%. Global hypokinesis noted.  · Left atrial cavity size is moderately dilated. The left atrial appendage   was visualized through multiple planes. Left atrial appendage was found to   be singularly lobar in nature. Doppler interrogation shows mildly reduced   flow within the left atrial appendage. No left atrial appendage thrombus   present.  · The mitral valve is abnormal in structure. There is mild bileaflet   mitral valve thickening present. Moderate-to-severe mitral valve   regurgitation is present (2-3+)  · Moderate tricuspid valve regurgitation is present. Estimated right   ventricular systolic  pressure from tricuspid regurgitation is mildly   elevated (35-45 mmHg).          Results: Reviewed.  I reviewed the patient's new laboratory and imaging results.  I independently reviewed the patient's new images.    Medications: Reviewed.    Assessment/Plan   A / P     Assessment:    74 y.o.male, admitted on 6/8/2019 with A-fib (CMS/[facility]) [I48.91]  Atrial fibrillation with rapid ventricular response (CMS/[facility]) [I48.91]:       1. Respiratory failure } Improved.  1. COPD  2. Bronchodilators  2. Cardiac:  1. A Fib with RVR  1. Anticoagulation on APIxaban   2. Cardiomyopathy, EF 33%  3. TR  4. HTN on CCB  5. Dyslipidemia on statins  3. Cellulitis R lower extremity and foot.  1. Antibiotics completed.  4. Laryngeal Mass } ENT  1. Bx. Pending  5. GERD on PPI  6. Glucose: T2D per A1c criteria    Results from last 7 days   Lab Units 06/16/19  1131 06/16/19  0714 06/15/19  2004 06/15/19  1544 06/15/19  1029 06/15/19  0713   GLUCOSE mg/dL 106 136* 165* 125 100 98     Lab Results   Lab Value Date/Time    HGBA1C 6.60 (H) 06/08/2019 0343       Diet: Diet Dysphagia; III - Pureed With Some Mashed; Honey Thick; Cardiac, Consistent Carbohydrate  NPO Diet   Advance Directives: Code Status and Medical Interventions:   Ordered at: 06/08/19 0320     Code Status:    CPR     Medical Interventions (Level of Support Prior to Arrest):    Full        Plan:    1. BiPAP prn  2. Cardiac:  3. Discussed with Dr. Chapman: AVN tomorrow.  4. Goal: Glucose < 180 mg/dL.  5. Disposition: Keep in ICU.    Plan of care and goals reviewed during interdisciplinary rounds.  I discussed the patient's findings and my recommendations with patient and nursing staff    Level of Risk is High due to:  illness with threat to life or bodily function.     Time: 25 minutes, in direct patient care, with the patient and family and/or on the avalos coordinating care with other health care providers.     I have spent > 50% percent of this time, counseling and discussing  management.       Stanislaw Torrez MD, FACP, FCCP, CNSC  Intensive Care Medicine, Nutrition Support and Pulmonary Medicine     [x]  Primary Attending  []  Consultant

## 2019-06-16 NOTE — PLAN OF CARE
Problem: Skin Injury Risk (Adult)  Goal: Identify Related Risk Factors and Signs and Symptoms  Outcome: Ongoing (interventions implemented as appropriate)   06/16/19 7505   Skin Injury Risk (Adult)   Related Risk Factors (Skin Injury Risk) critical care admission;mobility impaired

## 2019-06-16 NOTE — PLAN OF CARE
Problem: Patient Care Overview  Goal: Plan of Care Review  Outcome: Ongoing (interventions implemented as appropriate)   06/16/19 0421 06/16/19 1400 06/16/19 1525   Coping/Psychosocial   Plan of Care Reviewed With --  patient;family --    Plan of Care Review   Progress no change --  --    OTHER   Outcome Summary --  --  Heart rate controlled better today. Plan for AV ablation and pacemaker placement tomorrow. Blood pressure labile, also doing better. Awaiting biopsy results. VSS, will continue to monitor.        Problem: Fall Risk (Adult)  Goal: Identify Related Risk Factors and Signs and Symptoms  Outcome: Ongoing (interventions implemented as appropriate)      Problem: Skin Injury Risk (Adult)  Goal: Identify Related Risk Factors and Signs and Symptoms  Outcome: Ongoing (interventions implemented as appropriate)

## 2019-06-16 NOTE — PLAN OF CARE
Problem: Patient Care Overview  Goal: Plan of Care Review  Outcome: Ongoing (interventions implemented as appropriate)   06/16/19 0955   Coping/Psychosocial   Plan of Care Reviewed With patient   Plan of Care Review   Progress no change   OTHER   Outcome Summary hypotension labile, hr continues to improve slightly, 3l nc bipap most of night, mulitple loose bm during night, rash on abd and bilateral flank itching, ambulating well, no c/o pain or soa, awaiting biopsy results family at bedside

## 2019-06-16 NOTE — PROGRESS NOTES
[facility]  Progress Note       LOS: 8 days   Patient Care Team:  Lexx Monson MD as PCP - General (Family Medicine)    Chief Complaint:  Atrial fibrillation with RVR, CHF    Subjective     Interval History: Patient feels well today with no specific complaints.  Denies any palpitations, CP or SOB.  Heart rates remain elevated especially with any movement as high as 130 bpm.  BP's labile.  No new issues overnight        Review of Systems:   Pertinent positives in HPI, all others reviewed and negative.      Objective       Current Facility-Administered Medications:   •  acetaminophen (TYLENOL) tablet 650 mg, 650 mg, Oral, Q6H PRN, Aroldo Glez MD, 650 mg at 06/12/19 1104  •  albuterol (PROVENTIL) nebulizer solution 0.083% 2.5 mg/3mL, 0.625 mg, Nebulization, Q6H PRN, Glen Gutiérrez MD  •  amiodarone (PACERONE) tablet 400 mg, 400 mg, Oral, Q12H, Vonnie Ruiz APRN, 400 mg at 06/16/19 0834  •  amitriptyline (ELAVIL) tablet 25 mg, 25 mg, Oral, Nightly, Gema Duong APRN, 25 mg at 06/15/19 1942  •  apixaban (ELIQUIS) tablet 5 mg, 5 mg, Oral, Q12H, Aroldo Glez MD, 5 mg at 06/16/19 0834  •  atorvastatin (LIPITOR) tablet 20 mg, 20 mg, Oral, Nightly, Glen Gutiérrez MD, 20 mg at 06/15/19 1941  •  budesonide-formoterol (SYMBICORT) 80-4.5 MCG/ACT inhaler 2 puff, 2 puff, Inhalation, BID - RT, Glen Gutiérrez MD, 2 puff at 06/16/19 0827  •  diltiaZEM (CARDIZEM) tablet 30 mg, 30 mg, Oral, Q8H, Lucio Chapman MD, 30 mg at 06/16/19 0526  •  diphenhydrAMINE-zinc acetate 2-0.1 % cream, , Topical, TID PRN, Gema Duong APRN  •  esmolol (BREVIBLOC) 2500 mg/250 mL (10 mg/mL) infusion,  mcg/kg/min, Intravenous, Titrated, Yovanny Solares MD, Stopped at 06/14/19 1409  •  furosemide (LASIX) tablet 20 mg, 20 mg, Oral, Daily, Osetinsky, Glen CASANOVA MD, 20 mg at 06/16/19 0810  •  insulin lispro (humaLOG) injection 0-14 Units, 0-14 Units,  "Subcutaneous, 4x Daily With Meals & Nightly, Florentin Leggett, RP, 3 Units at 06/15/19 2102  •  ipratropium (ATROVENT) nebulizer solution 0.5 mg, 500 mcg, Nebulization, 4x Daily - RT, Glen Gutiérrez MD, 0.5 mg at 06/16/19 0827  •  magnesium sulfate 4g/100mL (PREMIX) infusion, 4 g, Intravenous, PRN, Stanislaw Torrez MD  •  metoprolol tartrate (LOPRESSOR) injection 5 mg, 5 mg, Intravenous, Q6H PRN, Vonnie Ruiz, APRSHANDRA, 5 mg at 06/14/19 1754  •  metoprolol tartrate (LOPRESSOR) tablet 100 mg, 100 mg, Oral, Q12H, Lucio Chapman MD, 100 mg at 06/16/19 0834  •  pantoprazole (PROTONIX) EC tablet 40 mg, 40 mg, Oral, Q AM, Mary Jane Chung, [facility], 40 mg at 06/16/19 0526  •  sacubitril-valsartan (ENTRESTO) 24-26 MG tablet 1 tablet, 1 tablet, Oral, Q12H, Yovanny Herbert MD, 1 tablet at 06/16/19 0834  •  sodium chloride 0.9 % flush 3 mL, 3 mL, Intravenous, Q12H, Gema Duong APRN, 3 mL at 06/15/19 1944  •  sodium chloride 0.9 % flush 3-10 mL, 3-10 mL, Intravenous, PRN, Gema Duong APRN    Vital Sign Min/Max for last 24 hours  Temp  Min: 97.6 °F (36.4 °C)  Max: 98.1 °F (36.7 °C)   BP  Min: 72/56  Max: 128/99   Pulse  Min: 90  Max: 129   Resp  Min: 16  Max: 21   SpO2  Min: 90 %  Max: 96 %   Flow (L/min)  Min: 3  Max: 3   Weight  Min: 72.6 kg (160 lb 0.9 oz)  Max: 72.6 kg (160 lb 0.9 oz)     Flowsheet Rows      First Filed Value   Admission Height  167.6 cm (66\") Documented at 06/08/2019 0245   Admission Weight  77.9 kg (171 lb 11.8 oz) Documented at 06/08/2019 0245            Intake/Output Summary (Last 24 hours) at 6/16/2019 0844  Last data filed at 6/16/2019 0800  Gross per 24 hour   Intake 1060 ml   Output 950 ml   Net 110 ml       Physical Exam:     General Appearance:    Alert, cooperative, in no acute distress   Lungs:    Decreased breath sounds bilaterally.  Respiratory effort normal.    Heart:   Irregular irregular rate and rhythm.  Normal S1-S2.  PMI laterally displaced   Chest Wall:    No " abnormalities observed   Abdomen:     Normal bowel sounds, no masses, soft, non-tender, non-distended, no change in examination.   Extremities:   Moves all extremities well, no edema, no cyanosis, no             redness   Pulses:   Pulses palpable and equal bilaterally   Skin:   No bleeding, bruising or rash        Results Review:   Results from last 7 days   Lab Units 06/16/19  0433 06/15/19  0423 06/14/19  0331   WBC 10*3/mm3 10.27 8.28 11.80*   HEMOGLOBIN g/dL 15.9 16.6 17.0   HEMATOCRIT % 50.1 53.1* 53.9*   PLATELETS 10*3/mm3 252 218 228     Results from last 7 days   Lab Units 06/16/19  0433 06/15/19  0423 06/13/19  0317   SODIUM mmol/L 137 136 138   POTASSIUM mmol/L 4.3 4.4 4.5   CHLORIDE mmol/L 100 96* 100   CO2 mmol/L 28.0 30.0* 26.0   BUN mg/dL 19 17 11   CREATININE mg/dL 1.06 0.94 0.88   GLUCOSE mg/dL 123* 96 114*              Results from last 7 days   Lab Units 06/12/19  1349   TSH mIU/mL 3.880   FREE T4 ng/dL 1.33     Results from last 7 days   Lab Units 06/10/19  0203   PROBNP pg/mL 3,904.0*             Results from last 7 days   Lab Units 06/16/19  0433   MAGNESIUM mg/dL 1.6       Intake/Output Summary (Last 24 hours) at 6/16/2019 0844  Last data filed at 6/16/2019 0800  Gross per 24 hour   Intake 1060 ml   Output 950 ml   Net 110 ml       I personally viewed and interpreted the patient's EKG/Telemetry data    EKG: None for review today    Telemetry: Atrial fibrillation, HR  bpm    Ejection Fraction  No results found for: EF    Echo EF Estimated  No results found for: ECHOEFEST      Present on Admission:  • Cellulitis of Right Lower Leg and Foot  • A Fib w/RVR  • COPD (chronic obstructive pulmonary disease) (CMS/HCC)  • Laryngeal mass    Assessment/Plan   1. Afib with RVR; still difficult to control despite multiple AV piter blocking agents.  - newly diagnosed, incidentally found  - CHADSVASC = 3 (age, HTN), starting Eliquis this admission  - Echo: EF mid 30s, moderate RV enlargement and RV  dysfunction.  - S/p DENA and successful ECV, 6/10.  Did develop early recurrence of his atrial fibrillation.  - On oral amiodarone, loaded IV and now on PO 400 mg bid. Will continue taper  - On Metoprolol, IV esmolol, amiodarone, and diltiazem with continued high ventricular rates  - Plan for AVN ablation + Bi-V PPM implant tomorrow.  Would discontinue amiodarone post-procedure.  Would also discontinue diltiazem due to his LV dysfunction following the procedure.     2. Acute systolic heart failure; clinically stable.  - TTE shows ejection fraction of mid 30s, moderate RV enlargement and RV dysfunction.  - Likely tachycardia induced  - DENA: EF 30-35%, Moderate to severe MR, moderate TR, RVSP 35-45mmHg.  - Continue Entresto, metoprolol  - Would repeat echo in a few months.  Hopefully EF improves with AVN and Bi-V pacing.     3. Hypertension:  - Labile with intermittent hypotension secondary to atrial fibrillation with RVR, monitor after procedure tomorrow and adjust meds as needed     4. Syncope; no recurrent episodes at this point.  - reportedly had syncopal episode seems to have been triggered by prolonged coughing.  No recurrence.     5. Lower extremity cellulitis  - per admitting     6. COPD  -  Per admitting     7. 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      Plan: NPO after midnight for AVN ablation + Bi-V PPM implant tomorrow.  The risks, benefits, and alternatives of the procedure have been reviewed and the patient wishes to proceed.     Electronically signed by MAYI Garcia, 06/16/19, 8:44 AM.      ILucio MD, personally performed the services face to face as described and documented by the above named individual. I have made any necessary edits and it is both accurate and complete 6/16/2019  9:27 AM

## 2019-06-17 LAB
ALBUMIN SERPL-MCNC: 3.1 G/DL (ref 3.5–5.2)
ALBUMIN/GLOB SERPL: 1 G/DL
ALP SERPL-CCNC: 150 U/L (ref 39–117)
ALT SERPL W P-5'-P-CCNC: 33 U/L (ref 1–41)
ANION GAP SERPL CALCULATED.3IONS-SCNC: 12 MMOL/L
AST SERPL-CCNC: 22 U/L (ref 1–40)
BASOPHILS # BLD AUTO: 0.07 10*3/MM3 (ref 0–0.2)
BASOPHILS NFR BLD AUTO: 0.7 % (ref 0–1.5)
BILIRUB SERPL-MCNC: 0.7 MG/DL (ref 0.2–1.2)
BUN BLD-MCNC: 18 MG/DL (ref 8–23)
BUN/CREAT SERPL: 17.3 (ref 7–25)
CALCIUM SPEC-SCNC: 8.9 MG/DL (ref 8.6–10.5)
CHLORIDE SERPL-SCNC: 96 MMOL/L (ref 98–107)
CO2 SERPL-SCNC: 29 MMOL/L (ref 22–29)
CREAT BLD-MCNC: 1.04 MG/DL (ref 0.76–1.27)
CYTO UR: NORMAL
DEPRECATED RDW RBC AUTO: 46 FL (ref 37–54)
EOSINOPHIL # BLD AUTO: 0.46 10*3/MM3 (ref 0–0.4)
EOSINOPHIL NFR BLD AUTO: 4.8 % (ref 0.3–6.2)
ERYTHROCYTE [DISTWIDTH] IN BLOOD BY AUTOMATED COUNT: 14.7 % (ref 12.3–15.4)
GFR SERPL CREATININE-BSD FRML MDRD: 69 ML/MIN/1.73
GLOBULIN UR ELPH-MCNC: 3.1 GM/DL
GLUCOSE BLD-MCNC: 91 MG/DL (ref 65–99)
GLUCOSE BLDC GLUCOMTR-MCNC: 103 MG/DL (ref 70–130)
GLUCOSE BLDC GLUCOMTR-MCNC: 205 MG/DL (ref 70–130)
GLUCOSE BLDC GLUCOMTR-MCNC: 94 MG/DL (ref 70–130)
GLUCOSE BLDC GLUCOMTR-MCNC: 98 MG/DL (ref 70–130)
HCT VFR BLD AUTO: 50.4 % (ref 37.5–51)
HGB BLD-MCNC: 16 G/DL (ref 13–17.7)
IMM GRANULOCYTES # BLD AUTO: 0.05 10*3/MM3 (ref 0–0.05)
IMM GRANULOCYTES NFR BLD AUTO: 0.5 % (ref 0–0.5)
LAB AP CASE REPORT: NORMAL
LAB AP CLINICAL INFORMATION: NORMAL
LYMPHOCYTES # BLD AUTO: 2.54 10*3/MM3 (ref 0.7–3.1)
LYMPHOCYTES NFR BLD AUTO: 26.5 % (ref 19.6–45.3)
MAGNESIUM SERPL-MCNC: 2.1 MG/DL (ref 1.6–2.4)
MCH RBC QN AUTO: 27.4 PG (ref 26.6–33)
MCHC RBC AUTO-ENTMCNC: 31.7 G/DL (ref 31.5–35.7)
MCV RBC AUTO: 86.3 FL (ref 79–97)
MONOCYTES # BLD AUTO: 0.84 10*3/MM3 (ref 0.1–0.9)
MONOCYTES NFR BLD AUTO: 8.8 % (ref 5–12)
NEUTROPHILS # BLD AUTO: 5.61 10*3/MM3 (ref 1.7–7)
NEUTROPHILS NFR BLD AUTO: 58.7 % (ref 42.7–76)
NRBC BLD AUTO-RTO: 0 /100 WBC (ref 0–0.2)
PATH REPORT.FINAL DX SPEC: NORMAL
PATH REPORT.GROSS SPEC: NORMAL
PHOSPHATE SERPL-MCNC: 3.4 MG/DL (ref 2.5–4.5)
PLATELET # BLD AUTO: 265 10*3/MM3 (ref 140–450)
PMV BLD AUTO: 10 FL (ref 6–12)
POTASSIUM BLD-SCNC: 4.3 MMOL/L (ref 3.5–5.2)
PROT SERPL-MCNC: 6.2 G/DL (ref 6–8.5)
RBC # BLD AUTO: 5.84 10*6/MM3 (ref 4.14–5.8)
SODIUM BLD-SCNC: 137 MMOL/L (ref 136–145)
WBC NRBC COR # BLD: 9.57 10*3/MM3 (ref 3.4–10.8)

## 2019-06-17 PROCEDURE — 82962 GLUCOSE BLOOD TEST: CPT

## 2019-06-17 PROCEDURE — C1887 CATHETER, GUIDING: HCPCS | Performed by: INTERNAL MEDICINE

## 2019-06-17 PROCEDURE — 25010000002 MIDAZOLAM PER 1 MG: Performed by: INTERNAL MEDICINE

## 2019-06-17 PROCEDURE — 0JH607Z INSERTION OF CARDIAC RESYNCHRONIZATION PACEMAKER PULSE GENERATOR INTO CHEST SUBCUTANEOUS TISSUE AND FASCIA, OPEN APPROACH: ICD-10-PCS | Performed by: INTERNAL MEDICINE

## 2019-06-17 PROCEDURE — 84100 ASSAY OF PHOSPHORUS: CPT | Performed by: INTERNAL MEDICINE

## 2019-06-17 PROCEDURE — 63710000001 INSULIN LISPRO (HUMAN) PER 5 UNITS

## 2019-06-17 PROCEDURE — 25010000002 FENTANYL CITRATE (PF) 100 MCG/2ML SOLUTION: Performed by: INTERNAL MEDICINE

## 2019-06-17 PROCEDURE — 33208 INSRT HEART PM ATRIAL & VENT: CPT | Performed by: INTERNAL MEDICINE

## 2019-06-17 PROCEDURE — 93650 ICAR CATH ABLTJ AV NODE FUNC: CPT | Performed by: INTERNAL MEDICINE

## 2019-06-17 PROCEDURE — 99153 MOD SED SAME PHYS/QHP EA: CPT | Performed by: INTERNAL MEDICINE

## 2019-06-17 PROCEDURE — C1898 LEAD, PMKR, OTHER THAN TRANS: HCPCS | Performed by: INTERNAL MEDICINE

## 2019-06-17 PROCEDURE — C1894 INTRO/SHEATH, NON-LASER: HCPCS | Performed by: INTERNAL MEDICINE

## 2019-06-17 PROCEDURE — 99232 SBSQ HOSP IP/OBS MODERATE 35: CPT | Performed by: INTERNAL MEDICINE

## 2019-06-17 PROCEDURE — 02583ZZ DESTRUCTION OF CONDUCTION MECHANISM, PERCUTANEOUS APPROACH: ICD-10-PCS | Performed by: INTERNAL MEDICINE

## 2019-06-17 PROCEDURE — 94799 UNLISTED PULMONARY SVC/PX: CPT

## 2019-06-17 PROCEDURE — 99152 MOD SED SAME PHYS/QHP 5/>YRS: CPT | Performed by: INTERNAL MEDICINE

## 2019-06-17 PROCEDURE — 83735 ASSAY OF MAGNESIUM: CPT | Performed by: INTERNAL MEDICINE

## 2019-06-17 PROCEDURE — 33225 L VENTRIC PACING LEAD ADD-ON: CPT | Performed by: INTERNAL MEDICINE

## 2019-06-17 PROCEDURE — 97535 SELF CARE MNGMENT TRAINING: CPT

## 2019-06-17 PROCEDURE — 02H43JZ INSERTION OF PACEMAKER LEAD INTO CORONARY VEIN, PERCUTANEOUS APPROACH: ICD-10-PCS | Performed by: INTERNAL MEDICINE

## 2019-06-17 PROCEDURE — C1900 LEAD, CORONARY VENOUS: HCPCS | Performed by: INTERNAL MEDICINE

## 2019-06-17 PROCEDURE — C1769 GUIDE WIRE: HCPCS | Performed by: INTERNAL MEDICINE

## 2019-06-17 PROCEDURE — 25010000002 ONDANSETRON PER 1 MG: Performed by: INTERNAL MEDICINE

## 2019-06-17 PROCEDURE — 85025 COMPLETE CBC W/AUTO DIFF WBC: CPT | Performed by: INTERNAL MEDICINE

## 2019-06-17 PROCEDURE — 25010000002 VANCOMYCIN PER 500 MG: Performed by: INTERNAL MEDICINE

## 2019-06-17 PROCEDURE — C2621 PMKR, OTHER THAN SING/DUAL: HCPCS | Performed by: INTERNAL MEDICINE

## 2019-06-17 PROCEDURE — 02HK3JZ INSERTION OF PACEMAKER LEAD INTO RIGHT VENTRICLE, PERCUTANEOUS APPROACH: ICD-10-PCS | Performed by: INTERNAL MEDICINE

## 2019-06-17 PROCEDURE — C1892 INTRO/SHEATH,FIXED,PEEL-AWAY: HCPCS | Performed by: INTERNAL MEDICINE

## 2019-06-17 PROCEDURE — 80053 COMPREHEN METABOLIC PANEL: CPT | Performed by: INTERNAL MEDICINE

## 2019-06-17 PROCEDURE — 97530 THERAPEUTIC ACTIVITIES: CPT

## 2019-06-17 PROCEDURE — 02H63JZ INSERTION OF PACEMAKER LEAD INTO RIGHT ATRIUM, PERCUTANEOUS APPROACH: ICD-10-PCS | Performed by: INTERNAL MEDICINE

## 2019-06-17 PROCEDURE — C1730 CATH, EP, 19 OR FEW ELECT: HCPCS | Performed by: INTERNAL MEDICINE

## 2019-06-17 PROCEDURE — 99233 SBSQ HOSP IP/OBS HIGH 50: CPT | Performed by: INTERNAL MEDICINE

## 2019-06-17 PROCEDURE — 0 IOPAMIDOL PER 1 ML: Performed by: INTERNAL MEDICINE

## 2019-06-17 PROCEDURE — 25010000002 VANCOMYCIN 10 G RECONSTITUTED SOLUTION: Performed by: NURSE PRACTITIONER

## 2019-06-17 PROCEDURE — C1733 CATH, EP, OTHR THAN COOL-TIP: HCPCS | Performed by: INTERNAL MEDICINE

## 2019-06-17 PROCEDURE — 94660 CPAP INITIATION&MGMT: CPT

## 2019-06-17 DEVICE — IMPLANTABLE DEVICE: Type: IMPLANTABLE DEVICE | Status: FUNCTIONAL

## 2019-06-17 DEVICE — LD PM TENDRIL STS 6F46CM 2088TC46: Type: IMPLANTABLE DEVICE | Status: FUNCTIONAL

## 2019-06-17 DEVICE — LD PM TENDRIL STS 6F52CM 2088TC52: Type: IMPLANTABLE DEVICE | Status: FUNCTIONAL

## 2019-06-17 DEVICE — LD QUARTET CRT VENT LNG SPACING 86CM: Type: IMPLANTABLE DEVICE | Status: FUNCTIONAL

## 2019-06-17 RX ORDER — MIDAZOLAM HYDROCHLORIDE 1 MG/ML
INJECTION INTRAMUSCULAR; INTRAVENOUS AS NEEDED
Status: DISCONTINUED | OUTPATIENT
Start: 2019-06-17 | End: 2019-06-17 | Stop reason: HOSPADM

## 2019-06-17 RX ORDER — ONDANSETRON 2 MG/ML
INJECTION INTRAMUSCULAR; INTRAVENOUS AS NEEDED
Status: DISCONTINUED | OUTPATIENT
Start: 2019-06-17 | End: 2019-06-17 | Stop reason: HOSPADM

## 2019-06-17 RX ORDER — ONDANSETRON 2 MG/ML
4 INJECTION INTRAMUSCULAR; INTRAVENOUS EVERY 6 HOURS PRN
Status: DISCONTINUED | OUTPATIENT
Start: 2019-06-17 | End: 2019-06-17 | Stop reason: SDUPTHER

## 2019-06-17 RX ORDER — DEXTROSE, SODIUM CHLORIDE, SODIUM LACTATE, POTASSIUM CHLORIDE, AND CALCIUM CHLORIDE 5; .6; .31; .03; .02 G/100ML; G/100ML; G/100ML; G/100ML; G/100ML
75 INJECTION, SOLUTION INTRAVENOUS CONTINUOUS
Status: DISCONTINUED | OUTPATIENT
Start: 2019-06-17 | End: 2019-06-18

## 2019-06-17 RX ORDER — PANTOPRAZOLE SODIUM 40 MG/10ML
40 INJECTION, POWDER, LYOPHILIZED, FOR SOLUTION INTRAVENOUS
Status: DISCONTINUED | OUTPATIENT
Start: 2019-06-17 | End: 2019-06-19

## 2019-06-17 RX ORDER — SODIUM CHLORIDE 0.9 % (FLUSH) 0.9 %
3 SYRINGE (ML) INJECTION EVERY 12 HOURS SCHEDULED
Status: DISCONTINUED | OUTPATIENT
Start: 2019-06-17 | End: 2019-06-19

## 2019-06-17 RX ORDER — AMIODARONE HYDROCHLORIDE 200 MG/1
200 TABLET ORAL
Status: DISCONTINUED | OUTPATIENT
Start: 2019-06-17 | End: 2019-06-19 | Stop reason: HOSPADM

## 2019-06-17 RX ORDER — LIDOCAINE HYDROCHLORIDE 10 MG/ML
INJECTION, SOLUTION INFILTRATION; PERINEURAL AS NEEDED
Status: DISCONTINUED | OUTPATIENT
Start: 2019-06-17 | End: 2019-06-17 | Stop reason: HOSPADM

## 2019-06-17 RX ORDER — ONDANSETRON 2 MG/ML
4 INJECTION INTRAMUSCULAR; INTRAVENOUS EVERY 6 HOURS PRN
Status: DISCONTINUED | OUTPATIENT
Start: 2019-06-17 | End: 2019-06-19

## 2019-06-17 RX ORDER — HYDROCODONE BITARTRATE AND ACETAMINOPHEN 5; 325 MG/1; MG/1
1 TABLET ORAL EVERY 4 HOURS PRN
Status: DISCONTINUED | OUTPATIENT
Start: 2019-06-17 | End: 2019-06-19

## 2019-06-17 RX ORDER — CARVEDILOL 12.5 MG/1
12.5 TABLET ORAL 2 TIMES DAILY WITH MEALS
Status: DISCONTINUED | OUTPATIENT
Start: 2019-06-17 | End: 2019-06-18

## 2019-06-17 RX ORDER — BUPIVACAINE HYDROCHLORIDE 5 MG/ML
INJECTION, SOLUTION PERINEURAL AS NEEDED
Status: DISCONTINUED | OUTPATIENT
Start: 2019-06-17 | End: 2019-06-17 | Stop reason: HOSPADM

## 2019-06-17 RX ORDER — VANCOMYCIN HYDROCHLORIDE 1 G/200ML
15 INJECTION, SOLUTION INTRAVENOUS EVERY 12 HOURS
Status: COMPLETED | OUTPATIENT
Start: 2019-06-17 | End: 2019-06-18

## 2019-06-17 RX ORDER — SODIUM CHLORIDE 0.9 % (FLUSH) 0.9 %
3-10 SYRINGE (ML) INJECTION AS NEEDED
Status: DISCONTINUED | OUTPATIENT
Start: 2019-06-17 | End: 2019-06-19

## 2019-06-17 RX ORDER — FENTANYL CITRATE 50 UG/ML
INJECTION, SOLUTION INTRAMUSCULAR; INTRAVENOUS AS NEEDED
Status: DISCONTINUED | OUTPATIENT
Start: 2019-06-17 | End: 2019-06-17 | Stop reason: HOSPADM

## 2019-06-17 RX ADMIN — VANCOMYCIN HYDROCHLORIDE 1500 MG: 10 INJECTION, POWDER, LYOPHILIZED, FOR SOLUTION INTRAVENOUS at 05:53

## 2019-06-17 RX ADMIN — IPRATROPIUM BROMIDE 0.5 MG: 0.5 SOLUTION RESPIRATORY (INHALATION) at 16:49

## 2019-06-17 RX ADMIN — INSULIN LISPRO 5 UNITS: 100 INJECTION, SOLUTION INTRAVENOUS; SUBCUTANEOUS at 21:18

## 2019-06-17 RX ADMIN — AMITRIPTYLINE HYDROCHLORIDE 25 MG: 25 TABLET, FILM COATED ORAL at 21:18

## 2019-06-17 RX ADMIN — HYDROCODONE BITARTRATE AND ACETAMINOPHEN 1 TABLET: 5; 325 TABLET ORAL at 20:30

## 2019-06-17 RX ADMIN — SODIUM CHLORIDE, SODIUM LACTATE, POTASSIUM CHLORIDE, CALCIUM CHLORIDE AND DEXTROSE MONOHYDRATE 75 ML/HR: 5; 600; 310; 30; 20 INJECTION, SOLUTION INTRAVENOUS at 10:43

## 2019-06-17 RX ADMIN — METOPROLOL TARTRATE 100 MG: 100 TABLET ORAL at 10:43

## 2019-06-17 RX ADMIN — ATORVASTATIN CALCIUM 20 MG: 20 TABLET, FILM COATED ORAL at 21:18

## 2019-06-17 RX ADMIN — SACUBITRIL AND VALSARTAN 1 TABLET: 24; 26 TABLET, FILM COATED ORAL at 21:18

## 2019-06-17 RX ADMIN — VANCOMYCIN HYDROCHLORIDE 1000 MG: 1 INJECTION, SOLUTION INTRAVENOUS at 18:34

## 2019-06-17 RX ADMIN — DILTIAZEM HYDROCHLORIDE 30 MG: 30 TABLET, FILM COATED ORAL at 05:51

## 2019-06-17 RX ADMIN — DIPHENHYDRAMINE HYDROCHLORIDE, ZINC ACETATE: 2; .1 CREAM TOPICAL at 03:52

## 2019-06-17 RX ADMIN — IPRATROPIUM BROMIDE 0.5 MG: 0.5 SOLUTION RESPIRATORY (INHALATION) at 20:51

## 2019-06-17 RX ADMIN — APIXABAN 5 MG: 5 TABLET, FILM COATED ORAL at 09:05

## 2019-06-17 RX ADMIN — CARVEDILOL 12.5 MG: 12.5 TABLET, FILM COATED ORAL at 18:34

## 2019-06-17 RX ADMIN — PANTOPRAZOLE SODIUM 40 MG: 40 INJECTION, POWDER, FOR SOLUTION INTRAVENOUS at 05:51

## 2019-06-17 RX ADMIN — BUDESONIDE AND FORMOTEROL FUMARATE DIHYDRATE 2 PUFF: 80; 4.5 AEROSOL RESPIRATORY (INHALATION) at 07:32

## 2019-06-17 RX ADMIN — AMIODARONE HYDROCHLORIDE 200 MG: 200 TABLET ORAL at 16:36

## 2019-06-17 RX ADMIN — BUDESONIDE AND FORMOTEROL FUMARATE DIHYDRATE 2 PUFF: 80; 4.5 AEROSOL RESPIRATORY (INHALATION) at 20:52

## 2019-06-17 RX ADMIN — FUROSEMIDE 20 MG: 20 TABLET ORAL at 12:15

## 2019-06-17 RX ADMIN — IPRATROPIUM BROMIDE 0.5 MG: 0.5 SOLUTION RESPIRATORY (INHALATION) at 07:32

## 2019-06-17 NOTE — SIGNIFICANT NOTE
06/17/19 1117   SLP Deferred Reason   SLP Deferred Reason Routine  (Pt is NPO for ablation and pacemaker placemaker today. f/u tomorrow w/ dysphagia tx)

## 2019-06-17 NOTE — PLAN OF CARE
Problem: Patient Care Overview  Goal: Plan of Care Review  Outcome: Ongoing (interventions implemented as appropriate)   06/17/19 2681   Coping/Psychosocial   Plan of Care Reviewed With patient;family   OTHER   Outcome Summary Pt CGA for t/f and mobility and supervision for toileting, grooming and bed mobility. Pt educated on pursed lip breathing and EC principles during ADL tasking. Pt limited by poor activity tolerance. Recommend CONT skilled IPOT POC. Recommend D/C to home with assist and home health.

## 2019-06-17 NOTE — PROGRESS NOTES
Continued Stay Note  [facility]     Patient Name: Stef Jones  MRN: [id number]  Today's Date: 6/17/2019    Admit Date: 6/8/2019    Discharge Plan     Row Name 06/17/19 1036       Plan    Plan  Update    Plan Comments  Patient may go for AVN today. Plan is home at discharge.    Final Discharge Disposition Code  01 - home or self-care        Discharge Codes    No documentation.       Expected Discharge Date and Time     Expected Discharge Date Expected Discharge Time    Mt 15, 2019             Shahla Armstrong RN

## 2019-06-17 NOTE — PROGRESS NOTES
[facility] at [facility]  Progress Note       LOS: 9 days   Patient Care Team:  Lexx Monson MD as PCP - General (Family Medicine)    Chief Complaint:  AFIB with RVR    Subjective : remains short of breath. Reports nasal congestion. Denies chest pain, lh, dizziness, pnd, orthopna.     Review of Systems:   Denies cough, fevers, chills. See HPI      Objective       Current Facility-Administered Medications:   •  acetaminophen (TYLENOL) tablet 650 mg, 650 mg, Oral, Q6H PRN, Aroldo Glez MD, 650 mg at 06/12/19 1104  •  albuterol (PROVENTIL) nebulizer solution 0.083% 2.5 mg/3mL, 0.625 mg, Nebulization, Q6H PRN, Glen Gutiérrez MD  •  amitriptyline (ELAVIL) tablet 25 mg, 25 mg, Oral, Nightly, Gema Duong APRN, 25 mg at 06/16/19 2111  •  apixaban (ELIQUIS) tablet 5 mg, 5 mg, Oral, Q12H, Aroldo Glez MD, 5 mg at 06/16/19 2111  •  atorvastatin (LIPITOR) tablet 20 mg, 20 mg, Oral, Nightly, OsGlen ferraro MD, 20 mg at 06/16/19 2111  •  budesonide-formoterol (SYMBICORT) 80-4.5 MCG/ACT inhaler 2 puff, 2 puff, Inhalation, BID - RT, OsGlen ferraro MD, 2 puff at 06/17/19 0732  •  diltiaZEM (CARDIZEM) tablet 30 mg, 30 mg, Oral, Q8H, Lucio Chapman MD, 30 mg at 06/17/19 0551  •  diphenhydrAMINE-zinc acetate 2-0.1 % cream, , Topical, TID PRN, Gema Duong APRN  •  furosemide (LASIX) tablet 20 mg, 20 mg, Oral, Daily, OsGlen ferraro MD, 20 mg at 06/16/19 0834  •  insulin lispro (humaLOG) injection 0-14 Units, 0-14 Units, Subcutaneous, 4x Daily With Meals & Nightly, Florentin Leggett, [facility], 3 Units at 06/15/19 2102  •  ipratropium (ATROVENT) nebulizer solution 0.5 mg, 500 mcg, Nebulization, 4x Daily - RT, Glen Gutiérrez MD, 0.5 mg at 06/17/19 0732  •  magnesium sulfate 4g/100mL (PREMIX) infusion, 4 g, Intravenous, PRN, Stanislaw Torrez MD  •  metoprolol tartrate (LOPRESSOR) injection 5 mg, 5 mg, Intravenous, Q6H PRN, Vonnie Ruiz, APRN, 5 mg  "at 06/14/19 1754  •  metoprolol tartrate (LOPRESSOR) tablet 100 mg, 100 mg, Oral, Q12H, Lucio Chapman MD, 100 mg at 06/16/19 0834  •  pantoprazole (PROTONIX) injection 40 mg, 40 mg, Intravenous, Q AM, Stanislaw Torrez MD, 40 mg at 06/17/19 0551  •  sacubitril-valsartan (ENTRESTO) 24-26 MG tablet 1 tablet, 1 tablet, Oral, Q12H, Yovanny Herbert MD, 1 tablet at 06/16/19 0834  •  sodium chloride 0.9 % flush 3 mL, 3 mL, Intravenous, Q12H, Gema Duong, APRN, 3 mL at 06/15/19 1944  •  sodium chloride 0.9 % flush 3-10 mL, 3-10 mL, Intravenous, PRN, Gema Duong, APRN    Vital Sign Min/Max for last 24 hours  Temp  Min: 98.7 °F (37.1 °C)  Max: 98.7 °F (37.1 °C)   BP  Min: 80/58  Max: 158/112   Pulse  Min: 80  Max: 142   Resp  Min: 16  Max: 20   SpO2  Min: 90 %  Max: 98 %   Flow (L/min)  Min: 3  Max: 4   No Data Recorded     Flowsheet Rows      First Filed Value   Admission Height  167.6 cm (66\") Documented at 06/08/2019 0245   Admission Weight  77.9 kg (171 lb 11.8 oz) Documented at 06/08/2019 0245            Intake/Output Summary (Last 24 hours) at 6/17/2019 0834  Last data filed at 6/17/2019 0300  Gross per 24 hour   Intake 240 ml   Output 1500 ml   Net -1260 ml       Physical Exam:     General Appearance:    Alert, cooperative, in no acute distress   Lungs:     Diminished BS throughout. Non labored    Heart:    Irregularly irregular, tachycardic. normal S1 and S2, no            murmur, no gallop, no rub, no click   Chest Wall:    No abnormalities observed   Abdomen:     Normal bowel sounds, soft nontender   Extremities:   Moves all extremities well, no edema, no cyanosis, no             redness   Pulses:   Pulses palpable and equal bilaterally   Skin:   No bleeding, bruising or rash        Results Review:   Results from last 7 days   Lab Units 06/17/19  9384 06/16/19  0433 06/15/19  0423   WBC 10*3/mm3 9.57 10.27 8.28   HEMOGLOBIN g/dL 16.0 15.9 16.6   HEMATOCRIT % 50.4 50.1 53.1*   PLATELETS 10*3/mm3 265 252 " 218     Results from last 7 days   Lab Units 06/17/19  0333 06/16/19  0433 06/15/19  0423   SODIUM mmol/L 137 137 136   POTASSIUM mmol/L 4.3 4.3 4.4   CHLORIDE mmol/L 96* 100 96*   CO2 mmol/L 29.0 28.0 30.0*   BUN mg/dL 18 19 17   CREATININE mg/dL 1.04 1.06 0.94   GLUCOSE mg/dL 91 123* 96              Results from last 7 days   Lab Units 06/12/19  1349   TSH mIU/mL 3.880   FREE T4 ng/dL 1.33                   Intake/Output Summary (Last 24 hours) at 6/17/2019 0834  Last data filed at 6/17/2019 0300  Gross per 24 hour   Intake 240 ml   Output 1500 ml   Net -1260 ml       I personally viewed and interpreted the patient's EKG/Telemetry data    EKG: none for review today    Telemetry: Afib 120's    Ejection Fraction  No results found for: EF    Echo EF Estimated  No results found for: ECHOEFEST      Present on Admission:  • Cellulitis of Right Lower Leg and Foot  • A Fib w/RVR  • COPD (chronic obstructive pulmonary disease) (CMS/HCC)  • Laryngeal mass    Assessment/Plan   Initial cardiac assessment:  Mr. Jones is a 73 y/o male with pmhx of hypertension and COPD who was transferred from [facility] for Afib with RVR and cellulitis of right lower calf/foot.     Recommendations:  1. Afib with RVR  - newly diagnosed, incidentally found  - CHADSVASC = 3 (age, HTN), starting Eliquis today  - Echo: EF mid 30s, moderate RV enlargement and RV dysfunction.  - S/p DENA and successful ECV, 6/10.   - Rates remain difficult to control.      2. Acute systolic heart failure  - TTE shows ejection fraction of mid 30s, moderate RV enlargement and RV dysfunction.  - could be tachycardia induced. Will need repeat echo once maintaining NSR, may also need ischemic evaluation.  - DENA: EF 30-35%, Moderate to severe MR, moderate TR, RVSP 35-45mmHg.     3. Hypertension  - remains labile.     4. Syncope  - reportedly had syncopal episode seems to have been triggered by prolonged coughing.     5. Lower extremity cellulitis  - per admitting     6.  "COPD  -  Per admitting    7. 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    - Continue entresto, lasix.   - Hold Eliquis tonight.   - Will stop Cardizem and amiodarone after AVN RFA with reduced EF and consider changing out metoprolol for bisoprolol (cardioselective)  - Consider ischemic evaluation if EF not improved once maintaining NSR.   - May also need lifevest at time of discharge since only getting BiV PPM implant.    Electronically signed by Vonnie Ruiz, MAYI, 06/17/19, 8:34 AM.    ___________________________________________________________  The patient was seen and examined by me.  Agree and verified/discussed key components of E/M as outlined by the Nurse practitioner/PA.    BP 93/62   Pulse (!) 151   Temp 97.3 °F (36.3 °C) (Axillary)   Resp 20   Ht 167.6 cm (66\")   Wt 72.6 kg (160 lb 0.9 oz)   SpO2 94%   BMI 25.83 kg/m²     General Appearance:   · well developed  · well nourished  Neck:  · thyroid not enlarged  · supple  Respiratory:  · no respiratory distress  · normal breath sounds  · no rales  Cardiovascular:  · no jugular venous distention  · Irregular irregular rhythm  · apical impulse normal  · S1 normal, S2 normal  · no S3, no S4   · no murmur  · no rub, no thrill  · carotid pulses normal; no bruit  · pedal pulses normal  · lower extremity edema: none  .   Skin:   · warm, dry        Plan:    Appreciate EP's assistance, plans for AV node ablation and biV pacemaker today  We will adjust medications after procedure  Ideally will be on cardioselective beta-blocker, and Entresto  Resume Eliquis after procedure  May repeat limited echo prior to discharge to determine need for LifeVest and urgency of ischemic evaluation.    Yovanny Solares MD, [facility]       "

## 2019-06-17 NOTE — PROGRESS NOTES
Postop day #4   status post direct laryngoscopy and biopsy of left  laryngeal lesion    Surprisingly pathology report is still pending.  Will check with pathology tomorrow to identify information available thus far.    Glen Gutiérrez MD, FACS  Otolaryngology  o [phone]  c [phone]

## 2019-06-17 NOTE — PROGRESS NOTES
INTENSIVIST   PROGRESS NOTE     Hospital:  LOS: 9 days      S     Mr. Stef Jones, 74 y.o. male is followed for:      A Fib w/RVR    Cellulitis of Right Lower Leg and Foot    COPD (chronic obstructive pulmonary disease) (CMS/[facility])    Laryngeal mass    As an Intensivist, we provide an integrated approach to the ICU patient and family, medical management of comorbid conditions, lead interdisciplinary rounds and coordinate the care with all other services, including those from other specialists.     Interval History:  Doing well.  No complaints.  No chest pain.     The patient's relevant past medical, surgical and social history were reviewed and updated in Epic as appropriate.     ROS:   Constitutional: Negative for fever.   Respiratory: No Dyspnea at present.   Cardiovascular: Negative for chest pain.   Gastrointestinal: Negative for  nausea, vomiting and diarrhea.        O     Vitals:  Temp: 97.3 °F (36.3 °C) (06/17/19 0800) Temp  Min: 97.3 °F (36.3 °C)  Max: 98.7 °F (37.1 °C)   BP: 93/62 (06/17/19 1043) BP  Min: 74/64  Max: 158/112   Pulse: (!) 151 (06/17/19 1043) Pulse  Min: 80  Max: 151   Resp: 20 (06/17/19 0800) Resp  Min: 16  Max: 20   SpO2: 94 % (06/17/19 1000) SpO2  Min: 90 %  Max: 98 %   Device: nasal cannula (06/17/19 0800)    Flow Rate: 4 (06/17/19 0800) Flow (L/min)  Min: 3  Max: 4     Intake/Ouptut 24 hrs (7:00AM - 6:59 AM)  Intake/Output       06/16/19 0700 - 06/17/19 0659    Intake (ml) 440    Output (ml) 1650    Net (ml) -1210           Medications (drips):    dextrose 5 % and lactated Ringer's Last Rate: 75 mL/hr (06/17/19 1043)       Physical Examination  Telemetry:  Rhythm: atrial rhythm (06/17/19 0800)  Atrial Rhythm: atrial fibrillation (06/17/19 0800)      Constitutional:  No acute distress.   Cardiovascular: IRR. Normal heart sounds.  No murmurs, gallop or rub.   Respiratory: No respiratory distress.  Normal breath sounds  No adventitious sounds    Abdominal:  Soft with no tenderness  No  distension. No HSM.   Extremities: Warm with no cyanosis.  No edema.   Neurological:   Alert and Oriented to person, place, and time.               Lines/Drains/Airways: Peripheral IV(s)     Hematology:  Results from last 7 days   Lab Units 06/17/19 0333 06/16/19 0433 06/15/19  0423   WBC 10*3/mm3 9.57 10.27 8.28   HEMOGLOBIN g/dL 16.0 15.9 16.6   MCV fL 86.3 86.7 87.3   PLATELETS 10*3/mm3 265 252 218       Chemistry:  Estimated Creatinine Clearance: 62.1 mL/min (by C-G formula based on SCr of 1.04 mg/dL).    Results from last 7 days   Lab Units 06/17/19 0333 06/16/19  0433 06/15/19  0423   SODIUM mmol/L 137 137 136   POTASSIUM mmol/L 4.3 4.3 4.4   CHLORIDE mmol/L 96* 100 96*   CO2 mmol/L 29.0 28.0 30.0*   ANION GAP mmol/L 12.0 9.0 10.0   GLUCOSE mg/dL 91 123* 96   CALCIUM mg/dL 8.9 9.2 9.2     Results from last 7 days   Lab Units 06/17/19 0333 06/16/19 0433 06/15/19  0423   BUN mg/dL 18 19 17   CREATININE mg/dL 1.04 1.06 0.94     Images:  No radiology results for the last day    Echo:  Results for orders placed during the hospital encounter of 06/08/19   Adult Transesophageal Echo (DENA) W/ Cont if Necessary Per Protocol    Narrative · Left ventricular systolic function is severely decreased. Estimated EF   appears to be in the range of 31 - 35%. Global hypokinesis noted.  · Left atrial cavity size is moderately dilated. The left atrial appendage   was visualized through multiple planes. Left atrial appendage was found to   be singularly lobar in nature. Doppler interrogation shows mildly reduced   flow within the left atrial appendage. No left atrial appendage thrombus   present.  · The mitral valve is abnormal in structure. There is mild bileaflet   mitral valve thickening present. Moderate-to-severe mitral valve   regurgitation is present (2-3+)  · Moderate tricuspid valve regurgitation is present. Estimated right   ventricular systolic pressure from tricuspid regurgitation is mildly   elevated (35-45  mmHg).          Results: Reviewed.  I reviewed the patient's new laboratory and imaging results.  I independently reviewed the patient's new images.    Medications: Reviewed.    Assessment/Plan   A / P     Assessment:    74 y.o.male, admitted on 6/8/2019 with A-fib (CMS/[facility]) [I48.91]  Atrial fibrillation with rapid ventricular response (CMS/[facility]) [I48.91]:       1. Respiratory failure } Improved.  1. COPD  2. Bronchodilators  2. Cardiac:  1. A Fib with RVR  1. Anticoagulation on APIxaban   2. Cardiomyopathy, EF 33%  3. TR  4. HTN on CCB  5. Dyslipidemia on statins  3. Cellulitis R lower extremity and foot.} Resolved.  1. Antibiotics completed.  4. Laryngeal Mass } ENT  1. Bx. Still pending (Dr. Thompson reading the slides.)  5. GERD on PPI  6. Glucose: T2D per A1c criteria    Results from last 7 days   Lab Units 06/17/19  0739 06/16/19 2007 06/16/19  1715 06/16/19  1131 06/16/19  0714 06/15/19  2004   GLUCOSE mg/dL 94 135* 134* 106 136* 165*     Lab Results   Lab Value Date/Time    HGBA1C 6.60 (H) 06/08/2019 0343       Diet: NPO Diet   Advance Directives: Code Status and Medical Interventions:   Ordered at: 06/08/19 0320     Code Status:    CPR     Medical Interventions (Level of Support Prior to Arrest):    Full        Plan:    1. BiPAP prn  2. Cardiac:  1. Discussed with Dr. Chapman: AVN and biV Pacemaker today.  3. Goal: Glucose < 180 mg/dL.  4. Disposition: Keep in ICU.    Plan of care and goals reviewed during interdisciplinary rounds.  I discussed the patient's findings and my recommendations with patient and nursing staff    Level of Risk is High due to:  illness with threat to life or bodily function.     Time: 25 minutes, in direct patient care, with the patient and family and/or on the avalos coordinating care with other health care providers.     I have spent > 50% percent of this time, counseling and discussing management.       Stanislaw Torrez MD, FACP, FCCP, [facility]  Intensive Care Medicine, Nutrition  Support and Pulmonary Medicine     [x]  Primary Attending  []  Consultant    No results found for: INTRAOP, PREDX, FINALDX, COMDX

## 2019-06-17 NOTE — PLAN OF CARE
Problem: Patient Care Overview  Goal: Plan of Care Review  Outcome: Ongoing (interventions implemented as appropriate)   06/17/19 1720   Coping/Psychosocial   Plan of Care Reviewed With patient;family   Plan of Care Review   Progress no change   OTHER   Outcome Summary VSS. Oxygen weaned to 3 L NC. Patient had ablation and LSCLV pacemaker placed. R groin site angiosealwith CDI dressing. L pacer site CDI. Laryngeal biopsy still pending - plan for discharge home tomorrow per cardiology       Problem: Chronic Obstructive Pulmonary Disease (Adult)  Intervention: Reduce/Relieve Breathlessness   06/11/19 0600 06/17/19 1600   Cognitive Interventions   Sensory Stimulation Regulation care clustered;lighting decreased;quiet environment promoted --    Coping/Psychosocial Interventions   Environmental Support --  calm environment promoted     Intervention: Prevent/Manage DVT/VTE Risk   06/17/19 0800   Interventions   VTE Prevention/Management bilateral;sequential compression devices on     Intervention: Prevent/Manage Infection   06/17/19 0800 06/17/19 1600   Safety Management   Infection Prevention --  environmental surveillance performed   Hygiene Care   Oral Care teeth brushed --      Intervention: Optimize Functional Ability/Increase Activity Tolerance   06/17/19 1600   Activity   Activity Management activity adjusted per tolerance     Intervention: Optimize Oxygenation/Ventilation/Perfusion   06/15/19 2000 06/17/19 1600   Positioning   Head of Bed (HOB) --  HOB at 15 degrees   Respiratory Interventions   Airway/Ventilation Management pulmonary hygiene promoted --      Intervention: Support/Optimize Psychosocial Response to Chronic Pulmonary Disease   06/17/19 1600   Coping/Psychosocial Interventions   Supportive Measures active listening utilized;decision-making supported;problem solving facilitated;positive reinforcement provided   Interventions   Trust Relationship/Rapport care explained;choices provided;emotional  support provided;empathic listening provided;questions answered;questions encouraged;reassurance provided;thoughts/feelings acknowledged   Psychosocial Support   Family/Support System Care involvement promoted;presence promoted       Goal: Signs and Symptoms of Listed Potential Problems Will be Absent, Minimized or Managed (Chronic Obstructive Pulmonary Disease)  Outcome: Ongoing (interventions implemented as appropriate)   06/17/19 1720   Goal/Outcome Evaluation   Problems Assessed (Chronic Obstructive Pulmonary Disease (COPD)) all   Problems Present (COPD, Bronch/Emphy) respiratory compromise       Problem: Cardiac Rhythm Management Device (Adult)  Intervention: Prevent/Manage DVT/VTE Risk   06/17/19 0800   Interventions   VTE Prevention/Management bilateral;sequential compression devices on     Intervention: Monitor/Manage Pain Considering Device Effect   06/12/19 1104 06/17/19 1600   Promote Oxygenation/Perfusion   Pain Management Interventions care clustered;see MAR;quiet environment facilitated;relaxation techniques promoted --    Safety Management   Medication Review/Management --  medications reviewed     Intervention: Support/Optimize Psychosocial Response to Condition   06/17/19 1600   Coping/Psychosocial Interventions   Supportive Measures active listening utilized;decision-making supported;problem solving facilitated;positive reinforcement provided   Psychosocial Support   Family/Support System Care involvement promoted;presence promoted       Goal: Signs and Symptoms of Listed Potential Problems Will be Absent, Minimized or Managed (Cardiac Rhythm Management Device)  Outcome: Ongoing (interventions implemented as appropriate)   06/17/19 1720   Goal/Outcome Evaluation   Problems Assessed (Cardiac Rhythm Management Device) all

## 2019-06-17 NOTE — THERAPY TREATMENT NOTE
Acute Care - Occupational Therapy Treatment Note  Psychiatric     Patient Name: Stef Jones  : 1945  MRN: [id number]  Today's Date: 2019  Onset of Illness/Injury or Date of Surgery: 19  Date of Referral to OT: 19  Referring Physician: MD Shan    Admit Date: 2019       ICD-10-CM ICD-9-CM   1. A Fib w/RVR I48.91 427.31   2. Impaired functional mobility, balance, gait, and endurance Z74.09 V49.89   3. 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 Atrial fibrillation with RVR (CMS/HCC) I48.91 427.31     Patient Active Problem List   Diagnosis   • Cellulitis of Right Lower Leg and Foot   • A Fib w/RVR   • COPD (chronic obstructive pulmonary disease) (CMS/HCC)   • Dysphagia   • Laryngeal mass     Past Medical History:   Diagnosis Date   • COPD (chronic obstructive pulmonary disease) (CMS/HCC)    • Hearing loss    • Hoarse voice quality    • Hypertension      Past Surgical History:   Procedure Laterality Date   • HEMORRHOIDECTOMY     • LARYNGOSCOPY N/A 2019    Procedure: MICRO DIRECT LARYNGOSCOPY WITH BIOPSY;  Surgeon: Glen Gutiérrez MD;  Location: [facility];  Service: ENT       Therapy Treatment    Rehabilitation Treatment Summary     Row Name 19 0815             Treatment Time/Intention    Discipline  occupational therapist  (Pended)   -      Document Type  therapy note (daily note)  (Pended)   -CH      Subjective Information  no complaints  (Pended)   -CH      Mode of Treatment  occupational therapy  (Pended)   -CH      Patient Effort  excellent  (Pended)   -CH      Existing Precautions/Restrictions  fall;cardiac;oxygen therapy device and L/min  (Pended)   -CH      Patient Response to Treatment  tolerated  (Pended)   -CH      Recorded by [CH] Dinesh Gomez, OT Student 19 1045      Row Name 19 0815             Vital Signs    Pre Systolic BP Rehab  89  (Pended)   -CH      Pre Treatment Diastolic BP  72   (Pended)   -      Intra Systolic BP Rehab  121  (Pended)   -CH      Intra Treatment Diastolic BP  99  (Pended)   -      Post Systolic BP Rehab  95  (Pended)   -CH      Post Treatment Diastolic BP  64  (Pended)   -      Pretreatment Heart Rate (beats/min)  137  (Pended)   -CH      Intratreatment Heart Rate (beats/min)  134  (Pended)   -CH      Posttreatment Heart Rate (beats/min)  141  (Pended)   -      Pre SpO2 (%)  94  (Pended)   -CH      O2 Delivery Pre Treatment  supplemental O2  (Pended)   -CH      Intra SpO2 (%)  93  (Pended)   -CH      O2 Delivery Intra Treatment  supplemental O2  (Pended)   -      Post SpO2 (%)  95  (Pended)   -CH      O2 Delivery Post Treatment  supplemental O2  (Pended)   -      Pre Patient Position  Supine  (Pended)   -CH      Intra Patient Position  Sitting  (Pended)   -CH      Post Patient Position  Sitting  (Pended)   -      Recorded by [CH] Dinseh Gomez, OT Student 06/17/19 1101      Row Name 06/17/19 0815             Cognitive Assessment/Intervention    Additional Documentation  Cognitive Assessment/Intervention (Group)  (Pended)   -      Recorded by [] Dinesh Gomez OT Student 06/17/19 1101      Row Name 06/17/19 0815             Cognitive Assessment/Intervention- PT/OT    Affect/Mental Status (Cognitive)  WFL  (Pended)   -      Orientation Status (Cognition)  oriented x 4  (Pended)   -      Follows Commands (Cognition)  follows one step commands;over 90% accuracy  (Pended)   -      Cognitive Function (Cognitive)  safety deficit  (Pended)   -      Safety Deficit (Cognitive)  mild deficit;insight into deficits/self awareness;safety precautions awareness  (Pended)   -      Personal Safety Interventions  fall prevention program maintained;gait belt;nonskid shoes/slippers when out of bed  (Pended)   -      Recorded by [] Dinesh Gomez, OT Student 06/17/19 1101      Row Name 06/17/19 0815             Safety Issues, Functional  Mobility    Safety Issues Affecting Function (Mobility)  insight into deficits/self awareness;safety precaution awareness  (Pended)   -      Impairments Affecting Function (Mobility)  endurance/activity tolerance;strength  (Pended)   -      Recorded by [CH] Dinesh Gomez, OT Student 06/17/19 1101      Row Name 06/17/19 0815             Bed Mobility Assessment/Treatment    Bed Mobility Assessment/Treatment  scooting/bridging;supine-sit  (Pended)   -      Scooting/Bridging [place] (Bed Mobility)  supervision;verbal cues  (Pended)   -      Supine-Sit [place] (Bed Mobility)  supervision;verbal cues  (Pended)   -      Assistive Device (Bed Mobility)  bed rails;head of bed elevated  (Pended)   -      Recorded by [CH] Dinesh Gomez, OT Student 06/17/19 1101      Row Name 06/17/19 0815             Functional Mobility    Functional Mobility- Ind. Level  contact guard assist  (Pended)   -      Functional Mobility- Device  rolling walker  (Pended)   -      Functional Mobility-Distance (Feet)  --  (Pended)  From bed bathroom and back to chair.   -CH      Functional Mobility- Comment  Pt with Mod VCs to keep walker close during t/f and for hand placement.   (Pended)   -      Recorded by [CH] Dinesh Gomez OT Student 06/17/19 1101      Row Name 06/17/19 0815             Transfer Assessment/Treatment    Transfer Assessment/Treatment  sit-stand transfer;stand-sit transfer;toilet transfer  (Pended)   -      Comment (Transfers)  Mod VCs for approach and for hand placement.   (Pended)   -      Recorded by [] Dinesh Gomez, OT Student 06/17/19 1101      Row Name 06/17/19 0815             Sit-Stand Transfer    Sit-Stand [place] (Transfers)  contact guard;verbal cues  (Pended)   -      Assistive Device (Sit-Stand Transfers)  walker, front-wheeled  (Pended)   -      Recorded by [] Dinesh Gomez, OT Student 06/17/19 1101      Row Name 06/17/19 0815              Stand-Sit Transfer    Stand-Sit [place] (Transfers)  contact guard;verbal cues  (Pended)   -CH      Assistive Device (Stand-Sit Transfers)  walker, front-wheeled  (Pended)   -CH      Recorded by [CH] Dinesh Gomez, OT Student 06/17/19 1101      Row Name 06/17/19 0815             Toilet Transfer    Type (Toilet Transfer)  sit-stand;stand-sit  (Pended)   -CH      [place] Level (Toilet Transfer)  contact guard;verbal cues  (Pended)   -CH      Assistive Device (Toilet Transfer)  walker, front-wheeled;grab bars/safety frame  (Pended)   -CH      Recorded by [CH] Dinesh Gomez, OT Student 06/17/19 1101      Row Name 06/17/19 0815             ADL Assessment/Intervention    77040 - OT Self Care/Mgmt Minutes  18  (Pended)   -CH      BADL Assessment/Intervention  grooming;toileting  (Pended)   -CH      Comment, IADL Assessment/Training  Pt educated on EC principles during ADL tasking and pursed lip breathing.   (Pended)   -CH2      Additional Documentation  Comment, IADL Assessment/Training (Row)  (Pended)   -CH3      Recorded by [CH] Dinesh Gomez, OT Student 06/17/19 1101  [CH2] Dinesh Gomez, OT Student 06/17/19 1110  [CH3] Dinesh Gomez, OT Student 06/17/19 1107      Row Name 06/17/19 0815             Grooming Assessment/Training    [place] Level (Grooming)  wash face, hands;oral care regimen;hair care, combing/brushing;supervision;verbal cues  (Pended)   -CH      Grooming Position  sink side  (Pended)   -CH      Comment (Grooming)  Min VCs to complete haircare tasks.   (Pended)   -CH      Recorded by [CH] Dinesh Gomez, OT Student 06/17/19 1104      Row Name 06/17/19 0815             Toileting Assessment/Training    [place] Level (Toileting)  supervision;verbal cues;adjust/manage clothing;perform perineal hygiene  (Pended)   -CH      Assistive Devices (Toileting)  raised toilet seat;grab bar/safety frame  (Pended)   -CH      Toileting  Position  supported sitting  (Pended)   -CH      Recorded by [CH] Dinesh Gomez OT Student 06/17/19 1106      Row Name 06/17/19 0815             BADL Safety/Performance    Impairments, BADL Safety/Performance  endurance/activity tolerance;strength  (Pended)   -CH      Skilled BADL Treatment/Intervention  BADL process/adaptation training  (Pended)   -CH      Recorded by [CH] Dinesh Gomez OT Student 06/17/19 1106      Row Name 06/17/19 0815             Therapeutic Exercise    20471 - OT Therapeutic Activity Minutes  6  (Pended)   -CH      Recorded by [CH] Dinesh Gomez OT Student 06/17/19 1106      Row Name 06/17/19 0815             Balance    Balance  static sitting balance;static standing balance;dynamic standing balance  (Pended)   -CH      Recorded by [CH] Dinesh Gomez OT Student 06/17/19 1106      Row Name 06/17/19 0815             Static Sitting Balance    Level of [place] (Unsupported Sitting, Static Balance)  supervision  (Pended)   -CH      Sitting Position (Unsupported Sitting, Static Balance)  sitting on edge of bed  (Pended)   -CH      Time Able to Maintain Position (Unsupported Sitting, Static Balance)  4 to 5 minutes  (Pended)   -CH      Recorded by [CH] Dinesh Gomez OT Student 06/17/19 1107      Row Name 06/17/19 0815             Static Standing Balance    Level of [place] (Supported Standing, Static Balance)  standby assist  (Pended)   -CH      Time Able to Maintain Position (Supported Standing, Static Balance)  2 to 3 minutes  (Pended)   -CH      Assistive Device Utilized (Supported Standing, Static Balance)  walker, rolling  (Pended)   -CH      Recorded by [CH] Dinesh Gomez OT Student 06/17/19 1107      Row Name 06/17/19 0815             Dynamic Standing Balance    Level of [place], Reaches Outside Midline (Standing, Dynamic Balance)  standby assist  (Pended)   -CH      Time Able to Maintain Position, Reaches Outside  Midline (Standing, Dynamic Balance)  more than 5 minutes  (Pended)   -      Assistive Device Utilized (Supported Standing, Dynamic Balance)  walker, rolling  (Pended)   -      Recorded by [] Dinesh Gomez OT Student 06/17/19 1107      Row Name 06/17/19 0815             Positioning and Restraints    Pre-Treatment Position  in bed  (Pended)   -      Post Treatment Position  chair  (Pended)   -      In Chair  reclined;call light within reach;encouraged to call for assist;exit alarm on;with family/caregiver;waffle cushion  (Pended)   -      Recorded by [CH] Dinesh Gomez OT Student 06/17/19 1108      Row Name 06/17/19 0815             Pain Scale: Numbers Pre/Post-Treatment    Pain Scale: Numbers, Pretreatment  0/10 - no pain  (Pended)   -      Pain Scale: Numbers, Post-Treatment  0/10 - no pain  (Pended)   -      Recorded by [CH] Dinesh Gomez OT Student 06/17/19 1108      Row Name                Wound 06/17/19 1410 Left upper;lateral chest incision;surgical    Wound - Properties Group Date first assessed: 06/17/19 [BM] Time first assessed: 1410 [BM] Side: Left [BM] Orientation: upper;lateral [BM] Location: chest [BM] Type: incision;surgical [BM] Recorded by:  [BM] Bozena Villanueva RN 06/17/19 1410    Row Name 06/17/19 0815             Plan of Care Review    Plan of Care Reviewed With  patient;family  (Pended)   -      Recorded by [] Dinesh Gomez OT Student 06/17/19 1108      Row Name 06/17/19 0815             Outcome Summary/Treatment Plan (OT)    Daily Summary of Progress (OT)  progress toward functional goals is good  (Pended)   -      Anticipated Discharge Disposition (OT)  home with assist;home with home health  (Pended)   -      Recorded by [CH] Dinesh Gomez OT Student 06/17/19 1108        User Key  (r) = Recorded By, (t) = Taken By, (c) = Cosigned By    Initials Name Effective Dates Discipline    BM Bozena Villanueva RN 06/16/16 -   Nurse    Dinesh Carmona OT Student 03/13/19 -  OT        Rash 06/14/19 2000 Left lower lumbar spine papule (Active)   Distribution generalized 6/17/2019 12:00 PM   Characteristics itching;flat;dry 6/17/2019 12:00 PM   Color pink 6/17/2019 12:00 PM   Lesion Size pinpoint 6/17/2019 12:00 PM   Care, Rash other (see comments) 6/17/2019  4:00 AM       Rash 06/15/19 2000 other (see comments) medial umbilical area papule (Active)   Distribution generalized 6/17/2019 12:00 PM   Configuration/Shape round;pinpoint 6/17/2019 12:00 PM   Borders irregular 6/17/2019 12:00 PM   Characteristics itching;dry 6/17/2019 12:00 PM   Color pink 6/17/2019 12:00 PM   Lesion Size pinpoint 6/17/2019 12:00 PM   Care, Rash other (see comments) 6/17/2019  4:00 AM     Rehab Goal Summary     Row Name 06/17/19 0815             Transfer Goal 1 (OT)    Progress/Outcome (Transfer Goal 1, OT)  goal met  (Pended)   -         Dressing Goal 1 (OT)    Progress/Outcome (Dressing Goal 1, OT)  goal ongoing  (Pended)   -         Toileting Goal 1 (OT)    Progress/Outcome (Toileting Goal 1, OT)  goal met  (Pended)   -          Activity Tolerance Goal 1 (OT)    Progress/Outcome (Activity Tolerance Goal 1, OT)  goal ongoing  (Pended)   -        User Key  (r) = Recorded By, (t) = Taken By, (c) = Cosigned By    Initials Name Provider Type Discipline     Dinesh Gomez, OT Student OT Student OT        Occupational Therapy Education     Title: PT OT SLP Therapies (Done)     Topic: Occupational Therapy (Done)     Point: ADL training (Done)     Description: Instruct learner(s) on proper safety adaptation and remediation techniques during self care or transfers.   Instruct in proper use of assistive devices.    Learning Progress Summary           Patient Acceptance, E, VU by  at 6/17/2019  8:15 AM    Comment:  Pt educated on EC principles, pursed lip breathing, safe t/f techniques, the benefits of therapy and the role of OT.    Acceptance,  E,TB, VU,DU by CH1 at 6/15/2019  9:30 PM    Acceptance, E,TB, VU,DU,NR by CH1 at 6/14/2019 10:18 PM    Acceptance, E, NR by CL at 6/10/2019  1:49 PM    Comment:  Pt educated on appropriate safety precautions, t/f techniques, role of OT, and benefits of therapy.   Family Acceptance, E, VU by CH at 6/17/2019  8:15 AM    Comment:  Pt educated on EC principles, pursed lip breathing, safe t/f techniques, the benefits of therapy and the role of OT.    Acceptance, E,TB, VU,DU,NR by CH1 at 6/14/2019 10:18 PM    Acceptance, E, NR by CL at 6/10/2019  1:49 PM    Comment:  Pt educated on appropriate safety precautions, t/f techniques, role of OT, and benefits of therapy.                   Point: Home exercise program (Done)     Description: Instruct learner(s) on appropriate technique for monitoring, assisting and/or progressing therapeutic exercises/activities.    Learning Progress Summary           Patient Acceptance, E,TB, VU,DU by CH1 at 6/15/2019  9:30 PM    Acceptance, E, VU by TA at 6/15/2019  9:57 AM    Comment:  BUE HEP; renforced need for call for assist with OOB activities.    Acceptance, E,TB, VU,DU,NR by CH1 at 6/14/2019 10:18 PM   Family Acceptance, E,TB, VU,DU,NR by CH1 at 6/14/2019 10:18 PM                   Point: Precautions (Done)     Description: Instruct learner(s) on prescribed precautions during self-care and functional transfers.    Learning Progress Summary           Patient Acceptance, E, VU by  at 6/17/2019  8:15 AM    Comment:  Pt educated on EC principles, pursed lip breathing, safe t/f techniques, the benefits of therapy and the role of OT.    Acceptance, E,TB, VU,DU by CH1 at 6/15/2019  9:30 PM    Acceptance, E, VU by TA at 6/15/2019  9:57 AM    Comment:  BUE HEP; renforced need for call for assist with OOB activities.    Acceptance, E,TB, VU,DU,NR by 1 at 6/14/2019 10:18 PM    Acceptance, E, NR by CL at 6/10/2019  1:49 PM    Comment:  Pt educated on appropriate safety precautions, t/f  techniques, role of OT, and benefits of therapy.   Family Acceptance, E, VU by  at 6/17/2019  8:15 AM    Comment:  Pt educated on EC principles, pursed lip breathing, safe t/f techniques, the benefits of therapy and the role of OT.    Acceptance, E,TB, VU,DU,NR by 1 at 6/14/2019 10:18 PM    Acceptance, E, NR by CL at 6/10/2019  1:49 PM    Comment:  Pt educated on appropriate safety precautions, t/f techniques, role of OT, and benefits of therapy.                   Point: Body mechanics (Done)     Description: Instruct learner(s) on proper positioning and spine alignment during self-care, functional mobility activities and/or exercises.    Learning Progress Summary           Patient Acceptance, E, VU by  at 6/17/2019  8:15 AM    Comment:  Pt educated on EC principles, pursed lip breathing, safe t/f techniques, the benefits of therapy and the role of OT.    Acceptance, E,TB, VU,DU by [facility] at 6/15/2019  9:30 PM    Acceptance, E,TB, VU,DU,NR by [facility] at 6/14/2019 10:18 PM    Acceptance, E, NR by CL at 6/10/2019  1:49 PM    Comment:  Pt educated on appropriate safety precautions, t/f techniques, role of OT, and benefits of therapy.   Family Acceptance, E, VU by  at 6/17/2019  8:15 AM    Comment:  Pt educated on EC principles, pursed lip breathing, safe t/f techniques, the benefits of therapy and the role of OT.    Acceptance, E,TB, VU,DU,NR by [facility] at 6/14/2019 10:18 PM    Acceptance, E, NR by  at 6/10/2019  1:49 PM    Comment:  Pt educated on appropriate safety precautions, t/f techniques, role of OT, and benefits of therapy.                               User Key     Initials Effective Dates Name Provider Type Discipline     03/14/16 -  Forrest Echeverria OT Occupational Therapist OT    [facility] 06/16/16 -  Tri White, RN Registered Nurse Nurse     04/03/18 -  Harper Nelson OT Occupational Therapist OT     03/13/19 -  Dinesh Gomez OT Student OT Student OT                OT Recommendation  and Plan  Outcome Summary/Treatment Plan (OT)  Daily Summary of Progress (OT): (P) progress toward functional goals is good  Anticipated Discharge Disposition (OT): (P) home with assist, home with home health  Daily Summary of Progress (OT): (P) progress toward functional goals is good  Plan of Care Review  Plan of Care Reviewed With: (P) patient, family  Plan of Care Reviewed With: (P) patient, family  Outcome Summary: (P) Pt CGA for t/f and mobility and supervision for toileting, grooming and bed mobility. Pt educated on pursed lip breathing and EC principles during ADL tasking. Pt limited by poor activity tolerance. Recommend CONT skilled IPOT POC. Recommend D/C to home with assist and home health.   Outcome Measures     Row Name 06/17/19 0815 06/15/19 0934 06/15/19 0907       How much help from another person do you currently need...    Turning from your back to your side while in flat bed without using bedrails?  --  --  3  -LS    Moving from lying on back to sitting on the side of a flat bed without bedrails?  --  --  3  -LS    Moving to and from a bed to a chair (including a wheelchair)?  --  --  3  -LS    Standing up from a chair using your arms (e.g., wheelchair, bedside chair)?  --  --  3  -LS    Climbing 3-5 steps with a railing?  --  --  3  -LS    To walk in hospital room?  --  --  3  -LS    AM-PAC 6 Clicks Score  --  --  18  -LS       How much help from another is currently needed...    Putting on and taking off regular lower body clothing?  2  (Pended)   -CH  2  -TA  --    Bathing (including washing, rinsing, and drying)  2  (Pended)   -CH  2  -TA  --    Toileting (which includes using toilet bed pan or urinal)  3  (Pended)   -CH  3  -TA  --    Putting on and taking off regular upper body clothing  4  (Pended)   -CH  3  -TA  --    Taking care of personal grooming (such as brushing teeth)  4  (Pended)   -CH  3  -TA  --    Eating meals  4  (Pended)   -CH  4  -TA  --    Score  19  (Pended)   -CH  17  -TA   --       Functional Assessment    Outcome Measure Options  --  AM-PAC 6 Clicks Daily Activity (OT)  -TA  AM-PAC 6 Clicks Basic Mobility (PT)  -LS      User Key  (r) = Recorded By, (t) = Taken By, (c) = Cosigned By    Initials Name Provider Type    LS Unique Millan, PT Physical Therapist    Forrest Castaneda, OT Occupational Therapist    CH Dinesh Gomez, OT Student OT Student           Time Calculation:   Time Calculation- OT     Row Name 06/17/19 0815             Time Calculation- OT    OT Start Time  0815  (Pended)   -      Total Timed Code Minutes- OT  24 minute(s)  (Pended)   -      OT Received On  06/17/19  (Pended)   -      OT Goal Re-Cert Due Date  06/20/19  (Pended)   -         Timed Charges    74734 - OT Therapeutic Activity Minutes  6  (Pended)   -      97290 - OT Self Care/Mgmt Minutes  18  (Pended)   -        User Key  (r) = Recorded By, (t) = Taken By, (c) = Cosigned By    Initials Name Provider Type     Dinesh Gomez, OT Student OT Student        Therapy Charges for Today     Code Description Service Date Service Provider Modifiers Qty    09249316822 HC OT THERAPEUTIC ACT EA 15 MIN 6/17/2019 Dinesh Gomez, OT Student GO 1    70378327551 HC OT SELF CARE/MGMT/TRAIN EA 15 MIN 6/17/2019 Dinesh Gomez, OT Student GO 1    80772260918 HC OT THER SUPP EA 15 MIN 6/17/2019 Dinesh Gomez OT Student GO 1               Dinesh Gomez OT Student  6/17/2019

## 2019-06-17 NOTE — PROGRESS NOTES
"Adult Nutrition  Assessment/PES    Patient Name:  Stef Jones  YOB: 1945  MRN: [id number]  Admit Date:  6/8/2019    Assessment Date:  6/17/2019    Comments:      Reason for Assessment     Row Name 06/17/19 1056          Reason for Assessment    Reason For Assessment  other (see comments) MDR/15\"         Nutrition/Diet History     Row Name 06/17/19 1056          Nutrition/Diet History    Typical Food/Fluid Intake  NURSING REPORTS PT NPO FOR PACEMAKER THIS AFTERNOON.                           Problem/Interventions:                  Education/Evaluation     Row Name 06/17/19 1056          Monitor/Evaluation    Monitor  Per protocol           Electronically signed by:  April Saavedra RD  06/17/19 10:56 AM  "

## 2019-06-18 ENCOUNTER — APPOINTMENT (OUTPATIENT)
Dept: GENERAL RADIOLOGY | Facility: HOSPITAL | Age: 74
End: 2019-06-18

## 2019-06-18 LAB
GLUCOSE BLDC GLUCOMTR-MCNC: 102 MG/DL (ref 70–130)
GLUCOSE BLDC GLUCOMTR-MCNC: 123 MG/DL (ref 70–130)
GLUCOSE BLDC GLUCOMTR-MCNC: 134 MG/DL (ref 70–130)
GLUCOSE BLDC GLUCOMTR-MCNC: 137 MG/DL (ref 70–130)

## 2019-06-18 PROCEDURE — 82962 GLUCOSE BLOOD TEST: CPT

## 2019-06-18 PROCEDURE — P9047 ALBUMIN (HUMAN), 25%, 50ML: HCPCS | Performed by: NURSE PRACTITIONER

## 2019-06-18 PROCEDURE — 94660 CPAP INITIATION&MGMT: CPT

## 2019-06-18 PROCEDURE — 71046 X-RAY EXAM CHEST 2 VIEWS: CPT

## 2019-06-18 PROCEDURE — 99233 SBSQ HOSP IP/OBS HIGH 50: CPT | Performed by: INTERNAL MEDICINE

## 2019-06-18 PROCEDURE — 97110 THERAPEUTIC EXERCISES: CPT

## 2019-06-18 PROCEDURE — 94799 UNLISTED PULMONARY SVC/PX: CPT

## 2019-06-18 PROCEDURE — 97530 THERAPEUTIC ACTIVITIES: CPT

## 2019-06-18 PROCEDURE — 97116 GAIT TRAINING THERAPY: CPT

## 2019-06-18 PROCEDURE — 25010000002 VANCOMYCIN PER 500 MG: Performed by: INTERNAL MEDICINE

## 2019-06-18 PROCEDURE — 25010000002 ALBUMIN HUMAN 25% PER 50 ML: Performed by: NURSE PRACTITIONER

## 2019-06-18 PROCEDURE — 99024 POSTOP FOLLOW-UP VISIT: CPT | Performed by: INTERNAL MEDICINE

## 2019-06-18 PROCEDURE — 99232 SBSQ HOSP IP/OBS MODERATE 35: CPT | Performed by: INTERNAL MEDICINE

## 2019-06-18 RX ORDER — NOREPINEPHRINE BIT/0.9 % NACL 8 MG/250ML
.02-.3 INFUSION BOTTLE (ML) INTRAVENOUS
Status: DISCONTINUED | OUTPATIENT
Start: 2019-06-18 | End: 2019-06-19

## 2019-06-18 RX ORDER — ALBUMIN (HUMAN) 12.5 G/50ML
25 SOLUTION INTRAVENOUS ONCE
Status: COMPLETED | OUTPATIENT
Start: 2019-06-18 | End: 2019-06-18

## 2019-06-18 RX ORDER — CARVEDILOL 3.12 MG/1
3.12 TABLET ORAL 2 TIMES DAILY WITH MEALS
Status: DISCONTINUED | OUTPATIENT
Start: 2019-06-18 | End: 2019-06-19 | Stop reason: HOSPADM

## 2019-06-18 RX ADMIN — HYDROCODONE BITARTRATE AND ACETAMINOPHEN 1 TABLET: 5; 325 TABLET ORAL at 00:24

## 2019-06-18 RX ADMIN — ATORVASTATIN CALCIUM 20 MG: 20 TABLET, FILM COATED ORAL at 20:37

## 2019-06-18 RX ADMIN — APIXABAN 5 MG: 5 TABLET, FILM COATED ORAL at 08:53

## 2019-06-18 RX ADMIN — NOREPINEPHRINE BITARTRATE 0.02 MCG/KG/MIN: 8 SOLUTION at 06:47

## 2019-06-18 RX ADMIN — AMIODARONE HYDROCHLORIDE 200 MG: 200 TABLET ORAL at 08:53

## 2019-06-18 RX ADMIN — AMITRIPTYLINE HYDROCHLORIDE 25 MG: 25 TABLET, FILM COATED ORAL at 20:37

## 2019-06-18 RX ADMIN — BUDESONIDE AND FORMOTEROL FUMARATE DIHYDRATE 2 PUFF: 80; 4.5 AEROSOL RESPIRATORY (INHALATION) at 07:55

## 2019-06-18 RX ADMIN — APIXABAN 5 MG: 5 TABLET, FILM COATED ORAL at 20:37

## 2019-06-18 RX ADMIN — ALBUMIN HUMAN 25 G: 0.25 SOLUTION INTRAVENOUS at 05:59

## 2019-06-18 RX ADMIN — IPRATROPIUM BROMIDE 0.5 MG: 0.5 SOLUTION RESPIRATORY (INHALATION) at 07:55

## 2019-06-18 RX ADMIN — IPRATROPIUM BROMIDE 0.5 MG: 0.5 SOLUTION RESPIRATORY (INHALATION) at 19:20

## 2019-06-18 RX ADMIN — PANTOPRAZOLE SODIUM 40 MG: 40 INJECTION, POWDER, FOR SOLUTION INTRAVENOUS at 05:59

## 2019-06-18 RX ADMIN — VANCOMYCIN HYDROCHLORIDE 1000 MG: 1 INJECTION, SOLUTION INTRAVENOUS at 06:38

## 2019-06-18 RX ADMIN — IPRATROPIUM BROMIDE 0.5 MG: 0.5 SOLUTION RESPIRATORY (INHALATION) at 17:18

## 2019-06-18 RX ADMIN — FUROSEMIDE 20 MG: 20 TABLET ORAL at 08:53

## 2019-06-18 RX ADMIN — IPRATROPIUM BROMIDE 0.5 MG: 0.5 SOLUTION RESPIRATORY (INHALATION) at 13:25

## 2019-06-18 RX ADMIN — BUDESONIDE AND FORMOTEROL FUMARATE DIHYDRATE 2 PUFF: 80; 4.5 AEROSOL RESPIRATORY (INHALATION) at 19:21

## 2019-06-18 NOTE — PROGRESS NOTES
Clinical Nutrition     Multidisciplinary Rounds      Patient Name: Stef Jones  Date of Encounter: 06/18/19 10:43 AM  MRN: [id number]  Admission date: 6/8/2019     JULIANNA CLOUD to continue to follow per protocol.     Current diet: Diet Dysphagia; III - Pureed With Some Mashed; Honey Thick; Consistent Carbohydrate, Cardiac    Intervention:  Follow treatment plan  Care plan reviewed    Follow up:   Per protocol      Lorin Russ RD  10:43 AM  Time: 10min

## 2019-06-18 NOTE — PLAN OF CARE
Problem: Patient Care Overview  Goal: Plan of Care Review  Outcome: Ongoing (interventions implemented as appropriate)   06/18/19 0819   Coping/Psychosocial   Plan of Care Reviewed With patient;family   Plan of Care Review   Progress improving   OTHER   Outcome Summary Pt demonstrated increased indep with STS transfer; progressed forward ambulation distance to 320 total ft with use of RWx, CGA, cues for avoiding pushing through LUE (PM precautions). Cont to be limited by hyptension; nonsymptomatic throughout session. Will cont PT POC.

## 2019-06-18 NOTE — THERAPY TREATMENT NOTE
Acute Care - Occupational Therapy Treatment Note  [facility]     Patient Name: Stef Jones  : 1945  MRN: [id number]  Today's Date: 2019  Onset of Illness/Injury or Date of Surgery: 19  Date of Referral to OT: 19  Referring Physician: MD Shan    Admit Date: 2019       ICD-10-CM ICD-9-CM   1. A Fib w/RVR I48.91 427.31   2. Impaired functional mobility, balance, gait, and endurance Z74.09 V49.89   3. 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 Atrial fibrillation with RVR (CMS/HCC) I48.91 427.31     Patient Active Problem List   Diagnosis   • Cellulitis of Right Lower Leg and Foot   • A Fib w/RVR   • COPD (chronic obstructive pulmonary disease) (CMS/HCC)   • Dysphagia   • Laryngeal mass     Past Medical History:   Diagnosis Date   • COPD (chronic obstructive pulmonary disease) (CMS/HCC)    • Hearing loss    • Hoarse voice quality    • Hypertension      Past Surgical History:   Procedure Laterality Date   • CARDIAC ELECTROPHYSIOLOGY PROCEDURE Left 2019    Procedure: AV node ablation + Bi-V PPM implant;  Surgeon: Lucio Chapman MD;  Location: [facility] EP INVASIVE LOCATION;  Service: Cardiovascular   • CARDIAC ELECTROPHYSIOLOGY PROCEDURE N/A 2019    Procedure: AV node ablation;  Surgeon: Lucio Chapman MD;  Location: [facility] EP INVASIVE LOCATION;  Service: Cardiovascular   • HEMORRHOIDECTOMY     • LARYNGOSCOPY N/A 2019    Procedure: MICRO DIRECT LARYNGOSCOPY WITH BIOPSY;  Surgeon: Glen Gutiérrez MD;  Location: [facility] OR;  Service: ENT       Therapy Treatment    Rehabilitation Treatment Summary     Row Name 19 1056 19 0819          Treatment Time/Intention    Discipline  occupational therapist  -CL  physical therapist  -LS     Document Type  therapy note (daily note)  -CL  therapy note (daily note)  -LS     Subjective Information  no complaints  -CL  no complaints  -LS     Mode of Treatment  --  physical  therapy  -LS     Patient Effort  excellent  -CL  excellent  -LS     Existing Precautions/Restrictions  cardiac;fall;oxygen therapy device and L/min;other (see comments);pacemaker monitor BP and HR  -CL  fall;cardiac;pacemaker  -LS     Treatment Considerations/Comments  --  s/p PM placement 6/17; hypotension persists. RN aware and gave clearance for monitored mobility (pt nonsymptomatic).  -LS     Recorded by [CL] Harper Nelson, OT 06/18/19 1453 [LS] Unique Millan, PT 06/18/19 0922     Row Name 06/18/19 1056 06/18/19 0819          Vital Signs    Pre Systolic BP Rehab  115  -CL  84  -LS     Pre Treatment Diastolic BP  75  -CL  62  -LS     Intra Systolic BP Rehab  111  -CL  --     Intra Treatment Diastolic BP  82  -CL  --     Post Systolic BP Rehab  94  -CL  105  -LS     Post Treatment Diastolic BP  77  -CL  75  -LS     Pretreatment Heart Rate (beats/min)  80  -CL  80  -LS     Posttreatment Heart Rate (beats/min)  78  -CL  80  -LS     Pre SpO2 (%)  94  -CL  94  -LS     O2 Delivery Pre Treatment  nasal cannula  -CL  nasal cannula  -LS     Intra SpO2 (%)  87  -CL  --     O2 Delivery Intra Treatment  room air  -CL  --     Post SpO2 (%)  93  -CL  93  -LS     O2 Delivery Post Treatment  nasal cannula  -CL  nasal cannula  -LS     Pre Patient Position  Sitting  -CL  Supine  -LS     Intra Patient Position  Standing  -CL  Standing  -LS     Post Patient Position  Sitting  -CL  Sitting  -LS     Recorded by [CL] Harper Nelson, OT 06/18/19 1453 [LS] Unique Millan, PT 06/18/19 0922     Row Name 06/18/19 1056 06/18/19 0819          Cognitive Assessment/Intervention- PT/OT    Affect/Mental Status (Cognitive)  WFL  -CL  WFL  -LS     Orientation Status (Cognition)  oriented x 4  -CL  oriented x 4  -LS     Follows Commands (Cognition)  follows one step commands;over 90% accuracy;repetition of directions required;verbal cues/prompting required  -CL  follows one step commands;over 90% accuracy;verbal cues/prompting required  -LS      Cognitive Function (Cognitive)  safety deficit  -CL  safety deficit  -LS     Safety Deficit (Cognitive)  mild deficit;awareness of need for assistance;safety precautions awareness  -CL  mild deficit;safety precautions awareness  -LS     Personal Safety Interventions  gait belt;fall prevention program maintained;nonskid shoes/slippers when out of bed  -CL  fall prevention program maintained;gait belt;nonskid shoes/slippers when out of bed  -LS     Recorded by [CL] Harper Nelson, OT 06/18/19 1453 [LS] Unique Millan, PT 06/18/19 0922     Row Name 06/18/19 1056 06/18/19 0819          Bed Mobility Assessment/Treatment    Supine-Sit [place] (Bed Mobility)  --  supervision;verbal cues  -LS     Assistive Device (Bed Mobility)  --  bed rails;head of bed elevated  -LS     Comment (Bed Mobility)  [facility].   -CL  Cues for avoiding pushing through LUE (PM precautions).   -LS     Recorded by [CL] Harper Nelson, OT 06/18/19 1453 [LS] Unique Millan, PT 06/18/19 0922     Row Name 06/18/19 1056             Functional Mobility    Functional Mobility- Ind. Level  contact guard assist;1 person + 1 person to manage equipment;verbal cues required  -CL      Functional Mobility- Device  rolling walker  -CL      Functional Mobility-Distance (Feet)  300  -CL      Functional Mobility- Comment  Pt required 1 standing rest break w/ noted desat to 87% on RA, improved to >90% w/in 1 min once O2 NC reapplied.   -CL      Recorded by [CL] Harper Nelson, OT 06/18/19 1453      Row Name 06/18/19 1056             Transfer Assessment/Treatment    Transfer Assessment/Treatment  sit-stand transfer;stand-sit transfer  -CL      Recorded by [CL] Harper Nelson, OT 06/18/19 1453      Row Name 06/18/19 1056 06/18/19 0819          Sit-Stand Transfer    Sit-Stand [place] (Transfers)  contact guard;verbal cues  -CL  supervision;verbal cues  -LS     Assistive Device (Sit-Stand Transfers)  walker, front-wheeled  -CL  walker, front-wheeled  -LS     Recorded by [CL]  Harper Nelson, OT 06/18/19 1453 [LS] Unique Millan, PT 06/18/19 0922     Row Name 06/18/19 1056 06/18/19 0819          Stand-Sit Transfer    Stand-Sit [place] (Transfers)  contact guard;verbal cues  -CL  supervision;verbal cues  -LS     Assistive Device (Stand-Sit Transfers)  walker, front-wheeled  -CL  walker, front-wheeled  -LS     Recorded by [CL] Harper Nelson, OT 06/18/19 1453 [LS] Unique Millan, PT 06/18/19 0922     Row Name 06/18/19 0819             Gait/Stairs Assessment/Training    08659 - Gait Training Minutes   14  -LS      Gait/Stairs Assessment/Training  gait/ambulation assistive device  -LS      [place] Level (Gait)  contact guard;verbal cues  -LS      Assistive Device (Gait)  walker, front-wheeled  -LS      Distance in Feet (Gait)  320  -LS      Pattern (Gait)  step-through  -LS      Deviations/Abnormal Patterns (Gait)  nicolas decreased;stride length decreased  -LS      Bilateral Gait Deviations  forward flexed posture  -LS      Comment (Gait/Stairs)  VC for slow, steady pace for safety and for avoiding leaning onto LUE. HR WFL; pt denied dizziness.   -LS      Recorded by [LS] Unique iMllan, PT 06/18/19 0922      Row Name 06/18/19 1056             ADL Assessment/Intervention    BADL Assessment/Intervention  upper body dressing  -CL      Recorded by [CL] Harper Nelson, OT 06/18/19 1453      Row Name 06/18/19 1056             Upper Body Dressing Assessment/Training    Comment (Upper Body Dressing)  Pt educated on PM precautions and celso-dressing technique.   -CL      Recorded by [CL] Harper Nelson, OT 06/18/19 1453      Row Name 06/18/19 1056 06/18/19 0819          Therapeutic Exercise    Therapeutic Exercise  --  supine, lower extremities  -LS     98825 - PT Therapeutic Exercise Minutes  --  6  -LS     54909 - PT Therapeutic Activity Minutes  --  5  -LS     11759 - OT Therapeutic Activity Minutes  17  -CL  --     Recorded by [CL] Harper Nelson, OT 06/18/19 1453 [LS] Unique Millan, PT 06/18/19  0922     Row Name 06/18/19 0819             Lower Extremity Supine Therapeutic Exercise    Performed, Supine Lower Extremity (Therapeutic Exercise)  hip flexion/extension;hip abduction/adduction;ankle pumps  -LS      Exercise Type, Supine Lower Extremity (Therapeutic Exercise)  AROM (active range of motion)  -LS      Sets/Reps Detail, Supine Lower Extremity (Therapeutic Exercise)  1/10  -LS      Recorded by [LS] Unique Millan, PT 06/18/19 0922      Row Name 06/18/19 1056 06/18/19 0819          Static Sitting Balance    Level of [place] (Unsupported Sitting, Static Balance)  supervision  -CL  supervision  -LS     Sitting Position (Unsupported Sitting, Static Balance)  sitting in chair  -CL  sitting on edge of bed  -LS     Recorded by [CL] Harper Nelson, OT 06/18/19 1453 [LS] Unique Millan, PT 06/18/19 0922     Row Name 06/18/19 1056 06/18/19 0819          Static Standing Balance    Level of [place] (Supported Standing, Static Balance)  standby assist  -CL  supervision  -LS     Assistive Device Utilized (Supported Standing, Static Balance)  walker, rolling  -CL  walker, rolling  -LS     Recorded by [CL] Harper Nelson, OT 06/18/19 1453 [LS] Unique Millan, PT 06/18/19 0922     Row Name 06/18/19 1056 06/18/19 0819          Positioning and Restraints    Pre-Treatment Position  sitting in chair/recliner  -CL  in bed  -LS     Post Treatment Position  chair  -CL  chair  -LS     In Chair  notified nsg;reclined;call light within reach;encouraged to call for assist;exit alarm on;waffle cushion;legs elevated  -CL  notified nsg;sitting;call light within reach;encouraged to call for assist;exit alarm on;with family/caregiver;with nsg;waffle cushion  -LS     Recorded by [CL] Harper Nelson, OT 06/18/19 1453 [LS] Unique Millan, PT 06/18/19 0922     Row Name 06/18/19 1056 06/18/19 0819          Pain Scale: Numbers Pre/Post-Treatment    Pain Scale: Numbers, Pretreatment  0/10 - no pain  -CL  0/10 - no pain  -LS     Pain  Scale: Numbers, Post-Treatment  0/10 - no pain  -CL  0/10 - no pain  -LS     Pre/Post Treatment Pain Comment  --  reports slight L chest soreness at PM site; denies pain  -LS     Recorded by [CL] Harper Nelson, OT 06/18/19 1453 [LS] Unique Millan, PT 06/18/19 0922     Row Name                Wound 06/17/19 1410 Left upper;lateral chest incision;surgical    Wound - Properties Group Date first assessed: 06/17/19 [BM] Time first assessed: 1410 [BM] Side: Left [BM] Orientation: upper;lateral [BM] Location: chest [BM] Type: incision;surgical [BM] Recorded by:  [BM] Bozena Villanueva, RN 06/17/19 1410    Row Name 06/18/19 0819             Plan of Care Review    Plan of Care Reviewed With  patient;family  -LS      Recorded by [LS] Unique Millan, PT 06/18/19 0922      Row Name 06/18/19 0819             Outcome Summary/Treatment Plan (PT)    Daily Summary of Progress (PT)  progress toward functional goals is good  -LS      Barriers to Overall Progress (PT)  hypotension  -LS      Recorded by [LS] Unique Millan, PT 06/18/19 0922        User Key  (r) = Recorded By, (t) = Taken By, (c) = Cosigned By    Initials Name Effective Dates Discipline    LS Unique Millan, PT 06/19/15 -  PT    BM Bozena Villanueva, RN 06/16/16 -  Nurse    CL Harper Nelson, OT 04/03/18 -  OT        Rash 06/14/19 2000 Left lower lumbar spine papule (Active)   Distribution generalized 6/18/2019  2:00 PM   Characteristics dry 6/18/2019  2:00 PM   Color red 6/18/2019  2:00 PM   Lesion Size pinpoint 6/18/2019  6:00 AM       Rash 06/15/19 2000 other (see comments) medial umbilical area papule (Active)   Distribution generalized 6/18/2019  2:00 PM   Configuration/Shape round;pinpoint 6/18/2019  8:00 AM   Borders irregular 6/18/2019  8:00 AM   Characteristics dry 6/18/2019  2:00 PM   Color red 6/18/2019  2:00 PM   Lesion Size pinpoint 6/18/2019  8:00 AM       Wound 06/17/19 1410 Left upper;lateral chest incision;surgical (Active)   Dressing Appearance no  drainage 6/18/2019  2:00 PM   Closure Adhesive closure strips 6/18/2019  2:00 PM   Base dressing in place, unable to visualize 6/18/2019  2:00 PM   Periwound intact 6/18/2019  2:00 PM   Drainage Amount none 6/18/2019  2:00 PM   Dressing Care, Wound gauze 6/17/2019  8:00 PM       Occupational Therapy Education     Title: PT OT SLP Therapies (In Progress)     Topic: Occupational Therapy (In Progress)     Point: ADL training (In Progress)     Description: Instruct learner(s) on proper safety adaptation and remediation techniques during self care or transfers.   Instruct in proper use of assistive devices.    Learning Progress Summary           Patient Acceptance, E, NR by CL at 6/18/2019 10:56 AM    Comment:  Pt educated on appropriate safety precautions, t/f techniques, PM precautions, adaptive dressing techniques, and benefits of therapy.    Acceptance, E, VU by  at 6/17/2019  8:15 AM    Comment:  Pt educated on EC principles, pursed lip breathing, safe t/f techniques, the benefits of therapy and the role of OT.    Acceptance, E,TB, VU,DU by 1 at 6/15/2019  9:30 PM    Acceptance, E,TB, VU,DU,NR by CH1 at 6/14/2019 10:18 PM    Acceptance, E, NR by CL at 6/10/2019  1:49 PM    Comment:  Pt educated on appropriate safety precautions, t/f techniques, role of OT, and benefits of therapy.   Family Acceptance, E, VU by CH at 6/17/2019  8:15 AM    Comment:  Pt educated on EC principles, pursed lip breathing, safe t/f techniques, the benefits of therapy and the role of OT.    Acceptance, E,TB, VU,DU,NR by CH1 at 6/14/2019 10:18 PM    Acceptance, E, NR by CL at 6/10/2019  1:49 PM    Comment:  Pt educated on appropriate safety precautions, t/f techniques, role of OT, and benefits of therapy.                   Point: Home exercise program (Done)     Description: Instruct learner(s) on appropriate technique for monitoring, assisting and/or progressing therapeutic exercises/activities.    Learning Progress Summary            Patient Acceptance, E,TB, VU,DU by CH1 at 6/15/2019  9:30 PM    Acceptance, E, VU by TA at 6/15/2019  9:57 AM    Comment:  BUE HEP; renforced need for call for assist with OOB activities.    Acceptance, E,TB, VU,DU,NR by CH1 at 6/14/2019 10:18 PM   Family Acceptance, E,TB, VU,DU,NR by CH1 at 6/14/2019 10:18 PM                   Point: Precautions (In Progress)     Description: Instruct learner(s) on prescribed precautions during self-care and functional transfers.    Learning Progress Summary           Patient Acceptance, E, NR by CL at 6/18/2019 10:56 AM    Comment:  Pt educated on appropriate safety precautions, t/f techniques, PM precautions, adaptive dressing techniques, and benefits of therapy.    Acceptance, E, VU by  at 6/17/2019  8:15 AM    Comment:  Pt educated on EC principles, pursed lip breathing, safe t/f techniques, the benefits of therapy and the role of OT.    Acceptance, E,TB, VU,DU by CH1 at 6/15/2019  9:30 PM    Acceptance, E, VU by TA at 6/15/2019  9:57 AM    Comment:  BUE HEP; renforced need for call for assist with OOB activities.    Acceptance, E,TB, VU,DU,NR by CH1 at 6/14/2019 10:18 PM    Acceptance, E, NR by CL at 6/10/2019  1:49 PM    Comment:  Pt educated on appropriate safety precautions, t/f techniques, role of OT, and benefits of therapy.   Family Acceptance, E, VU by  at 6/17/2019  8:15 AM    Comment:  Pt educated on EC principles, pursed lip breathing, safe t/f techniques, the benefits of therapy and the role of OT.    Acceptance, E,TB, VU,DU,NR by CH1 at 6/14/2019 10:18 PM    Acceptance, E, NR by CL at 6/10/2019  1:49 PM    Comment:  Pt educated on appropriate safety precautions, t/f techniques, role of OT, and benefits of therapy.                   Point: Body mechanics (In Progress)     Description: Instruct learner(s) on proper positioning and spine alignment during self-care, functional mobility activities and/or exercises.    Learning Progress Summary           Patient  Acceptance, E, NR by CL at 6/18/2019 10:56 AM    Comment:  Pt educated on appropriate safety precautions, t/f techniques, PM precautions, adaptive dressing techniques, and benefits of therapy.    Acceptance, E, VU by  at 6/17/2019  8:15 AM    Comment:  Pt educated on EC principles, pursed lip breathing, safe t/f techniques, the benefits of therapy and the role of OT.    Acceptance, E,TB, VU,DU by 1 at 6/15/2019  9:30 PM    Acceptance, E,TB, VU,DU,NR by 1 at 6/14/2019 10:18 PM    Acceptance, E, NR by CL at 6/10/2019  1:49 PM    Comment:  Pt educated on appropriate safety precautions, t/f techniques, role of OT, and benefits of therapy.   Family Acceptance, E, VU by  at 6/17/2019  8:15 AM    Comment:  Pt educated on EC principles, pursed lip breathing, safe t/f techniques, the benefits of therapy and the role of OT.    Acceptance, E,TB, VU,DU,NR by 1 at 6/14/2019 10:18 PM    Acceptance, E, NR by  at 6/10/2019  1:49 PM    Comment:  Pt educated on appropriate safety precautions, t/f techniques, role of OT, and benefits of therapy.                               User Key     Initials Effective Dates Name Provider Type Discipline     03/14/16 -  Forrest Echeverria OT Occupational Therapist OT    [facility] 06/16/16 -  Tri White RN Registered Nurse Nurse     04/03/18 -  Harper Nelson OT Occupational Therapist OT     03/13/19 -  Dinesh Gomez OT Student OT Student OT                OT Recommendation and Plan  Outcome Summary/Treatment Plan (OT)  Anticipated Equipment Needs at Discharge (OT): (TBA further)  Anticipated Discharge Disposition (OT): home with assist, home with home health  Therapy Frequency (OT Eval): daily  Plan of Care Review  Plan of Care Reviewed With: patient  Plan of Care Reviewed With: patient  Outcome Summary: Pt CGA to ambulate 300 ft w/ RW, though noted desat to 87% on RA which improved to >90% once O2 applied. Recommend cont skilled IPOT POC. Recommend pt DC home w/  assist and HH services.   Outcome Measures     Row Name 06/18/19 1056 06/18/19 0819 06/17/19 0815       How much help from another person do you currently need...    Turning from your back to your side while in flat bed without using bedrails?  --  3  -LS  --    Moving from lying on back to sitting on the side of a flat bed without bedrails?  --  3  -LS  --    Moving to and from a bed to a chair (including a wheelchair)?  --  3  -LS  --    Standing up from a chair using your arms (e.g., wheelchair, bedside chair)?  --  3  -LS  --    Climbing 3-5 steps with a railing?  --  3  -LS  --    To walk in hospital room?  --  3  -LS  --    AM-PAC 6 Clicks Score  --  18  -LS  --       How much help from another is currently needed...    Putting on and taking off regular lower body clothing?  3  -CL  --  2  -CL (r) CH (t) CL (c)    Bathing (including washing, rinsing, and drying)  2  -CL  --  2  -CL (r) CH (t) CL (c)    Toileting (which includes using toilet bed pan or urinal)  3  -CL  --  3  -CL (r) CH (t) CL (c)    Putting on and taking off regular upper body clothing  3  -CL  --  4  -CL (r) CH (t) CL (c)    Taking care of personal grooming (such as brushing teeth)  3  -CL  --  4  -CL (r) CH (t) CL (c)    Eating meals  4  -CL  --  4  -CL (r) CH (t) CL (c)    Score  18  -CL  --  19  -CL (r) CH (t)       Functional Assessment    Outcome Measure Options  --  AM-PAC 6 Clicks Basic Mobility (PT)  -LS  --      User Key  (r) = Recorded By, (t) = Taken By, (c) = Cosigned By    Initials Name Provider Type    Unique Martínez, PT Physical Therapist    CL Harper Nelson, OT Occupational Therapist    CH Dinesh Gomez, OT Student OT Student           Time Calculation:   Time Calculation- OT     Row Name 06/18/19 1056 06/18/19 0819          Time Calculation- OT    OT Start Time  1056  -CL  --     OT Received On  06/18/19  -CL  --     OT Goal Re-Cert Due Date  06/20/19  -CL  --        Timed Charges    82074 - Gait Training Minutes    --  14  -     16479 - OT Therapeutic Activity Minutes  17  -CL  --       User Key  (r) = Recorded By, (t) = Taken By, (c) = Cosigned By    Initials Name Provider Type    Unique Martínez, PT Physical Therapist    CL Harper Nelson OT Occupational Therapist        Therapy Charges for Today     Code Description Service Date Service Provider Modifiers Qty    01410792840 HC OT THERAPEUTIC ACT EA 15 MIN 6/18/2019 Harper Nelson OT GO 1    [id number] HC OT THER SUPP EA 15 MIN 6/18/2019 Harper Nelson OT GO 1               Harper Nelson OT  6/18/2019

## 2019-06-18 NOTE — THERAPY TREATMENT NOTE
Acute Care - Physical Therapy Treatment Note  [facility]     Patient Name: Stef Jones  : 1945  MRN: [id number]  Today's Date: 2019  Onset of Illness/Injury or Date of Surgery: 19  Date of Referral to PT: 06/10/19  Referring Physician: MD Shan    Admit Date: 2019    Visit Dx:    ICD-10-CM ICD-9-CM   1. A Fib w/RVR I48.91 427.31   2. Impaired functional mobility, balance, gait, and endurance Z74.09 V49.89   3. 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 Atrial fibrillation with RVR (CMS/HCC) I48.91 427.31     Patient Active Problem List   Diagnosis   • Cellulitis of Right Lower Leg and Foot   • A Fib w/RVR   • COPD (chronic obstructive pulmonary disease) (CMS/HCC)   • Dysphagia   • Laryngeal mass       Therapy Treatment    Rehabilitation Treatment Summary     Row Name 19 0819             Treatment Time/Intention    Discipline  physical therapist  -LS      Document Type  therapy note (daily note)  -LS      Subjective Information  no complaints  -LS      Mode of Treatment  physical therapy  -LS      Patient Effort  excellent  -LS      Existing Precautions/Restrictions  fall;cardiac;pacemaker  -LS      Treatment Considerations/Comments  s/p PM placement ; hypotension persists. RN aware and gave clearance for monitored mobility (pt nonsymptomatic).  -LS      Recorded by [LS] Unique Millan, PT 19 0922      Row Name 19 0819             Vital Signs    Pre Systolic BP Rehab  84  -LS      Pre Treatment Diastolic BP  62  -LS      Post Systolic BP Rehab  105  -LS      Post Treatment Diastolic BP  75  -LS      Pretreatment Heart Rate (beats/min)  80  -LS      Posttreatment Heart Rate (beats/min)  80  -LS      Pre SpO2 (%)  94  -LS      O2 Delivery Pre Treatment  nasal cannula  -LS      Post SpO2 (%)  93  -LS      O2 Delivery Post Treatment  nasal cannula  -LS      Pre Patient Position  Supine  -LS      Intra Patient Position  Standing   -LS      Post Patient Position  Sitting  -LS      Recorded by [LS] Unique Millan, PT 06/18/19 0922      Row Name 06/18/19 0819             Cognitive Assessment/Intervention- PT/OT    Affect/Mental Status (Cognitive)  WFL  -LS      Orientation Status (Cognition)  oriented x 4  -LS      Follows Commands (Cognition)  follows one step commands;over 90% accuracy;verbal cues/prompting required  -LS      Cognitive Function (Cognitive)  safety deficit  -LS      Safety Deficit (Cognitive)  mild deficit;safety precautions awareness  -LS      Personal Safety Interventions  fall prevention program maintained;gait belt;nonskid shoes/slippers when out of bed  -LS      Recorded by [LS] Unique Millan, PT 06/18/19 0922      Row Name 06/18/19 0819             Bed Mobility Assessment/Treatment    Supine-Sit [place] (Bed Mobility)  supervision;verbal cues  -LS      Assistive Device (Bed Mobility)  bed rails;head of bed elevated  -LS      Comment (Bed Mobility)  Cues for avoiding pushing through LUE (PM precautions).   -LS      Recorded by [LS] Unique Millan, PT 06/18/19 0922      Row Name 06/18/19 0819             Sit-Stand Transfer    Sit-Stand [place] (Transfers)  supervision;verbal cues  -LS      Assistive Device (Sit-Stand Transfers)  walker, front-wheeled  -LS      Recorded by [LS] Unique Millan, PT 06/18/19 0922      Row Name 06/18/19 0819             Stand-Sit Transfer    Stand-Sit [place] (Transfers)  supervision;verbal cues  -LS      Assistive Device (Stand-Sit Transfers)  walker, front-wheeled  -LS      Recorded by [LS] Unique Millan, PT 06/18/19 0922      Row Name 06/18/19 0819             Gait/Stairs Assessment/Training    47310 - Gait Training Minutes   14  -LS      Gait/Stairs Assessment/Training  gait/ambulation assistive device  -LS      [place] Level (Gait)  contact guard;verbal cues  -LS      Assistive Device (Gait)  walker, front-wheeled  -LS      Distance in Feet (Gait)  320  -LS       Pattern (Gait)  step-through  -LS      Deviations/Abnormal Patterns (Gait)  nicolas decreased;stride length decreased  -LS      Bilateral Gait Deviations  forward flexed posture  -LS      Comment (Gait/Stairs)  VC for slow, steady pace for safety and for avoiding leaning onto LUE. HR WFL; pt denied dizziness.   -LS      Recorded by [LS] Unique Millan, PT 06/18/19 0922      Row Name 06/18/19 0819             Therapeutic Exercise    Therapeutic Exercise  supine, lower extremities  -LS      90214 - PT Therapeutic Exercise Minutes  6  -LS      25686 - PT Therapeutic Activity Minutes  5  -LS      Recorded by [LS] Unique Millan, PT 06/18/19 0922      Row Name 06/18/19 0819             Lower Extremity Supine Therapeutic Exercise    Performed, Supine Lower Extremity (Therapeutic Exercise)  hip flexion/extension;hip abduction/adduction;ankle pumps  -LS      Exercise Type, Supine Lower Extremity (Therapeutic Exercise)  AROM (active range of motion)  -LS      Sets/Reps Detail, Supine Lower Extremity (Therapeutic Exercise)  1/10  -LS      Recorded by [LS] Unique Millan, PT 06/18/19 0922      Row Name 06/18/19 0819             Static Sitting Balance    Level of [place] (Unsupported Sitting, Static Balance)  supervision  -LS      Sitting Position (Unsupported Sitting, Static Balance)  sitting on edge of bed  -LS      Recorded by [LS] Unique Millan, PT 06/18/19 0922      Row Name 06/18/19 0819             Static Standing Balance    Level of [place] (Supported Standing, Static Balance)  supervision  -LS      Assistive Device Utilized (Supported Standing, Static Balance)  walker, rolling  -LS      Recorded by [LS] Unqiue Millan, PT 06/18/19 0922      Row Name 06/18/19 0819             Positioning and Restraints    Pre-Treatment Position  in bed  -LS      Post Treatment Position  chair  -LS      In Chair  notified nsg;sitting;call light within reach;encouraged to call for assist;exit alarm on;with family/caregiver;with  nsg;waffle cushion  -LS      Recorded by [LS] Unique Millan, PT 06/18/19 0922      Row Name 06/18/19 0819             Pain Scale: Numbers Pre/Post-Treatment    Pain Scale: Numbers, Pretreatment  0/10 - no pain  -LS      Pain Scale: Numbers, Post-Treatment  0/10 - no pain  -LS      Pre/Post Treatment Pain Comment  reports slight L chest soreness at PM site; denies pain  -LS      Recorded by [LS] Unique Millan, PT 06/18/19 0922      Row Name                Wound 06/17/19 1410 Left upper;lateral chest incision;surgical    Wound - Properties Group Date first assessed: 06/17/19 [BM] Time first assessed: 1410 [BM] Side: Left [BM] Orientation: upper;lateral [BM] Location: chest [BM] Type: incision;surgical [BM] Recorded by:  [BM] Bozena Villanueva RN 06/17/19 1410    Row Name 06/18/19 0819             Plan of Care Review    Plan of Care Reviewed With  patient;family  -LS      Recorded by [LS] Unique Millan, PT 06/18/19 0922      Row Name 06/18/19 0819             Outcome Summary/Treatment Plan (PT)    Daily Summary of Progress (PT)  progress toward functional goals is good  -LS      Barriers to Overall Progress (PT)  hypotension  -LS      Recorded by [LS] Unique Millan, PT 06/18/19 0922        User Key  (r) = Recorded By, (t) = Taken By, (c) = Cosigned By    Initials Name Effective Dates Discipline    LS Unique Millan, PT 06/19/15 -  PT     Bozena Villanueva RN 06/16/16 -  Nurse          Rash 06/14/19 2000 Left lower lumbar spine papule (Active)   Distribution generalized 6/18/2019  6:00 AM   Characteristics itching;flat;dry 6/18/2019  6:00 AM   Color pink 6/18/2019  6:00 AM   Lesion Size pinpoint 6/18/2019  6:00 AM       Rash 06/15/19 2000 other (see comments) medial umbilical area papule (Active)   Distribution generalized 6/18/2019  8:00 AM   Configuration/Shape round;pinpoint 6/18/2019  8:00 AM   Borders irregular 6/18/2019  8:00 AM   Characteristics itching;dry 6/18/2019  8:00 AM   Color red 6/18/2019  8:00  AM   Lesion Size pinpoint 6/18/2019  8:00 AM       Wound 06/17/19 1410 Left upper;lateral chest incision;surgical (Active)   Dressing Appearance no drainage 6/18/2019  8:00 AM   Closure Adhesive closure strips 6/18/2019  8:00 AM   Base dressing in place, unable to visualize 6/18/2019  8:00 AM   Periwound intact 6/18/2019  8:00 AM   Drainage Amount none 6/18/2019  8:00 AM   Dressing Care, Wound gauze 6/17/2019  8:00 PM           Physical Therapy Education     Title: PT OT SLP Therapies (Done)     Topic: Physical Therapy (Done)     Point: Mobility training (Done)     Learning Progress Summary           Patient Acceptance, E,D, VU,DU by  at 6/18/2019  8:19 AM    Acceptance, E,TB, VU,DU by  at 6/15/2019  9:30 PM    Acceptance, E, VU,NR by  at 6/15/2019  9:07 AM    Acceptance, E,TB, VU,DU,NR by  at 6/14/2019 10:18 PM    Acceptance, TB, NR by  at 6/13/2019  2:36 PM    Acceptance, E,D, NR by  at 6/11/2019 10:10 AM    Acceptance, TB, NR by  at 6/10/2019  1:50 PM   Family Acceptance, E,D, VU,DU by  at 6/18/2019  8:19 AM    Acceptance, E, VU,NR by  at 6/15/2019  9:07 AM    Acceptance, E,TB, VU,DU,NR by  at 6/14/2019 10:18 PM                   Point: Home exercise program (Done)     Learning Progress Summary           Patient Acceptance, E,D, VU,DU by  at 6/18/2019  8:19 AM    Acceptance, E,TB, VU,DU by  at 6/15/2019  9:30 PM    Acceptance, E, VU,NR by  at 6/15/2019  9:07 AM    Acceptance, E,TB, VU,DU,NR by  at 6/14/2019 10:18 PM    Acceptance, E,D, NR by  at 6/11/2019 10:10 AM   Family Acceptance, E,D, VU,DU by  at 6/18/2019  8:19 AM    Acceptance, E, VU,NR by  at 6/15/2019  9:07 AM    Acceptance, E,TB, VU,DU,NR by  at 6/14/2019 10:18 PM                   Point: Body mechanics (Done)     Learning Progress Summary           Patient Acceptance, E,D, VU,DU by  at 6/18/2019  8:19 AM    Acceptance, E,TB, VU,DU by  at 6/15/2019  9:30 PM    Acceptance, E, VU,NR by  at 6/15/2019  9:07 AM     Acceptance, E,TB, VU,DU,NR by  at 6/14/2019 10:18 PM    Acceptance, TB, NR by  at 6/13/2019  2:36 PM    Acceptance, E,D, NR by  at 6/11/2019 10:10 AM    Acceptance, TB, NR by  at 6/10/2019  1:50 PM   Family Acceptance, E,D, VU,DU by  at 6/18/2019  8:19 AM    Acceptance, E, VU,NR by  at 6/15/2019  9:07 AM    Acceptance, E,TB, VU,DU,NR by  at 6/14/2019 10:18 PM                   Point: Precautions (Done)     Learning Progress Summary           Patient Acceptance, E,D, VU,DU by  at 6/18/2019  8:19 AM    Acceptance, E,TB, VU,DU by  at 6/15/2019  9:30 PM    Acceptance, E, VU,NR by  at 6/15/2019  9:07 AM    Acceptance, E,TB, VU,DU,NR by  at 6/14/2019 10:18 PM    Acceptance, TB, NR by  at 6/13/2019  2:36 PM    Acceptance, E,D, NR by  at 6/11/2019 10:10 AM    Acceptance, TB, NR by  at 6/10/2019  1:50 PM   Family Acceptance, E,D, VU,DU by  at 6/18/2019  8:19 AM    Acceptance, E, VU,NR by  at 6/15/2019  9:07 AM    Acceptance, E,TB, VU,DU,NR by  at 6/14/2019 10:18 PM                               User Key     Initials Effective Dates Name Provider Type Discipline     06/19/15 -  Unique Millan, PT Physical Therapist PT     06/16/16 -  Tri White, RN Registered Nurse Nurse     05/15/19 -  Antonio Torres, PT Student PT Student PT                PT Recommendation and Plan     Outcome Summary/Treatment Plan (PT)  Daily Summary of Progress (PT): progress toward functional goals is good  Barriers to Overall Progress (PT): hypotension  Plan of Care Reviewed With: patient, family  Progress: improving  Outcome Summary: Pt demonstrated increased indep with STS transfer; progressed forward ambulation distance to 320 total ft with use of RWx, CGA, cues for avoiding pushing through LUE (PM precautions). Cont to be limited by hyptension; nonsymptomatic throughout session. Will cont PT POC.  Outcome Measures     Row Name 06/18/19 0819 06/17/19 0815 06/15/19 0934       How much help from  another person do you currently need...    Turning from your back to your side while in flat bed without using bedrails?  3  -LS  --  --    Moving from lying on back to sitting on the side of a flat bed without bedrails?  3  -LS  --  --    Moving to and from a bed to a chair (including a wheelchair)?  3  -LS  --  --    Standing up from a chair using your arms (e.g., wheelchair, bedside chair)?  3  -LS  --  --    Climbing 3-5 steps with a railing?  3  -LS  --  --    To walk in hospital room?  3  -LS  --  --    AM-PAC 6 Clicks Score  18  -LS  --  --       How much help from another is currently needed...    Putting on and taking off regular lower body clothing?  --  2  -CL (r) CH (t) CL (c)  2  -TA    Bathing (including washing, rinsing, and drying)  --  2  -CL (r) CH (t) CL (c)  2  -TA    Toileting (which includes using toilet bed pan or urinal)  --  3  -CL (r) CH (t) CL (c)  3  -TA    Putting on and taking off regular upper body clothing  --  4  -CL (r) CH (t) CL (c)  3  -TA    Taking care of personal grooming (such as brushing teeth)  --  4  -CL (r) CH (t) CL (c)  3  -TA    Eating meals  --  4  -CL (r) CH (t) CL (c)  4  -TA    Score  --  19  -CL (r) CH (t)  17  -TA       Functional Assessment    Outcome Measure Options  AM-PAC 6 Clicks Basic Mobility (PT)  -LS  --  AM-PAC 6 Clicks Daily Activity (OT)  -TA      User Key  (r) = Recorded By, (t) = Taken By, (c) = Cosigned By    Initials Name Provider Type    Unique Martínez, PT Physical Therapist    TA Forrest Echeverria, OT Occupational Therapist    CL Harper Nelson, OT Occupational Therapist    CH Dinesh Gomez, OT Student OT Student         Time Calculation:   PT Charges     Row Name 06/18/19 0819             Time Calculation    Start Time  0819  -      PT Received On  06/18/19  -         Time Calculation- PT    Total Timed Code Minutes- PT  25 minute(s)  -LS         Timed Charges    77393 - PT Therapeutic Exercise Minutes  6  -LS      35577 - Gait  Training Minutes   14  -LS      94415 - PT Therapeutic Activity Minutes  5  -LS        User Key  (r) = Recorded By, (t) = Taken By, (c) = Cosigned By    Initials Name Provider Type    Unique Martínez, PT Physical Therapist        Therapy Charges for Today     Code Description Service Date Service Provider Modifiers Qty    86222790885 HC GAIT TRAINING EA 15 MIN 6/18/2019 Unique Millan, PT GP 1    29456939597 HC PT THER PROC EA 15 MIN 6/18/2019 Unique Millan, PT GP 1    82486858352 HC PT THER SUPP EA 15 MIN 6/18/2019 Unique Millan, PT GP 2          PT G-Codes  Outcome Measure Options: AM-PAC 6 Clicks Basic Mobility (PT)  AM-PAC 6 Clicks Score: 18  Score: 19    Unique Millan, PT  6/18/2019

## 2019-06-18 NOTE — PROGRESS NOTES
INTENSIVIST   PROGRESS NOTE     Hospital:  LOS: 10 days      S     Mr. Stef Jones, 74 y.o. male is followed for:      A Fib w/RVR    Cellulitis of Right Lower Leg and Foot    COPD (chronic obstructive pulmonary disease) (CMS/[facility])    Laryngeal mass    As an Intensivist, we provide an integrated approach to the ICU patient and family, medical management of comorbid conditions, lead interdisciplinary rounds and coordinate the care with all other services, including those from other specialists.     Interval History:  Hypotensive during the night requiring pressors.  He is feeling well.  No complaints.     The patient's relevant past medical, surgical and social history were reviewed and updated in Epic as appropriate.     ROS:   Constitutional: Negative for fever.   Respiratory: No Dyspnea at present.   Cardiovascular: Negative for chest pain.   Gastrointestinal: Negative for  nausea, vomiting and diarrhea.        O     Vitals:  Temp: 98.5 °F (36.9 °C) (06/18/19 0000) Temp  Min: 96.7 °F (35.9 °C)  Max: 98.5 °F (36.9 °C)   BP: 105/79 (06/18/19 1416) BP  Min: 59/47  Max: 164/99   Pulse: 80 (06/18/19 1416) Pulse  Min: 79  Max: 83   Resp: 19 (06/18/19 1325) Resp  Min: 16  Max: 19   SpO2: 92 % (06/18/19 1416) SpO2  Min: 87 %  Max: 99 %   Device: humidified, nasal cannula (06/18/19 1325)    Flow Rate: 2.5 (06/18/19 1325) Flow (L/min)  Min: 2.5  Max: 4     Intake/Ouptut 24 hrs (7:00AM - 6:59 AM)  Intake/Output       06/17/19 0700 - 06/18/19 0659 06/18/19 0700 - 06/19/19 0659    Intake (ml) 1131.1 356.5    Output (ml) 900 100    Net (ml) 231.1 256.5           Medications (drips):    norepinephrine Last Rate: Stopped (06/18/19 1218)       Physical Examination  Telemetry:  Rhythm: paced rhythm (06/18/19 1400)  Atrial Rhythm: atrial fibrillation (06/17/19 1200)      Constitutional:  No acute distress.   Cardiovascular: RRR. Normal heart sounds.  No murmurs, gallop or rub.   Respiratory: No respiratory distress.  Normal  breath sounds  No adventitious sounds    Abdominal:  Soft with no tenderness  No distension. No HSM.   Extremities: Warm with no cyanosis.  No edema.   Neurological:   Alert and Oriented to person, place, and time.               Lines/Drains/Airways: Peripheral IV(s)     Hematology:  Results from last 7 days   Lab Units 06/17/19 0333 06/16/19  0433 06/15/19  0423   WBC 10*3/mm3 9.57 10.27 8.28   HEMOGLOBIN g/dL 16.0 15.9 16.6   MCV fL 86.3 86.7 87.3   PLATELETS 10*3/mm3 265 252 218       Chemistry:  Estimated Creatinine Clearance: 62.1 mL/min (by C-G formula based on SCr of 1.04 mg/dL).    Results from last 7 days   Lab Units 06/17/19  0333 06/16/19  0433 06/15/19  0423   SODIUM mmol/L 137 137 136   POTASSIUM mmol/L 4.3 4.3 4.4   CHLORIDE mmol/L 96* 100 96*   CO2 mmol/L 29.0 28.0 30.0*   ANION GAP mmol/L 12.0 9.0 10.0   GLUCOSE mg/dL 91 123* 96   CALCIUM mg/dL 8.9 9.2 9.2     Results from last 7 days   Lab Units 06/17/19 0333 06/16/19 0433 06/15/19  0423   BUN mg/dL 18 19 17   CREATININE mg/dL 1.04 1.06 0.94     Images:  Xr Chest Pa & Lateral    Result Date: 6/18/2019  Interval implantation left chest wall pacing device with leads intact. No postprocedural pneumothorax.  D:  06/18/2019 E:  06/18/2019        Echo:  Results for orders placed during the hospital encounter of 06/08/19   Adult Transesophageal Echo ([facility]) W/ Cont if Necessary Per Protocol    Narrative · Left ventricular systolic function is severely decreased. Estimated EF   appears to be in the range of 31 - 35%. Global hypokinesis noted.  · Left atrial cavity size is moderately dilated. The left atrial appendage   was visualized through multiple planes. Left atrial appendage was found to   be singularly lobar in nature. Doppler interrogation shows mildly reduced   flow within the left atrial appendage. No left atrial appendage thrombus   present.  · The mitral valve is abnormal in structure. There is mild bileaflet   mitral valve thickening present.  Moderate-to-severe mitral valve   regurgitation is present (2-3+)  · Moderate tricuspid valve regurgitation is present. Estimated right   ventricular systolic pressure from tricuspid regurgitation is mildly   elevated (35-45 mmHg).          Results: Reviewed.  I reviewed the patient's new laboratory and imaging results.  I independently reviewed the patient's new images.    Medications: Reviewed.    Assessment/Plan   A / P     Assessment:    74 y.o.male, admitted on 6/8/2019 with A-fib (CMS/[facility]) [I48.91]  Atrial fibrillation with rapid ventricular response (CMS/[facility]) [I48.91]:       1. Respiratory failure } Improved.  1. COPD  2. Bronchodilators  2. Cardiac:  1. A Fib with RVR  1. Anticoagulation on APIxaban   2. Cardiomyopathy, EF 33%  3. TR  4. HTN on CCB  1. Episode on hypotension on 06/18/19   5. Dyslipidemia on statins  3. Cellulitis R lower extremity and foot.} Resolved.  1. Antibiotics completed.  4. Laryngeal Mass } ENT  1. Bx. Negative for malignancy  5. GERD on PPI  6. Glucose: T2D per A1c criteria    Results from last 7 days   Lab Units 06/18/19  1106 06/18/19  0733 06/17/19 2008 06/17/19  1627 06/17/19  1134 06/17/19  0739   GLUCOSE mg/dL 102 134* 205* 98 103 94     Lab Results   Lab Value Date/Time    HGBA1C 6.60 (H) 06/08/2019 0343       Diet: Diet Dysphagia; III - Pureed With Some Mashed; Honey Thick; Consistent Carbohydrate, Cardiac   Advance Directives: Code Status and Medical Interventions:   Ordered at: 06/08/19 0320     Code Status:    CPR     Medical Interventions (Level of Support Prior to Arrest):    Full        Plan:    1. BiPAP prn  2. Cardiac:  1. Discussed with Dr. Solares who will decrease his oral antihypertensive.  3. Goal: Glucose < 180 mg/dL.  4. Disposition: Keep in ICU. due to BP issues.    Plan of care and goals reviewed during interdisciplinary rounds.  I discussed the patient's findings and my recommendations with patient and nursing staff    Level of Risk is High due to:  illness  with threat to life or bodily function.     Time: 25 minutes, in direct patient care, with the patient and family and/or on the avalos coordinating care with other health care providers.     I have spent > 50% percent of this time, counseling and discussing management.       Stanislaw Torrez MD, FACP, FCCP, [facility]  Intensive Care Medicine, Nutrition Support and Pulmonary Medicine     [x]  Primary Attending  []  Consultant    Lab Results   Lab Value Date/Time    FINALDX  06/12/2019 0720     LEFT LARYNGEAL BIOPSY:  Benign laryngeal nodule with inflammation and reactive change.  Negative for in-situ and invasive carcinoma.     JFJ/mbc

## 2019-06-18 NOTE — PLAN OF CARE
Problem: Patient Care Overview  Goal: Plan of Care Review  Outcome: Ongoing (interventions implemented as appropriate)   06/18/19 1056   Coping/Psychosocial   Plan of Care Reviewed With patient   Plan of Care Review   Progress improving   OTHER   Outcome Summary Pt CGA to ambulate 300 ft w/ RW, though noted desat to 87% on RA which improved to >90% once O2 applied. Recommend cont skilled IPOT POC. Recommend pt DC home w/ assist and HH services.

## 2019-06-18 NOTE — PROGRESS NOTES
[facility] at [facility]  Progress Note       LOS: 10 days   Patient Care Team:  Lexx Monson MD as PCP - General (Family Medicine)    Chief Complaint:  AFIB with RVR    Subjective : s/p AVN RFA and BiV PPM implant yesterday. Hypotensive this AM requiring albumin and Levophed. Asymptomatic with hypotension. States he feels a lot better.    Review of Systems:   Denies cough, fevers, chills. See HPI      Objective       Current Facility-Administered Medications:   •  acetaminophen (TYLENOL) tablet 650 mg, 650 mg, Oral, Q6H PRN, Aroldo Glez MD, 650 mg at 06/12/19 1104  •  albuterol (PROVENTIL) nebulizer solution 0.083% 2.5 mg/3mL, 0.625 mg, Nebulization, Q6H PRN, Glen Gutiérrez MD  •  amiodarone (PACERONE) tablet 200 mg, 200 mg, Oral, Q24H, Lucio Chapman MD, 200 mg at 06/17/19 1636  •  amitriptyline (ELAVIL) tablet 25 mg, 25 mg, Oral, Nightly, Gema Duong APRN, 25 mg at 06/17/19 2118  •  apixaban (ELIQUIS) tablet 5 mg, 5 mg, Oral, Q12H, Aroldo Glez MD, 5 mg at 06/17/19 0905  •  atorvastatin (LIPITOR) tablet 20 mg, 20 mg, Oral, Nightly, Glen Gutiérrez MD, 20 mg at 06/17/19 2118  •  budesonide-formoterol (SYMBICORT) 80-4.5 MCG/ACT inhaler 2 puff, 2 puff, Inhalation, BID - RT, Glen Gutiérrez MD, 2 puff at 06/18/19 0755  •  carvedilol (COREG) tablet 12.5 mg, 12.5 mg, Oral, BID With Meals, Lucio Chapman MD, 12.5 mg at 06/17/19 1834  •  dextrose 5 % and lactated Ringer's infusion, 75 mL/hr, Intravenous, Continuous, Stanislaw Torrez MD, Last Rate: 75 mL/hr at 06/17/19 1043, 75 mL/hr at 06/17/19 1043  •  diphenhydrAMINE-zinc acetate 2-0.1 % cream, , Topical, TID PRN, Gema Duong APRN  •  furosemide (LASIX) tablet 20 mg, 20 mg, Oral, Daily, OsetinsGlen alvarado MD, 20 mg at 06/17/19 1215  •  HYDROcodone-acetaminophen (NORCO) 5-325 MG per tablet 1 tablet, 1 tablet, Oral, Q4H PRN, Lucio Chapman MD, 1 tablet at 06/18/19 0024  •  insulin lispro  "(humaLOG) injection 0-14 Units, 0-14 Units, Subcutaneous, 4x Daily With Meals & Nightly, Florentin Leggett, [facility], 5 Units at 06/17/19 2118  •  ipratropium (ATROVENT) nebulizer solution 0.5 mg, 500 mcg, Nebulization, 4x Daily - RT, Glen Gutiérrez MD, 0.5 mg at 06/18/19 0755  •  magnesium sulfate 4g/100mL (PREMIX) infusion, 4 g, Intravenous, PRN, Stanislaw Torrez MD  •  metoprolol tartrate (LOPRESSOR) injection 5 mg, 5 mg, Intravenous, Q6H PRN, Vonnie Ruiz APRN, 5 mg at 06/14/19 1754  •  norepinephrine (LEVOPHED) 8 mg/250 mL (32 mcg/mL) in sodium chloride 0.9% infusion (premix), 0.02-0.3 mcg/kg/min, Intravenous, Titrated, Gema Duong APRN, Last Rate: 8.17 mL/hr at 06/18/19 0735, 0.06 mcg/kg/min at 06/18/19 0735  •  ondansetron (ZOFRAN) injection 4 mg, 4 mg, Intravenous, Q6H PRN, Lucio Chapman MD  •  pantoprazole (PROTONIX) injection 40 mg, 40 mg, Intravenous, Q AM, Stanislaw Torrez MD, 40 mg at 06/18/19 0559  •  sacubitril-valsartan (ENTRESTO) 24-26 MG tablet 1 tablet, 1 tablet, Oral, Q12H, Yovanny Herbert MD, 1 tablet at 06/17/19 2118  •  sodium chloride 0.9 % flush 3 mL, 3 mL, Intravenous, Q12H, Gema Duong APRN, 3 mL at 06/15/19 1944  •  sodium chloride 0.9 % flush 3 mL, 3 mL, Intravenous, Q12H, Lucio Chapman MD  •  sodium chloride 0.9 % flush 3-10 mL, 3-10 mL, Intravenous, PRN, Gema Duong APRN  •  sodium chloride 0.9 % flush 3-10 mL, 3-10 mL, Intravenous, PRN, Lucio Chapman MD    Vital Sign Min/Max for last 24 hours  Temp  Min: 96.7 °F (35.9 °C)  Max: 98.5 °F (36.9 °C)   BP  Min: 59/47  Max: 164/99   Pulse  Min: 79  Max: 152   Resp  Min: 10  Max: 24   SpO2  Min: 87 %  Max: 99 %   Flow (L/min)  Min: 3  Max: 4   No Data Recorded     Flowsheet Rows      First Filed Value   Admission Height  167.6 cm (66\") Documented at 06/08/2019 0245   Admission Weight  77.9 kg (171 lb 11.8 oz) Documented at 06/08/2019 0245            Intake/Output Summary (Last 24 hours) at " 6/18/2019 0809  Last data filed at 6/18/2019 0600  Gross per 24 hour   Intake 1131.1 ml   Output 900 ml   Net 231.1 ml       Physical Exam:     General Appearance:    Alert, cooperative, in no acute distress   Lungs:     Diminished BS throughout. Non labored    Heart:    RRR normal S1 and S2, no murmur, no gallop, no rub, no click   Chest Wall:    No abnormalities observed   Abdomen:     Normal bowel sounds, soft nontender   Extremities:   Moves all extremities well, no edema, no cyanosis, no             redness   Pulses:   Pulses palpable and equal bilaterally   Skin:   No bleeding, bruising or rash. PPM site- dressing c/d/i. Small amt of soft tissue swelling. No hematoma.        Results Review:   Results from last 7 days   Lab Units 06/17/19  0333 06/16/19 0433 06/15/19  0423   WBC 10*3/mm3 9.57 10.27 8.28   HEMOGLOBIN g/dL 16.0 15.9 16.6   HEMATOCRIT % 50.4 50.1 53.1*   PLATELETS 10*3/mm3 265 252 218     Results from last 7 days   Lab Units 06/17/19  0333 06/16/19  0433 06/15/19  0423   SODIUM mmol/L 137 137 136   POTASSIUM mmol/L 4.3 4.3 4.4   CHLORIDE mmol/L 96* 100 96*   CO2 mmol/L 29.0 28.0 30.0*   BUN mg/dL 18 19 17   CREATININE mg/dL 1.04 1.06 0.94   GLUCOSE mg/dL 91 123* 96              Results from last 7 days   Lab Units 06/12/19  1349   TSH mIU/mL 3.880   FREE T4 ng/dL 1.33                   Intake/Output Summary (Last 24 hours) at 6/18/2019 0809  Last data filed at 6/18/2019 0600  Gross per 24 hour   Intake 1131.1 ml   Output 900 ml   Net 231.1 ml       I personally viewed and interpreted the patient's EKG/Telemetry data    EKG: none for review today    Telemetry: V paced    Ejection Fraction  No results found for: EF    Echo EF Estimated  No results found for: ECHOEFEST      Present on Admission:  • Cellulitis of Right Lower Leg and Foot  • A Fib w/RVR  • COPD (chronic obstructive pulmonary disease) (CMS/HCC)  • Laryngeal mass    Assessment/Plan   Initial cardiac assessment:  Mr. Jones is a 73 y/o  "male with pmhx of hypertension and COPD who was transferred from [facility] for Afib with RVR and cellulitis of right lower calf/foot.     Recommendations:  1. Afib with RVR  - newly diagnosed, incidentally found  - CHADSVASC = 3 (age, HTN), starting Eliquis today  - Echo: EF mid 30s, moderate RV enlargement and RV dysfunction.  - S/p DENA and successful ECV, 6/10.   - Rates were difficult to control on CCB, BB, amiodarone.   - s/p AVN RFA and BiV PPM implant, 6/17.      2. Acute systolic heart failure  - TTE shows ejection fraction of mid 30s, moderate RV enlargement and RV dysfunction.  - could be tachycardia induced. Will need repeat echo once maintaining NSR, may also need ischemic evaluation.  - DENA: EF 30-35%, Moderate to severe MR, moderate TR, RVSP 35-45mmHg.     3. Hypertension/Hypotension  - remains labile. Required albumin and levophed overnight/this AM for hypotension.     4. Syncope  - reportedly had syncopal episode seems to have been triggered by prolonged coughing.     5. Lower extremity cellulitis  - per admitting     6. COPD  -  Per admitting    7. 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   - Resume Eliquis today  - Per EP continue amiodarone and bring back for ECV in 6-8 weeks. Will continue amio taper (reduced to 200 mg daily)  - Discontinued metoprolol and Cardizem. Started Coreg per EP. Will likely need to reduce dosing s/t hypotension at times.   - Will likely repeat echo prior to d/c to determine need for lifevest and indication/timing of ischemic eval.      Electronically signed by MAYI Borges, 06/18/19, 8:09 AM.  ___________________________________________________________  The patient was seen and examined by me.  Agree and verified/discussed key components of E/M as outlined by the Nurse practitioner/PA.    BP 95/65   Pulse 80   Temp 98.2 °F (36.8 °C) (Oral)   Resp 20   Ht 167.6 cm (66\")   Wt 72.6 kg (160 lb 0.9 oz)   " SpO2 92%   BMI 25.83 kg/m²     General Appearance:   · well developed  · well nourished  Neck:  · thyroid not enlarged  · supple  Respiratory:  · no respiratory distress  · normal breath sounds  · no rales  Cardiovascular:  · no jugular venous distention  · regular rhythm  · apical impulse normal  · S1 normal, S2 normal  · no S3, no S4   · no murmur  · no rub, no thrill  · carotid pulses normal; no bruit  · pedal pulses normal  · lower extremity edema: none  .   Skin:   · warm, dry        Plan:    Hypotensive overnight, Levophed was started  Pressures improved this morning, weaning Levophed  Decrease Coreg to 3.125 mg twice daily  DC Entresto due to hypotension  Continue amiodarone 200 mg daily per Dr. Chapman's recommendation  Per Dr. Chapman, no need for LifeVest, as current myopathy most certainly due to A. fib with RVR  If still in A. Fib after 2 months, will bring him back for elective cardioversion.    Yovanny Solares MD, [facility]

## 2019-06-18 NOTE — PLAN OF CARE
Problem: Patient Care Overview  Goal: Plan of Care Review  Outcome: Ongoing (interventions implemented as appropriate)   06/18/19 1536   Coping/Psychosocial   Plan of Care Reviewed With patient;family   Plan of Care Review   Progress no change   OTHER   Outcome Summary Patient has remained 100% v-paced throughout night. C/o moderate pain in left shoulder area relieved by PRN norco. This am, SBP dropped to 60's and 70's. APRN notified. Albumin given- no change. Levophed drip started. Patient has remained asypmtomatic with hypotension. Right groin site CDI and soft. Patient wore bipap most of night on 35%. Family at bedside and updated. WCM.

## 2019-06-18 NOTE — PROGRESS NOTES
[facility] at [facility]  Progress Note       LOS: 10 days   Patient Care Team:  Lexx Monson MD as PCP - General (Family Medicine)    Chief Complaint:  Atrial fibrillation with RVR, congestive heart failure    Subjective     Interval History: Patient is s/p AVN ablation and Bi-V PPM implant yesterday.  Site stable with no bleeding or hematoma.  He did develop worsening hypotension overnight necessitating norepinephrine gtt.   Asymptomatic.  C/o mild incisional pain, otherwise, no new complaints.  Overall much improved this AM.  No tachy palpitations.        Review of Systems:   Pertinent positives in HPI, all others reviewed and negative.      Objective       Current Facility-Administered Medications:   •  acetaminophen (TYLENOL) tablet 650 mg, 650 mg, Oral, Q6H PRN, Aroldo Glez MD, 650 mg at 06/12/19 1104  •  albuterol (PROVENTIL) nebulizer solution 0.083% 2.5 mg/3mL, 0.625 mg, Nebulization, Q6H PRN, Glen Gutiérrez MD  •  amiodarone (PACERONE) tablet 200 mg, 200 mg, Oral, Q24H, Lucio Chapman MD, 200 mg at 06/17/19 1636  •  amitriptyline (ELAVIL) tablet 25 mg, 25 mg, Oral, Nightly, Gema Duong APRN, 25 mg at 06/17/19 2118  •  apixaban (ELIQUIS) tablet 5 mg, 5 mg, Oral, Q12H, Aroldo Glez MD, 5 mg at 06/17/19 0905  •  atorvastatin (LIPITOR) tablet 20 mg, 20 mg, Oral, Nightly, Glen Gutiérrez MD, 20 mg at 06/17/19 2118  •  budesonide-formoterol (SYMBICORT) 80-4.5 MCG/ACT inhaler 2 puff, 2 puff, Inhalation, BID - RT, Glen Gutiérrez MD, 2 puff at 06/17/19 2052  •  carvedilol (COREG) tablet 12.5 mg, 12.5 mg, Oral, BID With Meals, Lucio Chapman MD, 12.5 mg at 06/17/19 1834  •  dextrose 5 % and lactated Ringer's infusion, 75 mL/hr, Intravenous, Continuous, Stanislaw Torrez MD, Last Rate: 75 mL/hr at 06/17/19 1043, 75 mL/hr at 06/17/19 1043  •  diphenhydrAMINE-zinc acetate 2-0.1 % cream, , Topical, TID PRN, Gema Duong, APRN  •  furosemide  (LASIX) tablet 20 mg, 20 mg, Oral, Daily, OsGlen ferraro MD, 20 mg at 06/17/19 1215  •  HYDROcodone-acetaminophen (NORCO) 5-325 MG per tablet 1 tablet, 1 tablet, Oral, Q4H PRN, Lucio Chapman MD, 1 tablet at 06/18/19 0024  •  insulin lispro (humaLOG) injection 0-14 Units, 0-14 Units, Subcutaneous, 4x Daily With Meals & Nightly, Florentin Leggett, [facility], 5 Units at 06/17/19 2118  •  ipratropium (ATROVENT) nebulizer solution 0.5 mg, 500 mcg, Nebulization, 4x Daily - RT, Glen Gutiérrez MD, 0.5 mg at 06/17/19 2051  •  magnesium sulfate 4g/100mL (PREMIX) infusion, 4 g, Intravenous, PRN, Stanislaw Torrez MD  •  metoprolol tartrate (LOPRESSOR) injection 5 mg, 5 mg, Intravenous, Q6H PRN, Vonnie Ruiz APRN, 5 mg at 06/14/19 1754  •  norepinephrine (LEVOPHED) 8 mg/250 mL (32 mcg/mL) in sodium chloride 0.9% infusion (premix), 0.02-0.3 mcg/kg/min, Intravenous, Titrated, Gema Duong APRN, Last Rate: 8.17 mL/hr at 06/18/19 0735, 0.06 mcg/kg/min at 06/18/19 0735  •  ondansetron (ZOFRAN) injection 4 mg, 4 mg, Intravenous, Q6H PRN, Lucio Chapman MD  •  pantoprazole (PROTONIX) injection 40 mg, 40 mg, Intravenous, Q AM, Stanislaw Torrez MD, 40 mg at 06/18/19 0559  •  sacubitril-valsartan (ENTRESTO) 24-26 MG tablet 1 tablet, 1 tablet, Oral, Q12H, Yovanny Herbert MD, 1 tablet at 06/17/19 2118  •  sodium chloride 0.9 % flush 3 mL, 3 mL, Intravenous, Q12H, Gema Duong APRN, 3 mL at 06/15/19 1944  •  sodium chloride 0.9 % flush 3 mL, 3 mL, Intravenous, Q12H, Lucio Chapman MD  •  sodium chloride 0.9 % flush 3-10 mL, 3-10 mL, Intravenous, PRN, YoungGema APRN  •  sodium chloride 0.9 % flush 3-10 mL, 3-10 mL, Intravenous, PRN, Lucio Chapman MD    Vital Sign Min/Max for last 24 hours  Temp  Min: 96.7 °F (35.9 °C)  Max: 98.5 °F (36.9 °C)   BP  Min: 59/47  Max: 164/99   Pulse  Min: 79  Max: 152   Resp  Min: 10  Max: 24   SpO2  Min: 87 %  Max: 99 %   Flow (L/min)  Min: 3  Max: 4   No Data  "Recorded     Flowsheet Rows      First Filed Value   Admission Height  167.6 cm (66\") Documented at 06/08/2019 0245   Admission Weight  77.9 kg (171 lb 11.8 oz) Documented at 06/08/2019 0245            Intake/Output Summary (Last 24 hours) at 6/18/2019 0743  Last data filed at 6/18/2019 0600  Gross per 24 hour   Intake 1131.1 ml   Output 900 ml   Net 231.1 ml       Physical Exam:     General Appearance:    Alert, cooperative, in no acute distress   Lungs:    Clear To auscultation bilaterally.  Respiratory effort normal.    Heart:   Regular rate rhythm normal S1-S2.  There is an S3 present.  PMI laterally displaced.   Chest Wall:    No abnormalities observed   Abdomen:     Normal bowel sounds, no masses, soft, non-tender, non-distended   Extremities:   Moves all extremities well, no edema, no cyanosis, no             redness   Pulses:   Pulses palpable and equal bilaterally   Skin:   Left chest wall PM site C/D/I.  No bleeding, bruising, or hematoma.  pacemaker site normal.        Results Review:   Results from last 7 days   Lab Units 06/17/19  0333 06/16/19  0433 06/15/19  0423   WBC 10*3/mm3 9.57 10.27 8.28   HEMOGLOBIN g/dL 16.0 15.9 16.6   HEMATOCRIT % 50.4 50.1 53.1*   PLATELETS 10*3/mm3 265 252 218     Results from last 7 days   Lab Units 06/17/19  0333 06/16/19  0433 06/15/19  0423   SODIUM mmol/L 137 137 136   POTASSIUM mmol/L 4.3 4.3 4.4   CHLORIDE mmol/L 96* 100 96*   CO2 mmol/L 29.0 28.0 30.0*   BUN mg/dL 18 19 17   CREATININE mg/dL 1.04 1.06 0.94   GLUCOSE mg/dL 91 123* 96              Results from last 7 days   Lab Units 06/12/19  1349   TSH mIU/mL 3.880   FREE T4 ng/dL 1.33                 Results from last 7 days   Lab Units 06/17/19  0333   MAGNESIUM mg/dL 2.1       Intake/Output Summary (Last 24 hours) at 6/18/2019 0743  Last data filed at 6/18/2019 0600  Gross per 24 hour   Intake 1131.1 ml   Output 900 ml   Net 231.1 ml       I personally viewed and interpreted the patient's EKG/Telemetry " data    EKG: None for review today    Telemetry: Underlying atrial fibrillation, Bi-V paced, HR 80 bpm,  no VT    Chest X-ray: pending    Ejection Fraction  No results found for: EF    Echo EF Estimated  No results found for: ECHOEFEST      Present on Admission:  • Cellulitis of Right Lower Leg and Foot  • A Fib w/RVR  • COPD (chronic obstructive pulmonary disease) (CMS/HCC)  • Laryngeal mass    Assessment/Plan   1. Afib with RVR; still difficult to control despite multiple AV piter blocking agents.  - newly diagnosed, incidentally found  - CHADSVASC = 3 (age, HTN), starting Eliquis this admission  - Echo: EF mid 30s, moderate RV enlargement and RV dysfunction.  - S/p DENA and successful ECV, 6/10.  Did develop early recurrence of his atrial fibrillation.  - Continued high ventricular rates despite amiodarone and multiple AV piter agents  - Now s/p AVN ablation + Bi-V PPM implant yesterday with Dr. Chapman.  Overall clinically improved.  We will follow-up on chest x-ray which is presently pending.  Plan is to bring back in 6 weeks for external cardioversion while on amiodarone therapy.     2. Acute systolic heart failure; clinically stable.  - TTE shows ejection fraction of mid 30s, moderate RV enlargement and RV dysfunction.  - Likely tachycardia induced  - DENA: EF 30-35%, Moderate to severe MR, moderate TR, RVSP 35-45mmHg.  - Continue Entresto, Coreg     3. Hypertension:  - Labile with intermittent hypotension, asymptomatic, received IV albumin and norepinephrine gtt last night.  Need to adjust medications.     4. Syncope:  - reportedly had syncopal episode seems to have been triggered by prolonged coughing.  No recurrence.     5. Lower extremity cellulitis  - per admitting     6. COPD  -  Per admitting     7. 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      Plan: Continue Amiodarone 200 mg po daily, Coreg 3.125 mg po bid, Entresto 24/26 mg bi if BP tolerates  He will stay on Amiodarone x  6-8 weeks and will bring back in for ECV if he remains in atrial fibrillation.  Consider limited echo prior to discharge to reassess LVEF and need for LifeVest.  He will need a wound check in 7-10 days.    Electronically signed by MAYI Garcia, 06/18/19, 7:43 AM.      I, Lucio Chapman MD, personally performed the services face to face as described and documented by the above named individual. I have made any necessary edits and it is both accurate and complete 6/18/2019  10:40 AM

## 2019-06-19 VITALS
WEIGHT: 160.05 LBS | TEMPERATURE: 98.8 F | SYSTOLIC BLOOD PRESSURE: 96 MMHG | OXYGEN SATURATION: 90 % | DIASTOLIC BLOOD PRESSURE: 65 MMHG | HEIGHT: 66 IN | BODY MASS INDEX: 25.72 KG/M2 | RESPIRATION RATE: 16 BRPM | HEART RATE: 82 BPM

## 2019-06-19 LAB
ANION GAP SERPL CALCULATED.3IONS-SCNC: 12 MMOL/L
BASOPHILS # BLD AUTO: 0.06 10*3/MM3 (ref 0–0.2)
BASOPHILS NFR BLD AUTO: 0.6 % (ref 0–1.5)
BUN BLD-MCNC: 16 MG/DL (ref 8–23)
BUN/CREAT SERPL: 16.3 (ref 7–25)
CALCIUM SPEC-SCNC: 8.7 MG/DL (ref 8.6–10.5)
CHLORIDE SERPL-SCNC: 97 MMOL/L (ref 98–107)
CO2 SERPL-SCNC: 28 MMOL/L (ref 22–29)
CREAT BLD-MCNC: 0.98 MG/DL (ref 0.76–1.27)
D-LACTATE SERPL-SCNC: 0.7 MMOL/L (ref 0.5–2)
DEPRECATED RDW RBC AUTO: 45.6 FL (ref 37–54)
EOSINOPHIL # BLD AUTO: 0.48 10*3/MM3 (ref 0–0.4)
EOSINOPHIL NFR BLD AUTO: 4.9 % (ref 0.3–6.2)
ERYTHROCYTE [DISTWIDTH] IN BLOOD BY AUTOMATED COUNT: 14.6 % (ref 12.3–15.4)
GFR SERPL CREATININE-BSD FRML MDRD: 74 ML/MIN/1.73
GLUCOSE BLD-MCNC: 95 MG/DL (ref 65–99)
GLUCOSE BLDC GLUCOMTR-MCNC: 113 MG/DL (ref 70–130)
GLUCOSE BLDC GLUCOMTR-MCNC: 96 MG/DL (ref 70–130)
HCT VFR BLD AUTO: 49.5 % (ref 37.5–51)
HGB BLD-MCNC: 15.7 G/DL (ref 13–17.7)
IMM GRANULOCYTES # BLD AUTO: 0.05 10*3/MM3 (ref 0–0.05)
IMM GRANULOCYTES NFR BLD AUTO: 0.5 % (ref 0–0.5)
LYMPHOCYTES # BLD AUTO: 2.51 10*3/MM3 (ref 0.7–3.1)
LYMPHOCYTES NFR BLD AUTO: 25.7 % (ref 19.6–45.3)
MCH RBC QN AUTO: 27.4 PG (ref 26.6–33)
MCHC RBC AUTO-ENTMCNC: 31.7 G/DL (ref 31.5–35.7)
MCV RBC AUTO: 86.4 FL (ref 79–97)
MONOCYTES # BLD AUTO: 1.1 10*3/MM3 (ref 0.1–0.9)
MONOCYTES NFR BLD AUTO: 11.3 % (ref 5–12)
NEUTROPHILS # BLD AUTO: 5.57 10*3/MM3 (ref 1.7–7)
NEUTROPHILS NFR BLD AUTO: 57 % (ref 42.7–76)
NRBC BLD AUTO-RTO: 0 /100 WBC (ref 0–0.2)
PLATELET # BLD AUTO: 240 10*3/MM3 (ref 140–450)
PMV BLD AUTO: 9.7 FL (ref 6–12)
POTASSIUM BLD-SCNC: 4.6 MMOL/L (ref 3.5–5.2)
RBC # BLD AUTO: 5.73 10*6/MM3 (ref 4.14–5.8)
SODIUM BLD-SCNC: 137 MMOL/L (ref 136–145)
WBC NRBC COR # BLD: 9.77 10*3/MM3 (ref 3.4–10.8)

## 2019-06-19 PROCEDURE — 97116 GAIT TRAINING THERAPY: CPT

## 2019-06-19 PROCEDURE — 94799 UNLISTED PULMONARY SVC/PX: CPT

## 2019-06-19 PROCEDURE — 99239 HOSP IP/OBS DSCHRG MGMT >30: CPT | Performed by: INTERNAL MEDICINE

## 2019-06-19 PROCEDURE — 92526 ORAL FUNCTION THERAPY: CPT

## 2019-06-19 PROCEDURE — 97535 SELF CARE MNGMENT TRAINING: CPT

## 2019-06-19 PROCEDURE — 97530 THERAPEUTIC ACTIVITIES: CPT

## 2019-06-19 PROCEDURE — 83605 ASSAY OF LACTIC ACID: CPT | Performed by: INTERNAL MEDICINE

## 2019-06-19 PROCEDURE — 99232 SBSQ HOSP IP/OBS MODERATE 35: CPT | Performed by: INTERNAL MEDICINE

## 2019-06-19 PROCEDURE — 85025 COMPLETE CBC W/AUTO DIFF WBC: CPT | Performed by: INTERNAL MEDICINE

## 2019-06-19 PROCEDURE — 80048 BASIC METABOLIC PNL TOTAL CA: CPT | Performed by: INTERNAL MEDICINE

## 2019-06-19 PROCEDURE — 82962 GLUCOSE BLOOD TEST: CPT

## 2019-06-19 RX ORDER — AMIODARONE HYDROCHLORIDE 200 MG/1
200 TABLET ORAL
Qty: 30 TABLET | Refills: 11 | Status: SHIPPED | OUTPATIENT
Start: 2019-06-20 | End: 2019-12-03

## 2019-06-19 RX ORDER — CARVEDILOL 3.12 MG/1
3.12 TABLET ORAL 2 TIMES DAILY WITH MEALS
Qty: 30 TABLET | Refills: 11 | Status: SHIPPED | OUTPATIENT
Start: 2019-06-19 | End: 2020-01-10 | Stop reason: SDUPTHER

## 2019-06-19 RX ADMIN — AMIODARONE HYDROCHLORIDE 200 MG: 200 TABLET ORAL at 10:44

## 2019-06-19 RX ADMIN — PANTOPRAZOLE SODIUM 40 MG: 40 INJECTION, POWDER, FOR SOLUTION INTRAVENOUS at 06:23

## 2019-06-19 RX ADMIN — SODIUM CHLORIDE, PRESERVATIVE FREE 3 ML: 5 INJECTION INTRAVENOUS at 08:47

## 2019-06-19 RX ADMIN — FUROSEMIDE 20 MG: 20 TABLET ORAL at 08:50

## 2019-06-19 RX ADMIN — CARVEDILOL 3.12 MG: 3.12 TABLET, FILM COATED ORAL at 09:38

## 2019-06-19 RX ADMIN — IPRATROPIUM BROMIDE 0.5 MG: 0.5 SOLUTION RESPIRATORY (INHALATION) at 13:27

## 2019-06-19 RX ADMIN — IPRATROPIUM BROMIDE 0.5 MG: 0.5 SOLUTION RESPIRATORY (INHALATION) at 07:07

## 2019-06-19 RX ADMIN — BUDESONIDE AND FORMOTEROL FUMARATE DIHYDRATE 2 PUFF: 80; 4.5 AEROSOL RESPIRATORY (INHALATION) at 07:08

## 2019-06-19 RX ADMIN — APIXABAN 5 MG: 5 TABLET, FILM COATED ORAL at 08:49

## 2019-06-19 NOTE — PLAN OF CARE
Problem: Patient Care Overview  Goal: Plan of Care Review  Outcome: Ongoing (interventions implemented as appropriate)   06/19/19 0535   Coping/Psychosocial   Plan of Care Reviewed With patient   Plan of Care Review   Progress improving   OTHER   Outcome Summary pt demos increased funtional independence with gait and navigating stairs. VSS throughout PT tx today while on room air. pt nearing PLoF with bed mobility, t/f's, gait, stair navigation, balance and functional endurance needed to D/C to home with home health.

## 2019-06-19 NOTE — PROGRESS NOTES
[facility] at [facility]  Progress Note       LOS: 11 days   Patient Care Team:  Lexx Monson MD as PCP - General (Family Medicine)    Chief Complaint: Acute systolic heart failure, cardiomyopathy, hypotension    Subjective : no acute events overnight. Reports breathing is much improved. BP also improved with -120 overnight/this AM.     Review of Systems:   Denies cough, fevers, chills, cp, palpitations. See HPI      Objective       Current Facility-Administered Medications:   •  acetaminophen (TYLENOL) tablet 650 mg, 650 mg, Oral, Q6H PRN, Aroldo Glez MD, 650 mg at 06/12/19 1104  •  albuterol (PROVENTIL) nebulizer solution 0.083% 2.5 mg/3mL, 0.625 mg, Nebulization, Q6H PRN, Glen Gutiérrez MD  •  amiodarone (PACERONE) tablet 200 mg, 200 mg, Oral, Q24H, Lucio Chapman MD, 200 mg at 06/18/19 0853  •  amitriptyline (ELAVIL) tablet 25 mg, 25 mg, Oral, Nightly, Gema Duong APRN, 25 mg at 06/18/19 2037  •  apixaban (ELIQUIS) tablet 5 mg, 5 mg, Oral, Q12H, Aroldo Glez MD, 5 mg at 06/18/19 2037  •  atorvastatin (LIPITOR) tablet 20 mg, 20 mg, Oral, Nightly, Glen Gutiérrez MD, 20 mg at 06/18/19 2037  •  budesonide-formoterol (SYMBICORT) 80-4.5 MCG/ACT inhaler 2 puff, 2 puff, Inhalation, BID - RT, Glen Gutiérrez MD, 2 puff at 06/19/19 0708  •  carvedilol (COREG) tablet 3.125 mg, 3.125 mg, Oral, BID With Meals, Yovanny Solares MD  •  diphenhydrAMINE-zinc acetate 2-0.1 % cream, , Topical, TID PRN, Gema Duong APRN  •  furosemide (LASIX) tablet 20 mg, 20 mg, Oral, Daily, Glen Gutiérrez MD, 20 mg at 06/18/19 0853  •  HYDROcodone-acetaminophen (NORCO) 5-325 MG per tablet 1 tablet, 1 tablet, Oral, Q4H PRN, Lucio Chapman MD, 1 tablet at 06/18/19 0024  •  insulin lispro (humaLOG) injection 0-14 Units, 0-14 Units, Subcutaneous, 4x Daily With Meals & Nightly, Florentin Leggett, [facility], 5 Units at 06/17/19 2118  •  ipratropium (ATROVENT)  "nebulizer solution 0.5 mg, 500 mcg, Nebulization, 4x Daily - RT, Glen Gutiérrez MD, 0.5 mg at 06/19/19 0707  •  magnesium sulfate 4g/100mL (PREMIX) infusion, 4 g, Intravenous, PRN, Stanislaw Torrez MD  •  metoprolol tartrate (LOPRESSOR) injection 5 mg, 5 mg, Intravenous, Q6H PRN, Vonnie Ruiz APRN, 5 mg at 06/14/19 1754  •  norepinephrine (LEVOPHED) 8 mg/250 mL (32 mcg/mL) in sodium chloride 0.9% infusion (premix), 0.02-0.3 mcg/kg/min, Intravenous, Titrated, Gema Duong APRN, Stopped at 06/18/19 1218  •  ondansetron (ZOFRAN) injection 4 mg, 4 mg, Intravenous, Q6H PRN, Lucio Chapman MD  •  pantoprazole (PROTONIX) injection 40 mg, 40 mg, Intravenous, Q AM, Stanislaw Torrez MD, 40 mg at 06/19/19 0623  •  sodium chloride 0.9 % flush 3 mL, 3 mL, Intravenous, Q12H, Gema Duong APRN, 3 mL at 06/15/19 1944  •  sodium chloride 0.9 % flush 3 mL, 3 mL, Intravenous, Q12H, Lucio Chapman MD  •  sodium chloride 0.9 % flush 3-10 mL, 3-10 mL, Intravenous, PRN, Gema Duong APRN  •  sodium chloride 0.9 % flush 3-10 mL, 3-10 mL, Intravenous, PRN, Lucio Chapman MD    Vital Sign Min/Max for last 24 hours  Temp  Min: 97.7 °F (36.5 °C)  Max: 98.3 °F (36.8 °C)   BP  Min: 75/62  Max: 157/94   Pulse  Min: 80  Max: 83   Resp  Min: 18  Max: 20   SpO2  Min: 90 %  Max: 97 %   Flow (L/min)  Min: 2  Max: 3   No Data Recorded     Flowsheet Rows      First Filed Value   Admission Height  167.6 cm (66\") Documented at 06/08/2019 0245   Admission Weight  77.9 kg (171 lb 11.8 oz) Documented at 06/08/2019 0245            Intake/Output Summary (Last 24 hours) at 6/19/2019 0754  Last data filed at 6/19/2019 0600  Gross per 24 hour   Intake 916.53 ml   Output 1225 ml   Net -308.47 ml       Physical Exam:     General Appearance:    Alert, cooperative, in no acute distress   Lungs:     Diminished BS throughout. Non labored    Heart:    RRR normal S1 and S2, no murmur, no gallop, no rub, no click   Chest Wall:    No " abnormalities observed   Abdomen:     Normal bowel sounds, soft nontender   Extremities:   Moves all extremities well, no edema, no cyanosis, no             redness   Pulses:   Pulses palpable and equal bilaterally   Skin:   No bleeding, bruising or rash. PPM site- dressing c/d/i. Small amt of soft tissue swelling. No hematoma.        Results Review:   Results from last 7 days   Lab Units 06/19/19  0439 06/17/19  0333 06/16/19  0433   WBC 10*3/mm3 9.77 9.57 10.27   HEMOGLOBIN g/dL 15.7 16.0 15.9   HEMATOCRIT % 49.5 50.4 50.1   PLATELETS 10*3/mm3 240 265 252     Results from last 7 days   Lab Units 06/19/19  0439 06/17/19  0333 06/16/19  0433   SODIUM mmol/L 137 137 137   POTASSIUM mmol/L 4.6 4.3 4.3   CHLORIDE mmol/L 97* 96* 100   CO2 mmol/L 28.0 29.0 28.0   BUN mg/dL 16 18 19   CREATININE mg/dL 0.98 1.04 1.06   GLUCOSE mg/dL 95 91 123*              Results from last 7 days   Lab Units 06/12/19  1349   TSH mIU/mL 3.880   FREE T4 ng/dL 1.33                   Intake/Output Summary (Last 24 hours) at 6/19/2019 0754  Last data filed at 6/19/2019 0600  Gross per 24 hour   Intake 916.53 ml   Output 1225 ml   Net -308.47 ml       I personally viewed and interpreted the patient's EKG/Telemetry data    EKG: none for review today    Telemetry: V paced    Ejection Fraction  No results found for: EF    Echo EF Estimated  No results found for: ECHOEFEST      Present on Admission:  • Cellulitis of Right Lower Leg and Foot  • A Fib w/RVR  • COPD (chronic obstructive pulmonary disease) (CMS/HCC)  • Laryngeal mass    Assessment/Plan   Initial cardiac assessment:  Mr. Jones is a 75 y/o male with pmhx of hypertension and COPD who was transferred from [facility] for Afib with RVR and cellulitis of right lower calf/foot.     Recommendations:  1. Afib with RVR  - newly diagnosed, incidentally found  - CHADSVASC = 3 (age, HTN), starting Eliquis today  - Echo: EF mid 30s, moderate RV enlargement and RV dysfunction.  - S/p DENA and  "successful ECV, 6/10.   - Rates were difficult to control on CCB, BB, amiodarone.   - s/p AVN RFA and BiV PPM implant, 6/17.      2. Acute systolic heart failure  - TTE shows ejection fraction of mid 30s, moderate RV enlargement and RV dysfunction.  - could be tachycardia induced. Will need repeat echo once maintaining NSR, may also need ischemic evaluation.  - DENA: EF 30-35%, Moderate to severe MR, moderate TR, RVSP 35-45mmHg.     3. Hypertension/Hypotension  - remains labile but stable overnight on lower dose of Coreg and discontinuation of Entresto     4. Syncope  - reportedly had syncopal episode seems to have been triggered by prolonged coughing.     5. Lower extremity cellulitis  - per admitting     6. COPD  -  Per admitting    7. 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      Plan:  - Continue lasix.   - Entresto discontinued s/t hypotension at times.   - Resumed Eliquis  - Per EP continue amiodarone and bring back for ECV in 6-8 weeks. Will continue amio taper (reduced to 200 mg daily)  - Discontinued metoprolol and Cardizem. Started Coreg per EP and reduced to 3.125 mg bid.   - Will repeat echo once in NSR. No lifevest for now since likely tachycardia induced.     Dispo: plan is to d/c home today. He will need f/u with Dr. Solares in 6 weeks, EP in 12 weeks and for wound check in 7-10 days from implant. To go home on coreg 3.125mg bid, NOAC and amiodarone 200 mg daily.       Electronically signed by MAYI Borges, 06/19/19, 7:54 AM.    ___________________________________________________________  The patient was seen and examined by me.  Agree and verified/discussed key components of E/M as outlined by the Nurse practitioner/PA.    BP 99/70   Pulse 80   Temp 98.8 °F (37.1 °C) (Oral)   Resp 18   Ht 167.6 cm (66\")   Wt 72.6 kg (160 lb 0.9 oz)   SpO2 95%   BMI 25.83 kg/m²     General Appearance:   · well developed  · well nourished  Neck:  · thyroid not " enlarged  · supple  Respiratory:  · no respiratory distress  · normal breath sounds  · no rales  Cardiovascular:  · no jugular venous distention  · regular rhythm  · apical impulse normal  · S1 normal, S2 normal  · no S3, no S4   · no murmur  · no rub, no thrill  · carotid pulses normal; no bruit  · pedal pulses normal  · lower extremity edema: none  .   Skin:   · warm, dry        Plan:    Stable overnight, off Levophed  Patient denies chest pain or shortness of breath  Remains BiV paced  Continue current cardiac medications including apixaban, low-dose carvedilol, and amiodarone.  Hypotension improved, no pressor requirements in the last 24 hours.  Will need follow-up echocardiogram in 4 to 6 weeks.  To assess cardiomyopathy.  Okay for discharge home, follow-up with me in 4 to 6 weeks.    Follow-up with EP as scheduled, wound check in 1 week.    Yovanny Solares MD, [facility]

## 2019-06-19 NOTE — THERAPY TREATMENT NOTE
Acute Care - Speech Language Pathology   Swallow Progress Note [facility]     Patient Name: Stef Jones  : 1945  MRN: [id number]  Today's Date: 2019  Onset of Illness/Injury or Date of Surgery: 19     Referring Physician: MD Shan      Admit Date: 2019    Visit Dx:      ICD-10-CM ICD-9-CM   1. A Fib w/RVR I48.91 427.31   2. Impaired functional mobility, balance, gait, and endurance Z74.09 V49.89   3. 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 Atrial fibrillation with RVR (CMS/HCC) I48.91 427.31     Patient Active Problem List   Diagnosis   • Cellulitis of Right Lower Leg and Foot   • A Fib w/RVR   • COPD (chronic obstructive pulmonary disease) (CMS/HCC)   • Dysphagia   • Laryngeal mass       Therapy Treatment  Rehabilitation Treatment Summary     Row Name 19 1135 19 1045 19 0912       Treatment Time/Intention    Discipline  physical therapist  (Pended)   -  speech language pathologist  -AV  occupational therapist  -CL,CH,CL2    Document Type  therapy note (daily note)  (Pended)   -  therapy note (daily note)  -  therapy note (daily note)  -CL,CH,CL2    Subjective Information  no complaints  (Pended)   -  no complaints  -  no complaints  -CL,CH,CL2    Mode of Treatment  physical therapy  (Pended)   -  speech-language pathology  -AV  occupational therapy  -CL,CH,CL2    Patient/Family Observations  son present  (Pended)   -  no family present   -AV  --    Care Plan Review  evaluation/treatment results reviewed;care plan/treatment goals reviewed;risks/benefits reviewed;patient/other agree to care plan  (Pended)   -  care plan/treatment goals reviewed  -AV  --    Therapy Frequency (PT Clinical Impression)  daily  (Pended)   -  --  --    Patient Effort  excellent  (Pended)   -  adequate  -AV  excellent  -CL,CH,CL2    Existing Precautions/Restrictions  fall;cardiac;pacemaker  (Pended)  BP & HR  -  --   fall;cardiac;pacemaker Monitor BP and HR  -CL,CH,CL2    Patient Response to Treatment  no adverse effects  (Pended)   -KL  --  Tolerated.   -CL,CH,CL2    Recorded by [KL] Antonio Torres, PT Student 06/19/19 1400 [AV] Lisha Ji, MS CCC-SLP 06/19/19 1441 [CL,CH,CL2] Harper Nelson, MELBA (r) Dinesh Gomez OT Student (t) Harper Nelson OT (c) 06/19/19 1317    Row Name 06/19/19 1135 06/19/19 0912          Vital Signs    Pre Systolic BP Rehab  97  (Pended)   -  100  -CL,CH,CL2     Pre Treatment Diastolic BP  60  (Pended)   -KL  85  -CL,CH,CL2     Post Systolic BP Rehab  98  (Pended)   -KL  101  -CL,CH,CL2     Post Treatment Diastolic BP  59  (Pended)   -KL  61  -CL,CH,CL2     Pretreatment Heart Rate (beats/min)  80  (Pended)   -KL  80  -CL,CH,CL2     Intratreatment Heart Rate (beats/min)  105  (Pended)   -KL  --     Posttreatment Heart Rate (beats/min)  85  (Pended)   -KL  80  -CL,CH,CL2     Pre SpO2 (%)  90  (Pended)   -KL  93  -CL,CH,CL2     O2 Delivery Pre Treatment  room air  (Pended)   -  nasal cannula  -CL,CH,CL2     Intra SpO2 (%)  88  (Pended)   -  88  -CL,CH,CL2     O2 Delivery Intra Treatment  room air  (Pended)   -  nasal cannula  -CL,CH,CL2     Post SpO2 (%)  91  (Pended)   -KL  94  -CL,CH,CL2     O2 Delivery Post Treatment  room air  (Pended)   -  nasal cannula  -CL,CH,CL2     Pre Patient Position  Sitting  (Pended)   -KL  Sitting  -CL,CH,CL2     Intra Patient Position  Standing  (Pended)   -KL  Standing  -CL,CH,CL2     Post Patient Position  Sitting  (Pended)   -KL  Sitting  -CL,CH,CL2     Recorded by [KL] Antonio Torres, PT Student 06/19/19 1400 [CL,CH,CL2] Harper Nelson, MELBA (r) Dinesh Gomez, OT Student (t) Harper Nelson OT (c) 06/19/19 1317     Row Name 06/19/19 1135 06/19/19 0912          Cognitive Assessment/Intervention    Additional Documentation  Cognitive Assessment/Intervention (Group)  (Pended)   -KL  Cognitive Assessment/Intervention (Group)  -CL,CH,CL2      Recorded by [KL] Antonio Torres P, PT Student 06/19/19 1400 [CL,CH,CL2] Harper Nelson OT (r) Dinesh Gomez OT Student (t) Harper Nelson OT (c) 06/19/19 1317     Row Name 06/19/19 1135 06/19/19 0912          Cognitive Assessment/Intervention- PT/OT    Affect/Mental Status (Cognitive)  WFL  (Pended)   -  WFL  -CL,CH,CL2     Orientation Status (Cognition)  oriented x 4  (Pended)   -  oriented x 4  -CL,CH,CL2     Follows Commands (Cognition)  follows one step commands;over 90% accuracy  (Pended)   -  over 90% accuracy  -CL,CH,CL2     Cognitive Function (Cognitive)  safety deficit  (Pended)   -  safety deficit  -CL,CH,CL2     Safety Deficit (Cognitive)  moderate deficit;safety precautions awareness;safety precautions follow-through/compliance  (Pended)   -  awareness of need for assistance;safety precautions awareness;insight into deficits/self awareness  -CL,CH,CL2     Personal Safety Interventions  fall prevention program maintained;gait belt;nonskid shoes/slippers when out of bed  (Pended)   -  fall prevention program maintained;gait belt;nonskid shoes/slippers when out of bed  -CL,CH,CL2     Recorded by [KL] Antonio Torres P, PT Student 06/19/19 1400 [CL,CH,CL2] Harper Nelson OT (r) Dinesh Gomez, OT Student (t) Harper Nelson OT (c) 06/19/19 1317     Row Name 06/19/19 1135 06/19/19 0912          Safety Issues, Functional Mobility    Safety Issues Affecting Function (Mobility)  ability to follow commands;safety precaution awareness;safety precautions follow-through/compliance  (Pended)   -  awareness of need for assistance;insight into deficits/self awareness;safety precaution awareness  -CL,CH,CL2     Impairments Affecting Function (Mobility)  balance;endurance/activity tolerance;coordination  (Pended)   -  endurance/activity tolerance;strength;shortness of breath  -CL,CH,CL2     Comment, Safety Issues/Impairments (Mobility)  pt req VC's to navigate steps with step to step pattern.   (Pended)   -KL  --     Recorded by [KL] Antonio Torres P, PT Student 06/19/19 1400 [CL,CH,CL2] Harper Nelson OT (r) Dinesh Gomez OT Student (t) Harper Nelson OT (c) 06/19/19 1317     Row Name 06/19/19 1135 06/19/19 0912          Bed Mobility Assessment/Treatment    Comment (Bed Mobility)  UIC  (Pended)   -  Pt received UIC.  -CL,CH,CL2     Recorded by [KL] Antonio Torres P, PT Student 06/19/19 1400 [CL,CH,CL2] Harper Nelson OT (r) Dinesh Gomez, OT Student (t) Harper Nelson OT (c) 06/19/19 1317     Row Name 06/19/19 0912             Functional Mobility    Functional Mobility- Ind. Level  contact guard assist  -CL,CH,CL2      Functional Mobility- Device  rolling walker  -CL,CH,CL2      Functional Mobility-Distance (Feet)  300  -CL,CH,CL2      Functional Mobility- Comment  Pt required 1 rest break during ambulation with desat to 88%. Pt educated on pursed lip breathing to resolve SOA.   -CL,CH,CL2      Recorded by [CL,CH,CL2] Harper Nelson OT (r) Dinesh Gomez, OT Student (t) Harper Nelson OT (c) 06/19/19 1317      Row Name 06/19/19 1135 06/19/19 0912          Transfer Assessment/Treatment    Transfer Assessment/Treatment  sit-stand transfer;stand-sit transfer  (Pended)   -  sit-stand transfer;stand-sit transfer;toilet transfer  -CL,CH,CL2     Comment (Transfers)  VCs to maintain pacemaker precautions.  (Pended)   -KL  VCs to maintain pacemaker precautions.   -CL,CH,CL2     Recorded by [KL] Antonio Torres, PT Student 06/19/19 1400 [CL,CH,CL2] Harper Nelson OT (r) Dinesh Gomez OT Student (t) Harper Nelson OT (c) 06/19/19 1317     Row Name 06/19/19 1135 06/19/19 0912          Sit-Stand Transfer    Sit-Stand [place] (Transfers)  contact guard;verbal cues  (Pended)   -GEETA  contact guard;verbal cues  -CL,CH,CL2     Assistive Device (Sit-Stand Transfers)  walker, front-wheeled  (Pended)   -GEETA  walker, front-wheeled  -CL,CH,CL2     Recorded by [GEETA] Antonio Torres, PT Student  06/19/19 1400 [CL,CH,CL2] Harper Nelson, OT (r) Dinesh Gomez, OT Student (t) Harper Nelson OT (c) 06/19/19 1317     Row Name 06/19/19 1135 06/19/19 0912          Stand-Sit Transfer    Stand-Sit [place] (Transfers)  contact guard;verbal cues  (Pended)   -KL  contact guard;verbal cues  -CL,CH,CL2     Assistive Device (Stand-Sit Transfers)  walker, front-wheeled  (Pended)   -KL  walker, front-wheeled  -CL,CH,CL2     Recorded by [KL] Antonio Torres, PT Student 06/19/19 1400 [CL,CH,CL2] Harper Nelson OT (r) Dinesh Gomez, OT Student (t) Harper Nelson OT (c) 06/19/19 1317     Row Name 06/19/19 0912             Toilet Transfer    Type (Toilet Transfer)  stand-sit;sit-stand  -CL,CH,CL2      [place] Level (Toilet Transfer)  contact guard;verbal cues  -CL,CH,CL2      Assistive Device (Toilet Transfer)  raised toilet seat;walker, front-wheeled;grab bars/safety frame  -CL,CH,CL2      Recorded by [CL,CH,CL2] Harper Nelson, OT (r) Dinesh Gomez, OT Student (t) Harper Nelson OT (c) 06/19/19 1317      Row Name 06/19/19 1135             Gait/Stairs Assessment/Training    44574 - Gait Training Minutes   11  (Pended)   -KL      Gait/Stairs Assessment/Training  gait/ambulation independence;gait/ambulation assistive device  (Pended)   -KL      [place] Level (Gait)  contact guard;verbal cues  (Pended)   -KL      Assistive Device (Gait)  walker, front-wheeled  (Pended)   -KL      Distance in Feet (Gait)  350  (Pended)   -KL      Pattern (Gait)  step-through  (Pended)   -KL      Deviations/Abnormal Patterns (Gait)  stride length decreased;gait speed decreased  (Pended)   -KL      Bilateral Gait Deviations  forward flexed posture  (Pended)   -KL      Comment (Gait/Stairs)  pt req vc's to lengthen stride and to take rest breaks when appropriate for safety  (Pended)   -KL      Recorded by [KL] Antonio Torres, PT Student 06/19/19 1400      Row Name 06/19/19 7526             ADL Assessment/Intervention     12818 - OT Self Care/Mgmt Minutes  15  -CL,CH,CL2      BADL Assessment/Intervention  lower body dressing;grooming;toileting  -CL,CH,CL2      Recorded by [CL,CH,CL2] Harper Nelson OT (r) Dinesh Gomez, OT Student (t) Harper Nelson OT (c) 06/19/19 1317      Row Name 06/19/19 0912             Lower Body Dressing Assessment/Training    Lower Body Dressing [place] Level  don;doff;socks;contact guard assist  -CL,CH,CL2      Lower Body Dressing Position  unsupported sitting  -CL,CH,CL2      Comment (Lower Body Dressing)  Pt educated on cross leg technique and EC principles to prevent SOA.  -CL,CH,CL2      Recorded by [CL,CH,CL2] Harper Nelson OT (r) Dinesh Gomez, OT Student (t) Harper Nelson OT (c) 06/19/19 1317      Row Name 06/19/19 0912             Grooming Assessment/Training    [place] Level (Grooming)  wash face, hands;oral care regimen;hair care, combing/brushing;supervision;verbal cues  -CL,CH,CL2      Grooming Position  sink side  -CL,CH,CL2      Recorded by [CL,CH,CL2] Harper Nelson OT (r) Dinesh Gomez, OT Student (t) Harper eNlson OT (c) 06/19/19 1317      Row Name 06/19/19 0912             Toileting Assessment/Training    [place] Level (Toileting)  adjust/manage clothing;perform perineal hygiene;supervision;verbal cues  -CL,CH,CL2      Assistive Devices (Toileting)  grab bar/safety frame;raised toilet seat  -CL,CH,CL2      Toileting Position  supported sitting  -CL,CH,CL2      Comment (Toileting)  VCs to maintain pacemaker precautions.   -CL,CH,CL2      Recorded by [CL,CH,CL2] Harper Nelson OT (r) Dinesh Gomez, OT Student (t) Harper Nelson OT (c) 06/19/19 1317      Row Name 06/19/19 0912             BADL Safety/Performance    Impairments, BADL Safety/Performance  endurance/activity tolerance;strength  -CL,CH,CL2      Skilled BADL Treatment/Intervention  BADL process/adaptation training  -CL,CH,CL2      Progress in BADL Status  improvement noted   -CL,CH,CL2      Recorded by [CL,CH,CL2] Harper Nelson OT (r) Dinesh Gomez, OT Student (t) Harper Nelson OT (c) 06/19/19 1317      Row Name 06/19/19 1135             Motor Skills Assessment/Interventions    Additional Documentation  Functional Endurance Training (Group)  (Pended)   -KL      Recorded by [KL] Antonio Torres, PT Student 06/19/19 1403      Row Name 06/19/19 1135 06/19/19 0912          Therapeutic Exercise    00346 - PT Therapeutic Activity Minutes  12  (Pended)   -KL  --     63155 - OT Therapeutic Activity Minutes  --  9  -CL,CH,CL2     Recorded by [KL] Antonio Torres, PT Student 06/19/19 1400 [CL,CH,CL2] Harper Nelson OT (r) Dinesh Gomez, OT Student (t) Harper Nelson OT (c) 06/19/19 1317     Row Name 06/19/19 1135 06/19/19 0912          Balance    Balance  static sitting balance;static standing balance  (Pended)   -KL  static sitting balance;static standing balance;dynamic sitting balance  -CL,CH,CL2     Recorded by [KL] Antonio Torres, PT Student 06/19/19 1403 [CL,CH,CL2] Harper Nelson OT (r) Dinesh Gomez, OT Student (t) Harper Nelson OT (c) 06/19/19 1317     Row Name 06/19/19 1135 06/19/19 0912          Static Sitting Balance    Level of [place] (Unsupported Sitting, Static Balance)  independent  (Pended)   -KL  supervision  -CL,CH,CL2     Sitting Position (Unsupported Sitting, Static Balance)  sitting in chair  (Pended)   -KL  sitting in chair  -CL,CH,CL2     Time Able to Maintain Position (Unsupported Sitting, Static Balance)  --  3 to 4 minutes  -CL,CH,CL2     Recorded by [KL] Antonio Torres, PT Student 06/19/19 1403 [CL,CH,CL2] Harper Nelson OT (r) Dinesh Gomez OT Student (t) Harper Nelson OT (c) 06/19/19 1317     Row Name 06/19/19 0912             Dynamic Sitting Balance    Level of [place], Reaches Outside Midline (Sitting, Dynamic Balance)  contact guard assist  -CL,CH,CL2      Sitting Position, Reaches Outside Midline (Sitting, Dynamic  Balance)  sitting in chair Commode  -CL,CH,CL2      Recorded by [CL,CH,CL2] Harper Nelson, MELBA (r) Dinesh Gomez OT Student (t) Harper Nelson OT (c) 06/19/19 1317      Row Name 06/19/19 1135 06/19/19 0912          Static Standing Balance    Level of [place] (Supported Standing, Static Balance)  supervision  (Pended)   -GEETA  standby assist  -CL,CH,CL2     Time Able to Maintain Position (Supported Standing, Static Balance)  --  4 to 5 minutes  -CL,CH,CL2     Assistive Device Utilized (Supported Standing, Static Balance)  walker, rolling  (Pended)   -GEETA  walker, rolling  -CL,CH,CL2     Recorded by [GEETA] Antonio Torres, PT Student 06/19/19 1403 [CL,CH,CL2] Harper Nelson OT (r) Dinesh Gomez, OT Student (t) Harper Nelson OT (c) 06/19/19 1317     Row Name 06/19/19 1135             Functional Endurance Training    Comment, Functional Endurance  1 flight of stairs, ascending/descending c use of hand rail on R UE  (Pended)   -      Recorded by [KL] Antonio Torres, PT Student 06/19/19 1403      Row Name 06/19/19 1135 06/19/19 0912          Positioning and Restraints    Pre-Treatment Position  sitting in chair/recliner  (Pended)   -  sitting in chair/recliner  -CL,CH,CL2     Post Treatment Position  chair  (Pended)   -  chair  -CL,CH,CL2     In Chair  notified nsg;reclined;sitting;call light within reach;encouraged to call for assist;with family/caregiver;RUE elevated;LUE elevated;legs elevated  (Pended)   -  notified nsg;reclined;call light within reach;encouraged to call for assist;exit alarm on;waffle cushion  -CL,CH,CL2     Recorded by [GEETA] Antonio Torres, PT Student 06/19/19 1403 [CL,CH,CL2] Harper Nelson, MELBA (r) Dinesh Gomez OT Student (t) Harper Nelson OT (c) 06/19/19 1317     Row Name 06/19/19 1135 06/19/19 1047          Pain Assessment    Additional Documentation  Pain Scale: Numbers Pre/Post-Treatment (Group)  (Pended)   -  Pain Scale: FACES Pre/Post-Treatment (Group)  -AV      Recorded by [KL] Antonio Torres, PT Student 06/19/19 1403 [AV] Lisha Ji, MS CCC-SLP 06/19/19 1441     Row Name 06/19/19 1135 06/19/19 0912          Pain Scale: Numbers Pre/Post-Treatment    Pain Scale: Numbers, Pretreatment  0/10 - no pain  (Pended)   -KL  0/10 - no pain  -CL,CH,CL2     Pain Scale: Numbers, Post-Treatment  0/10 - no pain  (Pended)   -KL  0/10 - no pain  -CL,CH,CL2     Recorded by [KL] Antonio Torres, PT Student 06/19/19 1403 [CL,CH,CL2] Harper Nelson, OT (r) Dinesh Gomez, OT Student (t) Harper Nelson, MELBA (c) 06/19/19 1317     Row Name 06/19/19 1045             Pain Scale: FACES Pre/Post-Treatment    Pain: FACES Scale, Pretreatment  0-->no hurt  -AV      Pain: FACES Scale, Post-Treatment  0-->no hurt  -AV      Recorded by [AV] Lisha Ji, MS CCC-SLP 06/19/19 1441      Row Name                Wound 06/17/19 1410 Left upper;lateral chest incision;surgical    Wound - Properties Group Date first assessed: 06/17/19 [BM] Time first assessed: 1410 [BM] Side: Left [BM] Orientation: upper;lateral [BM] Location: chest [BM] Type: incision;surgical [BM] Recorded by:  [BM] Bozena Villanueva RN 06/17/19 1410    Row Name 06/19/19 1135             Coping    Observed Emotional State  accepting;calm;cooperative  (Pended)   -      Verbalized Emotional State  acceptance  (Pended)   -      Recorded by [KL] Antonio Torres, PT Student 06/19/19 1403      Row Name 06/19/19 1135 06/19/19 0912          Plan of Care Review    Plan of Care Reviewed With  patient  (Pended)   -GEETA  patient  -CL,CH,CL2     Recorded by [KL] Antonio Torres, PT Student 06/19/19 1403 [CL,CH,CL2] Harper Nelson, MELBA (r) Dinesh Gomez, OT Student (t) Harper Nelson, OT (c) 06/19/19 1317     Row Name 06/19/19 0912             Outcome Summary/Treatment Plan (OT)    Daily Summary of Progress (OT)  progress toward functional goals is good  -CL,CH,CL2      Anticipated Discharge Disposition (OT)  home with  assist;home with home health  -CL,CH,CL2      Recorded by [CL,CH,CL2] Harper Nelson, OT (r) Dinesh Gomez, OT Student (t) Harper Nelson, OT (c) 06/19/19 1317      Row Name 06/19/19 1135             Outcome Summary/Treatment Plan (PT)    Daily Summary of Progress (PT)  progress toward functional goals is good  (Pended)   -KL      Recorded by [KL] Antonio Torres, PT Student 06/19/19 1403      Row Name 06/19/19 1045             Outcome Summary/Treatment Plan (SLP)    Daily Summary of Progress (SLP)  progress toward functional goals is gradual  -AV      Plan for Continued Treatment (SLP)  patient given trials of thins and nectar at bedside. Still demonstrating s/s with trials. Discussed with patient and  about home health vs. outpatient SLP serices in [facility] with follow up MBS at [facility] for further assessment of pharyngeal swallowing.   -AV      Anticipated Dischage Disposition  unknown;anticipate therapy at next level of care  -AV      Recorded by [AV] Lisha Ji, MS CCC-SLP 06/19/19 1441        User Key  (r) = Recorded By, (t) = Taken By, (c) = Cosigned By    Initials Name Effective Dates Discipline    BM Bozena Villanueva RN 06/16/16 -  Nurse    AV Lisha Ji, MS CCC-SLP 05/23/19 -  SLP    Harper Rodriges, OT 04/03/18 -  OT    Dinesh Carmona, OT Student 03/13/19 -  OT    Antonio Rodríguez, PT Student 05/15/19 -  PT          Outcome Summary  Outcome Summary/Treatment Plan (SLP)  Daily Summary of Progress (SLP): progress toward functional goals is gradual (06/19/19 1045 : Lisha Ji, MS CCC-SLP)  Plan for Continued Treatment (SLP): patient given trials of thins and nectar at bedside. Still demonstrating s/s with trials. Discussed with patient and  about home health vs. outpatient SLP serices in [facility] with follow up MBS at [facility] for further assessment of pharyngeal swallowing.  (06/19/19 1045 :  Lisha Ji, MS CCC-SLP)  Anticipated Dischage Disposition: unknown, anticipate therapy at next level of care (06/19/19 1045 : Lisha Ji, MS CCC-SLP)      SLP GOALS     Row Name 06/19/19 1045             Oral Nutrition/Hydration Goal 2 (SLP)    Oral Nutrition/Hydration Goal 2, SLP  Pt will tolerate puree, some mashed and HTL w/ no overt s/sxs aspiration or distress w/ 100% acc and no cues  -AV      Time Frame (Oral Nutrition/Hydration Goal 2, SLP)  short term goal (STG);by discharge  -AV      Barriers (Oral Nutrition/Hydration Goal 2, SLP)  Tolerating HTL w/ no s/sxs  -AV      Progress/Outcomes (Oral Nutrition/Hydration Goal 2, SLP)  continuing progress toward goal  -AV         Oral Nutrition/Hydration Goal (SLP)    Oral Nutrition/Hydration Goal, SLP  Pt will tolerate therapeutic trials of thin H2O w/ no overt s/sxs aspiration or distress w/ 60% acc and no cues in order to determine readiness for repeat instrumental eval  -AV      Time Frame (Oral Nutrition/Hydration Goal, SLP)  short term goal (STG);by discharge  -AV      Progress/Outcomes (Oral Nutrition/Hydration Goal, SLP)  continuing progress toward goal  -AV         Pharyngeal Strengthening Exercise Goal 1 (SLP)    Activity (Pharyngeal Strengthening Goal 1, SLP)  increase timing;increase closure at entrance to airway/closure of airway at glottis  -AV      Increase Timing  prepping - 3 second prep or suck swallow or 3-step swallow  -AV      Increase Closure at Entrance to Airway/Closure of Airway at Glottis  super-supraglottic swallow;hard effortful swallow  -AV      [place]/Accuracy (Pharyngeal Strengthening Goal 1, SLP)  with minimal cues (75-90% accuracy)  -AV      Time Frame (Pharyngeal Strengthening Goal 1, SLP)  short term goal (STG);by discharge  -AV      Barriers (Pharyngeal Strengthening Goal 1, SLP)  Reviewed & completed w/ pt. Provided instruction on at-home thickener & encouraged independent practice with pt & family   -AV      Progress/Outcomes (Pharyngeal Strengthening Goal 1, SLP)  good progress toward goal  -AV        User Key  (r) = Recorded By, (t) = Taken By, (c) = Cosigned By    Initials Name Provider Type    Lisha Dye MS CCC-SLP Speech and Language Pathologist          EDUCATION  The patient has been educated in the following areas:   Dysphagia (Swallowing Impairment) Oral Care/Hydration.    SLP Recommendation and Plan  Daily Summary of Progress (SLP): progress toward functional goals is gradual     Plan for Continued Treatment (SLP): patient given trials of thins and nectar at bedside. Still demonstrating s/s with trials. Discussed with patient and  about home health vs. outpatient SLP serices in [facility] with follow up MBS at [facility] for further assessment of pharyngeal swallowing.   Anticipated Dischage Disposition: unknown, anticipate therapy at next level of care                    Time Calculation:   Time Calculation- SLP     Row Name 06/19/19 1441             Time Calculation- SLP    SLP Start Time  1045  -AV      SLP Received On  06/19/19  -AV        User Key  (r) = Recorded By, (t) = Taken By, (c) = Cosigned By    Initials Name Provider Type    Lisha Dye MS CCC-SLP Speech and Language Pathologist          Therapy Charges for Today     Code Description Service Date Service Provider Modifiers Qty    [id number] HC ST TREATMENT SWALLOW 3 6/19/2019 Lisha Ji MS CCC-SLP GN 1                 Lisha Hubbard MS CCC-BRENNON  6/19/2019

## 2019-06-19 NOTE — PROGRESS NOTES
INTENSIVIST   PROGRESS NOTE     Hospital:  LOS: 11 days      S     Mr. Stef Jones, 74 y.o. male is followed for:      A Fib w/RVR    Cellulitis of Right Lower Leg and Foot    COPD (chronic obstructive pulmonary disease) (CMS/[facility])    Laryngeal mass    As an Intensivist, we provide an integrated approach to the ICU patient and family, medical management of comorbid conditions, lead interdisciplinary rounds and coordinate the care with all other services, including those from other specialists.     Interval History:  Off pressors since noon yesterday.  Anxious to go home.  No new problems.     The patient's relevant past medical, surgical and social history were reviewed and updated in Epic as appropriate.     ROS:   Constitutional: Negative for fever.   Respiratory: No Dyspnea at present.   Cardiovascular: Negative for chest pain.   Gastrointestinal: Negative for  nausea, vomiting and diarrhea.        O     Vitals:  Temp: 98.8 °F (37.1 °C) (06/19/19 1205) Temp  Min: 97.7 °F (36.5 °C)  Max: 98.8 °F (37.1 °C)   BP: 94/59 (06/19/19 1230) BP  Min: 67/41  Max: 157/94   Pulse: 80 (06/19/19 1230) Pulse  Min: 80  Max: 83   Resp: 18 (06/19/19 1205) Resp  Min: 16  Max: 20   SpO2: 91 % (06/19/19 1230) SpO2  Min: 87 %  Max: 97 %   Device: room air (06/19/19 1205)    Flow Rate: 3 (06/19/19 0800) Flow (L/min)  Min: 2  Max: 3     Intake/Ouptut 24 hrs (7:00AM - 6:59 AM)  Intake/Output       06/18/19 0700 - 06/19/19 0659 06/19/19 0700 - 06/20/19 0659    Intake (ml) 916.5 360    Output (ml) 1225 --    Net (ml) -308.5 360           Medications (drips):    norepinephrine Last Rate: Stopped (06/18/19 1218)       Physical Examination  Telemetry:  Rhythm: paced rhythm (06/19/19 1000)  Atrial Rhythm: atrial fibrillation (06/17/19 1200)      Constitutional:  No acute distress.   Cardiovascular: RRR. Normal heart sounds.  No murmurs, gallop or rub.   Respiratory: No respiratory distress.  Normal breath sounds  No adventitious sounds     Abdominal:  Soft with no tenderness  No distension. No HSM.   Extremities: Warm with no cyanosis.  No edema.   Neurological:   Alert and Oriented to person, place, and time.  Best Eye Response: 4-->(E4) spontaneous (06/19/19 1000)  Best Motor Response: 6-->(M6) obeys commands (06/19/19 1000)  Best Verbal Response: 5-->(V5) oriented (06/19/19 1000)  [place] Coma Scale Score: 15 (06/19/19 1000)   Lines/Drains/Airways: Peripheral IV(s)     Hematology:  Results from last 7 days   Lab Units 06/19/19  0439 06/17/19  0333 06/16/19 0433   WBC 10*3/mm3 9.77 9.57 10.27   HEMOGLOBIN g/dL 15.7 16.0 15.9   MCV fL 86.4 86.3 86.7   PLATELETS 10*3/mm3 240 265 252       Chemistry:  Estimated Creatinine Clearance: 65.9 mL/min (by C-G formula based on SCr of 0.98 mg/dL).    Results from last 7 days   Lab Units 06/19/19  0439 06/17/19  0333 06/16/19 0433   SODIUM mmol/L 137 137 137   POTASSIUM mmol/L 4.6 4.3 4.3   CHLORIDE mmol/L 97* 96* 100   CO2 mmol/L 28.0 29.0 28.0   ANION GAP mmol/L 12.0 12.0 9.0   GLUCOSE mg/dL 95 91 123*   CALCIUM mg/dL 8.7 8.9 9.2     Results from last 7 days   Lab Units 06/19/19 0439 06/17/19 0333 06/16/19 0433   BUN mg/dL 16 18 19   CREATININE mg/dL 0.98 1.04 1.06     Images:  Xr Chest Pa & Lateral    Result Date: 6/18/2019  Interval implantation left chest wall pacing device with leads intact. No postprocedural pneumothorax.  D:  06/18/2019 E:  06/18/2019  This report was finalized on 6/18/2019 3:54 PM by Dr. Gary Deleon.        Echo:  Results for orders placed during the hospital encounter of 06/08/19   Adult Transesophageal Echo (DENA) W/ Cont if Necessary Per Protocol    Narrative · Left ventricular systolic function is severely decreased. Estimated EF   appears to be in the range of 31 - 35%. Global hypokinesis noted.  · Left atrial cavity size is moderately dilated. The left atrial appendage   was visualized through multiple planes. Left atrial appendage was found to   be singularly lobar in  nature. Doppler interrogation shows mildly reduced   flow within the left atrial appendage. No left atrial appendage thrombus   present.  · The mitral valve is abnormal in structure. There is mild bileaflet   mitral valve thickening present. Moderate-to-severe mitral valve   regurgitation is present (2-3+)  · Moderate tricuspid valve regurgitation is present. Estimated right   ventricular systolic pressure from tricuspid regurgitation is mildly   elevated (35-45 mmHg).          Results: Reviewed.  I reviewed the patient's new laboratory and imaging results.  I independently reviewed the patient's new images.    Medications: Reviewed.    Assessment/Plan   A / P     Assessment:    74 y.o.male, admitted on 6/8/2019 with A-fib (CMS/[facility]) [I48.91]  Atrial fibrillation with rapid ventricular response (CMS/[facility]) [I48.91]:       1. Respiratory failure } Improved.  1. COPD  2. Bronchodilators  2. Cardiac:  1. A Fib with RVR  1. Anticoagulation on APIxaban   2. S/P AVN and PPM 6/17/19 Dr. Chapman.  2. Cardiomyopathy, EF 33%  3. TR  4. HTN on CCB  1. Episode on hypotension on 06/18/19   5. Dyslipidemia on statins  3. Cellulitis R lower extremity and foot.} Resolved.  1. Antibiotics completed.  4. Laryngeal Mass } ENT  1. Bx. Negative for malignancy  5. GERD on PPI  6. Glucose: T2D per A1c criteria.    Results from last 7 days   Lab Units 06/19/19  1112 06/19/19  0716 06/18/19  2025 06/18/19  1620 06/18/19  1106 06/18/19  0733   GLUCOSE mg/dL 113 96 123 137* 102 134*     Lab Results   Lab Value Date/Time    HGBA1C 6.60 (H) 06/08/2019 0343       Diet: Diet Dysphagia; III - Pureed With Some Mashed; Honey Thick; Consistent Carbohydrate, Cardiac   Advance Directives: Code Status and Medical Interventions:   Ordered at: 06/08/19 0320     Code Status:    CPR     Medical Interventions (Level of Support Prior to Arrest):    Full        Plan:    1. Sleep clinic appt: Sleep studies as outpt.   2. F/u with Dr. Sterling as per his  instructions.  3. Disposition: Discharge Home. and follow up with cardiology.   1. APIxaban   2. Diuretics  3. Amiodarone  4. ß-blockers (Carvedilol)  5. No Entresto due to hypotension  6. F/U with his PCP for DM. May just need lifestyle changes and diet.  1. Diabetes educator, and RD consult.    Plan of care and goals reviewed during interdisciplinary rounds.  I discussed the patient's findings and my recommendations with patient and nursing staff    Level of Risk is High due to:  illness with threat to life or bodily function.     Time: 25 minutes, in direct patient care, with the patient and family and/or on the avalos coordinating care with other health care providers.     I have spent > 50% percent of this time, counseling and discussing management.       Stanislaw Torrez MD, FACP, FCCP, CNSC  Intensive Care Medicine, Nutrition Support and Pulmonary Medicine     [x]  Primary Attending  []  Consultant    Lab Results   Lab Value Date/Time    FINALDX  06/12/2019 0720     LEFT LARYNGEAL BIOPSY:  Benign laryngeal nodule with inflammation and reactive change.  Negative for in-situ and invasive carcinoma.     JFJ/mbc

## 2019-06-19 NOTE — PROGRESS NOTES
Case Management Discharge Note    Final Note: Pt going home today with home 02 provided by [facility] they will deliver the 02 to pt's room. [facility]    Destination      No service has been selected for the patient.      Durable Medical Equipment      No service has been selected for the patient.      Dialysis/Infusion      No service has been selected for the patient.      Home Medical Care      No service has been selected for the patient.      Therapy      No service has been selected for the patient.      Community Resources      No service has been selected for the patient.             Final Discharge Disposition Code: 06 - home with home health care

## 2019-06-19 NOTE — PROGRESS NOTES
Continued Stay Note  [facility]     Patient Name: Stef Jones  MRN: [id number]  Today's Date: 6/19/2019    Admit Date: 6/8/2019    Discharge Plan     Row Name 06/19/19 0751       Plan    Plan  Update    Plan Comments  Plan is for patient to go home with family at discharge. CM will continue to follow as needed.        Discharge Codes    No documentation.       Expected Discharge Date and Time     Expected Discharge Date Expected Discharge Time    Jun 21, 2019             Shahla Armstrong RN

## 2019-06-19 NOTE — PLAN OF CARE
Problem: Patient Care Overview  Goal: Plan of Care Review  Outcome: Ongoing (interventions implemented as appropriate)   06/19/19 0967   Coping/Psychosocial   Plan of Care Reviewed With patient   OTHER   Outcome Summary Pt CGA for LB dressing, t/f and mobility. Pt supervision for grooming and toileting. Pt educated on EC principles and home safety. Recommned CONT skilled IPOT POC. Recommend D/C to home with assist and HH services.

## 2019-06-19 NOTE — PLAN OF CARE
Problem: Patient Care Overview  Goal: Plan of Care Review  Outcome: Ongoing (interventions implemented as appropriate)   06/19/19 7249   Coping/Psychosocial   Plan of Care Reviewed With patient   Plan of Care Review   Progress improving   SLP treatment completed. Will address dysphagia. Please see note for further details and recommendations.

## 2019-06-19 NOTE — NURSING NOTE
1030 Spoke with Margarita SEE with Dr. Solares's office. Discussed SBP of 80s-120s with recent SBP of low 100s this am. She stated that it was OK to administer Amiodarone 200mg PO daily as this should not affect his blood pressure. Will be D/C'd home on amiodarone 200mg/day and Coreg 3.125mg 2x/day. She stated to follow up with Dr. Solares in 6 to 8 weeks and with discuss making an appointment for ECV at that time. OK for discharge per their office. Will continue to monitor.     1218 Spoke with Whitley SEE with Dr. Chapman's office. I asked if he was going to come by and see him today and she said no, that they were in the clinic and that Latricia Mckee PA-C had been by early this morning. She stated that they were OK for discharge home from their standpoint and with follow-up with them in 3 mo. She also stated that their office will call the patient to make an appointment in 6-8 weeks for an ECV.     1230 Dr. Torrez updated. I informed him that Dr. Solares and Dr. Chapman's offices were good with discharge home. Discussed f/u appointment with Dr. Gutiérrez (already made). I also informed him that the patient had been complaining of a rash on his abdomen and BLE. I told him that the patient states it is getting better. No new orders regarding this at this time. Will notify APRN of discharge.     1552 Patient discharged home with daughter. Daughter asked numerous questions regarding follow-ups, medications, and plan of care. All questions answered and she was informed to call physician for any additional questions once home. Instructed to  medications at pharmacy in [facility] today. Discussed what medications the patient needs to take tonight. Discussed side effects of medications, including increased bleeding risk while on Eliquis. Discussed all follow up appointments and wound care: including not to touch chest dressing or shower. VSS at discharge. Informed patient to check BP frequently and  to call if feeling dizzy or lightheaded. Patient had no further questions at time of discharge.

## 2019-06-19 NOTE — PROGRESS NOTES
Clinical Nutrition     Nutrition Assessment  Reason for Visit:   Physician consult, Need for education    Patient Name: Stef Jones  YOB: 1945  MRN: [id number]  Date of Encounter: 06/19/19 2:51 PM  Admission date: 6/8/2019    Nutrition Assessment   Admission Diagnosis         A Fib w/RVR    Cellulitis of Right Lower Leg and Foot    COPD (chronic obstructive pulmonary disease) (CMS/HCC)    Laryngeal mass    Elevated Ha1c 6.6      PMH: He  has a past medical history of COPD (chronic obstructive pulmonary disease) (CMS/HCC), Hearing loss, Hoarse voice quality, and Hypertension.   PSxH: He  has a past surgical history that includes Hemorrhoid surgery; Laryngoscopy (N/A, 6/12/2019); Cardiac electrophysiology procedure (Left, 6/17/2019); and Cardiac electrophysiology procedure (N/A, 6/17/2019).       Reported/Observed/Food/Nutrition Related History:     Pt sitting up in chair, preparing for dc    SLP rec: pt remain on current diet, f/u with outpatient Speech therapy      Labs reviewed     Results from last 7 days   Lab Units 06/19/19  0439 06/17/19  0333   SODIUM mmol/L 137 137   POTASSIUM mmol/L 4.6 4.3   CHLORIDE mmol/L 97* 96*   CO2 mmol/L 28.0 29.0   BUN mg/dL 16 18   CREATININE mg/dL 0.98 1.04   CALCIUM mg/dL 8.7 8.9   BILIRUBIN mg/dL  --  0.7   ALK PHOS U/L  --  150*   ALT (SGPT) U/L  --  33   AST (SGOT) U/L  --  22   GLUCOSE mg/dL 95 91       Results from last 7 days   Lab Units 06/19/19  1112 06/19/19  0716 06/18/19  2025 06/18/19  1620 06/18/19  1106 06/18/19  0733   GLUCOSE mg/dL 113 96 123 137* 102 134*     Lab Results   Lab Value Date/Time    HGBA1C 6.60 (H) 06/08/2019 0343         Medications reviewed            GI: wnl    SKIN: surgical site,spine- rash, umbilical rash    Current Nutrition Prescription     PO: Diet Dysphagia; III - Pureed With Some Mashed; Honey Thick; Consistent Carbohydrate, Cardiac    Intake:56% of 9 meals         Nutrition Diagnosis     Problem  Swallowing difficulty   Etiology Per Clinical Status   Signs/Symptoms Per SLP Eval     Problem Food and nutrition knowledge deficit   Etiology DM 2 per MD- new dx   Signs/Symptoms Ha1c 6.6     Nutrition Intervention     Interventions Goal    General: Nutrition support treatment  Education    Nutrition Interventions   1.  Follow treatment progress, Education provided  DM Education Provided    Monitor/ Evaluation    Per protocol, PO intake, Pertinent labs, Skin status, GI status, Symptoms, Swallow function    DISCUSS with RN, SLP      Lorin Russ RD  Time Spent: 30min

## 2019-06-19 NOTE — PLAN OF CARE
Problem: Patient Care Overview  Goal: Plan of Care Review  Outcome: Ongoing (interventions implemented as appropriate)   06/19/19 9245   Coping/Psychosocial   Plan of Care Reviewed With patient   Plan of Care Review   Progress improving   OTHER   Outcome Summary Patients SBP intially in 80's at beginning of shift, but has ranged from 90's-150's throuhgout shift. Other vss. Has remained on 2L nc/ bipap 35% throughout night. Family at bedside. Ambulated to bathroom and chair. WCM.

## 2019-06-19 NOTE — PROGRESS NOTES
[facility]  Progress Note       LOS: 11 days   Patient Care Team:  Lexx Monson MD as PCP - General (Family Medicine)    Chief Complaint:  Atrial fibrillation with RVR, congestive heart failure    Subjective     Sitting up in chair this AM. No new complaints. BPs have been labile still but now in the low 100s. No CP. Breathing is stable. Will go home with O2.         Review of Systems:   Pertinent positives in HPI, all others reviewed and negative.      Objective       Current Facility-Administered Medications:   •  acetaminophen (TYLENOL) tablet 650 mg, 650 mg, Oral, Q6H PRN, Aroldo Glez MD, 650 mg at 06/12/19 1104  •  albuterol (PROVENTIL) nebulizer solution 0.083% 2.5 mg/3mL, 0.625 mg, Nebulization, Q6H PRN, Glen Gutiérrez MD  •  amiodarone (PACERONE) tablet 200 mg, 200 mg, Oral, Q24H, Lucio Chapman MD, 200 mg at 06/18/19 0853  •  amitriptyline (ELAVIL) tablet 25 mg, 25 mg, Oral, Nightly, Gema Doung APRN, 25 mg at 06/18/19 2037  •  apixaban (ELIQUIS) tablet 5 mg, 5 mg, Oral, Q12H, Aroldo Glez MD, 5 mg at 06/18/19 2037  •  atorvastatin (LIPITOR) tablet 20 mg, 20 mg, Oral, Nightly, Glen Gutiérrez MD, 20 mg at 06/18/19 2037  •  budesonide-formoterol (SYMBICORT) 80-4.5 MCG/ACT inhaler 2 puff, 2 puff, Inhalation, BID - RT, Glen Gutiérrez MD, 2 puff at 06/19/19 0708  •  carvedilol (COREG) tablet 3.125 mg, 3.125 mg, Oral, BID With Meals, Yovanny Solares MD  •  diphenhydrAMINE-zinc acetate 2-0.1 % cream, , Topical, TID PRN, Gema Duong APRN  •  furosemide (LASIX) tablet 20 mg, 20 mg, Oral, Daily, Glen Gutiérrez MD, 20 mg at 06/18/19 0853  •  HYDROcodone-acetaminophen (NORCO) 5-325 MG per tablet 1 tablet, 1 tablet, Oral, Q4H PRN, Lucio Chapman MD, 1 tablet at 06/18/19 0024  •  insulin lispro (humaLOG) injection 0-14 Units, 0-14 Units, Subcutaneous, 4x Daily With Meals & Nightly, Florentin Leggett, [facility], 5 Units at  "06/17/19 2118  •  ipratropium (ATROVENT) nebulizer solution 0.5 mg, 500 mcg, Nebulization, 4x Daily - RT, Glen Gutiérrez MD, 0.5 mg at 06/19/19 0707  •  magnesium sulfate 4g/100mL (PREMIX) infusion, 4 g, Intravenous, PRN, Stanislaw Torrez MD  •  metoprolol tartrate (LOPRESSOR) injection 5 mg, 5 mg, Intravenous, Q6H PRN, Vonnie Ruiz APRN, 5 mg at 06/14/19 1754  •  norepinephrine (LEVOPHED) 8 mg/250 mL (32 mcg/mL) in sodium chloride 0.9% infusion (premix), 0.02-0.3 mcg/kg/min, Intravenous, Titrated, Gema Duong APRN, Stopped at 06/18/19 1218  •  ondansetron (ZOFRAN) injection 4 mg, 4 mg, Intravenous, Q6H PRN, Lucio Chapman MD  •  pantoprazole (PROTONIX) injection 40 mg, 40 mg, Intravenous, Q AM, Stanislaw Torrez MD, 40 mg at 06/19/19 0623  •  sodium chloride 0.9 % flush 3 mL, 3 mL, Intravenous, Q12H, Gema Duong APRN, 3 mL at 06/15/19 1944  •  sodium chloride 0.9 % flush 3 mL, 3 mL, Intravenous, Q12H, Lucoi Chapman MD  •  sodium chloride 0.9 % flush 3-10 mL, 3-10 mL, Intravenous, PRN, Gema Duong APRN  •  sodium chloride 0.9 % flush 3-10 mL, 3-10 mL, Intravenous, PRN, Lucio Chapman MD    Vital Sign Min/Max for last 24 hours  Temp  Min: 97.7 °F (36.5 °C)  Max: 98.3 °F (36.8 °C)   BP  Min: 75/62  Max: 157/94   Pulse  Min: 80  Max: 83   Resp  Min: 18  Max: 20   SpO2  Min: 89 %  Max: 97 %   Flow (L/min)  Min: 2  Max: 3   No Data Recorded     Flowsheet Rows      First Filed Value   Admission Height  167.6 cm (66\") Documented at 06/08/2019 0245   Admission Weight  77.9 kg (171 lb 11.8 oz) Documented at 06/08/2019 0245            Intake/Output Summary (Last 24 hours) at 6/19/2019 0741  Last data filed at 6/19/2019 0600  Gross per 24 hour   Intake 916.53 ml   Output 1225 ml   Net -308.47 ml       Physical Exam:     General Appearance:    Alert, cooperative, in no acute distress   Lungs:    Clear To auscultation bilaterally.  Respiratory effort normal.    Heart:   Regular rate rhythm " normal S1-S2.  There is an S3 present.  PMI laterally displaced.   Chest Wall:    No abnormalities observed   Abdomen:     Normal bowel sounds, no masses, soft, non-tender, non-distended   Extremities:   Moves all extremities well, no edema, no cyanosis, no             redness   Pulses:   Pulses palpable and equal bilaterally   Skin:   Left chest wall PM site C/D/I.  No bleeding, bruising, or hematoma.  pacemaker site normal.        Results Review:   Results from last 7 days   Lab Units 06/19/19  0439 06/17/19  0333 06/16/19 0433   WBC 10*3/mm3 9.77 9.57 10.27   HEMOGLOBIN g/dL 15.7 16.0 15.9   HEMATOCRIT % 49.5 50.4 50.1   PLATELETS 10*3/mm3 240 265 252     Results from last 7 days   Lab Units 06/19/19 0439 06/17/19 0333 06/16/19 0433   SODIUM mmol/L 137 137 137   POTASSIUM mmol/L 4.6 4.3 4.3   CHLORIDE mmol/L 97* 96* 100   CO2 mmol/L 28.0 29.0 28.0   BUN mg/dL 16 18 19   CREATININE mg/dL 0.98 1.04 1.06   GLUCOSE mg/dL 95 91 123*              Results from last 7 days   Lab Units 06/12/19  1349   TSH mIU/mL 3.880   FREE T4 ng/dL 1.33                 Results from last 7 days   Lab Units 06/17/19  0333   MAGNESIUM mg/dL 2.1       Intake/Output Summary (Last 24 hours) at 6/19/2019 0741  Last data filed at 6/19/2019 0600  Gross per 24 hour   Intake 916.53 ml   Output 1225 ml   Net -308.47 ml       I personally viewed and interpreted the patient's EKG/Telemetry data    EKG: None for review today    Telemetry: Underlying atrial fibrillation, Bi-V paced, HR 80-83 bpm,  no VT        Ejection Fraction  No results found for: EF    Echo EF Estimated  No results found for: ECHOEFEST      Present on Admission:  • Cellulitis of Right Lower Leg and Foot  • A Fib w/RVR  • COPD (chronic obstructive pulmonary disease) (CMS/HCC)  • Laryngeal mass    Assessment/Plan   1. Afib with RVR; still difficult to control despite multiple AV piter blocking agents.  - newly diagnosed, incidentally found  - CHADSVASC = 3 (age, HTN), starting  Eliquis this admission  - Echo: EF mid 30s, moderate RV enlargement and RV dysfunction.  - S/p DENA and successful ECV, 6/10.  Did develop early recurrence of his atrial fibrillation.  - Continued high ventricular rates despite amiodarone and multiple AV piter agents  - Now s/p AVN ablation + Bi-V PPM implant 6/17/19 with Dr. Chapman.  Plan is to bring back in 6 weeks for external cardioversion while on amiodarone therapy.     2. Acute systolic heart failure; clinically stable.  - TTE shows ejection fraction of mid 30s, moderate RV enlargement and RV dysfunction.  - Likely tachycardia induced  - DENA: EF 30-35%, Moderate to severe MR, moderate TR, RVSP 35-45mmHg.  - Continue Entresto, Coreg     3. Hypertension:  - Labile with intermittent hypotension, asymptomatic, received IV albumin and norepinephrine gtt.      4. Syncope:  - reportedly had syncopal episode seems to have been triggered by prolonged coughing.  No recurrence.     5. Lower extremity cellulitis  - per admitting     6. COPD  -  Per admitting     7. 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      Plan: Continue Amiodarone 200 mg po daily, Coreg 3.125 mg po bid, Entresto 24/26 mg bi if BP tolerates.  He will stay on Amiodarone x 6-8 weeks and will bring back in for ECV if he remains in atrial fibrillation.  Consider limited echo prior to discharge to reassess LVEF and need for LifeVest.  He will need a wound check in 7-10 days with our office. Order placed in Epic.     Electronically signed by MEREDITH Dhillon, 06/19/19, 7:41 AM.

## 2019-06-19 NOTE — THERAPY TREATMENT NOTE
Acute Care - Physical Therapy Treatment Note  [facility]     Patient Name: Stef Jones  : 1945  MRN: [id number]  Today's Date: 2019  Onset of Illness/Injury or Date of Surgery: 19  Date of Referral to PT: 06/10/19  Referring Physician: MD Shan    Admit Date: 2019    Visit Dx:    ICD-10-CM ICD-9-CM   1. A Fib w/RVR I48.91 427.31   2. Impaired functional mobility, balance, gait, and endurance Z74.09 V49.89   3. 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 Atrial fibrillation with RVR (CMS/HCC) I48.91 427.31     Patient Active Problem List   Diagnosis   • Cellulitis of Right Lower Leg and Foot   • A Fib w/RVR   • COPD (chronic obstructive pulmonary disease) (CMS/HCC)   • Dysphagia   • Laryngeal mass       Therapy Treatment    Rehabilitation Treatment Summary     Row Name 19 1135 19 1045 19 0912       Treatment Time/Intention    Discipline  physical therapist  (Pended)   -  speech language pathologist  -AV  occupational therapist  -CL,CH,CL2    Document Type  therapy note (daily note)  (Pended)   -  therapy note (daily note)  -  therapy note (daily note)  -CL,CH,CL2    Subjective Information  no complaints  (Pended)   -  no complaints  -  no complaints  -CL,CH,CL2    Mode of Treatment  physical therapy  (Pended)   -  speech-language pathology  -  occupational therapy  -CL,CH,CL2    Patient/Family Observations  son present  (Pended)   -  no family present   -AV  --    Care Plan Review  evaluation/treatment results reviewed;care plan/treatment goals reviewed;risks/benefits reviewed;patient/other agree to care plan  (Pended)   -  care plan/treatment goals reviewed  -AV  --    Therapy Frequency (PT Clinical Impression)  daily  (Pended)   -  --  --    Patient Effort  excellent  (Pended)   -  adequate  -AV  excellent  -CL,CH,CL2    Existing Precautions/Restrictions  fall;cardiac;pacemaker  (Pended)  BP & HR  -  --   fall;cardiac;pacemaker Monitor BP and HR  -CL,CH,CL2    Patient Response to Treatment  no adverse effects  (Pended)   -KL  --  Tolerated.   -CL,CH,CL2    Recorded by [KL] Antonio Torres, PT Student 06/19/19 1400 [AV] Lisha Ji, MS CCC-SLP 06/19/19 1441 [CL,CH,CL2] Harper Nelson, MELBA (r) Dinesh Gomez OT Student (t) Harper Nelson OT (c) 06/19/19 1317    Row Name 06/19/19 1135 06/19/19 0912          Vital Signs    Pre Systolic BP Rehab  97  (Pended)   -  100  -CL,CH,CL2     Pre Treatment Diastolic BP  60  (Pended)   -KL  85  -CL,CH,CL2     Post Systolic BP Rehab  98  (Pended)   -KL  101  -CL,CH,CL2     Post Treatment Diastolic BP  59  (Pended)   -KL  61  -CL,CH,CL2     Pretreatment Heart Rate (beats/min)  80  (Pended)   -KL  80  -CL,CH,CL2     Intratreatment Heart Rate (beats/min)  105  (Pended)   -KL  --     Posttreatment Heart Rate (beats/min)  85  (Pended)   -KL  80  -CL,CH,CL2     Pre SpO2 (%)  90  (Pended)   -KL  93  -CL,CH,CL2     O2 Delivery Pre Treatment  room air  (Pended)   -  nasal cannula  -CL,CH,CL2     Intra SpO2 (%)  88  (Pended)   -  88  -CL,CH,CL2     O2 Delivery Intra Treatment  room air  (Pended)   -  nasal cannula  -CL,CH,CL2     Post SpO2 (%)  91  (Pended)   -KL  94  -CL,CH,CL2     O2 Delivery Post Treatment  room air  (Pended)   -  nasal cannula  -CL,CH,CL2     Pre Patient Position  Sitting  (Pended)   -KL  Sitting  -CL,CH,CL2     Intra Patient Position  Standing  (Pended)   -KL  Standing  -CL,CH,CL2     Post Patient Position  Sitting  (Pended)   -KL  Sitting  -CL,CH,CL2     Recorded by [KL] Antonio Torres, PT Student 06/19/19 1400 [CL,CH,CL2] Harper Nelson, MELBA (r) Dinesh Gomez, OT Student (t) Harper Nelson OT (c) 06/19/19 1317     Row Name 06/19/19 1135 06/19/19 0912          Cognitive Assessment/Intervention    Additional Documentation  Cognitive Assessment/Intervention (Group)  (Pended)   -KL  Cognitive Assessment/Intervention (Group)  -CL,CH,CL2      Recorded by [KL] Antonio Torres P, PT Student 06/19/19 1400 [CL,CH,CL2] Harper Nelson OT (r) Dinesh Gomez OT Student (t) Harper Nelson OT (c) 06/19/19 1317     Row Name 06/19/19 1135 06/19/19 0912          Cognitive Assessment/Intervention- PT/OT    Affect/Mental Status (Cognitive)  WFL  (Pended)   -  WFL  -CL,CH,CL2     Orientation Status (Cognition)  oriented x 4  (Pended)   -  oriented x 4  -CL,CH,CL2     Follows Commands (Cognition)  follows one step commands;over 90% accuracy  (Pended)   -  over 90% accuracy  -CL,CH,CL2     Cognitive Function (Cognitive)  safety deficit  (Pended)   -  safety deficit  -CL,CH,CL2     Safety Deficit (Cognitive)  moderate deficit;safety precautions awareness;safety precautions follow-through/compliance  (Pended)   -  awareness of need for assistance;safety precautions awareness;insight into deficits/self awareness  -CL,CH,CL2     Personal Safety Interventions  fall prevention program maintained;gait belt;nonskid shoes/slippers when out of bed  (Pended)   -  fall prevention program maintained;gait belt;nonskid shoes/slippers when out of bed  -CL,CH,CL2     Recorded by [KL] Antonio Torres P, PT Student 06/19/19 1400 [CL,CH,CL2] Harper Nelson OT (r) Dinesh Gomez, OT Student (t) Harper Nelson OT (c) 06/19/19 1317     Row Name 06/19/19 1135 06/19/19 0912          Safety Issues, Functional Mobility    Safety Issues Affecting Function (Mobility)  ability to follow commands;safety precaution awareness;safety precautions follow-through/compliance  (Pended)   -  awareness of need for assistance;insight into deficits/self awareness;safety precaution awareness  -CL,CH,CL2     Impairments Affecting Function (Mobility)  balance;endurance/activity tolerance;coordination  (Pended)   -  endurance/activity tolerance;strength;shortness of breath  -CL,CH,CL2     Comment, Safety Issues/Impairments (Mobility)  pt req VC's to navigate steps with step to step pattern.   (Pended)   -KL  --     Recorded by [KL] Antonio Torres P, PT Student 06/19/19 1400 [CL,CH,CL2] Harper Nelson OT (r) Dinesh Gomez OT Student (t) Harper Nelson OT (c) 06/19/19 1317     Row Name 06/19/19 1135 06/19/19 0912          Bed Mobility Assessment/Treatment    Comment (Bed Mobility)  UIC  (Pended)   -  Pt received UIC.  -CL,CH,CL2     Recorded by [KL] Antonio Torres P, PT Student 06/19/19 1400 [CL,CH,CL2] Harper Nelson OT (r) Dinesh Gomez, OT Student (t) Harper Nelson OT (c) 06/19/19 1317     Row Name 06/19/19 0912             Functional Mobility    Functional Mobility- Ind. Level  contact guard assist  -CL,CH,CL2      Functional Mobility- Device  rolling walker  -CL,CH,CL2      Functional Mobility-Distance (Feet)  300  -CL,CH,CL2      Functional Mobility- Comment  Pt required 1 rest break during ambulation with desat to 88%. Pt educated on pursed lip breathing to resolve SOA.   -CL,CH,CL2      Recorded by [CL,CH,CL2] Harper Nelson OT (r) Dinesh Gomez, OT Student (t) Harper Nelson OT (c) 06/19/19 1317      Row Name 06/19/19 1135 06/19/19 0912          Transfer Assessment/Treatment    Transfer Assessment/Treatment  sit-stand transfer;stand-sit transfer  (Pended)   -  sit-stand transfer;stand-sit transfer;toilet transfer  -CL,CH,CL2     Comment (Transfers)  VCs to maintain pacemaker precautions.  (Pended)   -KL  VCs to maintain pacemaker precautions.   -CL,CH,CL2     Recorded by [KL] Antonio Torres, PT Student 06/19/19 1400 [CL,CH,CL2] Harper Nelson OT (r) Dinesh Gomez OT Student (t) Harper Nelson OT (c) 06/19/19 1317     Row Name 06/19/19 1135 06/19/19 0912          Sit-Stand Transfer    Sit-Stand [place] (Transfers)  contact guard;verbal cues  (Pended)   -GEETA  contact guard;verbal cues  -CL,CH,CL2     Assistive Device (Sit-Stand Transfers)  walker, front-wheeled  (Pended)   -GEETA  walker, front-wheeled  -CL,CH,CL2     Recorded by [GEETA] Antonio Torres, PT Student  06/19/19 1400 [CL,CH,CL2] Harper Nelson, OT (r) Dinesh Gomez, OT Student (t) Harper Nelson OT (c) 06/19/19 1317     Row Name 06/19/19 1135 06/19/19 0912          Stand-Sit Transfer    Stand-Sit [place] (Transfers)  contact guard;verbal cues  (Pended)   -KL  contact guard;verbal cues  -CL,CH,CL2     Assistive Device (Stand-Sit Transfers)  walker, front-wheeled  (Pended)   -KL  walker, front-wheeled  -CL,CH,CL2     Recorded by [KL] Antonio Torres, PT Student 06/19/19 1400 [CL,CH,CL2] Harper Nelson OT (r) Dinesh Gomez, OT Student (t) Harper Nelson OT (c) 06/19/19 1317     Row Name 06/19/19 0912             Toilet Transfer    Type (Toilet Transfer)  stand-sit;sit-stand  -CL,CH,CL2      [place] Level (Toilet Transfer)  contact guard;verbal cues  -CL,CH,CL2      Assistive Device (Toilet Transfer)  raised toilet seat;walker, front-wheeled;grab bars/safety frame  -CL,CH,CL2      Recorded by [CL,CH,CL2] Harper Nelson, MELBA (r) Dinesh Gomez, OT Student (t) Harper Nelson OT (c) 06/19/19 1317      Row Name 06/19/19 1135             Gait/Stairs Assessment/Training    66967 - Gait Training Minutes   11  (Pended)   -KL      Gait/Stairs Assessment/Training  gait/ambulation independence;gait/ambulation assistive device  (Pended)   -KL      [place] Level (Gait)  contact guard;verbal cues  (Pended)   -KL      Assistive Device (Gait)  walker, front-wheeled  (Pended)   -KL      Distance in Feet (Gait)  350  (Pended)   -KL      Pattern (Gait)  step-through  (Pended)   -KL      Deviations/Abnormal Patterns (Gait)  stride length decreased;gait speed decreased  (Pended)   -KL      Bilateral Gait Deviations  forward flexed posture  (Pended)   -KL      Negotiation (Stairs)  ascending technique;descending technique;number of steps  (Pended)   -KL2      Handrail Location (Stairs)  right side (ascending)  (Pended)   -KL2      Number of Steps (Stairs)  12  (Pended)   -KL2      Ascending Technique (Stairs)   step-to-step  (Pended)  pt req vc for step to step for increased safety   -KL2      Descending Technique (Stairs)  step-to-step  (Pended)   -KL2      Comment (Gait/Stairs)  pt req vc's to lengthen stride and to take rest breaks when appropriate for safety. 1 flight of stairs, ascending/descending c use of hand rail on R UE  (Pended)   -KL2      Recorded by [KL] Antonio Torres P, PT Student 06/19/19 1400  [KL2] Antonio Torres, PT Student 06/19/19 1602      Row Name 06/19/19 0912             ADL Assessment/Intervention    49386 - OT Self Care/Mgmt Minutes  15  -CL,CH,CL2      BADL Assessment/Intervention  lower body dressing;grooming;toileting  -CL,CH,CL2      Recorded by [CL,CH,CL2] Harper Nelson OT (r) Dinesh Gomez, OT Student (t) Harper Nelson OT (c) 06/19/19 1317      Row Name 06/19/19 0912             Lower Body Dressing Assessment/Training    Lower Body Dressing [place] Level  don;doff;socks;contact guard assist  -CL,CH,CL2      Lower Body Dressing Position  unsupported sitting  -CL,CH,CL2      Comment (Lower Body Dressing)  Pt educated on cross leg technique and EC principles to prevent SOA.  -CL,CH,CL2      Recorded by [CL,CH,CL2] Harper Nelson OT (r) Dinesh Gomez OT Student (t) Harper Nelson OT (c) 06/19/19 1317      Row Name 06/19/19 0912             Grooming Assessment/Training    [place] Level (Grooming)  wash face, hands;oral care regimen;hair care, combing/brushing;supervision;verbal cues  -CL,CH,CL2      Grooming Position  sink side  -CL,CH,CL2      Recorded by [CL,CH,CL2] Harper Nelson OT (r) Dinesh Gomez OT Student (t) Harper Nelson OT (c) 06/19/19 1317      Row Name 06/19/19 0912             Toileting Assessment/Training    [place] Level (Toileting)  adjust/manage clothing;perform perineal hygiene;supervision;verbal cues  -CL,CH,CL2      Assistive Devices (Toileting)  grab bar/safety frame;raised toilet seat  -CHAYA,LAY,CL2      Toileting Position   supported sitting  -CL,CH,CL2      Comment (Toileting)  VCs to maintain pacemaker precautions.   -CL,CH,CL2      Recorded by [CL,CH,CL2] Harper Nelson OT (r) Dinesh Gomez, OT Student (t) Harper Nelson OT (c) 06/19/19 1317      Row Name 06/19/19 0912             BADL Safety/Performance    Impairments, BADL Safety/Performance  endurance/activity tolerance;strength  -CL,CH,CL2      Skilled BADL Treatment/Intervention  BADL process/adaptation training  -CL,CH,CL2      Progress in BADL Status  improvement noted  -CL,CH,CL2      Recorded by [CL,CH,CL2] Harper Nelson OT (r) Dinesh Gomez, OT Student (t) Harper Nelson OT (c) 06/19/19 1317      Row Name 06/19/19 1135             Motor Skills Assessment/Interventions    Additional Documentation  --  (Pended)   -KL      Recorded by [KL] Antonio Torres, PT Student 06/19/19 1602      Row Name 06/19/19 1135 06/19/19 0912          Therapeutic Exercise    80460 - PT Therapeutic Activity Minutes  12  (Pended)   -KL  --     63801 - OT Therapeutic Activity Minutes  --  9  -CL,CH,CL2     Recorded by [KL] Antonio Torres, PT Student 06/19/19 1400 [CL,CH,CL2] Harper Nelson OT (r) Dinesh Gomez, OT Student (t) Harper Nelson OT (c) 06/19/19 1317     Row Name 06/19/19 1135 06/19/19 0912          Balance    Balance  static sitting balance;static standing balance  (Pended)   -KL  static sitting balance;static standing balance;dynamic sitting balance  -CL,CH,CL2     Recorded by [KL] Antonio Torres, PT Student 06/19/19 1403 [CL,CH,CL2] Harper Nelson OT (r) Dinesh Gomez OT Student (t) Harper Nelson OT (c) 06/19/19 1317     Row Name 06/19/19 1135 06/19/19 0912          Static Sitting Balance    Level of [place] (Unsupported Sitting, Static Balance)  independent  (Pended)   -KL  supervision  -CL,CH,CL2     Sitting Position (Unsupported Sitting, Static Balance)  sitting in chair  (Pended)   -GEETA  sitting in chair  -CL,CH,CL2     Time Able to Maintain  Position (Unsupported Sitting, Static Balance)  --  3 to 4 minutes  -CL,CH,CL2     Recorded by [KL] Antonio Torres, PT Student 06/19/19 1403 [CL,CH,CL2] Harper Nelson OT (r) Dinesh Gomez OT Student (t) Harper Nelson OT (c) 06/19/19 1317     Row Name 06/19/19 0912             Dynamic Sitting Balance    Level of [place], Reaches Outside Midline (Sitting, Dynamic Balance)  contact guard assist  -CL,CH,CL2      Sitting Position, Reaches Outside Midline (Sitting, Dynamic Balance)  sitting in chair Commode  -CL,CH,CL2      Recorded by [CL,CH,CL2] Harper Nelson OT (r) Dinesh Gomez, OT Student (t) Harper Nelson OT (c) 06/19/19 1317      Row Name 06/19/19 1135 06/19/19 0912          Static Standing Balance    Level of [place] (Supported Standing, Static Balance)  supervision  (Pended)   -GEETA  standby assist  -CL,CH,CL2     Time Able to Maintain Position (Supported Standing, Static Balance)  --  4 to 5 minutes  -CL,CH,CL2     Assistive Device Utilized (Supported Standing, Static Balance)  walker, rolling  (Pended)   -GEETA  walker, rolling  -CL,CH,CL2     Recorded by [KL] Antonio Torres, PT Student 06/19/19 1403 [CL,CH,CL2] Harper Nelson OT (r) Dinesh Gomez, OT Student (t) Harper Nelson OT (c) 06/19/19 1317     Row Name 06/19/19 1135             Functional Endurance Training    Comment, Functional Endurance  --  (Pended)   -GEETA      Recorded by [KL] Antonio Torres, PT Student 06/19/19 1602      Row Name 06/19/19 1135 06/19/19 0912          Positioning and Restraints    Pre-Treatment Position  sitting in chair/recliner  (Pended)   -GEETA  sitting in chair/recliner  -CL,CH,CL2     Post Treatment Position  chair  (Pended)   -GEETA  chair  -CL,CH,CL2     In Chair  notified nsg;reclined;sitting;call light within reach;encouraged to call for assist;with family/caregiver;RUE elevated;LUE elevated;legs elevated  (Pended)   -KL  notified nsg;reclined;call light within reach;encouraged to call for  assist;exit alarm on;waffle cushion  -CL,CH,CL2     Recorded by [KL] Antonio Torres, PT Student 06/19/19 1403 [CL,CH,CL2] Harper Nelson, MELBA (r) Dinesh Gomez, OT Student (t) Harper Nelson OT (c) 06/19/19 1317     Row Name 06/19/19 1135 06/19/19 1045          Pain Assessment    Additional Documentation  Pain Scale: Numbers Pre/Post-Treatment (Group)  (Pended)   -KL  Pain Scale: FACES Pre/Post-Treatment (Group)  -AV     Recorded by [KL] Antonio Torres, PT Student 06/19/19 1403 [AV] Lisha Ji, MS CCC-SLP 06/19/19 1441     Row Name 06/19/19 1135 06/19/19 0912          Pain Scale: Numbers Pre/Post-Treatment    Pain Scale: Numbers, Pretreatment  0/10 - no pain  (Pended)   -KL  0/10 - no pain  -CL,CH,CL2     Pain Scale: Numbers, Post-Treatment  0/10 - no pain  (Pended)   -KL  0/10 - no pain  -CL,CH,CL2     Recorded by [KL] Antonio Torres, PT Student 06/19/19 1403 [CL,CH,CL2] Harper Nelson, MELBA (r) Dinesh Gomez, OT Student (t) Harper Nelson, MELBA (c) 06/19/19 1317     Row Name 06/19/19 1045             Pain Scale: FACES Pre/Post-Treatment    Pain: FACES Scale, Pretreatment  0-->no hurt  -AV      Pain: FACES Scale, Post-Treatment  0-->no hurt  -AV      Recorded by [AV] Lisha Ji, MS CCC-SLP 06/19/19 1441      Row Name                Wound 06/17/19 1410 Left upper;lateral chest incision;surgical    Wound - Properties Group Date first assessed: 06/17/19 [BM] Time first assessed: 1410 [BM] Side: Left [BM] Orientation: upper;lateral [BM] Location: chest [BM] Type: incision;surgical [BM] Recorded by:  [BM] Bozena Villanueva RN 06/17/19 1410    Row Name 06/19/19 1135             Coping    Observed Emotional State  accepting;calm;cooperative  (Pended)   -GEETA      Verbalized Emotional State  acceptance  (Pended)   -GEETA      Recorded by [GEETA] Antonio Torres, PT Student 06/19/19 1403      Row Name 06/19/19 1135 06/19/19 0912          Plan of Care Review    Plan of Care Reviewed With   patient  (Pended)   -  patient  -CL,CH,CL2     Recorded by [KL] Antonio Torres, PT Student 06/19/19 1403 [CL,CH,CL2] Harper Nelson OT (r) Dinesh Gomez, OT Student (t) Harper Nelson OT (c) 06/19/19 1317     Row Name 06/19/19 0912             Outcome Summary/Treatment Plan (OT)    Daily Summary of Progress (OT)  progress toward functional goals is good  -CL,CH,CL2      Anticipated Discharge Disposition (OT)  home with assist;home with home health  -CL,CH,CL2      Recorded by [CL,CH,CL2] Harper Nelson OT (r) Dinesh Gomez, OT Student (t) Harper Nelson, OT (c) 06/19/19 1317      Row Name 06/19/19 1135             Outcome Summary/Treatment Plan (PT)    Daily Summary of Progress (PT)  progress toward functional goals is good  (Pended)   -KL      Recorded by [KL] Antonio Torres, PT Student 06/19/19 1403      Row Name 06/19/19 1045             Outcome Summary/Treatment Plan (SLP)    Daily Summary of Progress (SLP)  progress toward functional goals is gradual  -AV      Plan for Continued Treatment (SLP)  patient given trials of thins and nectar at bedside. Still demonstrating s/s with trials. Discussed with patient and  about home health vs. outpatient SLP serices in [facility] with follow up MBS at [facility] for further assessment of pharyngeal swallowing.   -AV      Anticipated Dischage Disposition  unknown;anticipate therapy at next level of care  -AV      Recorded by [AV] Lisha Ji, MS CCC-SLP 06/19/19 1441        User Key  (r) = Recorded By, (t) = Taken By, (c) = Cosigned By    Initials Name Effective Dates Discipline    BM Bozena Villanueva RN 06/16/16 -  Nurse    AV Lisha Ji, MS CCC-SLP 05/23/19 -  SLP    Harper Rodriges, OT 04/03/18 -  OT    Dinesh Carmona, OT Student 03/13/19 -  OT    Antonio Rodríguez, PT Student 05/15/19 -  PT          Rash 06/14/19 2000 Left lower lumbar spine papule (Active)   Distribution generalized  6/19/2019  6:00 AM   Characteristics dry 6/19/2019  6:00 AM   Color red 6/19/2019  6:00 AM       Rash 06/15/19 2000 other (see comments) medial umbilical area papule (Active)   Distribution generalized 6/19/2019 12:00 PM   Characteristics dry 6/19/2019 12:00 PM   Color red 6/19/2019 12:00 PM       Wound 06/17/19 1410 Left upper;lateral chest incision;surgical (Active)   Dressing Appearance no drainage 6/19/2019 12:00 PM   Closure Adhesive closure strips 6/19/2019 12:00 PM   Base dressing in place, unable to visualize 6/19/2019 12:00 PM   Periwound intact 6/19/2019 12:00 PM   Drainage Amount none 6/19/2019 12:00 PM   Dressing Care, Wound gauze 6/19/2019 12:00 PM       Rehab Goal Summary     Row Name 06/19/19 1045             Oral Nutrition/Hydration Goal 2 (SLP)    Oral Nutrition/Hydration Goal 2, SLP  Pt will tolerate puree, some mashed and HTL w/ no overt s/sxs aspiration or distress w/ 100% acc and no cues  -AV      Time Frame (Oral Nutrition/Hydration Goal 2, SLP)  short term goal (STG);by discharge  -AV      Barriers (Oral Nutrition/Hydration Goal 2, SLP)  Tolerating HTL w/ no s/sxs  -AV      Progress/Outcomes (Oral Nutrition/Hydration Goal 2, SLP)  continuing progress toward goal  -AV         Oral Nutrition/Hydration Goal (SLP)    Oral Nutrition/Hydration Goal, SLP  Pt will tolerate therapeutic trials of thin H2O w/ no overt s/sxs aspiration or distress w/ 60% acc and no cues in order to determine readiness for repeat instrumental eval  -AV      Time Frame (Oral Nutrition/Hydration Goal, SLP)  short term goal (STG);by discharge  -AV      Progress/Outcomes (Oral Nutrition/Hydration Goal, SLP)  continuing progress toward goal  -AV         Pharyngeal Strengthening Exercise Goal 1 (SLP)    Activity (Pharyngeal Strengthening Goal 1, SLP)  increase timing;increase closure at entrance to airway/closure of airway at glottis  -AV      Increase Timing  prepping - 3 second prep or suck swallow or 3-step swallow  -AV       Increase Closure at Entrance to Airway/Closure of Airway at Glottis  super-supraglottic swallow;hard effortful swallow  -AV      [place]/Accuracy (Pharyngeal Strengthening Goal 1, SLP)  with minimal cues (75-90% accuracy)  -AV      Time Frame (Pharyngeal Strengthening Goal 1, SLP)  short term goal (STG);by discharge  -AV      Barriers (Pharyngeal Strengthening Goal 1, SLP)  Reviewed & completed w/ pt. Provided instruction on at-home thickener & encouraged independent practice with pt & family  -AV      Progress/Outcomes (Pharyngeal Strengthening Goal 1, SLP)  good progress toward goal  -AV        User Key  (r) = Recorded By, (t) = Taken By, (c) = Cosigned By    Initials Name Provider Type Discipline    AV Lisha Ji MS CCC-SLP Speech and Language Pathologist SLP          Physical Therapy Education     Title: PT OT SLP Therapies (Done)     Topic: Physical Therapy (Done)     Point: Mobility training (Done)     Learning Progress Summary           Patient Acceptance, TB, NR,VU by  at 6/19/2019 11:35 AM    Acceptance, E,D, VU,DU by  at 6/18/2019  8:19 AM    Acceptance, E,TB, VU,DU by  at 6/15/2019  9:30 PM    Acceptance, E, VU,NR by  at 6/15/2019  9:07 AM    Acceptance, E,TB, VU,DU,NR by  at 6/14/2019 10:18 PM    Acceptance, TB, NR by  at 6/13/2019  2:36 PM    Acceptance, E,D, NR by  at 6/11/2019 10:10 AM    Acceptance, TB, NR by  at 6/10/2019  1:50 PM   Family Acceptance, E,D, VU,DU by  at 6/18/2019  8:19 AM    Acceptance, E, VU,NR by  at 6/15/2019  9:07 AM    Acceptance, E,TB, VU,DU,NR by  at 6/14/2019 10:18 PM                   Point: Home exercise program (Done)     Learning Progress Summary           Patient Acceptance, TB, NR,VU by  at 6/19/2019 11:35 AM    Acceptance, E,D, VU,DU by  at 6/18/2019  8:19 AM    Acceptance, E,TB, VU,DU by  at 6/15/2019  9:30 PM    Acceptance, E, VU,NR by  at 6/15/2019  9:07 AM    Acceptance, EAPOLLO, KEILY,JUNE,NR by CH at 6/14/2019 10:18 PM     Acceptance, E,D, NR by  at 6/11/2019 10:10 AM   Family Acceptance, E,D, VU,DU by  at 6/18/2019  8:19 AM    Acceptance, E, VU,NR by  at 6/15/2019  9:07 AM    Acceptance, E,TB, VU,DU,NR by  at 6/14/2019 10:18 PM                   Point: Body mechanics (Done)     Learning Progress Summary           Patient Acceptance, TB, NR,VU by  at 6/19/2019 11:35 AM    Acceptance, E,D, VU,DU by  at 6/18/2019  8:19 AM    Acceptance, E,TB, VU,DU by CH at 6/15/2019  9:30 PM    Acceptance, E, VU,NR by  at 6/15/2019  9:07 AM    Acceptance, E,TB, VU,DU,NR by  at 6/14/2019 10:18 PM    Acceptance, TB, NR by  at 6/13/2019  2:36 PM    Acceptance, E,D, NR by  at 6/11/2019 10:10 AM    Acceptance, TB, NR by  at 6/10/2019  1:50 PM   Family Acceptance, E,D, VU,DU by  at 6/18/2019  8:19 AM    Acceptance, E, VU,NR by  at 6/15/2019  9:07 AM    Acceptance, E,TB, VU,DU,NR by  at 6/14/2019 10:18 PM                   Point: Precautions (Done)     Learning Progress Summary           Patient Acceptance, TB, NR,VU by  at 6/19/2019 11:35 AM    Acceptance, E,D, VU,DU by  at 6/18/2019  8:19 AM    Acceptance, E,TB, VU,DU by  at 6/15/2019  9:30 PM    Acceptance, E, VU,NR by  at 6/15/2019  9:07 AM    Acceptance, E,TB, VU,DU,NR by  at 6/14/2019 10:18 PM    Acceptance, TB, NR by  at 6/13/2019  2:36 PM    Acceptance, E,D, NR by  at 6/11/2019 10:10 AM    Acceptance, TB, NR by  at 6/10/2019  1:50 PM   Family Acceptance, E,D, VU,DU by  at 6/18/2019  8:19 AM    Acceptance, E, VU,NR by  at 6/15/2019  9:07 AM    Acceptance, E,APOLLO, KEILY,DU,NR by  at 6/14/2019 10:18 PM                               User Key     Initials Effective Dates Name Provider Type Discipline     06/19/15 -  Unique Millan, PT Physical Therapist PT     06/16/16 -  Tri White RN Registered Nurse Nurse     05/15/19 -  Antonio Torres, PT Student PT Student PT                PT Recommendation and Plan  Anticipated Discharge Disposition (PT):  home with home health, home with assist  Planned Therapy Interventions (PT Eval): balance training, bed mobility training, gait training, home exercise program, patient/family education, stair training, strengthening, transfer training  Therapy Frequency (PT Clinical Impression): (P) daily  Outcome Summary/Treatment Plan (PT)  Daily Summary of Progress (PT): (P) progress toward functional goals is good  Barriers to Overall Progress (PT): (AFib)  Anticipated Discharge Disposition (PT): home with home health, home with assist  Plan of Care Reviewed With: (P) patient  Progress: (P) improving  Outcome Summary: (P) pt demos increased funtional independence with gait and navigating stairs. VSS throughout PT tx today while on room air. pt nearing PLoF with bed mobility, t/f's, gait, stair navigation, balance, and functional endurance needed to D/C to home with home health.   Outcome Measures     Row Name 06/19/19 1135 06/19/19 0912 06/18/19 1056       How much help from another person do you currently need...    Turning from your back to your side while in flat bed without using bedrails?  4  (Pended)   -KL  --  --    Moving from lying on back to sitting on the side of a flat bed without bedrails?  3  (Pended)   -KL  --  --    Moving to and from a bed to a chair (including a wheelchair)?  3  (Pended)   -KL  --  --    Standing up from a chair using your arms (e.g., wheelchair, bedside chair)?  3  (Pended)   -KL  --  --    Climbing 3-5 steps with a railing?  3  (Pended)   -KL  --  --    To walk in hospital room?  3  (Pended)   -KL  --  --    AM-PAC 6 Clicks Score  19  (Pended)   -DANE (r) KL (t)  --  --       How much help from another is currently needed...    Putting on and taking off regular lower body clothing?  --  3  -CL (r) CH (t) CL (c)  3  -CL    Bathing (including washing, rinsing, and drying)  --  2  -CL (r) CH (t) CL (c)  2  -CL    Toileting (which includes using toilet bed pan or urinal)  --  3  -CL (r) CH (t) CL  (c)  3  -CL    Putting on and taking off regular upper body clothing  --  3  -CL (r) CH (t) CL (c)  3  -CL    Taking care of personal grooming (such as brushing teeth)  --  4  -CL (r) CH (t) CL (c)  3  -CL    Eating meals  --  4  -CL (r) CH (t) CL (c)  4  -CL    Score  --  19  -CL (r) CH (t)  18  -CL       Functional Assessment    Outcome Measure Options  -[facility] 6 Clicks Basic Mobility (PT)  (Pended)   -KL  --  --    Row Name 06/18/19 0819 06/17/19 0815          How much help from another person do you currently need...    Turning from your back to your side while in flat bed without using bedrails?  3  -LS  --     Moving from lying on back to sitting on the side of a flat bed without bedrails?  3  -LS  --     Moving to and from a bed to a chair (including a wheelchair)?  3  -LS  --     Standing up from a chair using your arms (e.g., wheelchair, bedside chair)?  3  -LS  --     Climbing 3-5 steps with a railing?  3  -LS  --     To walk in hospital room?  3  -LS  --     AM-[facility] 6 Clicks Score  18  -LS  --        How much help from another is currently needed...    Putting on and taking off regular lower body clothing?  --  2  -CL (r) CH (t) CL (c)     Bathing (including washing, rinsing, and drying)  --  2  -CL (r) CH (t) CL (c)     Toileting (which includes using toilet bed pan or urinal)  --  3  -CL (r) CH (t) CL (c)     Putting on and taking off regular upper body clothing  --  4  -CL (r) CH (t) CL (c)     Taking care of personal grooming (such as brushing teeth)  --  4  -CL (r) CH (t) CL (c)     Eating meals  --  4  -CL (r) CH (t) CL (c)     Score  --  19  -CL (r) CH (t)        Functional Assessment    Outcome Measure Options  AM-[facility] 6 Clicks Basic Mobility (PT)  -LS  --       User Key  (r) = Recorded By, (t) = Taken By, (c) = Cosigned By    Initials Name Provider Type    Unique Martínez, PT Physical Therapist    CL Harper Nelson, OT Occupational Therapist    Marsha Begum, RN Registered Nurse    LAY Gomez,  Dinesh CANALES, OT Student OT Student    Antonio Rodríguez, PT Student PT Student         Time Calculation:   PT Charges     Row Name 06/19/19 1135             Time Calculation    Start Time  1135  (Pended)   -      PT Received On  06/19/19  (Pended)   -      PT Goal Re-Cert Due Date  06/20/19  (Pended)   -         Time Calculation- PT    Total Timed Code Minutes- PT  23 minute(s)  (Pended)   -         Timed Charges    75735 - Gait Training Minutes   11  (Pended)   -      89172 - PT Therapeutic Activity Minutes  12  (Pended)   -        User Key  (r) = Recorded By, (t) = Taken By, (c) = Cosigned By    Initials Name Provider Type    Antonio Rodríguez, PT Student PT Student        Therapy Charges for Today     Code Description Service Date Service Provider Modifiers Qty    02355596908 HC GAIT TRAINING EA 15 MIN 6/19/2019 Antonio Torres, PT Student GP 1    93873573769 HC PT THERAPEUTIC ACT EA 15 MIN 6/19/2019 Antonio Torres, PT Student GP 1          PT G-Codes  Outcome Measure Options: (P) AM-PAC 6 Clicks Basic Mobility (PT)  AM-PAC 6 Clicks Score: (P) 19  Score: 19    Antonio Torres PT Student  6/19/2019

## 2019-06-19 NOTE — PLAN OF CARE
Problem: Patient Care Overview  Goal: Plan of Care Review  Outcome: Ongoing (interventions implemented as appropriate)   06/19/19 1717   Coping/Psychosocial   Plan of Care Reviewed With patient;daughter;son   Plan of Care Review   Progress improving   OTHER   Outcome Summary Patient remained stable throughout shift. VSS. SBP 90s-120s. Verified SBP with Dr. Slick SEE. She stated that it was ok to give amiodarone even though SBP was low 100s. See Nursing note. HR 80s V-paced. Tolerating 2-3L NC. Home on oxygen therapy. No complaints of pain. Left chest surgicial site C/D/I. Dressing intact. Instructed not to remove until wound check appointment in 1 week and not to shower, only sponge bath. Worked with PT/OT. Instructed on diet orders at discharge. Education provided to patient, son, and daughter. See Nursing Note.        Problem: Fall Risk (Adult)  Goal: Absence of Fall  Outcome: Ongoing (interventions implemented as appropriate)   06/19/19 1717   Fall Risk (Adult)   Absence of Fall making progress toward outcome       Problem: Skin Injury Risk (Adult)  Goal: Skin Health and Integrity  Outcome: Ongoing (interventions implemented as appropriate)   06/19/19 1717   Skin Injury Risk (Adult)   Skin Health and Integrity making progress toward outcome       Problem: Chronic Obstructive Pulmonary Disease (Adult)  Goal: Signs and Symptoms of Listed Potential Problems Will be Absent, Minimized or Managed (Chronic Obstructive Pulmonary Disease)  Outcome: Ongoing (interventions implemented as appropriate)   06/19/19 1717   Goal/Outcome Evaluation   Problems Assessed (Chronic Obstructive Pulmonary Disease (COPD)) all   Problems Present (COPD, Bronch/Emphy) respiratory compromise       Problem: Cardiac Rhythm Management Device (Adult)  Goal: Signs and Symptoms of Listed Potential Problems Will be Absent, Minimized or Managed (Cardiac Rhythm Management Device)  Outcome: Ongoing (interventions implemented as appropriate)    06/19/19 3770   Goal/Outcome Evaluation   Problems Assessed (Cardiac Rhythm Management Device) all   Problems Present (Cardiac Rhythm Dev) situational response

## 2019-06-19 NOTE — THERAPY TREATMENT NOTE
Acute Care - Occupational Therapy Treatment Note  [facility]     Patient Name: Stef Jones  : 1945  MRN: [id number]  Today's Date: 2019  Onset of Illness/Injury or Date of Surgery: 19  Date of Referral to OT: 19  Referring Physician: MD Shan    Admit Date: 2019       ICD-10-CM ICD-9-CM   1. A Fib w/RVR I48.91 427.31   2. Impaired functional mobility, balance, gait, and endurance Z74.09 V49.89   3. 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 Atrial fibrillation with RVR (CMS/HCC) I48.91 427.31     Patient Active Problem List   Diagnosis   • Cellulitis of Right Lower Leg and Foot   • A Fib w/RVR   • COPD (chronic obstructive pulmonary disease) (CMS/HCC)   • Dysphagia   • Laryngeal mass     Past Medical History:   Diagnosis Date   • COPD (chronic obstructive pulmonary disease) (CMS/HCC)    • Hearing loss    • Hoarse voice quality    • Hypertension      Past Surgical History:   Procedure Laterality Date   • CARDIAC ELECTROPHYSIOLOGY PROCEDURE Left 2019    Procedure: AV node ablation + Bi-V PPM implant;  Surgeon: Lucio Chapman MD;  Location: [facility] EP INVASIVE LOCATION;  Service: Cardiovascular   • CARDIAC ELECTROPHYSIOLOGY PROCEDURE N/A 2019    Procedure: AV node ablation;  Surgeon: Lucio Chapman MD;  Location: [facility] EP INVASIVE LOCATION;  Service: Cardiovascular   • HEMORRHOIDECTOMY     • LARYNGOSCOPY N/A 2019    Procedure: MICRO DIRECT LARYNGOSCOPY WITH BIOPSY;  Surgeon: Glen Gutiérrez MD;  Location: [facility] OR;  Service: ENT       Therapy Treatment    Rehabilitation Treatment Summary     Row Name 19 0912             Treatment Time/Intention    Discipline  occupational therapist  (Pended)   -CH      Document Type  therapy note (daily note)  (Pended)   -CH      Subjective Information  no complaints  (Pended)   -CH      Mode of Treatment  occupational therapy  (Pended)   -CH      Patient Effort  excellent   (Pended)   -      Existing Precautions/Restrictions  fall;cardiac;pacemaker  (Pended)  Monitor BP and HR  -CH      Patient Response to Treatment  Tolerated.   (Pended)   -CH      Recorded by [CH] Dinesh Gomez, OT Student 06/19/19 1048      Row Name 06/19/19 0912             Vital Signs    Pre Systolic BP Rehab  100  (Pended)   -CH      Pre Treatment Diastolic BP  85  (Pended)   -CH      Post Systolic BP Rehab  101  (Pended)   -CH      Post Treatment Diastolic BP  61  (Pended)   -CH      Pretreatment Heart Rate (beats/min)  80  (Pended)   -CH      Posttreatment Heart Rate (beats/min)  80  (Pended)   -CH      Pre SpO2 (%)  93  (Pended)   -CH      O2 Delivery Pre Treatment  nasal cannula  (Pended)   -CH      Intra SpO2 (%)  88  (Pended)   -CH      O2 Delivery Intra Treatment  nasal cannula  (Pended)   -CH      Post SpO2 (%)  94  (Pended)   -CH      O2 Delivery Post Treatment  nasal cannula  (Pended)   -CH      Pre Patient Position  Sitting  (Pended)   -CH      Intra Patient Position  Standing  (Pended)   -CH      Post Patient Position  Sitting  (Pended)   -CH      Recorded by [CH] Dinesh Gomez, OT Student 06/19/19 1048      Row Name 06/19/19 0912             Cognitive Assessment/Intervention    Additional Documentation  Cognitive Assessment/Intervention (Group)  (Pended)   -CH      Recorded by [CH] Dinesh Gomez, OT Student 06/19/19 1048      Row Name 06/19/19 0912             Cognitive Assessment/Intervention- PT/OT    Affect/Mental Status (Cognitive)  WFL  (Pended)   -CH      Orientation Status (Cognition)  oriented x 4  (Pended)   -CH2      Follows Commands (Cognition)  over 90% accuracy  (Pended)   -CH2      Cognitive Function (Cognitive)  safety deficit  (Pended)   -CH2      Safety Deficit (Cognitive)  awareness of need for assistance;safety precautions awareness;insight into deficits/self awareness  (Pended)   -CH2      Personal Safety Interventions  fall prevention program  maintained;gait belt;nonskid shoes/slippers when out of bed  (Pended)   -[facility]      Recorded by [CH] Dinesh Gomez, OT Student 06/19/19 1131  [CH2] Dinesh Gomez, OT Student 06/19/19 1048      Row Name 06/19/19 0912             Safety Issues, Functional Mobility    Safety Issues Affecting Function (Mobility)  awareness of need for assistance;insight into deficits/self awareness;safety precaution awareness  (Pended)   -CH      Impairments Affecting Function (Mobility)  endurance/activity tolerance;strength;shortness of breath  (Pended)   -CH      Recorded by [CH] Dinesh Gomez, OT Student 06/19/19 1048      Row Name 06/19/19 0912             Bed Mobility Assessment/Treatment    Comment (Bed Mobility)  Pt received [facility].  (Pended)   -CH      Recorded by [CH] Dinesh Gomez, OT Student 06/19/19 1048      Row Name 06/19/19 0912             Functional Mobility    Functional Mobility- Ind. Level  contact guard assist  (Pended)   -      Functional Mobility- Device  rolling walker  (Pended)   -      Functional Mobility-Distance (Feet)  300  (Pended)   -      Functional Mobility- Comment  Pt required 1 rest break during ambulation with desat to 88%. Pt educated on pursed lip breathing to resolve SOA.   (Pended)   -CH      Recorded by [CH] Dinesh Gomez, OT Student 06/19/19 1048      Row Name 06/19/19 0912             Transfer Assessment/Treatment    Transfer Assessment/Treatment  sit-stand transfer;stand-sit transfer;toilet transfer  (Pended)   -      Comment (Transfers)  VCs to maintain pacemaker precautions.   (Pended)   -      Recorded by [CH] Dinesh Gomez, OT Student 06/19/19 1048      Row Name 06/19/19 0912             Sit-Stand Transfer    Sit-Stand [place] (Transfers)  contact guard;verbal cues  (Pended)   -      Assistive Device (Sit-Stand Transfers)  walker, front-wheeled  (Pended)   -      Recorded by [CH] Dinesh Gomez, OT Student  06/19/19 1048      Row Name 06/19/19 0912             Stand-Sit Transfer    Stand-Sit [place] (Transfers)  contact guard;verbal cues  (Pended)   -CH      Assistive Device (Stand-Sit Transfers)  walker, front-wheeled  (Pended)   -CH      Recorded by [CH] Dinesh Gomez, OT Student 06/19/19 1048      Row Name 06/19/19 0912             Toilet Transfer    Type (Toilet Transfer)  stand-sit;sit-stand  (Pended)   -CH      [place] Level (Toilet Transfer)  contact guard;verbal cues  (Pended)   -CH      Assistive Device (Toilet Transfer)  raised toilet seat;walker, front-wheeled;grab bars/safety frame  (Pended)   -CH      Recorded by [CH] Dinesh Gomez OT Student 06/19/19 1048      Row Name 06/19/19 0912             ADL Assessment/Intervention    56438 - OT Self Care/Mgmt Minutes  15  (Pended)   -CH      BADL Assessment/Intervention  lower body dressing;grooming;toileting  (Pended)   -CH      Recorded by [CH] Dinesh Gomez OT Student 06/19/19 1048      Row Name 06/19/19 0912             Lower Body Dressing Assessment/Training    Lower Body Dressing [place] Level  don;doff;socks;contact guard assist  (Pended)   -CH      Lower Body Dressing Position  unsupported sitting  (Pended)   -CH      Comment (Lower Body Dressing)  Pt educated on cross leg technique and EC principles to prevent SOA.  (Pended)   -CH2      Recorded by [CH] Dinesh Gomez OT Student 06/19/19 1051  [CH2] Dinesh Gomez, OT Student 06/19/19 1125      Row Name 06/19/19 0912             Grooming Assessment/Training    [place] Level (Grooming)  wash face, hands;oral care regimen;hair care, combing/brushing;supervision;verbal cues  (Pended)   -CH      Grooming Position  sink side  (Pended)   -CH      Recorded by [CH] Dinesh Gomez, OT Student 06/19/19 1100      Row Name 06/19/19 0912             Toileting Assessment/Training    [place] Level (Toileting)  adjust/manage clothing;perform  perineal hygiene;supervision;verbal cues  (Pended)   -CH      Assistive Devices (Toileting)  grab bar/safety frame;raised toilet seat  (Pended)   -CH      Toileting Position  supported sitting  (Pended)   -CH      Comment (Toileting)  VCs to maintain pacemaker precautions.   (Pended)   -CH      Recorded by [CH] Dinesh Gomez, OT Student 06/19/19 1100      Row Name 06/19/19 0912             BADL Safety/Performance    Impairments, BADL Safety/Performance  endurance/activity tolerance;strength  (Pended)   -CH      Skilled BADL Treatment/Intervention  BADL process/adaptation training  (Pended)   -CH      Progress in BADL Status  improvement noted  (Pended)   -CH      Recorded by [CH] Dinesh Gomez, OT Student 06/19/19 1100      Row Name 06/19/19 0912             Therapeutic Exercise    29333 - OT Therapeutic Activity Minutes  9  (Pended)   -CH      Recorded by [CH] Dinesh Gomez, OT Student 06/19/19 1100      Row Name 06/19/19 0912             Balance    Balance  static sitting balance;static standing balance;dynamic sitting balance  (Pended)   -CH      Recorded by [CH] Dinesh Gomez, OT Student 06/19/19 1100      Row Name 06/19/19 0912             Static Sitting Balance    Level of [place] (Unsupported Sitting, Static Balance)  supervision  (Pended)   -CH      Sitting Position (Unsupported Sitting, Static Balance)  sitting in chair  (Pended)   -CH      Time Able to Maintain Position (Unsupported Sitting, Static Balance)  3 to 4 minutes  (Pended)   -CH      Recorded by [CH] Dinesh Gomez OT Student 06/19/19 1100      Row Name 06/19/19 0912             Dynamic Sitting Balance    Level of [place], Reaches Outside Midline (Sitting, Dynamic Balance)  contact guard assist  (Pended)   -CH      Sitting Position, Reaches Outside Midline (Sitting, Dynamic Balance)  sitting in chair  (Pended)  Commode  -CH      Recorded by [CH] Dinesh Gomez, OT Student 06/19/19  1100      Row Name 06/19/19 0912             Static Standing Balance    Level of [place] (Supported Standing, Static Balance)  standby assist  (Pended)   -CH      Time Able to Maintain Position (Supported Standing, Static Balance)  4 to 5 minutes  (Pended)   -      Assistive Device Utilized (Supported Standing, Static Balance)  walker, rolling  (Pended)   -CH      Recorded by [CH] Dinesh Gomez, OT Student 06/19/19 1100      Row Name 06/19/19 0912             Positioning and Restraints    Pre-Treatment Position  sitting in chair/recliner  (Pended)   -CH      Post Treatment Position  chair  (Pended)   -CH      In Chair  notified nsg;reclined;call light within reach;encouraged to call for assist;exit alarm on;waffle cushion  (Pended)   -CH      Recorded by [CH] Dinesh Gomez, OT Student 06/19/19 1100      Row Name 06/19/19 0912             Pain Scale: Numbers Pre/Post-Treatment    Pain Scale: Numbers, Pretreatment  0/10 - no pain  (Pended)   -      Pain Scale: Numbers, Post-Treatment  0/10 - no pain  (Pended)   -CH      Recorded by [CH] Dinesh Gomez, OT Student 06/19/19 1100      Row Name                Wound 06/17/19 1410 Left upper;lateral chest incision;surgical    Wound - Properties Group Date first assessed: 06/17/19 [BM] Time first assessed: 1410 [BM] Side: Left [BM] Orientation: upper;lateral [BM] Location: chest [BM] Type: incision;surgical [BM] Recorded by:  [BM] Bozena Villanueva RN 06/17/19 1410    Row Name 06/19/19 0912             Plan of Care Review    Plan of Care Reviewed With  patient  (Pended)   -CH      Recorded by [CH] Dinesh Gomez, OT Student 06/19/19 1100      Row Name 06/19/19 0912             Outcome Summary/Treatment Plan (OT)    Daily Summary of Progress (OT)  progress toward functional goals is good  (Pended)   -      Anticipated Discharge Disposition (OT)  home with assist;home with home health  (Pended)   -[facility]      Recorded by [CH] Jason  Dinesh CANALES, OT Student 06/19/19 1100  [CH2] Dinesh Gomez, OT Student 06/19/19 1103        User Key  (r) = Recorded By, (t) = Taken By, (c) = Cosigned By    Initials Name Effective Dates Discipline    BM Bozena Villanueva, RN 06/16/16 -  Nurse    Dinesh Carmona, OT Student 03/13/19 -  OT        Rash 06/14/19 2000 Left lower lumbar spine papule (Active)   Distribution generalized 6/19/2019  6:00 AM   Characteristics dry 6/19/2019  6:00 AM   Color red 6/19/2019  6:00 AM       Rash 06/15/19 2000 other (see comments) medial umbilical area papule (Active)   Distribution generalized 6/19/2019  8:00 AM   Characteristics dry 6/19/2019  8:00 AM   Color red 6/19/2019  8:00 AM       Wound 06/17/19 1410 Left upper;lateral chest incision;surgical (Active)   Dressing Appearance no drainage 6/19/2019  8:00 AM   Closure Adhesive closure strips 6/19/2019  8:00 AM   Base dressing in place, unable to visualize 6/19/2019  8:00 AM   Periwound intact 6/19/2019  8:00 AM   Drainage Amount none 6/19/2019  8:00 AM   Dressing Care, Wound gauze 6/19/2019  8:00 AM       Occupational Therapy Education     Title: PT OT SLP Therapies (Done)     Topic: Occupational Therapy (Done)     Point: ADL training (Done)     Description: Instruct learner(s) on proper safety adaptation and remediation techniques during self care or transfers.   Instruct in proper use of assistive devices.    Learning Progress Summary           Patient Acceptance, E, VU by  at 6/19/2019  9:12 AM    Comment:  Pt educated on home safety and EC principles, safe t/f techniques, the benefits of therapy and the role of OT.    Acceptance, E, NR by  at 6/18/2019 10:56 AM    Comment:  Pt educated on appropriate safety precautions, t/f techniques, PM precautions, adaptive dressing techniques, and benefits of therapy.    Acceptance, E, VU by CH at 6/17/2019  8:15 AM    Comment:  Pt educated on EC principles, pursed lip breathing, safe t/f techniques, the  benefits of therapy and the role of OT.    Acceptance, E,TB, VU,DU by CH1 at 6/15/2019  9:30 PM    Acceptance, E,TB, VU,DU,NR by CH1 at 6/14/2019 10:18 PM    Acceptance, E, NR by CL at 6/10/2019  1:49 PM    Comment:  Pt educated on appropriate safety precautions, t/f techniques, role of OT, and benefits of therapy.   Family Acceptance, E, VU by  at 6/17/2019  8:15 AM    Comment:  Pt educated on EC principles, pursed lip breathing, safe t/f techniques, the benefits of therapy and the role of OT.    Acceptance, E,TB, VU,DU,NR by 1 at 6/14/2019 10:18 PM    Acceptance, E, NR by CL at 6/10/2019  1:49 PM    Comment:  Pt educated on appropriate safety precautions, t/f techniques, role of OT, and benefits of therapy.                   Point: Home exercise program (Done)     Description: Instruct learner(s) on appropriate technique for monitoring, assisting and/or progressing therapeutic exercises/activities.    Learning Progress Summary           Patient Acceptance, E,TB, VU,DU by CH1 at 6/15/2019  9:30 PM    Acceptance, E, VU by  at 6/15/2019  9:57 AM    Comment:  BUE HEP; renforced need for call for assist with OOB activities.    Acceptance, E,TB, VU,DU,NR by 1 at 6/14/2019 10:18 PM   Family Acceptance, E,TB, VU,DU,NR by 1 at 6/14/2019 10:18 PM                   Point: Precautions (Done)     Description: Instruct learner(s) on prescribed precautions during self-care and functional transfers.    Learning Progress Summary           Patient Acceptance, E, VU by  at 6/19/2019  9:12 AM    Comment:  Pt educated on home safety and EC principles, safe t/f techniques, the benefits of therapy and the role of OT.    Acceptance, E, NR by CL at 6/18/2019 10:56 AM    Comment:  Pt educated on appropriate safety precautions, t/f techniques, PM precautions, adaptive dressing techniques, and benefits of therapy.    Acceptance, E, VU by  at 6/17/2019  8:15 AM    Comment:  Pt educated on EC principles, pursed lip breathing,  safe t/f techniques, the benefits of therapy and the role of OT.    Acceptance, E,TB, VU,DU by CH1 at 6/15/2019  9:30 PM    Acceptance, E, VU by TA at 6/15/2019  9:57 AM    Comment:  BUE HEP; renforced need for call for assist with OOB activities.    Acceptance, E,TB, VU,DU,NR by CH1 at 6/14/2019 10:18 PM    Acceptance, E, NR by CL at 6/10/2019  1:49 PM    Comment:  Pt educated on appropriate safety precautions, t/f techniques, role of OT, and benefits of therapy.   Family Acceptance, E, VU by  at 6/17/2019  8:15 AM    Comment:  Pt educated on EC principles, pursed lip breathing, safe t/f techniques, the benefits of therapy and the role of OT.    Acceptance, E,TB, VU,DU,NR by CH1 at 6/14/2019 10:18 PM    Acceptance, E, NR by CL at 6/10/2019  1:49 PM    Comment:  Pt educated on appropriate safety precautions, t/f techniques, role of OT, and benefits of therapy.                   Point: Body mechanics (Done)     Description: Instruct learner(s) on proper positioning and spine alignment during self-care, functional mobility activities and/or exercises.    Learning Progress Summary           Patient Acceptance, E, VU by  at 6/19/2019  9:12 AM    Comment:  Pt educated on home safety and EC principles, safe t/f techniques, the benefits of therapy and the role of OT.    Acceptance, E, NR by CL at 6/18/2019 10:56 AM    Comment:  Pt educated on appropriate safety precautions, t/f techniques, PM precautions, adaptive dressing techniques, and benefits of therapy.    Acceptance, E, VU by CH at 6/17/2019  8:15 AM    Comment:  Pt educated on EC principles, pursed lip breathing, safe t/f techniques, the benefits of therapy and the role of OT.    Acceptance, E,TB, VU,DU by CH1 at 6/15/2019  9:30 PM    Acceptance, E,TB, VU,DU,NR by CH1 at 6/14/2019 10:18 PM    Acceptance, E, NR by CL at 6/10/2019  1:49 PM    Comment:  Pt educated on appropriate safety precautions, t/f techniques, role of OT, and benefits of therapy.   Family  Acceptance, E, VU by  at 6/17/2019  8:15 AM    Comment:  Pt educated on EC principles, pursed lip breathing, safe t/f techniques, the benefits of therapy and the role of OT.    Acceptance, E,TB, VU,DU,NR by [facility] at 6/14/2019 10:18 PM    Acceptance, E, NR by  at 6/10/2019  1:49 PM    Comment:  Pt educated on appropriate safety precautions, t/f techniques, role of OT, and benefits of therapy.                               User Key     Initials Effective Dates Name Provider Type Discipline     03/14/16 -  Forrest Echeverria, OT Occupational Therapist OT    [facility] 06/16/16 -  Tri White RN Registered Nurse Nurse    CL 04/03/18 -  Harper Nelson OT Occupational Therapist OT     03/13/19 -  Dinesh Gomez OT Student OT Student OT                OT Recommendation and Plan  Outcome Summary/Treatment Plan (OT)  Daily Summary of Progress (OT): (P) progress toward functional goals is good  Anticipated Discharge Disposition (OT): (P) home with assist, home with home health  Daily Summary of Progress (OT): (P) progress toward functional goals is good  Plan of Care Review  Plan of Care Reviewed With: (P) patient  Plan of Care Reviewed With: (P) patient  Outcome Summary: (P) Pt CGA for LB dressing, t/f and mobility. Pt supervision for grooming and toileting. Pt educated on EC principles and home safety. Recommned CONT skilled IPOT POC. Recommend D/C to home with assist and HH services.   Outcome Measures     Row Name 06/19/19 0912 06/18/19 1056 06/18/19 0819       How much help from another person do you currently need...    Turning from your back to your side while in flat bed without using bedrails?  --  --  3  -LS    Moving from lying on back to sitting on the side of a flat bed without bedrails?  --  --  3  -LS    Moving to and from a bed to a chair (including a wheelchair)?  --  --  3  -LS    Standing up from a chair using your arms (e.g., wheelchair, bedside chair)?  --  --  3  -LS    Climbing 3-5  steps with a railing?  --  --  3  -LS    To walk in hospital room?  --  --  3  -LS    AM-PAC 6 Clicks Score  --  --  18  -LS       How much help from another is currently needed...    Putting on and taking off regular lower body clothing?  3  (Pended)   -CH  3  -CL  --    Bathing (including washing, rinsing, and drying)  2  (Pended)   -CH  2  -CL  --    Toileting (which includes using toilet bed pan or urinal)  3  (Pended)   -CH  3  -CL  --    Putting on and taking off regular upper body clothing  3  (Pended)   -CH  3  -CL  --    Taking care of personal grooming (such as brushing teeth)  4  (Pended)   -CH  3  -CL  --    Eating meals  4  (Pended)   -CH  4  -CL  --    Score  19  (Pended)   -CH  18  -CL  --       Functional Assessment    Outcome Measure Options  --  --  AM-PAC 6 Clicks Basic Mobility (PT)  -    Row Name 06/17/19 0815             How much help from another is currently needed...    Putting on and taking off regular lower body clothing?  2  -CL (r) CH (t) CL (c)      Bathing (including washing, rinsing, and drying)  2  -CL (r) CH (t) CL (c)      Toileting (which includes using toilet bed pan or urinal)  3  -CL (r) CH (t) CL (c)      Putting on and taking off regular upper body clothing  4  -CL (r) CH (t) CL (c)      Taking care of personal grooming (such as brushing teeth)  4  -CL (r) CH (t) CL (c)      Eating meals  4  -CL (r) CH (t) CL (c)      Score  19  -CL (r) CH (t)        User Key  (r) = Recorded By, (t) = Taken By, (c) = Cosigned By    Initials Name Provider Type    Unique Martínez, PT Physical Therapist    CL Harper Nelson, OT Occupational Therapist    CH Dinesh Gomez, OT Student OT Student           Time Calculation:   Time Calculation- OT     Row Name 06/19/19 0912             Time Calculation- OT    OT Start Time  0912  (Pended)   -      Total Timed Code Minutes- OT  24 minute(s)  (Pended)   -CH      OT Received On  06/19/19  (Pended)   -      OT Goal Re-Cert Due Date   06/20/19  (Pended)   -         Timed Charges    61457 - OT Therapeutic Activity Minutes  9  (Pended)   -      46811 - OT Self Care/Mgmt Minutes  15  (Pended)   -        User Key  (r) = Recorded By, (t) = Taken By, (c) = Cosigned By    Initials Name Provider Type     Dinesh Gomez, OT Student OT Student        Therapy Charges for Today     Code Description Service Date Service Provider Modifiers Qty    [id number]  OT THERAPEUTIC ACT EA 15 MIN 6/19/2019 Dinesh Gomez, OT Student GO 1    [id number]  OT SELF CARE/MGMT/TRAIN EA 15 MIN 6/19/2019 Dinesh Gomez, OT Student GO 1    [id number]  OT THER SUPP EA 15 MIN 6/19/2019 Dinesh Gomez, OT Student GO 2               MELBA Woodard  6/19/2019

## 2019-06-19 NOTE — CONSULTS
"Diabetes Education  Assessment/Teaching    Patient Name:  Stef Jones  YOB: 1945  MRN: [id number]  Admit Date:  6/8/2019      Assessment Date:  6/19/2019    Most Recent Value   General Information    Referral From:  MD wan   Height  167.6 cm (66\")   Height Method  Stated   Weight  72.6 kg (160 lb 0.9 oz)   Weight Method  Bed scale   Pregnancy Assessment   Diabetes History   What type of diabetes do you have?  Type 2   Length of Diabetes Diagnosis  Newly diagnosed <6 months   Current DM knowledge  poor   Have you had diabetes education/teaching in the past?  no   Do you test your blood sugar at home?  no   Education Preferences   Nutrition Information   Assessment Topics   Healthy Eating - Assessment  Needs education   Being Active - Assessment  Needs education   Taking Medication - Assessment  Needs education   Problem Solving - Assessment  Needs education   Reducing Risk - Assessment  Needs education   Healthy Coping - Assessment  Needs education   Monitoring - Assessment  Needs education   DM Goals            Most Recent Value   DM Education Needs   Meter  Meter provided   Meter Type  Accuchek   Frequency of Testing  Daily   Problem Solving  Hypoglycemia, Hyperglycemia, Sick days, Signs, Symptoms, Treatment   Reducing Risks  A1C testing   Healthy Eating  Other (comment)   Physical Activity  -- [per PT]   Healthy Coping  Appropriate   Discharge Plan  Home   Motivation  Moderate   Teaching Method  Explanation, Discussion, Demonstration, Teach back   Patient Response  Verbalized understanding            Other Comments:  Patient was seen for new diagnosis of type 2 diabetes. A1c was 6.6%. He was not aware of the new diagnosis. I encouraged him to follow up with his PCP. Discussed and taught patient about type 2 diabetes self-management, risk factors, and importance of blood glucose control to reduce complications. Target blood glucose readings and A1c goals per ADA were reviewed. Reviewed with " patient current A1c and discussed its significance. Signs, symptoms and treatment of hyperglycemia and hypoglycemia were discussed. Lifestyle changes such as physical activity with MD approval and healthy eating were encouraged.  Patient provided and demonstrated blood glucose meter (Accu chek Guide) and was able to return demonstration appropriately. Patient was encouraged to keep record of blood glucose readings to take to follow up appointment with PCP.  OP education was also encouraged for additional education once discharged.          Electronically signed by:  Anu Haines RN  06/19/19 2:30 PM

## 2019-06-20 ENCOUNTER — READMISSION MANAGEMENT (OUTPATIENT)
Dept: CALL CENTER | Facility: HOSPITAL | Age: 74
End: 2019-06-20

## 2019-06-20 ENCOUNTER — TELEPHONE (OUTPATIENT)
Dept: CARDIOLOGY | Facility: CLINIC | Age: 74
End: 2019-06-20

## 2019-06-20 NOTE — OUTREACH NOTE
Prep Survey      Responses   Facility patient discharged from?  [facility]   Is patient eligible?  Yes   Discharge diagnosis  MICRO DIRECT LARYNGOSCOPY WITH BIOPSY  AV node ablation + Bi-V PPM implant   Does the patient have one of the following disease processes/diagnoses(primary or secondary)?  Other   Does the patient have Home health ordered?  Yes   What is the Home health agency?    [facility]   Is there a DME ordered?  Yes   What DME was ordered?  home 02 provided by [facility]   Prep survey completed?  Yes          Maria Luz Ellis RN

## 2019-06-20 NOTE — TELEPHONE ENCOUNTER
Pt daughter called to reschedule wound check apt as she is traveling a lot with his apts next week. She also wanted me to note that the pt experienced a lot of nausea on the ride home from discharge yesterday. They want to see if he's had anymore nausea as the daughter believes this could have been the result of a long car ride in addition to all the new medication the pt was placed on. Advised if it continues to f/u with us and pcp. Agreeable and verbalized understanding. Transferred to [facility] to reschedule wound check.

## 2019-06-24 ENCOUNTER — READMISSION MANAGEMENT (OUTPATIENT)
Dept: CALL CENTER | Facility: HOSPITAL | Age: 74
End: 2019-06-24

## 2019-06-24 NOTE — OUTREACH NOTE
Medical Week 1 Survey      Responses   Facility patient discharged from?  [facility]   Does the patient have one of the following disease processes/diagnoses(primary or secondary)?  Other   Is there a successful TCM telephone encounter documented?  No   Week 1 attempt successful?  No   Call start time  1133   Unsuccessful attempts  Attempt 1          Greta Campbell RN

## 2019-06-26 NOTE — DISCHARGE SUMMARY
DISCHARGE SUMMARY     Admit date: 6/8/2019  Date of Discharge:  6/26/2019    Discharge Diagnoses  Active Hospital Problems    Diagnosis   • **A Fib w/RVR   • Laryngeal mass   • Cellulitis of Right Lower Leg and Foot   • COPD (chronic obstructive pulmonary disease) (CMS/[facility])       Past Surgical History:   Procedure Laterality Date   • CARDIAC ELECTROPHYSIOLOGY PROCEDURE Left 6/17/2019    Procedure: AV node ablation + Bi-V PPM implant;  Surgeon: Lucio Chapman MD;  Location: [facility] EP INVASIVE LOCATION;  Service: Cardiovascular   • CARDIAC ELECTROPHYSIOLOGY PROCEDURE N/A 6/17/2019    Procedure: AV node ablation;  Surgeon: Lucio Chapman MD;  Location:  [facility] EP INVASIVE LOCATION;  Service: Cardiovascular   • HEMORRHOIDECTOMY     • LARYNGOSCOPY N/A 6/12/2019    Procedure: MICRO DIRECT LARYNGOSCOPY WITH BIOPSY;  Surgeon: Glen Gutiérrez MD;  Location: [facility] OR;  Service: ENT       History of Present Illness    Patient is a 74 y.o. male presented with: Atrial fibrillation with RVR (CMS/[facility])    Mr. Jones is a 75 yo male with with limited knowledge of PMH but known COPD and HTN who presents as a transfer from [facility] secondary to Atrial Fibrillation with RVR and cellulitis of right lower calf and foot. According to patient and family who are poor historians, patient developed bilateral foot swelling approximately 1 week ago. Around that time he was placed on Bactrim, a steroid and pain medications with minimal and possibly worsening symptoms per family. With his ongoing issues he presented to OSH for medical attention. On arrival to ED he was found to be in A. Fib with RVR. It appears he was given Diltiazem pushes a couple of times however he remained in A. Fib with RVR. At some point he was started on a Heparin gtt however documentation sent from OSH is very limited. He was transferred to [facility] for further evaluation and management.     Of note, patient had what sounds like a syncopal episode  "around the same day that he developed his leg swelling. According to fiance and daughter he often has episodes of \"smothering\" and they believe this episode of passing out was as a result of his breathing difficulties.      On arrival patient remains in A. Fib with RVR. He is hemodynamically stable otherwise. He is on supplemental oxygen.  He notes in the past that he has had intermittent leg swelling.      Hospital Course    The patient was admitted to the ICU and continued on broad spectrum antibiotics, amiodarone and heparin drips. Lower extremity duplex was negative. Cardiology was consulted and he was continued on amiodarone and given one dose of IV labetolol for A-Fib with RVR. Echocardiogram showed EF 30% with severely depressed LV systolic dysfunction, moderate MR, mild to moderate TR, RVSP 35-45. He continued to have A-Fib with RVR despite Amiodarone and low dose Esmolol was added but he was unable to tolerate secondary to hypotension. On 6/10 He underwent successful ECV to NSR. The patient had complained of hoarseness and FEES study showed what was felt to be a laryngeal abnormality and ENT was consulted. Laryngoscopy was performed on 6/12 and pathology was benign with only inflammation and reactive change. A-Fib with RVR recurred and he was treated with IV Lopressor and IV digoxin. CHADSVASc score was 4 and he was started on Eliquis.  On 6/17 he underwent placement of a biventricular pacemaker. He was hypotensive and briefly required vasopressor support. On 6/19 his BP was stable and he was off pressors, he was felt stable and ready for discharge home. He will follow up with cardiology in 4-6 weeks for repeat echocardiogram and with EP in one week for wound check.        Procedures Performed: Procedure(s):  AV node ablation + Bi-V PPM implant  AV node ablation     Consults:   Glen Gutiérrez MD, ENT    Yovanny Solares MD, Cardiology    Pertinent Test Results:             Invalid input(s): " LABALBU        Condition on Discharge:  Stable    Discharge Disposition: Home-Health Care [facility] [place]    Discharge Medications:      Discharge Medications      New Medications      Instructions Start Date   amiodarone 200 MG tablet  Commonly known as:  PACERONE   200 mg, Oral, Every 24 Hours Scheduled      apixaban 5 MG tablet tablet  Commonly known as:  ELIQUIS   5 mg, Oral, Every 12 Hours Scheduled      carvedilol 3.125 MG tablet  Commonly known as:  COREG   3.125 mg, Oral, 2 Times Daily With Meals         Continue These Medications      Instructions Start Date   amitriptyline 100 MG tablet  Commonly known as:  ELAVIL   100 mg, Oral, Nightly      atorvastatin 20 MG tablet  Commonly known as:  LIPITOR   20 mg, Oral, Nightly      BREO ELLIPTA 100-25 MCG/INH inhaler  Generic drug:  Fluticasone Furoate-Vilanterol   1 puff, Inhalation, Daily - RT      esomeprazole 40 MG capsule  Commonly known as:  nexIUM   40 mg, Oral, Every Morning Before Breakfast      furosemide 20 MG tablet  Commonly known as:  LASIX   20 mg, Oral, Daily      ipratropium 0.02 % nebulizer solution  Commonly known as:  ATROVENT   500 mcg, Nebulization, 4 Times Daily - RT      levalbuterol 45 MCG/ACT inhaler  Commonly known as:  XOPENEX HFA   1-2 puffs, Inhalation, Every 6 Hours PRN         Stop These Medications    amLODIPine 2.5 MG tablet  Commonly known as:  NORVASC     metoprolol tartrate 50 MG tablet  Commonly known as:  LOPRESSOR            Discharge Diet: No diet orders on file    Activity at Discharge: Stable    Follow-up Appointments  Future Appointments   Date Time Provider Department Center   6/27/2019 10:15 AM Stefany Morales APRN [facility]   6/27/2019  1:15 PM WOUND CHECK [facility] None   8/8/2019 10:30 AM Yovanny Solares MD [facility] [place] [facility] None   10/2/2019 11:15 AM Lucio Chapman MD [facility] None     Additional Instructions for the Follow-ups that You Need to Schedule     Ambulatory Referral to Home Health   As directed      Face  to Face Visit Date:  6/19/2019    Follow-up Provider for Plan of Care?:  I treated the patient in an acute care facility and will not continue treatment after discharge.    Follow-up Provider:  AUGUSTO GARCIA [[id number]]    Reason/Clinical Findings:  speech    Describe mobility limitations that make leaving home difficult:  weakness due to pt being in the hospital    Nursing/Therapeutic Services Requested:  Physical Therapy Speech Therapy    PT orders:  Gait Training Strengthening    Weight Bearing Status:  As Tolerated    Frequency:  1 Week 1         Discharge Follow-up with Specialty: Dr. Chapman; 3 Months   As directed      Specialty:  Dr. Chapman    Follow Up:  3 Months    Follow Up Details:  wound check in 7-10 days, 3 months with PM check ([facility])         Discharge Follow-up with Specialty: Yovanny dave 6-8 weeks; 6 Weeks   As directed      Specialty:  Yovanny dave 6-8 weeks    Follow Up:  6 Weeks         Discharge Follow-up with Specialty: wound check in 7-10 days from implant (6/17); 1 Week   As directed      Specialty:  wound check in 7-10 days from implant (6/17)    Follow Up:  1 Week               Test Results Pending at Discharge       Code Status and Medical Interventions:   Ordered at: 06/08/19 0320     Code Status:    CPR     Medical Interventions (Level of Support Prior to Arrest):    Full       MAYI Fajardo  06/26/19  8:33 AM    Discharge Time Spent: 40 Minutes

## 2019-06-27 ENCOUNTER — OFFICE VISIT (OUTPATIENT)
Dept: CARDIOLOGY | Facility: HOSPITAL | Age: 74
End: 2019-06-27

## 2019-06-27 ENCOUNTER — HOSPITAL ENCOUNTER (OUTPATIENT)
Dept: CARDIOLOGY | Facility: HOSPITAL | Age: 74
Discharge: HOME | End: 2019-06-27
Admitting: NURSE PRACTITIONER

## 2019-06-27 ENCOUNTER — OFFICE VISIT (OUTPATIENT)
Dept: CARDIOLOGY | Facility: CLINIC | Age: 74
End: 2019-06-27

## 2019-06-27 ENCOUNTER — DOCUMENTATION (OUTPATIENT)
Dept: CARDIOLOGY | Facility: CLINIC | Age: 74
End: 2019-06-27

## 2019-06-27 VITALS
OXYGEN SATURATION: 94 % | HEIGHT: 66 IN | BODY MASS INDEX: 26.54 KG/M2 | RESPIRATION RATE: 18 BRPM | DIASTOLIC BLOOD PRESSURE: 81 MMHG | HEART RATE: 87 BPM | WEIGHT: 165.13 LBS | TEMPERATURE: 96.4 F | SYSTOLIC BLOOD PRESSURE: 152 MMHG

## 2019-06-27 DIAGNOSIS — I48.91 ATRIAL FIBRILLATION WITH RVR (HCC): ICD-10-CM

## 2019-06-27 DIAGNOSIS — J44.9 CHRONIC OBSTRUCTIVE PULMONARY DISEASE, UNSPECIFIED COPD TYPE (HCC): ICD-10-CM

## 2019-06-27 DIAGNOSIS — I50.22 CHRONIC SYSTOLIC HEART FAILURE (HCC): ICD-10-CM

## 2019-06-27 DIAGNOSIS — I48.91 ATRIAL FIBRILLATION WITH RVR (HCC): Primary | ICD-10-CM

## 2019-06-27 PROCEDURE — 99024 POSTOP FOLLOW-UP VISIT: CPT | Performed by: INTERNAL MEDICINE

## 2019-06-27 PROCEDURE — 99214 OFFICE O/P EST MOD 30 MIN: CPT | Performed by: NURSE PRACTITIONER

## 2019-06-27 PROCEDURE — 93005 ELECTROCARDIOGRAM TRACING: CPT | Performed by: NURSE PRACTITIONER

## 2019-06-27 PROCEDURE — 93010 ELECTROCARDIOGRAM REPORT: CPT | Performed by: INTERNAL MEDICINE

## 2019-06-27 RX ORDER — AMITRIPTYLINE HYDROCHLORIDE 50 MG/1
50 TABLET, FILM COATED ORAL NIGHTLY
COMMUNITY
Start: 2019-06-26 | End: 2020-01-10 | Stop reason: SDUPTHER

## 2019-06-27 RX ORDER — NICOTINE 21 MG/24HR
1 PATCH, TRANSDERMAL 24 HOURS TRANSDERMAL EVERY 24 HOURS
Qty: 30 PATCH | Refills: 1 | Status: SHIPPED | OUTPATIENT
Start: 2019-06-27 | End: 2019-10-02

## 2019-06-28 ENCOUNTER — CLINICAL SUPPORT NO REQUIREMENTS (OUTPATIENT)
Dept: CARDIOLOGY | Facility: CLINIC | Age: 74
End: 2019-06-28

## 2019-06-28 ENCOUNTER — READMISSION MANAGEMENT (OUTPATIENT)
Dept: CALL CENTER | Facility: HOSPITAL | Age: 74
End: 2019-06-28

## 2019-06-28 ENCOUNTER — TELEPHONE (OUTPATIENT)
Dept: CARDIOLOGY | Facility: CLINIC | Age: 74
End: 2019-06-28

## 2019-06-28 DIAGNOSIS — I48.91 ATRIAL FIBRILLATION, UNSPECIFIED TYPE (HCC): Primary | ICD-10-CM

## 2019-06-28 NOTE — TELEPHONE ENCOUNTER
Called to assist pt in sending a manual transmission. No answer so I left a message with my name and number and ask him to return my call.

## 2019-06-28 NOTE — OUTREACH NOTE
"Medical Week 1 Survey      Responses   Facility patient discharged from?  [facility]   Does the patient have one of the following disease processes/diagnoses(primary or secondary)?  Other   Is there a successful TCM telephone encounter documented?  No   Week 1 attempt successful?  Yes   Call start time  1435   Call end time  1440   Discharge diagnosis  MICRO DIRECT LARYNGOSCOPY WITH BIOPSY  AV node ablation + Bi-V PPM implant   Meds reviewed with patient/caregiver?  Yes   Is the patient having any side effects they believe may be caused by any medication additions or changes?  No   Does the patient have all medications ordered at discharge?  Yes   Is the patient taking all medications as directed (includes completed medication regime)?  Yes   Does the patient have a primary care provider?   Yes   Does the patient have an appointment with their PCP within 7 days of discharge?  Yes   Has the patient kept scheduled appointments due by today?  Yes   Comments  Pt has had multiple appointments.    Psychosocial issues?  No   Did the patient receive a copy of their discharge instructions?  Yes   Nursing interventions  Reviewed instructions with patient   What is the patient's perception of their health status since discharge?  Improving   Is the patient/caregiver able to teach back signs and symptoms related to disease process for when to call PCP?  Yes   Is the patient/caregiver able to teach back signs and symptoms related to disease process for when to call 911?  Yes   Is the patient/caregiver able to teach back the hierarchy of who to call/visit for symptoms/problems? PCP, Specialist, Home health nurse, Urgent Care, ED, 911  Yes   Additional teach back comments  pt states he is doing \"good\". He has been back to the DR and we reviewed s/s to watch for regarding his device and wound care.    Week 1 call completed?  Yes          Katharine Low RN  "

## 2019-07-01 NOTE — PROGRESS NOTES
2019    Stef Karen, : 1945    WOUND CHECK    B/P:     Pulse:     Patient has fever: [] Temperature if indicated:     Wound Location: Left Infraclavicular    Dressing Removed [x]        Old Dressing Appearance:  Clean, dry [x]                 Old, bloody drainage []                            Moist, serous drainage []                Moist, thick yellow/green drainage []       Wound Appearance: Redness []                  Drainage []                  Culture obtained []        Color: N/A     Consistency: n/a     Amount: none         Gloves used, wound cleansed with sterile 4x4 and peroxide [x]       MD notified [] MD orders:     Antibiotic started []  If checked, type   Other:     Appointment for follow-up scheduled for 3 months post procedure [x]    Future Appointments   Date Time Provider Department Center   2019 10:30 AM Yovanny Solares MD [facility] None   2019  1:15 PM Stefany Morales APRN [facility]   10/2/2019 11:15 AM Lucio Chapman MD [facility] [place] [place] [facility] None           Santana Ramos MA, 19      MD Signature:______________________________ Completed By/Date:

## 2019-07-08 ENCOUNTER — READMISSION MANAGEMENT (OUTPATIENT)
Dept: CALL CENTER | Facility: HOSPITAL | Age: 74
End: 2019-07-08

## 2019-07-08 NOTE — OUTREACH NOTE
Medical Week 2 Survey      Responses   Facility patient discharged from?  [facility]   Does the patient have one of the following disease processes/diagnoses(primary or secondary)?  Other   Week 2 attempt successful?  Yes   Call start time  1606   Discharge diagnosis  MICRO DIRECT LARYNGOSCOPY WITH BIOPSY  AV node ablation + Bi-V PPM implant   Call end time  1614   Is patient permission given to speak with other caregiver?  Yes   List who call center can speak with  daughter, Ene   Person spoke with today (if not patient) and relationship  Ene, daughter   Meds reviewed with patient/caregiver?  Yes   Is the patient taking all medications as directed (includes completed medication regime)?  Yes   Has the patient kept scheduled appointments due by today?  Yes   What is the Home health agency?   [facility]   Home health comments  Physcial therpay in home at time of call.   What DME was ordered?  O2 2/l min, wears O2 most of the day and night   Has all DME been delivered?  Yes   What is the patient's perception of their health status since discharge?  Improving   If the patient is a current smoker, are they able to teach back resources for cessation?  -- [Not smoking.]   Additional teach back comments  Pacemaker site healing. No s/s infection.   Week 2 Call Completed?  Yes          Janeth Porter RN

## 2019-07-15 ENCOUNTER — READMISSION MANAGEMENT (OUTPATIENT)
Dept: CALL CENTER | Facility: HOSPITAL | Age: 74
End: 2019-07-15

## 2019-07-15 NOTE — OUTREACH NOTE
Medical Week 3 Survey      Responses   Facility patient discharged from?  [place]   Does the patient have one of the following disease processes/diagnoses(primary or secondary)?  Other   Week 3 attempt successful?  Yes   Call start time  1609   Call end time  1611   Discharge diagnosis  MICRO DIRECT LARYNGOSCOPY WITH BIOPSY  AV node ablation + Bi-V PPM implant   Meds reviewed with patient/caregiver?  Yes   Is the patient having any side effects they believe may be caused by any medication additions or changes?  No   Does the patient have all medications ordered at discharge?  Yes   Is the patient taking all medications as directed (includes completed medication regime)?  Yes   Does the patient have a primary care provider?   Yes   Does the patient have an appointment with their PCP within 7 days of discharge?  Yes   Has the patient kept scheduled appointments due by today?  Yes   What is the Home health agency?   [facility]   Has home health visited the patient within 72 hours of discharge?  Yes   Psychosocial issues?  No   Did the patient receive a copy of their discharge instructions?  Yes   Nursing interventions  Reviewed instructions with patient   What is the patient's perception of their health status since discharge?  Improving   Is the patient/caregiver able to teach back signs and symptoms related to disease process for when to call PCP?  Yes   Is the patient/caregiver able to teach back signs and symptoms related to disease process for when to call 911?  Yes   Is the patient/caregiver able to teach back the hierarchy of who to call/visit for symptoms/problems? PCP, Specialist, Home health nurse, Urgent Care, ED, 911  Yes   Additional teach back comments  He says is still receiving HH and is some better than he was.  No issues with infection at this time.   Week 3 Call Completed?  Yes          Sherrie Starks RN

## 2019-07-22 ENCOUNTER — READMISSION MANAGEMENT (OUTPATIENT)
Dept: CALL CENTER | Facility: HOSPITAL | Age: 74
End: 2019-07-22

## 2019-07-22 NOTE — OUTREACH NOTE
Medical Week 4 Survey      Responses   Facility patient discharged from?  [facility]   Does the patient have one of the following disease processes/diagnoses(primary or secondary)?  Other   Week 4 attempt successful?  No          Mary Jane Berg RN

## 2019-08-05 ENCOUNTER — PREP FOR SURGERY (OUTPATIENT)
Dept: OTHER | Facility: HOSPITAL | Age: 74
End: 2019-08-05

## 2019-08-05 DIAGNOSIS — I48.91 ATRIAL FIBRILLATION, UNSPECIFIED TYPE (HCC): Primary | ICD-10-CM

## 2019-08-05 RX ORDER — NITROGLYCERIN 0.4 MG/1
0.4 TABLET SUBLINGUAL
Status: CANCELLED | OUTPATIENT
Start: 2019-08-05

## 2019-08-05 RX ORDER — SODIUM CHLORIDE 0.9 % (FLUSH) 0.9 %
3 SYRINGE (ML) INJECTION EVERY 12 HOURS SCHEDULED
Status: CANCELLED | OUTPATIENT
Start: 2019-08-05

## 2019-08-05 RX ORDER — PROMETHAZINE HYDROCHLORIDE 25 MG/ML
12.5 INJECTION, SOLUTION INTRAMUSCULAR; INTRAVENOUS EVERY 4 HOURS PRN
Status: CANCELLED | OUTPATIENT
Start: 2019-08-05

## 2019-08-05 RX ORDER — ACETAMINOPHEN 325 MG/1
650 TABLET ORAL EVERY 4 HOURS PRN
Status: CANCELLED | OUTPATIENT
Start: 2019-08-05

## 2019-08-05 RX ORDER — SODIUM CHLORIDE 0.9 % (FLUSH) 0.9 %
1-10 SYRINGE (ML) INJECTION AS NEEDED
Status: CANCELLED | OUTPATIENT
Start: 2019-08-05

## 2019-08-07 ENCOUNTER — APPOINTMENT (OUTPATIENT)
Dept: GENERAL RADIOLOGY | Facility: HOSPITAL | Age: 74
End: 2019-08-07

## 2019-08-07 ENCOUNTER — HOSPITAL ENCOUNTER (OUTPATIENT)
Dept: CARDIOLOGY | Facility: HOSPITAL | Age: 74
Discharge: HOME | End: 2019-08-07
Attending: INTERNAL MEDICINE | Admitting: INTERNAL MEDICINE

## 2019-08-07 VITALS
BODY MASS INDEX: 27.67 KG/M2 | HEIGHT: 66 IN | DIASTOLIC BLOOD PRESSURE: 91 MMHG | WEIGHT: 172.18 LBS | TEMPERATURE: 98.1 F | OXYGEN SATURATION: 97 % | HEART RATE: 80 BPM | SYSTOLIC BLOOD PRESSURE: 148 MMHG

## 2019-08-07 DIAGNOSIS — I50.22 CHRONIC SYSTOLIC CONGESTIVE HEART FAILURE (HCC): Primary | ICD-10-CM

## 2019-08-07 DIAGNOSIS — I48.19 PERSISTENT ATRIAL FIBRILLATION (HCC): ICD-10-CM

## 2019-08-07 DIAGNOSIS — I48.91 ATRIAL FIBRILLATION, UNSPECIFIED TYPE (HCC): ICD-10-CM

## 2019-08-07 PROBLEM — I10 ESSENTIAL HYPERTENSION: Status: ACTIVE | Noted: 2019-08-07

## 2019-08-07 PROBLEM — I48.0 PAROXYSMAL ATRIAL FIBRILLATION (HCC): Status: ACTIVE | Noted: 2019-06-08

## 2019-08-07 LAB
ANION GAP SERPL CALCULATED.3IONS-SCNC: 11 MMOL/L (ref 5–15)
BUN BLD-MCNC: 25 MG/DL (ref 8–23)
BUN/CREAT SERPL: 21.7 (ref 7–25)
CALCIUM SPEC-SCNC: 9.2 MG/DL (ref 8.6–10.5)
CHLORIDE SERPL-SCNC: 101 MMOL/L (ref 98–107)
CO2 SERPL-SCNC: 27 MMOL/L (ref 22–29)
CREAT BLD-MCNC: 1.15 MG/DL (ref 0.76–1.27)
DEPRECATED RDW RBC AUTO: 54.4 FL (ref 37–54)
ERYTHROCYTE [DISTWIDTH] IN BLOOD BY AUTOMATED COUNT: 16.8 % (ref 12.3–15.4)
GFR SERPL CREATININE-BSD FRML MDRD: 62 ML/MIN/1.73
GLUCOSE BLD-MCNC: 111 MG/DL (ref 65–99)
HBA1C MFR BLD: 6.3 % (ref 4.8–5.6)
HCT VFR BLD AUTO: 45.2 % (ref 37.5–51)
HGB BLD-MCNC: 14.2 G/DL (ref 13–17.7)
INR PPP: 1.3 (ref 0.85–1.16)
MCH RBC QN AUTO: 27.8 PG (ref 26.6–33)
MCHC RBC AUTO-ENTMCNC: 31.4 G/DL (ref 31.5–35.7)
MCV RBC AUTO: 88.6 FL (ref 79–97)
PLATELET # BLD AUTO: 172 10*3/MM3 (ref 140–450)
PMV BLD AUTO: 10.4 FL (ref 6–12)
POTASSIUM BLD-SCNC: 4.9 MMOL/L (ref 3.5–5.2)
PROTHROMBIN TIME: 15.6 SECONDS (ref 11.2–14.3)
RBC # BLD AUTO: 5.1 10*6/MM3 (ref 4.14–5.8)
SODIUM BLD-SCNC: 139 MMOL/L (ref 136–145)
WBC NRBC COR # BLD: 12.1 10*3/MM3 (ref 3.4–10.8)

## 2019-08-07 PROCEDURE — 80048 BASIC METABOLIC PNL TOTAL CA: CPT | Performed by: INTERNAL MEDICINE

## 2019-08-07 PROCEDURE — 99235 HOSP IP/OBS SAME DATE MOD 70: CPT | Performed by: INTERNAL MEDICINE

## 2019-08-07 PROCEDURE — 85610 PROTHROMBIN TIME: CPT | Performed by: INTERNAL MEDICINE

## 2019-08-07 PROCEDURE — 85027 COMPLETE CBC AUTOMATED: CPT | Performed by: INTERNAL MEDICINE

## 2019-08-07 PROCEDURE — 71045 X-RAY EXAM CHEST 1 VIEW: CPT

## 2019-08-07 PROCEDURE — 83036 HEMOGLOBIN GLYCOSYLATED A1C: CPT | Performed by: NURSE PRACTITIONER

## 2019-08-07 PROCEDURE — 36415 COLL VENOUS BLD VENIPUNCTURE: CPT

## 2019-08-07 RX ORDER — NITROGLYCERIN 0.4 MG/1
0.4 TABLET SUBLINGUAL
Status: DISCONTINUED | OUTPATIENT
Start: 2019-08-07 | End: 2019-08-07 | Stop reason: HOSPADM

## 2019-08-07 RX ORDER — ACETAMINOPHEN 325 MG/1
650 TABLET ORAL EVERY 4 HOURS PRN
Status: DISCONTINUED | OUTPATIENT
Start: 2019-08-07 | End: 2019-08-07 | Stop reason: HOSPADM

## 2019-08-07 RX ORDER — FENTANYL CITRATE 50 UG/ML
INJECTION, SOLUTION INTRAMUSCULAR; INTRAVENOUS
Status: DISCONTINUED
Start: 2019-08-07 | End: 2019-08-07 | Stop reason: WASHOUT

## 2019-08-07 RX ORDER — FLUMAZENIL 0.1 MG/ML
INJECTION INTRAVENOUS
Status: DISCONTINUED
Start: 2019-08-07 | End: 2019-08-07 | Stop reason: WASHOUT

## 2019-08-07 RX ORDER — SODIUM CHLORIDE 0.9 % (FLUSH) 0.9 %
1-10 SYRINGE (ML) INJECTION AS NEEDED
Status: DISCONTINUED | OUTPATIENT
Start: 2019-08-07 | End: 2019-08-07 | Stop reason: HOSPADM

## 2019-08-07 RX ORDER — NALOXONE HYDROCHLORIDE 0.4 MG/ML
INJECTION, SOLUTION INTRAMUSCULAR; INTRAVENOUS; SUBCUTANEOUS
Status: DISCONTINUED
Start: 2019-08-07 | End: 2019-08-07 | Stop reason: WASHOUT

## 2019-08-07 RX ORDER — SODIUM CHLORIDE 0.9 % (FLUSH) 0.9 %
3 SYRINGE (ML) INJECTION EVERY 12 HOURS SCHEDULED
Status: DISCONTINUED | OUTPATIENT
Start: 2019-08-07 | End: 2019-08-07 | Stop reason: HOSPADM

## 2019-08-07 RX ORDER — PROMETHAZINE HYDROCHLORIDE 25 MG/ML
12.5 INJECTION, SOLUTION INTRAMUSCULAR; INTRAVENOUS EVERY 4 HOURS PRN
Status: DISCONTINUED | OUTPATIENT
Start: 2019-08-07 | End: 2019-08-07 | Stop reason: HOSPADM

## 2019-08-07 RX ORDER — MIDAZOLAM HYDROCHLORIDE 1 MG/ML
INJECTION INTRAMUSCULAR; INTRAVENOUS
Status: DISCONTINUED
Start: 2019-08-07 | End: 2019-08-07 | Stop reason: WASHOUT

## 2019-08-07 NOTE — H&P
[facility]        Date of Hospital Visit: 19      Place of Service: [facility]    Patient Name: Stef Jones  :1945      Primary Care Provider: Lexx Monson MD    Chief complaint/Reason for Consultation:  atrial fibrillation         Problem List:  Patient Active Problem List    Diagnosis Date Noted   • Chronic systolic congestive heart failure (CMS/[facility]) 2019     Priority: High     Note Last Updated: 2019     - TTE shows ejection fraction of mid 30s, moderate RV enlargement and RV dysfunction.  - could be tachycardia induced. Will need repeat echo once maintaining NSR, may also need ischemic evaluation.  - DENA: EF 30-35%, Moderate to severe MR, moderate TR, RVSP 35-45mmHg.     • Paroxysmal atrial fibrillation (CMS/[facility]) 2019     Priority: High     Note Last Updated: 2019     1. Radiofrequency ablation of the AV node.-19. With BS BiV PPM     • Essential hypertension 2019     Priority: Medium   • Laryngeal mass 2019   • Dysphagia 06/10/2019   • Cellulitis of Right Lower Leg and Foot 2019   • COPD (chronic obstructive pulmonary disease) (CMS/[facility]) 2019               History of Present Illness:    This is a 74-year-old hypertensive male recently admitted to this facility for congestive heart failure with an incidental finding of atrial fibrillation with rapid ventricular response.  He underwent an AV node ablation with subsequent implantation of a [employer] biV permanent pacemaker.  He returns today for interrogation of his device and possible ECV.  He has no awareness of his rate and rhythm.  Continues to have dyspnea and is wearing his oxygen frequently.  He denies chest pain, orthopnea, PND, claudication.  States compliance with the current medical regimen.    He reports increased cough and slight increased shortness of breath of the last 7 to 10 days.  He was seen in urgent care, and  prescribed doxycycline for COPD exacerbation.  He still uses oxygen 24 hours/day.  Denies lower extremity edema.      Past Surgical History:   Procedure Laterality Date   • CARDIAC ELECTROPHYSIOLOGY PROCEDURE Left 6/17/2019    Procedure: AV node ablation + Bi-V PPM implant;  Surgeon: Lucio Chapman MD;  Location:  [facility];  Service: Cardiovascular   • CARDIAC ELECTROPHYSIOLOGY PROCEDURE N/A 6/17/2019    Procedure: AV node ablation;  Surgeon: Lucio Chapman MD;  Location:  [facility];  Service: Cardiovascular   • HEMORRHOIDECTOMY     • INSERT / REPLACE / REMOVE PACEMAKER     • LARYNGOSCOPY N/A 6/12/2019    Procedure: MICRO DIRECT LARYNGOSCOPY WITH BIOPSY;  Surgeon: Glen Gutiérrez MD;  Location:  [facility];  Service: ENT       Allergies   Allergen Reactions   • Amoxicillin Rash   • Penicillins Rash       Medications Prior to Admission   Medication Sig Dispense Refill Last Dose   • amiodarone (PACERONE) 200 MG tablet Take 1 tablet by mouth Daily. 30 tablet 11 8/7/2019 at Unknown time   • amitriptyline (ELAVIL) 50 MG tablet Take 50 mg by mouth Every Night.   8/6/2019 at Unknown time   • apixaban (ELIQUIS) 5 MG tablet tablet Take 1 tablet by mouth Every 12 (Twelve) Hours. 60 tablet 11 8/7/2019 at Unknown time   • atorvastatin (LIPITOR) 20 MG tablet Take 20 mg by mouth Every Night.   8/6/2019 at Unknown time   • carvedilol (COREG) 3.125 MG tablet Take 1 tablet by mouth 2 (Two) Times a Day With Meals. 30 tablet 11 8/7/2019 at Unknown time   • esomeprazole (nexIUM) 40 MG capsule Take 40 mg by mouth Every Morning Before Breakfast.   8/7/2019 at Unknown time   • Fluticasone Furoate-Vilanterol (BREO ELLIPTA) 100-25 MCG/INH inhaler Inhale 1 puff Daily.   8/7/2019 at Unknown time   • furosemide (LASIX) 20 MG tablet Take 20 mg by mouth Daily.   8/7/2019 at Unknown time   • ipratropium (ATROVENT) 0.02 % nebulizer solution Take 500 mcg by nebulization 4 (Four) Times a Day.   8/7/2019  at Unknown time   • levalbuterol (XOPENEX HFA) 45 MCG/ACT inhaler Inhale 1-2 puffs Every 6 (Six) Hours As Needed for Wheezing or Shortness of Air.      • nicotine (NICODERM CQ) 14 MG/24HR patch Place 1 patch on the skin as directed by provider Daily. 30 patch 1          Current Facility-Administered Medications:   •  acetaminophen (TYLENOL) tablet 650 mg, 650 mg, Oral, Q4H PRN, Kuns-Richardson, Vonnie B, APRN  •  nitroglycerin (NITROSTAT) SL tablet 0.4 mg, 0.4 mg, Sublingual, Q5 Min PRN, Kuns-Richardson, Vonnie B, APRN  •  promethazine (PHENERGAN) injection 12.5 mg, 12.5 mg, Intravenous, Q4H PRN, Kuns-Richardson, Vonnie B, APRN  •  sodium chloride 0.9 % flush 1-10 mL, 1-10 mL, Intravenous, PRN, Kuns-Richardson, Vonnie B, APRN  •  sodium chloride 0.9 % flush 3 mL, 3 mL, Intravenous, Q12H, Kuns-Richardson, Vonnie B, APRN      Social History     Socioeconomic History   • Marital status:      Spouse name: Not on file   • Number of children: Not on file   • Years of education: Not on file   • Highest education level: Not on file   Tobacco Use   • Smoking status: Former Smoker     Types: Cigarettes     Last attempt to quit: 2019     Years since quittin.1   • Smokeless tobacco: Never Used   Substance and Sexual Activity   • Alcohol use: No     Frequency: Never   • Drug use: No   • Sexual activity: Defer   Social History Narrative    Caffeine: De-caf       Family History   Problem Relation Age of Onset   • Heart disease Mother    • Other Mother         staph infection   • Heart attack Mother    • Heart attack Sister    • Heart disease Maternal Grandmother    • Diabetes Maternal Grandmother    • Heart attack Maternal Grandfather    • No Known Problems Paternal Grandmother    • No Known Problems Paternal Grandfather    • Heart failure Maternal Uncle    • Heart failure Maternal Aunt        REVIEW OF SYSTEMS:   Review of Systems   Constitution: Negative.   HENT: Negative.    Eyes: Negative.    Cardiovascular: Positive for  "dyspnea on exertion. Negative for claudication, leg swelling, near-syncope, orthopnea, paroxysmal nocturnal dyspnea and syncope.   Respiratory: Positive for shortness of breath and wheezing.    Endocrine: Negative.    Hematologic/Lymphatic: Negative.    Skin: Negative.    Musculoskeletal: Negative.    Gastrointestinal: Negative.    Genitourinary: Negative.    Neurological: Negative.    Psychiatric/Behavioral: Negative.    Allergic/Immunologic: Negative.    All other systems reviewed and are negative.           Objective:  Vitals:    08/07/19 1008 08/07/19 1009   BP: 136/100 148/91   BP Location: Right arm Left arm   Patient Position: Lying Lying   Pulse: 80 80   Temp:  98.1 °F (36.7 °C)   TempSrc:  Temporal   SpO2: 97% 97%   Weight:  78.1 kg (172 lb 2.9 oz)   Height:  167.6 cm (66\")     Body mass index is 27.79 kg/m².  Flowsheet Rows      First Filed Value   Admission Height  167.6 cm (66\") Documented at 08/07/2019 1009   Admission Weight  78.1 kg (172 lb 2.9 oz) Documented at 08/07/2019 1009        No intake or output data in the 24 hours ending 08/07/19 1105    General Appearance:   · well developed  · well nourished  Neck:  · thyroid not enlarged  · supple  Respiratory:  · no respiratory distress  · No rhonchi or rales, decreased breath sounds at bases per  · no rales  Cardiovascular:  · no jugular venous distention  · regular rhythm  · apical impulse normal  · S1 normal, S2 normal  · no S3, no S4   · no murmur  · no rub, no thrill  · carotid pulses normal; no bruit  · pedal pulses normal  · lower extremity edema: none    Skin:   warm, dry            Lab Review:                Results from last 7 days   Lab Units 08/07/19  1016   SODIUM mmol/L 139   POTASSIUM mmol/L 4.9   CHLORIDE mmol/L 101   CO2 mmol/L 27.0   BUN mg/dL 25*   CREATININE mg/dL 1.15   GLUCOSE mg/dL 111*   CALCIUM mg/dL 9.2     Results from last 7 days   Lab Units 08/07/19  1016   WBC 10*3/mm3 12.10*   HEMOGLOBIN g/dL 14.2   HEMATOCRIT % 45.2 "   PLATELETS 10*3/mm3 172         Lab Results   Component Value Date    CHOL 108 06/08/2019     Lab Results   Component Value Date    TRIG 61 06/08/2019     Lab Results   Component Value Date    HDL 59 06/08/2019     Lab Results   Component Value Date    LDL 37 06/08/2019     Lab Results   Component Value Date    HGBA1C 6.60 (H) 06/08/2019     Estimated Creatinine Clearance: 53.7 mL/min (by C-G formula based on SCr of 1.15 mg/dL).                          Assessment:   · Paroxysmal atrial fibrillation  · Chronic systolic heart failure EF 30% currently well compensated  · Hypertension          Plan:   · No need for external cardioversion patient is in sinus rhythm.  Device was interrogated today, normal sinus rhythm noted, device adjustments made for improved pacing.  Advised patient increase Lasix to 40 mg daily for 5 days, and monitor symptoms  He should follow-up with me in 4 to 6 weeks with a limited echo to evaluate overall ejection fraction  We will hold off on increasing Coreg or starting an ACE or arm due to patient's low normal blood pressure and fatigue at home.  Check portable chest x-ray prior to discharge due to patient's increased cough, concern for pneumonia peer      Electronically signed by MEREDITH Avitia, 08/07/19, 11:08 AM.    I have seen and examined the patient, performing a face-to-face diagnostic evaluation with plan of care reviewed and developed with the Advanced Practice Clinician and nursing staff. I have addended and modified the above history of present illness, physical examination, and assessment and plan to reflect my findings and impressions    Yovanny Solares MD, [facility]  08/07/19

## 2019-08-07 NOTE — DISCHARGE INSTRUCTIONS
Increase Lasix to 40 mg daily for 5 days and monitor symptoms  If breathing improves, call our office and new dose will be prescribed  Follow-up with PCP regarding COPD exacerbation and cough    Follow-up with me in the office in 4 to 6 weeks, appointment will be made, we will perform an echocardiogram prior to the visit on the same day.

## 2019-09-13 ENCOUNTER — APPOINTMENT (OUTPATIENT)
Dept: CARDIOLOGY | Facility: HOSPITAL | Age: 74
End: 2019-09-13

## 2019-09-16 ENCOUNTER — TELEPHONE (OUTPATIENT)
Dept: CARDIOLOGY | Facility: CLINIC | Age: 74
End: 2019-09-16

## 2019-09-16 NOTE — TELEPHONE ENCOUNTER
"Pt daughter called with complaints of her father having another infection and was placed on antibiotics. She states that he was \"very cold\" on Friday afternoon and wanted to see if [facility] could see him sooner. Pt denies fever, shakes, weakness, chest pain/tightness or swelling. She states the patient is better today. Pt has an apt with RDS next Friday 09/27/19 with an echo scheduled as well. I advised to f/u with PCP this week if pt experiences this again but the pt should keep original apt date and time to ensure he has his echo completed that day. Daughter agreeable and verbalized understanding.   "

## 2019-09-27 ENCOUNTER — HOSPITAL ENCOUNTER (OUTPATIENT)
Dept: CARDIOLOGY | Facility: HOSPITAL | Age: 74
Discharge: HOME | End: 2019-09-27
Admitting: INTERNAL MEDICINE

## 2019-09-27 ENCOUNTER — OFFICE VISIT (OUTPATIENT)
Dept: CARDIOLOGY | Facility: CLINIC | Age: 74
End: 2019-09-27

## 2019-09-27 VITALS
WEIGHT: 177.2 LBS | BODY MASS INDEX: 28.48 KG/M2 | HEIGHT: 66 IN | DIASTOLIC BLOOD PRESSURE: 66 MMHG | HEART RATE: 80 BPM | SYSTOLIC BLOOD PRESSURE: 124 MMHG

## 2019-09-27 DIAGNOSIS — I50.22 CHRONIC SYSTOLIC CONGESTIVE HEART FAILURE (HCC): ICD-10-CM

## 2019-09-27 DIAGNOSIS — I48.0 PAROXYSMAL ATRIAL FIBRILLATION (HCC): Primary | ICD-10-CM

## 2019-09-27 DIAGNOSIS — J44.9 CHRONIC OBSTRUCTIVE PULMONARY DISEASE, UNSPECIFIED COPD TYPE (HCC): ICD-10-CM

## 2019-09-27 DIAGNOSIS — I10 ESSENTIAL HYPERTENSION: ICD-10-CM

## 2019-09-27 LAB
BH CV ECHO MEAS - AO ROOT AREA (BSA CORRECTED): 2
BH CV ECHO MEAS - AO ROOT AREA: 11.4 CM^2
BH CV ECHO MEAS - AO ROOT DIAM: 3.8 CM
BH CV ECHO MEAS - BSA(HAYCOCK): 1.9 M^2
BH CV ECHO MEAS - BSA: 1.9 M^2
BH CV ECHO MEAS - BZI_BMI: 27.8 KILOGRAMS/M^2
BH CV ECHO MEAS - BZI_METRIC_HEIGHT: 167.6 CM
BH CV ECHO MEAS - BZI_METRIC_WEIGHT: 78 KG
BH CV ECHO MEAS - EDV(CUBED): 45.5 ML
BH CV ECHO MEAS - EDV(MOD-SP2): 130 ML
BH CV ECHO MEAS - EDV(MOD-SP4): 117 ML
BH CV ECHO MEAS - EDV(TEICH): 53.3 ML
BH CV ECHO MEAS - EF(CUBED): 53.6 %
BH CV ECHO MEAS - EF(MOD-BP): 54 %
BH CV ECHO MEAS - EF(MOD-SP2): 55.4 %
BH CV ECHO MEAS - EF(MOD-SP4): 52.1 %
BH CV ECHO MEAS - EF(TEICH): 46.4 %
BH CV ECHO MEAS - ESV(CUBED): 21.1 ML
BH CV ECHO MEAS - ESV(MOD-SP2): 58 ML
BH CV ECHO MEAS - ESV(MOD-SP4): 56 ML
BH CV ECHO MEAS - ESV(TEICH): 28.6 ML
BH CV ECHO MEAS - FS: 22.6 %
BH CV ECHO MEAS - IVS/LVPW: 1.1
BH CV ECHO MEAS - IVSD: 1.8 CM
BH CV ECHO MEAS - LA DIMENSION: 3.8 CM
BH CV ECHO MEAS - LA/AO: 1
BH CV ECHO MEAS - LAD MAJOR: 4 CM
BH CV ECHO MEAS - LV DIASTOLIC VOL/BSA (35-75): 62.4 ML/M^2
BH CV ECHO MEAS - LV MASS(C)D: 234.7 GRAMS
BH CV ECHO MEAS - LV MASS(C)DI: 125.1 GRAMS/M^2
BH CV ECHO MEAS - LV SYSTOLIC VOL/BSA (12-30): 29.9 ML/M^2
BH CV ECHO MEAS - LVIDD: 3.6 CM
BH CV ECHO MEAS - LVIDS: 2.8 CM
BH CV ECHO MEAS - LVLD AP2: 8.4 CM
BH CV ECHO MEAS - LVLD AP4: 8.6 CM
BH CV ECHO MEAS - LVLS AP2: 6.8 CM
BH CV ECHO MEAS - LVLS AP4: 7.2 CM
BH CV ECHO MEAS - LVOT AREA (M): 3.1 CM^2
BH CV ECHO MEAS - LVOT AREA: 3.1 CM^2
BH CV ECHO MEAS - LVOT DIAM: 2 CM
BH CV ECHO MEAS - LVPWD: 1.6 CM
BH CV ECHO MEAS - RVDD: 2.9 CM
BH CV ECHO MEAS - SI(CUBED): 13 ML/M^2
BH CV ECHO MEAS - SI(MOD-SP2): 38.4 ML/M^2
BH CV ECHO MEAS - SI(MOD-SP4): 32.5 ML/M^2
BH CV ECHO MEAS - SI(TEICH): 13.2 ML/M^2
BH CV ECHO MEAS - SV(CUBED): 24.4 ML
BH CV ECHO MEAS - SV(MOD-SP2): 72 ML
BH CV ECHO MEAS - SV(MOD-SP4): 61 ML
BH CV ECHO MEAS - SV(TEICH): 24.7 ML
BH CV VAS BP LEFT ARM: NORMAL MMHG
BH CV XLRA - RV BASE: 4.8 CM
BH CV XLRA - RV LENGTH: 5.6 CM
BH CV XLRA - RV MID: 3.7 CM
LEFT ATRIUM VOLUME INDEX: 17.6 ML/M^2
LEFT ATRIUM VOLUME: 33 ML
MAXIMAL PREDICTED HEART RATE: 144 BPM
STRESS TARGET HR: 122 BPM

## 2019-09-27 PROCEDURE — 93000 ELECTROCARDIOGRAM COMPLETE: CPT | Performed by: INTERNAL MEDICINE

## 2019-09-27 PROCEDURE — 99214 OFFICE O/P EST MOD 30 MIN: CPT | Performed by: INTERNAL MEDICINE

## 2019-09-27 PROCEDURE — 25010000002 SULFUR HEXAFLUORIDE MICROSPH 60.7-25 MG RECONSTITUTED SUSPENSION: Performed by: INTERNAL MEDICINE

## 2019-09-27 PROCEDURE — 93321 DOPPLER ECHO F-UP/LMTD STD: CPT | Performed by: INTERNAL MEDICINE

## 2019-09-27 PROCEDURE — 93308 TTE F-UP OR LMTD: CPT

## 2019-09-27 PROCEDURE — 93321 DOPPLER ECHO F-UP/LMTD STD: CPT

## 2019-09-27 PROCEDURE — 93308 TTE F-UP OR LMTD: CPT | Performed by: INTERNAL MEDICINE

## 2019-09-27 PROCEDURE — 93325 DOPPLER ECHO COLOR FLOW MAPG: CPT

## 2019-09-27 PROCEDURE — 93325 DOPPLER ECHO COLOR FLOW MAPG: CPT | Performed by: INTERNAL MEDICINE

## 2019-09-27 RX ORDER — FUROSEMIDE 40 MG/1
40 TABLET ORAL DAILY
Qty: 90 TABLET | Refills: 3 | Status: SHIPPED | OUTPATIENT
Start: 2019-09-27 | End: 2020-01-10 | Stop reason: SDUPTHER

## 2019-09-27 RX ORDER — LEVOFLOXACIN 500 MG/1
500 TABLET, FILM COATED ORAL DAILY
COMMUNITY
End: 2019-10-02

## 2019-09-27 RX ORDER — ALBUTEROL SULFATE 90 UG/1
2 AEROSOL, METERED RESPIRATORY (INHALATION) EVERY 4 HOURS PRN
COMMUNITY
End: 2020-01-10 | Stop reason: SDUPTHER

## 2019-09-27 RX ADMIN — SULFUR HEXAFLUORIDE 3 ML: KIT at 14:29

## 2019-09-27 NOTE — PROGRESS NOTES
OFFICE VISIT  NOTE  [facility] CARDIOLOGY      Name: Stef Jones    Date: 2019  MRN:  [id number]  :  1945      REFERRING/PRIMARY PROVIDER:  No ref. provider found    Chief Complaint   Patient presents with   • Atrial Fibrillation       HPI: Stef Jones is a 74 y.o. male who presents today for follow-up for chronic systolic heart failure, persistent atrial fibrillation, status post AV node ablation and biventricular permanent pacemaker by Dr. Chapman 2019.  Associate history of severe COPD, hypertension, hyperlipidemia.  Patient reports overall feeling better since hospital discharge.  He presented for external cardioversion 2019 but was in normal sinus rhythm by pacemaker interrogation therefore procedure canceled.  Limited echo today shows dramatic improvement in ejection fraction with sinus rhythm, EF 54%.  Most of his complaints today are related to COPD, with cough, and adequate antibiotics recently, he does not see a pulmonologist.  He does report some puffiness of his hands and upper extremities.  No PND orthopnea or chest pain.    Past Medical History:   Diagnosis Date   • Atrial fibrillation (CMS/HCC)    • COPD (chronic obstructive pulmonary disease) (CMS/HCC)    • Hearing loss    • Hoarse voice quality    • Hypertension    • Pacemaker        Past Surgical History:   Procedure Laterality Date   • CARDIAC ELECTROPHYSIOLOGY PROCEDURE Left 2019    Procedure: AV node ablation + Bi-V PPM implant;  Surgeon: Lucio Chapman MD;  Location: [facility] INVASIVE LOC[facility];  Service: Cardiovascular   • CARDIAC ELECTROPHYSIOLOGY PROCEDURE N/A 2019    Procedure: AV node ablation;  Surgeon: Lucio Chapman MD;  Location: [facility] INVASIVE LOCATION;  Service: Cardiovascular   • HEMORRHOIDECTOMY     • INSERT / REPLACE / REMOVE PACEMAKER     • LARYNGOSCOPY N/A 2019    Procedure: MICRO DIRECT LARYNGOSCOPY WITH BIOPSY;  Surgeon: Glen Gutiérrez  MD JOCELYNE;  Location: [facility];  Service: ENT       Social History     Socioeconomic History   • Marital status:      Spouse name: Not on file   • Number of children: Not on file   • Years of education: Not on file   • Highest education level: Not on file   Tobacco Use   • Smoking status: Former Smoker     Types: Cigarettes     Last attempt to quit: 2019     Years since quittin.3   • Smokeless tobacco: Never Used   Substance and Sexual Activity   • Alcohol use: No     Frequency: Never   • Drug use: No   • Sexual activity: Defer   Social History Narrative    Caffeine: De-caf       Family History   Problem Relation Age of Onset   • Heart disease Mother    • Other Mother         staph infection   • Heart attack Mother    • Heart attack Sister    • Heart disease Maternal Grandmother    • Diabetes Maternal Grandmother    • Heart attack Maternal Grandfather    • No Known Problems Paternal Grandmother    • No Known Problems Paternal Grandfather    • Heart failure Maternal Uncle    • Heart failure Maternal Aunt         ROS:   Constitutional no fever,  no weight loss   Skin no rash, no subcutaneous nodules   Otolaryngeal no difficulty swallowing   Cardiovascular See HPI   Pulmonary no cough, no sputum production   Gastrointestinal no constipation, no diarrhea   Genitourinary no dysuria, no hematuria   Hematologic no easy bruisability, no abnormal bleeding   Musculoskeletal no muscle pain   Neurologic no dizziness, no falls         Allergies   Allergen Reactions   • Amoxicillin Rash   • Penicillins Rash         Current Outpatient Medications:   •  albuterol sulfate  (90 Base) MCG/ACT inhaler, Inhale 2 puffs Every 4 (Four) Hours As Needed for Wheezing., Disp: , Rfl:   •  amiodarone (PACERONE) 200 MG tablet, Take 1 tablet by mouth Daily., Disp: 30 tablet, Rfl: 11  •  amitriptyline (ELAVIL) 50 MG tablet, Take 50 mg by mouth Every Night., Disp: , Rfl:   •  apixaban (ELIQUIS) 5 MG tablet tablet, Take 1 tablet  "by mouth Every 12 (Twelve) Hours., Disp: 60 tablet, Rfl: 11  •  atorvastatin (LIPITOR) 20 MG tablet, Take 20 mg by mouth Every Night., Disp: , Rfl:   •  carvedilol (COREG) 3.125 MG tablet, Take 1 tablet by mouth 2 (Two) Times a Day With Meals., Disp: 30 tablet, Rfl: 11  •  esomeprazole (nexIUM) 40 MG capsule, Take 40 mg by mouth Every Morning Before Breakfast., Disp: , Rfl:   •  Fluticasone Furoate-Vilanterol (BREO ELLIPTA) 100-25 MCG/INH inhaler, Inhale 1 puff Daily., Disp: , Rfl:   •  ipratropium (ATROVENT) 0.02 % nebulizer solution, Take 500 mcg by nebulization 4 (Four) Times a Day., Disp: , Rfl:   •  levalbuterol (XOPENEX HFA) 45 MCG/ACT inhaler, Inhale 1-2 puffs Every 6 (Six) Hours As Needed for Wheezing or Shortness of Air., Disp: , Rfl:   •  levoFLOXacin (LEVAQUIN) 500 MG tablet, Take 500 mg by mouth Daily., Disp: , Rfl:   •  nicotine (NICODERM CQ) 14 MG/24HR patch, Place 1 patch on the skin as directed by provider Daily., Disp: 30 patch, Rfl: 1  •  furosemide (LASIX) 40 MG tablet, Take 1 tablet by mouth Daily., Disp: 90 tablet, Rfl: 3  No current facility-administered medications for this visit.     Vitals:    09/27/19 1443   BP: 124/66   BP Location: Right arm   Patient Position: Sitting   Pulse: 80   Weight: 80.4 kg (177 lb 3.2 oz)   Height: 167.6 cm (66\")     Body mass index is 28.6 kg/m².    PHYSICAL EXAM:    General Appearance:   · well developed  · well nourished  Neck:  · thyroid not enlarged  · supple  Respiratory:  · no respiratory distress  · normal breath sounds  · no rales  Cardiovascular:  · no jugular venous distention  · regular rhythm  · apical impulse normal  · S1 normal, S2 normal  · no S3, no S4   · no murmur  · no rub, no thrill  · carotid pulses normal; no bruit  · pedal pulses normal  · lower extremity edema: none    Skin:   warm, dry    RESULTS:     ECG 12 Lead  Date/Time: 9/27/2019 4:25 PM  Performed by: Yovanny Solares MD  Authorized by: Yovanny Solares MD   Comparison: compared " with previous ECG from 6/17/2019  Similar to previous ECG  Pacing: ventricular paced rhythmComments: Biventricular paced rhythm at 80 bpm            Results for orders placed during the hospital encounter of 09/27/19   Adult Transthoracic Echo Limited W/ Cont if Necessary Per Protocol    Narrative · Calculated EF = 54.0%.  · Left ventricular wall thickness is consistent with mild-to-moderate   concentric hypertrophy.  · Left ventricular systolic function is normal.  · Right ventricular cavity is moderately dilated.            Labs:  Lab Results   Component Value Date    CHOL 108 06/08/2019    TRIG 61 06/08/2019    HDL 59 06/08/2019    LDL 37 06/08/2019    AST 22 06/17/2019    ALT 33 06/17/2019     Lab Results   Component Value Date    HGBA1C 6.30 (H) 08/07/2019     No components found for: CREATINININE  eGFR Non  Amer   Date Value Ref Range Status   08/07/2019 62 >60 mL/min/1.73 Final   06/19/2019 74 >60 mL/min/1.73 Final   06/17/2019 69 >60 mL/min/1.73 Final         ASSESSMENT:  Problem List Items Addressed This Visit        Cardiovascular and Mediastinum    Paroxysmal atrial fibrillation (CMS/HCC) - Primary    Overview     1. Radiofrequency ablation of the AV node.6-17-19. With BS BiV PPM         Chronic systolic congestive heart failure (CMS/[facility])    Overview     - TTE shows ejection fraction of mid 30s, moderate RV enlargement and RV dysfunction.  - could be tachycardia induced. Will need repeat echo once maintaining NSR, may also need ischemic evaluation.  - DENA: EF 30-35%, Moderate to severe MR, moderate TR, RVSP 35-45mmHg.  - TTE 9/27/19, improved ef to 54%, mod LVH, mod RVE. In NSR                   Essential hypertension    Relevant Medications    furosemide (LASIX) 40 MG tablet       Respiratory    COPD (chronic obstructive pulmonary disease) (CMS/HCC)    Relevant Medications    albuterol sulfate  (90 Base) MCG/ACT inhaler    Other Relevant Orders    Ambulatory Referral to Pulmonology           PLAN:    1.  Chronic systolic heart failure:  EF has improved from 30 to 35% to 54% today in normal sinus rhythm  Continue carvedilol at current dose  Increase Lasix to 40 mg daily due to slight upper extremity edema    2.  Paroxysmal atrial fibrillation:  We will defer amiodarone discontinuation to Dr. Chapman who he sees next week  Continue Eliquis for anticoagulation    3.  Moderate LVH,  Continue close blood pressure monitoring  Long-term goal blood pressure is 138    4.  Moderate RV enlargement with mildly elevated pulmonary pressures, likely secondary to severe COPD  Referred to pulmonary for COPD management    Return clinic in 6 months.  Thank you for the opportunity to share in the care of your patient; please do not hesitate to call me with any questions.     Yovanny Solares MD, [facility]  Office: [phone] [address] [place] [address]

## 2019-10-02 ENCOUNTER — OFFICE VISIT (OUTPATIENT)
Dept: CARDIOLOGY | Facility: CLINIC | Age: 74
End: 2019-10-02

## 2019-10-02 VITALS
SYSTOLIC BLOOD PRESSURE: 132 MMHG | HEART RATE: 80 BPM | DIASTOLIC BLOOD PRESSURE: 76 MMHG | HEIGHT: 66 IN | WEIGHT: 178.8 LBS | OXYGEN SATURATION: 92 % | BODY MASS INDEX: 28.73 KG/M2

## 2019-10-02 DIAGNOSIS — I48.19 OTHER PERSISTENT ATRIAL FIBRILLATION (HCC): Primary | ICD-10-CM

## 2019-10-02 DIAGNOSIS — I10 ESSENTIAL HYPERTENSION: ICD-10-CM

## 2019-10-02 DIAGNOSIS — I50.22 CHRONIC SYSTOLIC CONGESTIVE HEART FAILURE (HCC): ICD-10-CM

## 2019-10-02 PROCEDURE — 93281 PM DEVICE PROGR EVAL MULTI: CPT | Performed by: INTERNAL MEDICINE

## 2019-10-02 PROCEDURE — 99213 OFFICE O/P EST LOW 20 MIN: CPT | Performed by: INTERNAL MEDICINE

## 2019-10-02 RX ORDER — ACETAMINOPHEN 500 MG
500 TABLET ORAL EVERY 6 HOURS PRN
COMMUNITY

## 2019-10-02 NOTE — PROGRESS NOTES
Stef Jones  1945  [phone]    10/02/2019    [facility] CARDIOLOGY     Lexx Monson MD  [address]  [facility] [address]    Chief Complaint   Patient presents with   • Atrial Fibrillation       Problem List:   • Chronic systolic congestive heart failure (CMS/HCC) 08/07/2019       Priority: High       Note Last Updated: 8/7/2019       - TTE shows ejection fraction of mid 30s, moderate RV enlargement and RV dysfunction.  - could be tachycardia induced. Will need repeat echo once maintaining NSR, may also need ischemic evaluation.  - DENA: EF 30-35%, Moderate to severe MR, moderate TR, RVSP 35-45mmHg.  - [employer] BiV PM implant + AVN RFA 6/17/19  - Echocardiogram 9/27/2019: EF 54%      • Paroxysmal atrial fibrillation (CMS/HCC) 06/08/2019       Priority: High       Note Last Updated: 8/7/2019       1. Radiofrequency ablation of the AV node.6-17-19. 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   • Dysphagia 06/10/2019   • Cellulitis of Right Lower Leg and Foot 06/08/2019   • COPD (chronic obstructive pulmonary disease) (CMS/[facility])            Allergies  Allergies   Allergen Reactions   • Amoxicillin Rash   • Penicillins Rash       Current Medications    Current Outpatient Medications:   •  acetaminophen (TYLENOL) 500 MG tablet, Take 500 mg by mouth Every 6 (Six) Hours As Needed for Mild Pain ., Disp: , Rfl:   •  albuterol sulfate  (90 Base) MCG/ACT inhaler, Inhale 2 puffs Every 4 (Four) Hours As Needed for Wheezing., Disp: , Rfl:   •  amiodarone (PACERONE) 200 MG tablet, Take 1 tablet by mouth Daily., Disp: 30 tablet, Rfl: 11  •  amitriptyline (ELAVIL) 50 MG tablet, Take 50 mg by mouth Every Night., Disp: , Rfl:   •  apixaban (ELIQUIS) 5 MG tablet tablet, Take 1 tablet by mouth Every 12 (Twelve) Hours., Disp: 60 tablet, Rfl: 11  •  atorvastatin (LIPITOR) 20 MG tablet, Take 20 mg by mouth Every Night.,  "Disp: , Rfl:   •  carvedilol (COREG) 3.125 MG tablet, Take 1 tablet by mouth 2 (Two) Times a Day With Meals., Disp: 30 tablet, Rfl: 11  •  esomeprazole (nexIUM) 40 MG capsule, Take 40 mg by mouth Every Morning Before Breakfast., Disp: , Rfl:   •  Fluticasone Furoate-Vilanterol (BREO ELLIPTA) 100-25 MCG/INH inhaler, Inhale 1 puff Daily., Disp: , Rfl:   •  furosemide (LASIX) 40 MG tablet, Take 1 tablet by mouth Daily., Disp: 90 tablet, Rfl: 3  •  ipratropium (ATROVENT) 0.02 % nebulizer solution, Take 500 mcg by nebulization 4 (Four) Times a Day., Disp: , Rfl:   •  levalbuterol (XOPENEX HFA) 45 MCG/ACT inhaler, Inhale 1-2 puffs Every 6 (Six) Hours As Needed for Wheezing or Shortness of Air., Disp: , Rfl:   •  magnesium hydroxide (MILK OF MAGNESIA) 400 MG/5ML suspension, Take 30 mL by mouth Daily As Needed for Constipation., Disp: , Rfl:   •  O2 (OXYGEN), Inhale 2 L/min Daily., Disp: , Rfl:     History of Present Illness     Pt presents for follow up of atrial fibrillation, chronic systolic CHF with BiV PM check, HTN. Since his discharge in June with CHF and persistent atrial fibrillation, he has converted to NSR on Amiodarone and his EF has improved to 54%. He is feeling better and gaining energy back. His breathing has improved.  He denies palpitations, SOB, CP, LH, and dizziness. Denies any further hospitalizations, ER visits, bleeding, or TIA/CVA symptoms. Overall feels well for the most part. He is going to be seeing a pulmonologist for COPD. He quit smoking 3 months ago.     ROS:  General:  + fatigue, + weight gain - loss  Cardiovascular:  Denies CP, PND, syncope, near syncope, + edema - palpitations.  Pulmonary:  Denies BLACKWELL, cough, or wheezing      Vitals:    10/02/19 1115   BP: 132/76   BP Location: Right arm   Patient Position: Sitting   Pulse: 80   SpO2: 92%   Weight: 81.1 kg (178 lb 12.8 oz)   Height: 167.6 cm (66\")     Body mass index is 28.86 kg/m².  PE:  General: NAD. A & O x 3  Neck: no JVD, no carotid " bruits, no TM  Heart RRR, NL S1, S2, S4 present, no rubs, murmurs  Lungs: Wheezes bilaterally  Abd: soft, non-tender, NL BS  Ext: No musculoskeletal deformities, no edema, cyanosis, or clubbing  Psych: normal mood and affect  Skin: PM site well healed.     Diagnostic Data:    BiV PM Manual Interrogation: normal function. 99% BiV paced. 6.9-7.9 years on battery. One short episode AFIB. Base rate lowered to 70 bpm from 80 bpm.         ECG 12 Lead  Date/Time: 10/2/2019 11:36 AM  Performed by: Lucio Chapman MD  Authorized by: Lucio Chapman MD   Comparison: compared with previous ECG from 9/27/2019  Similar to previous ECG  Rhythm: sinus rhythm and paced  BPM: 80              1. Other persistent atrial fibrillation    2. Chronic systolic congestive heart failure (CMS/HCC)    3. Essential hypertension        Plan:  1. Persistent Atrial Fibrillation:  - s/p AVN + BiV PM implant 6/2019  - placed on Amiodarone 200 mg daily and now maintaining NSR. Patient's family would like for him to not take Amiodarone due to potential side effects. Will stop today and monitor atrial fibrillation episodes on home monitor.   - continue Eliquis for anticoagulation    2. Chronic Systolic CHF:  - Class I symptoms on Coreg. No ACE/Entresto due to low normal BP  - improved EF 54%  - normally functioning BiV PM    3. HTN:  - controlled on current medications    F/up in 6 months    Scribed for Lucio Chapman MD by Latricia Mckee PA-C. 10/2/2019  11:37 AM     ILucio MD, personally performed the services described in this documentation as scribed by the above named individual in my presence, and it is both accurate and complete.  10/2/2019  11:44 AM

## 2019-10-10 ENCOUNTER — OFFICE VISIT (OUTPATIENT)
Dept: PULMONOLOGY | Facility: CLINIC | Age: 74
End: 2019-10-10

## 2019-10-10 VITALS
BODY MASS INDEX: 29.66 KG/M2 | OXYGEN SATURATION: 97 % | TEMPERATURE: 97.8 F | HEIGHT: 65 IN | DIASTOLIC BLOOD PRESSURE: 76 MMHG | HEART RATE: 80 BPM | SYSTOLIC BLOOD PRESSURE: 122 MMHG | WEIGHT: 178 LBS

## 2019-10-10 DIAGNOSIS — I50.22 CHRONIC SYSTOLIC CONGESTIVE HEART FAILURE (HCC): ICD-10-CM

## 2019-10-10 DIAGNOSIS — F17.200 TOBACCO USE DISORDER: ICD-10-CM

## 2019-10-10 DIAGNOSIS — J38.7 LARYNGEAL MASS: ICD-10-CM

## 2019-10-10 DIAGNOSIS — J44.9 CHRONIC OBSTRUCTIVE PULMONARY DISEASE, UNSPECIFIED COPD TYPE (HCC): Primary | ICD-10-CM

## 2019-10-10 DIAGNOSIS — R13.10 DYSPHAGIA, UNSPECIFIED TYPE: ICD-10-CM

## 2019-10-10 PROBLEM — R49.0 HOARSE VOICE QUALITY: Status: ACTIVE | Noted: 2019-10-10

## 2019-10-10 PROCEDURE — 94729 DIFFUSING CAPACITY: CPT | Performed by: NURSE PRACTITIONER

## 2019-10-10 PROCEDURE — 99215 OFFICE O/P EST HI 40 MIN: CPT | Performed by: NURSE PRACTITIONER

## 2019-10-10 PROCEDURE — 94726 PLETHYSMOGRAPHY LUNG VOLUMES: CPT | Performed by: NURSE PRACTITIONER

## 2019-10-10 PROCEDURE — 94060 EVALUATION OF WHEEZING: CPT | Performed by: NURSE PRACTITIONER

## 2019-10-10 RX ORDER — ALBUTEROL SULFATE 90 UG/1
4 AEROSOL, METERED RESPIRATORY (INHALATION) ONCE
Status: COMPLETED | OUTPATIENT
Start: 2019-10-10 | End: 2019-10-10

## 2019-10-10 RX ADMIN — ALBUTEROL SULFATE 4 PUFF: 90 AEROSOL, METERED RESPIRATORY (INHALATION) at 14:05

## 2019-10-10 NOTE — PROGRESS NOTES
[facility] Pulmonary Evaluation    CHIEF COMPLAINT    COPD, tobacco use, seasonal allergies     Refered by:  Yovanny Solares MD        HISTORY OF PRESENT ILLNESS    Stef Jones is a 74 y.o.male here today for evaluation of COPD.  He was seen in the hospital June 8 to June 26 of this year for A. fib with rapid ventricular response with cardiomyopathy and decompensated heart failure.  At that time he also had a lower extremity cellulitis.  His ejection fraction was 30% with RV systolic pressure of 35-45.  He did undergo electrical cardioversion and a permanent pacemaker placed on June 17.  He is now on medical management with Diltiazem and Eliquis.  He had been on Amiodarone briefly as well, but is now off due to side effects.     He did have acute respiratory failure that was multifactorial including some underlying COPD as well as the heart failure.    He was also seen by ENT for a laryngeal mass and underwent biopsy on June 12, which was benign.  He is follow-up with Dr. ESTRADA as outpatient and the lesion has resloved.  He continue to have azeem hoarseness.  He has had some seasonal allergy symptoms with a worsening cough and congestion, as well as nasal drainage and sinus pressure.      An evaluation of his COPD he has chronic dyspnea with moderate activity.  He has a chronic cough mostly in the mornings with thick secretions.  Currently he is on duo nebs twice a day and Xopenex HFA 2-3 times a day.  He has been on Breo but is currently not taking it.    He has stopped smoking since his hospital stay.  He has history of 1 to 2 packs/day for 60 years.    Patient Active Problem List   Diagnosis   • Cellulitis of Right Lower Leg and Foot   • Other persistent atrial fibrillation   • COPD (chronic obstructive pulmonary disease) (CMS/HCC)   • Dysphagia   • Laryngeal mass   • Chronic systolic congestive heart failure (CMS/HCC)   • Essential hypertension   • Tobacco use disorder       Allergies   Allergen Reactions   •  Amoxicillin Rash   • Penicillins Rash       Current Outpatient Medications:   •  acetaminophen (TYLENOL) 500 MG tablet, Take 500 mg by mouth Every 6 (Six) Hours As Needed for Mild Pain ., Disp: , Rfl:   •  albuterol sulfate  (90 Base) MCG/ACT inhaler, Inhale 2 puffs Every 4 (Four) Hours As Needed for Wheezing., Disp: , Rfl:   •  amitriptyline (ELAVIL) 50 MG tablet, Take 50 mg by mouth Every Night., Disp: , Rfl:   •  apixaban (ELIQUIS) 5 MG tablet tablet, Take 1 tablet by mouth Every 12 (Twelve) Hours., Disp: 60 tablet, Rfl: 11  •  atorvastatin (LIPITOR) 20 MG tablet, Take 20 mg by mouth Every Night., Disp: , Rfl:   •  carvedilol (COREG) 3.125 MG tablet, Take 1 tablet by mouth 2 (Two) Times a Day With Meals., Disp: 30 tablet, Rfl: 11  •  esomeprazole (nexIUM) 40 MG capsule, Take 40 mg by mouth Every Morning Before Breakfast., Disp: , Rfl:   •  Fluticasone Furoate-Vilanterol (BREO ELLIPTA) 100-25 MCG/INH inhaler, Inhale 1 puff Daily., Disp: , Rfl:   •  furosemide (LASIX) 40 MG tablet, Take 1 tablet by mouth Daily., Disp: 90 tablet, Rfl: 3  •  ipratropium (ATROVENT) 0.02 % nebulizer solution, Take 500 mcg by nebulization 4 (Four) Times a Day., Disp: , Rfl:   •  levalbuterol (XOPENEX HFA) 45 MCG/ACT inhaler, Inhale 1-2 puffs Every 6 (Six) Hours As Needed for Wheezing or Shortness of Air., Disp: , Rfl:   •  magnesium hydroxide (MILK OF MAGNESIA) 400 MG/5ML suspension, Take 30 mL by mouth Daily As Needed for Constipation., Disp: , Rfl:   •  O2 (OXYGEN), Inhale 2 L/min Daily., Disp: , Rfl:   •  amiodarone (PACERONE) 200 MG tablet, Take 1 tablet by mouth Daily., Disp: 30 tablet, Rfl: 11  No current facility-administered medications for this visit.     MEDICATION LIST AND ALLERGIES REVIEWED.    Social History     Tobacco Use   • Smoking status: Former Smoker     Packs/day: 1.50     Years: 60.00     Pack years: 90.00     Types: Cigarettes     Last attempt to quit: 2019     Years since quittin.3   • Smokeless  "tobacco: Never Used   Substance Use Topics   • Alcohol use: No     Frequency: Never   • Drug use: No       FAMILY AND SOCIAL HISTORY REVIEWED AND UPDATED IN EPIC    Review of Systems   Constitutional: Positive for fatigue. Negative for chills, fever and unexpected weight change.   HENT: Positive for congestion, postnasal drip, sinus pressure and voice change. Negative for nosebleeds, rhinorrhea and trouble swallowing.    Respiratory: Positive for cough and shortness of breath. Negative for chest tightness and wheezing.    Cardiovascular: Negative for chest pain and leg swelling.   Gastrointestinal: Positive for constipation. Negative for abdominal pain, diarrhea, nausea and vomiting.   Endocrine: Positive for cold intolerance.   Genitourinary: Negative for dysuria, frequency, hematuria and urgency.   Musculoskeletal: Negative for myalgias.   Skin: Positive for rash (recent medication reaction).   Neurological: Negative for dizziness, weakness, numbness and headaches.   All other systems reviewed and are negative.  .    /76   Pulse 80   Temp 97.8 °F (36.6 °C)   Ht 165.1 cm (65\")   Wt 80.7 kg (178 lb)   SpO2 97% Comment: resting, room air  BMI 29.62 kg/m²     There is no immunization history on file for this patient.    Physical Exam   Constitutional: He is oriented to person, place, and time. He appears well-developed and well-nourished.   HENT:   Head: Normocephalic and atraumatic.   Mouth/Throat: Posterior oropharyngeal erythema present.   Eyes: EOM are normal. Pupils are equal, round, and reactive to light.   Neck: Normal range of motion. Neck supple.   Cardiovascular: Normal rate and regular rhythm.   No murmur heard.  Pulmonary/Chest: Effort normal. No respiratory distress. He has no wheezes. He has no rales.   Abdominal: Soft. Bowel sounds are normal. He exhibits no distension.   Musculoskeletal: Normal range of motion. He exhibits no edema.   Neurological: He is alert and oriented to person, place, " and time.   Skin: Skin is warm and dry. No erythema.   Psychiatric: He has a normal mood and affect. His behavior is normal.   Vitals reviewed.      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  Negative for in-situ and invasive carcinoma.           PFTs done in the office today, and read by me:  Moderate obstructive airway disease with significant air trapping  FEV1 1.60, 66% predicted.  Residual 117%.  Normal adjusted diffusion      PA/LAT CXR done in the office today, and reviewed by me:  Awaiting final MD review        EXAMINATION: XR CHEST 1 VW- 8/07/2019     INDICATION: chest congestion; I48.1-Persistent atrial fibrillation;  I48.91-Unspecified atrial fibrillation      COMPARISON: 06/18/2019     FINDINGS: Left sided triple lead pacemaker is noted. Heart and pulmonary  vasculature appear normal in size. Mild chronic appearing interstitial  lung changes are similar to the prior study. No lung consolidation  effusion or pneumothorax is seen.         IMPRESSION:  Stable mild pulmonary interstitial changes which may be  chronic. Interstitial edema, if present, is minimal in extent. No new  chest disease is seen.     This report was finalized on 8/7/2019 12:56 PM by DR. Og Garay MD.    PROBLEM LIST    Problem List Items Addressed This Visit        Cardiovascular and Mediastinum    Chronic systolic congestive heart failure (CMS/HCC)    Overview     - TTE shows ejection fraction of  mid 30s, moderate RV enlargement and RV dysfunction.  - could be tachycardia induced. Will need repeat echo once maintaining NSR, may also need ischemic evaluation.  - DENA: EF 30-35%, Moderate to severe MR, moderate TR, RVSP 35-45mmHg.  - TTE 9/27/19, improved ef to 54%, mod LVH, mod RVE. In NSR                      Respiratory    COPD (chronic obstructive pulmonary disease) (CMS/[facility]) - Primary    Relevant Medications    albuterol sulfate HFA (PROVENTIL HFA;VENTOLIN HFA;PROAIR HFA) inhaler 4 puff (Completed)    Other Relevant Orders    XR Chest PA & Lateral    Pulmonary Function Test (Completed)    Laryngeal mass       Digestive    Dysphagia       Other    Tobacco use disorder            DISCUSSION    A total of 30 minutes of my 40 minute visit was spent with face-to-face time in the office today discussing COPD management.  We spent time counseling on tobacco cessation, maintenance medication, the use of oxygen with chronic respiratory failure, patient and family education regarding disease management.  As well as the importance of compliance with medication and regular follow-up.    Mr. Jones's daughter was with him in the office today.  She seemed very hesitant on changing any of his medications.  Especially if I did not discuss it first with the physician or with Dr. Chapman.    I did encourage him to take his Breo daily to help with his symptom management of his COPD.  That has already been prescribed but he does not take it regularly.  We also discussed the use of his Xopenex and nebulizers.  I do believe he is using his Xopenex more than reported, we discussed that that is every 8 hours as needed.  And to not take the nebulizers and Xopenex at the same time.    He does seem to be having some allergies.  With a postnasal drainage, a cough, and oropharyngeal erythema.  I did suggest some Claritin as needed for seasonal allergy treatment.  The daughter would also like for him to follow-up with the ENT  doctor before starting any allergy medications.    Follow-up with Dr. Glez.        Marta Ramos, APRN  10/10/98848:07 PM  Electronically signed     Please note that portions of this note were completed with a voice recognition program. Efforts were made to edit the dictations, but occasionally words are mistranscribed.      CC: Lexx Monson MD

## 2019-11-21 ENCOUNTER — CLINICAL SUPPORT NO REQUIREMENTS (OUTPATIENT)
Dept: CARDIOLOGY | Facility: CLINIC | Age: 74
End: 2019-11-21

## 2019-11-21 DIAGNOSIS — I48.19 OTHER PERSISTENT ATRIAL FIBRILLATION (HCC): ICD-10-CM

## 2019-11-21 PROCEDURE — 93294 REM INTERROG EVL PM/LDLS PM: CPT | Performed by: INTERNAL MEDICINE

## 2019-11-21 PROCEDURE — 93296 REM INTERROG EVL PM/IDS: CPT | Performed by: INTERNAL MEDICINE

## 2019-12-03 ENCOUNTER — OFFICE VISIT (OUTPATIENT)
Dept: PULMONOLOGY | Facility: CLINIC | Age: 74
End: 2019-12-03

## 2019-12-03 VITALS
BODY MASS INDEX: 30.66 KG/M2 | OXYGEN SATURATION: 95 % | DIASTOLIC BLOOD PRESSURE: 70 MMHG | HEIGHT: 65 IN | HEART RATE: 84 BPM | WEIGHT: 184 LBS | TEMPERATURE: 97.3 F | SYSTOLIC BLOOD PRESSURE: 120 MMHG

## 2019-12-03 DIAGNOSIS — J01.90 ACUTE SINUSITIS, RECURRENCE NOT SPECIFIED, UNSPECIFIED LOCATION: ICD-10-CM

## 2019-12-03 DIAGNOSIS — R05.9 COUGH: ICD-10-CM

## 2019-12-03 DIAGNOSIS — R51.9 FREQUENT HEADACHES: ICD-10-CM

## 2019-12-03 DIAGNOSIS — R06.09 DYSPNEA ON EXERTION: ICD-10-CM

## 2019-12-03 DIAGNOSIS — J44.9 CHRONIC OBSTRUCTIVE PULMONARY DISEASE, UNSPECIFIED COPD TYPE (HCC): Primary | ICD-10-CM

## 2019-12-03 PROCEDURE — 99215 OFFICE O/P EST HI 40 MIN: CPT | Performed by: INTERNAL MEDICINE

## 2019-12-03 RX ORDER — MUPIROCIN CALCIUM 20 MG/G
CREAM TOPICAL 2 TIMES DAILY
Qty: 15 G | Refills: 1 | Status: SHIPPED | OUTPATIENT
Start: 2019-12-03 | End: 2020-01-03

## 2019-12-03 RX ORDER — LEVOFLOXACIN 500 MG/1
500 TABLET, FILM COATED ORAL DAILY
Qty: 14 TABLET | Refills: 0 | Status: SHIPPED | OUTPATIENT
Start: 2019-12-03 | End: 2019-12-17

## 2019-12-03 RX ORDER — DOXYCYCLINE HYCLATE 100 MG/1
CAPSULE ORAL
COMMUNITY
Start: 2019-10-28 | End: 2019-12-03

## 2019-12-09 NOTE — PROGRESS NOTES
Pulmonary Medicine Follow-up    Chief Complaint     Follow-up of chronic obstructive pulmonary disease with multiple exacerbations and possibly pneumonia    History of Present Illness/History Review     Mr. Jones returns to the clinic today.  He previously was seen by my nurse practitioner at the beginning of October.  He is here for further evaluation of his chronic obstructive pulmonary disease as well as episodes of what he has been told are recurrent pneumonia.  He has been on 3 courses of antibiotics since June and is currently on doxycycline.  He has not been smoking.  He states that he is frequently hoarse.  He also notes that he has been coughing after eating although he denies out right aspiration or regurgitation.  He coughs throughout the day and is typically nonproductive.  He also has been complaining of headaches which are primarily focused over the posterior aspect of the right side of his head.  He denies any vision changes, loss of balance, slurred speech, or focal weakness with this.  This started about a week ago however he states that he has been having his off and on since June.  He also endorses a lot of sinus congestion and feels that this is likely contributing.  Postnasal drip is a large feature of his symptomatology.  He has not had any fevers in the last few days but states that he does feel hot and has some chills occasionally.  He denies any diarrhea.  He denies weight loss.      Review of Systems    A full review of systems has been completed with the patient and it is negative except as mentioned expressly in the HPI and the scanned review sheet.    Allergies   Allergen Reactions   • Amoxicillin Rash   • Penicillins Rash       Current Outpatient Medications:   •  acetaminophen (TYLENOL) 500 MG tablet, Take 500 mg by mouth Every 6 (Six) Hours As Needed for Mild Pain ., Disp: , Rfl:   •  albuterol sulfate  (90 Base) MCG/ACT inhaler, Inhale 2 puffs Every 4 (Four) Hours As Needed  for Wheezing., Disp: , Rfl:   •  amitriptyline (ELAVIL) 50 MG tablet, Take 50 mg by mouth Every Night., Disp: , Rfl:   •  apixaban (ELIQUIS) 5 MG tablet tablet, Take 1 tablet by mouth Every 12 (Twelve) Hours., Disp: 60 tablet, Rfl: 11  •  atorvastatin (LIPITOR) 20 MG tablet, Take 20 mg by mouth Every Night., Disp: , Rfl:   •  carvedilol (COREG) 3.125 MG tablet, Take 1 tablet by mouth 2 (Two) Times a Day With Meals., Disp: 30 tablet, Rfl: 11  •  esomeprazole (nexIUM) 40 MG capsule, Take 40 mg by mouth Every Morning Before Breakfast., Disp: , Rfl:   •  Fluticasone Furoate-Vilanterol (BREO ELLIPTA) 100-25 MCG/INH inhaler, Inhale 1 puff Daily., Disp: 28 each, Rfl: 5  •  furosemide (LASIX) 40 MG tablet, Take 1 tablet by mouth Daily., Disp: 90 tablet, Rfl: 3  •  ipratropium (ATROVENT) 0.02 % nebulizer solution, Take 500 mcg by nebulization 4 (Four) Times a Day., Disp: , Rfl:   •  levalbuterol (XOPENEX HFA) 45 MCG/ACT inhaler, Inhale 1-2 puffs Every 6 (Six) Hours As Needed for Wheezing or Shortness of Air., Disp: , Rfl:   •  magnesium hydroxide (MILK OF MAGNESIA) 400 MG/5ML suspension, Take 30 mL by mouth Daily As Needed for Constipation., Disp: , Rfl:   •  O2 (OXYGEN), Inhale 2 L/min Daily., Disp: , Rfl:   •  levoFLOXacin (LEVAQUIN) 500 MG tablet, Take 1 tablet by mouth Daily for 14 days., Disp: 14 tablet, Rfl: 0  •  mupirocin (BACTROBAN) 2 % cream, Apply  topically to the appropriate area as directed 2 (Two) Times a Day. Apply to affected area twice daily, Disp: 15 g, Rfl: 1    Past Medical History:   Diagnosis Date   • Atrial fibrillation (CMS/HCC)    • COPD (chronic obstructive pulmonary disease) (CMS/HCC)    • Hearing loss    • Hoarse voice quality    • Hypertension    • Pacemaker      Family History   Problem Relation Age of Onset   • Heart disease Mother    • Other Mother         staph infection   • Heart attack Mother    • Heart attack Sister    • Heart disease Maternal Grandmother    • Diabetes Maternal  "Grandmother    • Heart attack Maternal Grandfather    • No Known Problems Paternal Grandmother    • No Known Problems Paternal Grandfather    • Heart failure Maternal Uncle    • Heart failure Maternal Aunt    • No Known Problems Sister    • No Known Problems Sister      Social History     Tobacco Use   • Smoking status: Former Smoker     Packs/day: 1.50     Years: 60.00     Pack years: 90.00     Types: Cigarettes     Last attempt to quit: 2019     Years since quittin.5   • Smokeless tobacco: Never Used   Substance Use Topics   • Alcohol use: No     Frequency: Never   • Drug use: No       /70   Pulse 84   Temp 97.3 °F (36.3 °C)   Ht 165.1 cm (65\")   Wt 83.5 kg (184 lb)   SpO2 95% Comment: resting, room air  BMI 30.62 kg/m²   Physical Exam   Constitutional: He is oriented to person, place, and time. He appears well-developed and well-nourished.   HENT:   Head: Normocephalic and atraumatic.   Mouth/Throat: Posterior oropharyngeal erythema present.   The patient's head was palpated.  He does indicate that behind his right ear is the place where his headache is most intense.  This area is nontender.   Eyes: Pupils are equal, round, and reactive to light. EOM are normal.   Neck: Normal range of motion. Neck supple. No JVD present. No tracheal deviation present. No thyromegaly present.   Cardiovascular: Normal rate, regular rhythm and normal heart sounds. Exam reveals no friction rub.   No murmur heard.  Pulmonary/Chest: Effort normal. No stridor. No respiratory distress. He has no wheezes. He has no rales. He exhibits no tenderness.   Mild coarse breath sounds at the bilateral bases without wheeze.  Chest wall is nontender.   Abdominal: Soft. Bowel sounds are normal. He exhibits no distension.   Musculoskeletal: Normal range of motion. He exhibits no edema.   Lymphadenopathy:     He has no cervical adenopathy.   Neurological: He is alert and oriented to person, place, and time.   Skin: Skin is warm and " "dry. Capillary refill takes less than 2 seconds. No erythema.   Psychiatric: He has a normal mood and affect. His behavior is normal. Thought content normal.   Vitals reviewed.      Results     Was able to perform a chest x-ray today.  This shows chronic changes bilaterally.  There are no acute infiltrates noted compared to his previous examinations.    Problem List       ICD-10-CM ICD-9-CM   1. Chronic obstructive pulmonary disease, unspecified COPD type (CMS/[facility]) J44.9 496   2. Acute sinusitis, recurrence not specified, unspecified location J01.90 461.9   3. Cough R05 786.2   4. Dyspnea on exertion R06.09 786.09   5. Frequent headaches R51 784.0       Impression and Plan     Mr. Jones has multiple medical issues which will need to be looked into.  I have advised him that we will need to get him a family physician as many of these are not part of my purview and I feel that it would benefit him to have some to help organize his care.     From the standpoint of his COPD, he does have at least moderate obstruction.  I would like him to continue with Breo daily.  He has not been on this daily in a consistent manner and I feel that he will need this as his prolonged bronchodilator as well as inhaled steroid to help cut back on exacerbations.  I explained that this will be his controlling medication.  He can use Xopenex as needed for his rescue inhaler.  He will also use his nebulized albuterol and Atrovent at least once in the morning to \"open himself up\"and then thereafter as needed.    I believe it is likely that some sinus congestion and possibly infection are affecting both his breathing as well as contributing to his headaches.  I would like him to go ahead and stop doxycycline and I will place him on a course of Levaquin to see if this helps.  He is not able to produce sputum for us today.  In addition, he likely does have a secondary skin infection of his lips and under his nose secondary to wiping his nose " quite a bit and I will prescribe him mupirocin cream for that.  I will order a CT scan of the sinuses as well as the head.    I am also extremely concerned about the possibility that he could be having some dysphasia as he states that he frequently coughs with eating and this could certainly be implicated in his recurrent pneumonias/chest congestion.  We will go ahead and get a swallow evaluation with a barium swallow.    His headache is very unusual.  He does not have any temporal tenderness to palpation.  It does seem to only be on the right side.  There are no visual phenomenon or auras associated with it.  This could simply be related to his sinuses.  We will take a look at his CAT scan and see how he does after treatment of his sinusitis.  I think that he would also benefit from referral to a neurologist for further investigation of this since it has been recurrent since June.    I will plan to see the patient back in the next several months and certainly I will plan to discuss the results of his studies when they are available and make further recommendations at that time.    I have spent a total of 90 minutes with the patient and his family with extensive discussion of multiple medical issues with greater than 50% of that time spent in discussion of his diagnoses, review of the data, and formulation of our plan including multiple prescriptions being initiated.    Aroldo Glez MD, [facility]  [facility]    CC: Lexx Monson MD

## 2019-12-10 DIAGNOSIS — R09.81 SINUS CONGESTION: ICD-10-CM

## 2019-12-10 DIAGNOSIS — Z76.89 ENCOUNTER TO ESTABLISH CARE WITH NEW DOCTOR: ICD-10-CM

## 2019-12-10 DIAGNOSIS — R51.9 FREQUENT HEADACHES: Primary | ICD-10-CM

## 2019-12-10 DIAGNOSIS — R13.10 DYSPHAGIA, UNSPECIFIED TYPE: ICD-10-CM

## 2019-12-30 ENCOUNTER — HOSPITAL ENCOUNTER (OUTPATIENT)
Dept: CT IMAGING | Facility: HOSPITAL | Age: 74
Discharge: HOME | End: 2019-12-30

## 2019-12-30 ENCOUNTER — HOSPITAL ENCOUNTER (OUTPATIENT)
Dept: GENERAL RADIOLOGY | Facility: HOSPITAL | Age: 74
Discharge: HOME | End: 2019-12-30

## 2019-12-30 ENCOUNTER — HOSPITAL ENCOUNTER (OUTPATIENT)
Dept: CT IMAGING | Facility: HOSPITAL | Age: 74
Discharge: HOME | End: 2019-12-30
Admitting: INTERNAL MEDICINE

## 2019-12-30 DIAGNOSIS — R09.81 SINUS CONGESTION: ICD-10-CM

## 2019-12-30 DIAGNOSIS — R13.10 DYSPHAGIA, UNSPECIFIED TYPE: ICD-10-CM

## 2019-12-30 PROCEDURE — 70450 CT HEAD/BRAIN W/O DYE: CPT

## 2019-12-30 PROCEDURE — 70486 CT MAXILLOFACIAL W/O DYE: CPT

## 2019-12-30 PROCEDURE — 74220 X-RAY XM ESOPHAGUS 1CNTRST: CPT

## 2020-01-01 ENCOUNTER — TELEPHONE (OUTPATIENT)
Dept: FAMILY MEDICINE CLINIC | Facility: CLINIC | Age: 75
End: 2020-01-01

## 2020-01-01 DIAGNOSIS — I10 ESSENTIAL HYPERTENSION: ICD-10-CM

## 2020-01-01 DIAGNOSIS — I50.22 CHRONIC SYSTOLIC CONGESTIVE HEART FAILURE (HCC): ICD-10-CM

## 2020-01-01 RX ORDER — FUROSEMIDE 40 MG/1
40 TABLET ORAL DAILY
Qty: 90 TABLET | Refills: 3 | Status: SHIPPED | OUTPATIENT
Start: 2020-01-01 | End: 2021-01-01

## 2020-01-01 RX ORDER — AMITRIPTYLINE HYDROCHLORIDE 50 MG/1
TABLET, FILM COATED ORAL
Qty: 90 TABLET | Refills: 1 | Status: SHIPPED | OUTPATIENT
Start: 2020-01-01 | End: 2021-01-01

## 2020-01-01 RX ORDER — DOXYCYCLINE 100 MG/1
100 CAPSULE ORAL EVERY 12 HOURS SCHEDULED
Qty: 20 CAPSULE | Refills: 0 | Status: SHIPPED | OUTPATIENT
Start: 2020-01-01 | End: 2021-01-01

## 2020-01-02 DIAGNOSIS — R13.10 DYSPHAGIA, UNSPECIFIED TYPE: Primary | ICD-10-CM

## 2020-01-03 ENCOUNTER — OFFICE VISIT (OUTPATIENT)
Dept: FAMILY MEDICINE CLINIC | Facility: CLINIC | Age: 75
End: 2020-01-03

## 2020-01-03 VITALS
HEART RATE: 98 BPM | DIASTOLIC BLOOD PRESSURE: 90 MMHG | HEIGHT: 65 IN | WEIGHT: 184 LBS | BODY MASS INDEX: 30.66 KG/M2 | TEMPERATURE: 97.6 F | OXYGEN SATURATION: 98 % | SYSTOLIC BLOOD PRESSURE: 140 MMHG

## 2020-01-03 DIAGNOSIS — I10 ESSENTIAL HYPERTENSION: ICD-10-CM

## 2020-01-03 DIAGNOSIS — H04.553 DACRYOSTENOSIS, ACQUIRED, BILATERAL: ICD-10-CM

## 2020-01-03 DIAGNOSIS — IMO0002 RECURRENT ASPIRATION BRONCHITIS/PNEUMONIA: ICD-10-CM

## 2020-01-03 DIAGNOSIS — J32.0 CHRONIC MAXILLARY SINUSITIS: ICD-10-CM

## 2020-01-03 DIAGNOSIS — J44.9 CHRONIC OBSTRUCTIVE BRONCHITIS (HCC): ICD-10-CM

## 2020-01-03 DIAGNOSIS — I48.19 OTHER PERSISTENT ATRIAL FIBRILLATION (HCC): ICD-10-CM

## 2020-01-03 DIAGNOSIS — K21.00 GERD WITH ESOPHAGITIS: ICD-10-CM

## 2020-01-03 DIAGNOSIS — I50.22 CHRONIC SYSTOLIC CONGESTIVE HEART FAILURE (HCC): Primary | ICD-10-CM

## 2020-01-03 DIAGNOSIS — R13.14 PHARYNGOESOPHAGEAL DYSPHAGIA: Chronic | ICD-10-CM

## 2020-01-03 PROBLEM — J44.89 CHRONIC OBSTRUCTIVE BRONCHITIS: Status: ACTIVE | Noted: 2020-01-03

## 2020-01-03 PROCEDURE — 99204 OFFICE O/P NEW MOD 45 MIN: CPT | Performed by: FAMILY MEDICINE

## 2020-01-03 RX ORDER — PANTOPRAZOLE SODIUM 40 MG/1
40 TABLET, DELAYED RELEASE ORAL DAILY
Qty: 90 TABLET | Refills: 1 | Status: SHIPPED | OUTPATIENT
Start: 2020-01-03 | End: 2020-01-10 | Stop reason: SDUPTHER

## 2020-01-03 RX ORDER — DEXAMETHASONE 0.5 MG/1
TABLET ORAL
Qty: 21 TABLET | Refills: 0 | Status: SHIPPED | OUTPATIENT
Start: 2020-01-03 | End: 2020-01-10

## 2020-01-03 RX ORDER — CLINDAMYCIN HYDROCHLORIDE 150 MG/1
150 CAPSULE ORAL
Qty: 42 CAPSULE | Refills: 0 | Status: SHIPPED | OUTPATIENT
Start: 2020-01-03 | End: 2020-01-17

## 2020-01-03 NOTE — PROGRESS NOTES
New Patient History and Physical      Referring Physician: Aroldo Glez MD    Chief Complaint:    Chief Complaint   Patient presents with   • Establish Care     cough, congestion, difficulty swallowing and decreased voice; Patient was diagosed with Pneumonia on 11/27/2019; patient had multiple imaging studies at [facility]; Also c/o Bilateral eye irritation and drainage       History of Present Illness:   Patient is a 74-year-old male who is here to establish with a new primary care provider.  He is status post hospitalization in [place] as result of suspected aspiration pneumonia.  He has had numerous imaging studies additionally throughout the course of the last several months and hospitalization.  He is accompanied today by his daughter.  She does provide a great deal of his history due to his hoarseness of voice, as well as persistence of cough and upper airway congestion.  He does offer as much input as possible.    Gives a history of longstanding dysphagia, described as predominantly to solids but occasionally reflux associated.  He has had recurrent pneumonitis/bronchitis symptoms as a result of this.  He did subsequently develop a pneumonia additionally.  He has been successfully treated, denies any current fever or chills.  He has had no reported hemoptysis.  He has had progressive loss of vocalization, described as an audible at times due to the hoarse nature.  His does tend to get worse throughout the course of the day.  He has daily cough/phlegm production, copious in amounts at times, frequently discolored to green/yellow.  No reports of any orthopnea.  He also describes daily congestion and crustiness to the eyelids, denies any mucopurulent drainage throughout the day.    Subjective     Review of Systems     1. Constitutional: Negative for fever. Negative for chills, diaphoresis, fatigue and unexpected weight change.   2. HENT: No dysphagia; no changes to  vision/hearing/smell/taste; no epistaxis.  Hoarse voice as per above.  3. Eyes: Negative for redness and visual disturbance.   4. Respiratory: As per above.  5. Cardiovascular: Negative for chest pain and palpitations.   6. Gastrointestinal: Negative for abdominal distention, abdominal pain and blood in stool.  Recurrent aspiration and dysphasia.  7. Endocrine: Negative for cold intolerance and heat intolerance.   8. Genitourinary: Negative for difficulty urinating, dysuria and frequency.   9. Musculoskeletal: Chronic arthralgias, back pain and myalgias.   10. Skin: Negative for color change, rash and wound.   11. Neurological: Negative for syncope, weakness and headaches.   12. Hematological: Negative for adenopathy. Does not bruise/bleed easily.   13. Psychiatric/Behavioral: Negative for confusion. The patient is not nervous/anxious.     The following portions of the patient's history were reviewed and updated as appropriate: allergies, current medications, past family history, past medical history, past social history, past surgical history and problem list.    Past Medical History:   Past Medical History:   Diagnosis Date   • Arthritis    • Atrial fibrillation (CMS/HCC)    • Bone disorder    • COPD (chronic obstructive pulmonary disease) (CMS/HCC)    • Hearing loss    • Hoarse voice quality    • Hyperlipidemia    • Hypertension    • Pacemaker        Past Surgical History:  Past Surgical History:   Procedure Laterality Date   • CARDIAC ELECTROPHYSIOLOGY PROCEDURE Left 6/17/2019    Procedure: AV node ablation + Bi-V PPM implant;  Surgeon: Lucio Chapman MD;  Location:  [facility] INVASIVE LOCATION;  Service: Cardiovascular   • CARDIAC ELECTROPHYSIOLOGY PROCEDURE N/A 6/17/2019    Procedure: AV node ablation;  Surgeon: Lucio Chapman MD;  Location: [facility] INVASIVE LOCATION;  Service: Cardiovascular   • HEMORRHOIDECTOMY     • INSERT / REPLACE / REMOVE PACEMAKER     • LARYNGOSCOPY N/A 6/12/2019    Procedure:  MICRO DIRECT LARYNGOSCOPY WITH BIOPSY;  Surgeon: Glen Gutiérrez MD;  Location: [facility];  Service: ENT       Family History: family history includes Diabetes in his maternal grandmother; Heart attack in his maternal grandfather, mother, and sister; Heart disease in his maternal grandmother and mother; Heart failure in his maternal aunt and maternal uncle; No Known Problems in his paternal grandfather, paternal grandmother, sister, and sister; Other in his mother.    Social History:  reports that he quit smoking about 7 months ago. His smoking use included cigarettes. He has a 90.00 pack-year smoking history. He has never used smokeless tobacco. He reports that he does not drink alcohol or use drugs.    Medications:    Current Outpatient Medications:   •  acetaminophen (TYLENOL) 500 MG tablet, Take 500 mg by mouth Every 6 (Six) Hours As Needed for Mild Pain ., Disp: , Rfl:   •  albuterol sulfate  (90 Base) MCG/ACT inhaler, Inhale 2 puffs Every 4 (Four) Hours As Needed for Wheezing., Disp: , Rfl:   •  amitriptyline (ELAVIL) 50 MG tablet, Take 50 mg by mouth Every Night., Disp: , Rfl:   •  apixaban (ELIQUIS) 5 MG tablet tablet, Take 1 tablet by mouth Every 12 (Twelve) Hours., Disp: 60 tablet, Rfl: 11  •  atorvastatin (LIPITOR) 20 MG tablet, Take 20 mg by mouth Every Night., Disp: , Rfl:   •  carvedilol (COREG) 3.125 MG tablet, Take 1 tablet by mouth 2 (Two) Times a Day With Meals., Disp: 30 tablet, Rfl: 11  •  furosemide (LASIX) 40 MG tablet, Take 1 tablet by mouth Daily., Disp: 90 tablet, Rfl: 3  •  ipratropium (ATROVENT) 0.02 % nebulizer solution, Take 500 mcg by nebulization 4 (Four) Times a Day., Disp: , Rfl:   •  levalbuterol (XOPENEX HFA) 45 MCG/ACT inhaler, Inhale 1-2 puffs Every 6 (Six) Hours As Needed for Wheezing or Shortness of Air., Disp: , Rfl:   •  magnesium hydroxide (MILK OF MAGNESIA) 400 MG/5ML suspension, Take 30 mL by mouth Daily As Needed for Constipation., Disp: , Rfl:   •  O2  "(OXYGEN), Inhale 2 L/min Daily., Disp: , Rfl:   •  clindamycin (CLEOCIN) 150 MG capsule, Take 1 capsule by mouth 3 (Three) Times a Day With Meals for 14 days., Disp: 42 capsule, Rfl: 0  •  dexamethasone (DECADRON) 0.5 MG tablet, 6 po x1d; then 5 po x 1d; then 4 po x 1d; then 3 po x 1d; then 2 po x 1d; then 1 po x1d; then STOP, Disp: 21 tablet, Rfl: 0  •  pantoprazole (PROTONIX) 40 MG EC tablet, Take 1 tablet by mouth Daily., Disp: 90 tablet, Rfl: 1    Allergies:  Allergies   Allergen Reactions   • Amoxicillin Rash   • Penicillins Rash       Objective     Physical Exam:  Vital Signs: /90   Pulse 98   Temp 97.6 °F (36.4 °C)   Ht 165.1 cm (65\")   Wt 83.5 kg (184 lb)   SpO2 98%   BMI 30.62 kg/m²      General Appearance: alert, oriented x 3, no acute distress.  Pleasant and interactive during questioning and examination.  Skin: warm and dry.   HEENT: Atraumatic.  pupils round and reactive to light and accommodation, oral mucosa pink and moist.  Nares patent without epistaxis.  External auditory canals are patent tympanic membranes intact.  Hoarse voice with pharyngeal congestion.  Boggy/inflamed nasal turbinates bilaterally with moderate to severe postnasal drainage, posterior pharyngeal erythema without pustules or exudate.  Mildly injected conjunctive a bilaterally.  Neck: supple, no JVD, trachea midline.  No thyromegaly  Lungs: Rhonchus referred congestive changes throughout bilateral lungs, audible air exchange noted all lung fields, unlabored breathing effort.  Uncus congestion is partially cleared with heavy cough.  Heart: RR, normal S1 and S2, no S3, no rub.  Irregularly irregular on palpation and auscultation.  Abdomen: soft, non-tender, no palpable bladder, present bowel sounds to auscultation ×4.  No guarding or rigidity.  Extremities: no clubbing, cyanosis or edema.  Good range of motion actively and passively.  Symmetric muscle strength and development.  Independently ambulatory.  Neuro: normal " speech and mental status.  Cranial nerves II through XII intact.  No anosmia. DTR 2+; proprioception intact.  No focal motor/sensory deficits.        Assessment / Plan     Assessment:   Stef was seen today for establish care.    Diagnoses and all orders for this visit:    Chronic systolic congestive heart failure (CMS/HCC)    Other persistent atrial fibrillation    Essential hypertension    Chronic maxillary sinusitis  -     dexamethasone (DECADRON) 0.5 MG tablet; 6 po x1d; then 5 po x 1d; then 4 po x 1d; then 3 po x 1d; then 2 po x 1d; then 1 po x1d; then STOP  -     clindamycin (CLEOCIN) 150 MG capsule; Take 1 capsule by mouth 3 (Three) Times a Day With Meals for 14 days.  -     Ambulatory Referral to ENT (Otolaryngology)    Recurrent aspiration bronchitis/pneumonia (CMS/HCC)  -     pantoprazole (PROTONIX) 40 MG EC tablet; Take 1 tablet by mouth Daily.  -     Ambulatory Referral to Gastroenterology    Chronic obstructive bronchitis (CMS/HCC)    GERD with esophagitis  -     pantoprazole (PROTONIX) 40 MG EC tablet; Take 1 tablet by mouth Daily.  -     Ambulatory Referral to Gastroenterology    Pharyngoesophageal dysphagia  -     Ambulatory Referral to ENT (Otolaryngology)  -     Ambulatory Referral to Gastroenterology    Dacryostenosis, acquired, bilateral  -     Ambulatory Referral to ENT (Otolaryngology)        Plan:  Heart rate well controlled.  Blood pressure is not at goal however.  I would defer any medication changes today until after treating patient for his chronic maxillary sinusitis.  He is asymptomatic with respect to his hemodynamic status.  Adjustment of his carvedilol dosing if needed at follow-up appointment should his blood pressure remain uncontrolled.    Patient is demonstrating no findings of volume overload or acute exacerbation of his chronic systolic heart failure at this time.  Continue to follow clinically.  Daily weight checks.  Continue vasodilating beta-blocker therapy, carvedilol, as  well as Lasix.    Continue anticoagulation, surveillance labs as needed.    Continue statin therapy.    Furl to ENT for evaluation of chronic maxillary sinusitis.  CT scan completed in December demonstrated mucosal thickening in the maxillary sinuses consistent with chronic sinusitis.  Due to patient's chronic symptoms, surgical intervention should be considered.  Patient also has dacryostenosis, suspect acquired but could be a result of his nasopharyngeal problems.  I have asked that he be seen by ENT for this additionally.    Patient will also be referred to gastroenterology for evaluation of his pharyngoesophageal dysphagia.  He is referred to ENT additionally for probable need of laryngoscopy due to his hoarse voice additionally.  Discussion Summary:    Discussed plan of care in detail with pt today; pt verb understanding and agrees.  Follow up:  Return in about 1 week (around 1/10/2020) for Recheck, Med Change/New Meds.     There are no Patient Instructions on file for this visit.    Néstro Ford,   01/08/20  9:33 AM    Please note that portions of this note may have been completed with a voice recognition program. Efforts were made to edit the dictations, but occasionally words are mistranscribed.

## 2020-01-08 PROBLEM — R13.14 PHARYNGOESOPHAGEAL DYSPHAGIA: Chronic | Status: ACTIVE | Noted: 2019-06-10

## 2020-01-08 PROBLEM — H04.553: Status: ACTIVE | Noted: 2020-01-08

## 2020-01-08 PROBLEM — L03.90 CELLULITIS: Status: RESOLVED | Noted: 2019-06-08 | Resolved: 2020-01-08

## 2020-01-10 ENCOUNTER — OFFICE VISIT (OUTPATIENT)
Dept: FAMILY MEDICINE CLINIC | Facility: CLINIC | Age: 75
End: 2020-01-10

## 2020-01-10 VITALS
TEMPERATURE: 98.2 F | SYSTOLIC BLOOD PRESSURE: 120 MMHG | HEART RATE: 92 BPM | OXYGEN SATURATION: 97 % | BODY MASS INDEX: 30.82 KG/M2 | DIASTOLIC BLOOD PRESSURE: 70 MMHG | HEIGHT: 65 IN | WEIGHT: 185 LBS

## 2020-01-10 DIAGNOSIS — J44.9 CHRONIC OBSTRUCTIVE BRONCHITIS (HCC): Primary | ICD-10-CM

## 2020-01-10 DIAGNOSIS — H04.553 DACRYOSTENOSIS, ACQUIRED, BILATERAL: ICD-10-CM

## 2020-01-10 DIAGNOSIS — I50.22 CHRONIC SYSTOLIC CONGESTIVE HEART FAILURE (HCC): ICD-10-CM

## 2020-01-10 DIAGNOSIS — IMO0002 RECURRENT ASPIRATION BRONCHITIS/PNEUMONIA: ICD-10-CM

## 2020-01-10 DIAGNOSIS — I48.19 OTHER PERSISTENT ATRIAL FIBRILLATION (HCC): ICD-10-CM

## 2020-01-10 DIAGNOSIS — R09.02 HYPOXIA: ICD-10-CM

## 2020-01-10 DIAGNOSIS — R13.14 PHARYNGOESOPHAGEAL DYSPHAGIA: Chronic | ICD-10-CM

## 2020-01-10 DIAGNOSIS — I10 ESSENTIAL HYPERTENSION: ICD-10-CM

## 2020-01-10 DIAGNOSIS — K21.00 GERD WITH ESOPHAGITIS: ICD-10-CM

## 2020-01-10 DIAGNOSIS — J32.0 CHRONIC MAXILLARY SINUSITIS: ICD-10-CM

## 2020-01-10 PROCEDURE — 99214 OFFICE O/P EST MOD 30 MIN: CPT | Performed by: FAMILY MEDICINE

## 2020-01-10 RX ORDER — AMITRIPTYLINE HYDROCHLORIDE 50 MG/1
50 TABLET, FILM COATED ORAL NIGHTLY
Qty: 30 TABLET | Refills: 5 | Status: SHIPPED | OUTPATIENT
Start: 2020-01-10 | End: 2020-03-19 | Stop reason: SDUPTHER

## 2020-01-10 RX ORDER — FUROSEMIDE 40 MG/1
40 TABLET ORAL DAILY
Qty: 90 TABLET | Refills: 3 | Status: SHIPPED | OUTPATIENT
Start: 2020-01-10 | End: 2020-01-01

## 2020-01-10 RX ORDER — LEVALBUTEROL TARTRATE 45 UG/1
1-2 AEROSOL, METERED ORAL EVERY 6 HOURS PRN
Qty: 1 INHALER | Refills: 11 | Status: SHIPPED | OUTPATIENT
Start: 2020-01-10 | End: 2020-01-28 | Stop reason: SDUPTHER

## 2020-01-10 RX ORDER — ATORVASTATIN CALCIUM 20 MG/1
20 TABLET, FILM COATED ORAL NIGHTLY
Qty: 30 TABLET | Refills: 5 | Status: SHIPPED | OUTPATIENT
Start: 2020-01-10 | End: 2020-07-14 | Stop reason: SDUPTHER

## 2020-01-10 RX ORDER — PANTOPRAZOLE SODIUM 40 MG/1
40 TABLET, DELAYED RELEASE ORAL DAILY
Qty: 90 TABLET | Refills: 1 | Status: SHIPPED | OUTPATIENT
Start: 2020-01-10 | End: 2020-02-07 | Stop reason: SDUPTHER

## 2020-01-10 RX ORDER — CARVEDILOL 3.12 MG/1
3.12 TABLET ORAL 2 TIMES DAILY WITH MEALS
Qty: 30 TABLET | Refills: 11 | Status: SHIPPED | OUTPATIENT
Start: 2020-01-10 | End: 2020-07-13

## 2020-01-10 RX ORDER — ALBUTEROL SULFATE 90 UG/1
2 AEROSOL, METERED RESPIRATORY (INHALATION) EVERY 4 HOURS PRN
Qty: 1 INHALER | Refills: 11 | Status: SHIPPED | OUTPATIENT
Start: 2020-01-10 | End: 2020-07-14 | Stop reason: SDUPTHER

## 2020-01-10 NOTE — PROGRESS NOTES
Established Patient        Chief Complaint:   Chief Complaint   Patient presents with   • Follow-up     sinusitis; symptoms have nearly subsided; med refills        Stef Jones is a 74 y.o. male    History of Present Illness:   Here for scheduled follow-up visit concerning his pharyngoesophageal dysphagia, chronic systolic congestive heart failure, hypertension, persistent atrial fibrillation, chronic obstructive bronchitis, recurrent aspiration pneumonitis and chronic maxillary sinusitis.  His voice continues to be hoarse in quality, although he does report significant improvement to his reflux symptoms in general.  He denies any focal aspiration.  He has maintained good urine output without hematuria or dysuria reported.  Denies any bright red blood or black or tarry stools.  Denies any epistaxis or hemoptysis.    Subjective     The following portions of the patient's history were reviewed and updated as appropriate: allergies, current medications, past family history, past medical history, past social history, past surgical history and problem list.    Allergies   Allergen Reactions   • Amoxicillin Rash   • Penicillins Rash       Review of Systems  Constitutional: Negative for fever. Negative for chills, diaphoresis, fatigue and unexpected weight change.   HENT: Dysphasia to solids paroxysmally; no changes to vision/hearing/smell/taste; no epistaxis.  Hoarse voice as per above.  Eyes: Negative for redness and visual disturbance.   Respiratory: As per above.  Cardiovascular: Negative for chest pain and palpitations.   Gastrointestinal: Negative for abdominal distention, abdominal pain and blood in stool.  Recurrent aspiration and dysphasia.  Endocrine: Negative for cold intolerance and heat intolerance.   Genitourinary: Negative for difficulty urinating, dysuria and frequency.   Musculoskeletal: Chronic arthralgias, back pain and myalgias.   Skin: Negative for color change, rash and wound.  "  Neurological: Negative for syncope, weakness and headaches.   Hematological: Negative for adenopathy. Does not bruise/bleed easily.   Psychiatric/Behavioral: Negative for confusion. The patient is not nervous/anxious.     Objective     Physical Exam   Vital Signs: /70   Pulse 92   Temp 98.2 °F (36.8 °C)   Ht 165.1 cm (65\")   Wt 83.9 kg (185 lb)   SpO2 97%   BMI 30.79 kg/m²     General Appearance: alert, oriented x 3, no acute distress.  Pleasant and interactive during questioning and examination.  Skin: warm and dry.   HEENT: Atraumatic.  pupils round and reactive to light and accommodation, oral mucosa pink and moist.  Nares patent without epistaxis.  External auditory canals are patent tympanic membranes intact.  Hoarse voice with pharyngeal congestion.  Boggy/inflamed nasal turbinates bilaterally with moderate to severe postnasal drainage, posterior pharyngeal erythema without pustules or exudate.  Mildly injected conjunctive a bilaterally.  Neck: supple, no JVD, trachea midline.  No thyromegaly  Lungs: Rhonchus referred congestive changes throughout bilateral lungs, audible air exchange noted all lung fields, unlabored breathing effort.  Uncus congestion is partially cleared with heavy cough.  Heart: RR, normal S1 and S2, no S3, no rub.  Irregularly irregular on palpation and auscultation.  Abdomen: soft, non-tender, no palpable bladder, present bowel sounds to auscultation ×4.  No guarding or rigidity.  Extremities: no clubbing, cyanosis or edema.  Good range of motion actively and passively.  Symmetric muscle strength and development.  Independently ambulatory.  Neuro: normal speech and mental status.  Cranial nerves II through XII intact.  No anosmia. DTR 2+; proprioception intact.  No focal motor/sensory deficits.    Assessment and Plan      Assessment:   Stef was seen today for follow-up.    Diagnoses and all orders for this visit:    Chronic obstructive bronchitis (CMS/HCC)  -     " levalbuterol (XOPENEX HFA) 45 MCG/ACT inhaler; Inhale 1-2 puffs Every 6 (Six) Hours As Needed for Wheezing.  -     albuterol sulfate  (90 Base) MCG/ACT inhaler; Inhale 2 puffs Every 4 (Four) Hours As Needed for Wheezing.  -     ipratropium (ATROVENT) 0.02 % nebulizer solution; Take 2.5 mL by nebulization 4 (Four) Times a Day.  -     Overnight Sleep Oximetry Study; Future    Recurrent aspiration bronchitis/pneumonia (CMS/HCC)  -     pantoprazole (PROTONIX) 40 MG EC tablet; Take 1 tablet by mouth Daily.    GERD with esophagitis  -     pantoprazole (PROTONIX) 40 MG EC tablet; Take 1 tablet by mouth Daily.    Hypoxia  -     Overnight Sleep Oximetry Study; Future    Dacryostenosis, acquired, bilateral    Pharyngoesophageal dysphagia    Chronic maxillary sinusitis    Essential hypertension  -     carvedilol (COREG) 3.125 MG tablet; Take 1 tablet by mouth 2 (Two) Times a Day With Meals.  -     furosemide (LASIX) 40 MG tablet; Take 1 tablet by mouth Daily.    Chronic systolic congestive heart failure (CMS/HCC)  -     atorvastatin (LIPITOR) 20 MG tablet; Take 1 tablet by mouth Every Night.  -     furosemide (LASIX) 40 MG tablet; Take 1 tablet by mouth Daily.    Other persistent atrial fibrillation  -     carvedilol (COREG) 3.125 MG tablet; Take 1 tablet by mouth 2 (Two) Times a Day With Meals.    Other orders  -     amitriptyline (ELAVIL) 50 MG tablet; Take 1 tablet by mouth Every Night.  -     magnesium hydroxide (MILK OF MAGNESIA) 400 MG/5ML suspension; Take 30 mL by mouth Daily As Needed for Constipation.        Plan:  7 days remaining on clindamycin; symptoms have improved at least noticeably, although not resolved.    Scheduling to see ENT.    Has Overnight pulse oximetry ordered.    Refills provided for numerous meds today.    Continue Protonix; pt states symptoms improved with the change.  Discussion Summary:    Discussed plan of care in detail with pt today; pt verb understanding and agrees.  Follow up:  No  follow-ups on file.     There are no Patient Instructions on file for this visit.    Néstor Ford DO  01/10/20  4:09 PM          Please note that portions of this note may have been completed with a voice recognition program. Efforts were made to edit the dictations, but occasionally words are mistranscribed.

## 2020-01-13 ENCOUNTER — APPOINTMENT (OUTPATIENT)
Dept: GENERAL RADIOLOGY | Facility: HOSPITAL | Age: 75
End: 2020-01-13

## 2020-01-15 ENCOUNTER — HOSPITAL ENCOUNTER (OUTPATIENT)
Dept: GENERAL RADIOLOGY | Facility: HOSPITAL | Age: 75
Discharge: HOME | End: 2020-01-15
Admitting: INTERNAL MEDICINE

## 2020-01-15 DIAGNOSIS — R13.10 DYSPHAGIA, UNSPECIFIED TYPE: ICD-10-CM

## 2020-01-15 PROCEDURE — 74230 X-RAY XM SWLNG FUNCJ C+: CPT

## 2020-01-15 PROCEDURE — 92611 MOTION FLUOROSCOPY/SWALLOW: CPT

## 2020-01-15 NOTE — MBS/VFSS/FEES
Outpatient - Speech Language Pathology   Swallow Modified Barium Swallow Study  Joe     Patient Name: Stef Jones  : 1945  MRN: [id number]  Today's Date: 1/15/2020               Admit Date: 1/15/2020    Visit Dx:     ICD-10-CM ICD-9-CM   1. Dysphagia, unspecified type R13.10 787.20     Patient Active Problem List   Diagnosis   • Other persistent atrial fibrillation   • COPD (chronic obstructive pulmonary disease) (CMS/HCC)   • Pharyngoesophageal dysphagia   • Laryngeal mass   • Chronic systolic congestive heart failure (CMS/HCC)   • Essential hypertension   • Tobacco use disorder   • Hoarse voice quality   • Chronic maxillary sinusitis   • GERD with esophagitis   • Recurrent aspiration bronchitis/pneumonia (CMS/HCC)   • Chronic obstructive bronchitis (CMS/HCC)   • Dacryostenosis, acquired, bilateral   • Hypoxia     Past Medical History:   Diagnosis Date   • Arthritis    • Atrial fibrillation (CMS/HCC)    • Bone disorder    • COPD (chronic obstructive pulmonary disease) (CMS/HCC)    • Hearing loss    • Hoarse voice quality    • Hyperlipidemia    • Hypertension    • Pacemaker      Past Surgical History:   Procedure Laterality Date   • CARDIAC ELECTROPHYSIOLOGY PROCEDURE Left 2019    Procedure: AV node ablation + Bi-V PPM implant;  Surgeon: Lucio Chapman MD;  Location:  [facility];  Service: Cardiovascular   • CARDIAC ELECTROPHYSIOLOGY PROCEDURE N/A 2019    Procedure: AV node ablation;  Surgeon: Lucio Chapman MD;  Location:  [facility];  Service: Cardiovascular   • HEMORRHOIDECTOMY     • INSERT / REPLACE / REMOVE PACEMAKER     • LARYNGOSCOPY N/A 2019    Procedure: MICRO DIRECT LARYNGOSCOPY WITH BIOPSY;  Surgeon: Glen Gutiérrez MD;  Location:  [facility];  Service: ENT        SWALLOW EVALUATION (last 72 hours)      SLP Adult Swallow Evaluation     Row Name 01/15/20 1135                   Rehab Evaluation    Document Type  other (see  comments) Modified Barium Swallow Study  -TM        Subjective Information  complains of difficulty with po, mostly solids  -TM        Patient Observations  alert;cooperative  -TM        Patient/Family Observations  family present  -TM        Patient Effort  good  -TM           General Information    Patient Profile Reviewed  yes  -TM        Pertinent History Of Current Problem  COPD, laryngeal mass, GERD with esophagitis, pneumonia  -TM        Current Method of Nutrition  regular textures;thin liquids  -TM        Precautions/Limitations, Hearing  WFL  -TM        Prior Level of Function-Communication  WFL;other (see comments) dysphonia  -TM        Prior Level of Function-Swallowing  no diet consistency restrictions  -TM        Plans/Goals Discussed with  patient and family  -TM        Barriers to Rehab  none identified  -TM           Pain Assessment    Additional Documentation  Pain Scale: Numbers Pre/Post-Treatment (Group)  -TM           Pain Scale: Numbers Pre/Post-Treatment    Pain Scale: Numbers, Pretreatment  0/10 - no pain  -TM        Pain Scale: Numbers, Post-Treatment  0/10 - no pain  -TM           Oral Motor and Function    Oral Lesions or Structural Abnormalities and/or variants  none identified  -TM        Dentition Assessment  missing teeth;other (see comments) very few teeth  -TM        Secretion Management  WNL/WFL  -TM        Mucosal Quality  moist, healthy  -TM           Oral Musculature and Cranial Nerve Assessment    Oral Motor General Assessment  WFL  -TM           Respiratory    Respiratory Status  WFL;during swallowing/eating  -TM           MBS/VFSS    Utensils Used  spoon;straw;cup  -TM        Consistencies Trialed  soft textures;pudding thick;honey-thick liquids;nectar/syrup-thick liquids;thin liquids  -TM           MBS/VFSS Interpretation    Oral Prep Phase  impaired oral phase of swallowing  -TM        Oral Transit Phase  WFL  -TM        Oral Residue  WFL  -TM        VFSS Summary  Oral phase  was mildly impaired due to having only few dentition affecting mastication with some solids and efficiency with bolus prep.  Mild-moderate pharyngeal phase dysphagia exhibited by decreased laryngeal elevation with laryngeal penetration with nectar (trace) and thin (consistent, moderate, mid-laryngeal) liquids.  Compensatory chin tuck was effective to reduce penetration.  Mild residue/stasis along posterior pharyngeal wall-superior pyriform due to significant cervical osteophytes at level C3- 5 which resulted in path deviation of bolus through the pharynx to the esophagus.  Pt. was also noted to have dysphonia - hoarseness - and has an upcoming ENT referral.  Question if vocal changes may be related to significance of osteophytes from compressing tissues and inflammation at the anterior cervical spine.  Recommend:  1. regular diet with thin liq as gladys,  2. meds whole as gladys,  3. small bites/drinks,  4. alternate solids and liquids,  5. compensatory chin tuck,  6. proceed with ENT consult for dysphonia.   -TM           Oral Preparatory Phase    Oral Preparatory Phase  other (see comments) prolonged mastication due to few dentition  -TM           Initiation of Pharyngeal Swallow    Initiation of Pharyngeal Swallow  WFL  -TM        Pharyngeal Phase  impaired pharyngeal phase of swallowing  -TM        Penetration During the Swallow  thin liquids;nectar-thick liquids;secondary to reduced laryngeal elevation  -TM        Response to Penetration  transient;other (see comments) transient w/nectar; consistent moderate w/thin  -TM        Rosenbek's Scale  2--->level 2;thin:;nectar:  -TM        Pharyngeal Residue  mechanical soft;posterior pharyngeal wall;other (see comments) due to significance of cervical osteophytes level C3-5  -TM        Response to Residue  cleared residue with spontaneous subsequent swallow;cleared residue with liquid wash  -TM        Attempted Compensatory Maneuvers  chin tuck  -TM        Response to  Attempted Compensatory Maneuvers  prevented penetration  -TM           Esophageal Phase    Esophageal Phase  other (see comments) previous dx of GERD  -TM           SLP Communication to Radiology    Severity Level of Dysphagia  pharyngeal dysfunction;mild dysphagia;mild-moderate dysphagia  -TM        Consistencies Aspirated/Penetrated  penetrated;thin liquids;nectar-thick liquids  -TM        Summary Statement  mild oral and mild-moderate pharyngeal phases of dysphagia; prior dx of GERD  -TM           Clinical Impression    SLP Swallowing Diagnosis  mild;oral dysfunction;mild-moderate;pharyngeal dysfunction;esophageal dysfunction  -TM        Functional Impact  risk of aspiration/pneumonia  -TM           Recommendations    Therapy Frequency (Swallow)  evaluation only  -TM        SLP Diet Recommendation  regular textures;thin liquids  -TM        Recommended Precautions and Strategies  upright posture during/after eating;small bites of food and sips of liquid;other (see comments);chin Tuck;alternate between small bites of food and sips of liquid reflux precautions  -TM        SLP Rec. for Method of Medication Administration  meds whole;with thin liquids;with pudding or applesauce;as tolerated  -TM        Monitor for Signs of Aspiration  notify SLP if any concerns;yes;right lower lobe infiltrates;cough;gurgly voice;throat clearing  -TM        Anticipated Dischage Disposition  home  -TM        Demonstrates Need for Referral to Another Service  otolaryngology (ENT)  -TM          User Key  (r) = Recorded By, (t) = Taken By, (c) = Cosigned By    Initials Name Effective Dates    VALENTINE FcoGlenna 03/07/18 -           EDUCATION  The patient has been educated in the following areas:   Dysphagia (Swallowing Impairment).    SLP Recommendation and Plan  SLP Swallowing Diagnosis: mild, oral dysfunction, mild-moderate, pharyngeal dysfunction, esophageal dysfunction  SLP Diet Recommendation: regular textures, thin  liquids  Recommended Precautions and Strategies: upright posture during/after eating, small bites of food and sips of liquid, other (see comments), chin Tuck, alternate between small bites of food and sips of liquid(reflux precautions)  SLP Rec. for Method of Medication Administration: meds whole, with thin liquids, with pudding or applesauce, as tolerated     Monitor for Signs of Aspiration: notify SLP if any concerns, yes, right lower lobe infiltrates, cough, gurgly voice, throat clearing        Anticipated Dischage Disposition: home     Therapy Frequency (Swallow): evaluation only     Demonstrates Need for Referral to Another Service: otolaryngology (ENT)                Time Calculation:   Time Calculation- SLP     Row Name 01/15/20 1327             Time Calculation- SLP    SLP Start Time  1135  -TM      SLP Stop Time  1158  -TM      SLP Time Calculation (min)  23 min  -      SLP Received On  01/15/20  -        User Key  (r) = Recorded By, (t) = Taken By, (c) = Cosigned By    Initials Name Provider Type     Glenna Eagle Speech and Language Pathologist          Therapy Charges for Today     Code Description Service Date Service Provider Modifiers Qty    [id number] HC ST MOTION FLUORO EVAL SWALLOW 6 1/15/2020 Glenna Eagle GN 1               Glenna Eagle  1/15/2020

## 2020-01-16 ENCOUNTER — TELEPHONE (OUTPATIENT)
Dept: FAMILY MEDICINE CLINIC | Facility: CLINIC | Age: 75
End: 2020-01-16

## 2020-01-16 NOTE — TELEPHONE ENCOUNTER
Patient's daughter, Ene, called in regards to Dr. Ford's referral to GI. Patient's daughter had stated that patient is hard of hearing and had written down a day and time for the scheduled GI appt but not who he was going to see or the address. Ene stated that Dr. Ford was going to refer him to a GI Doctor by the name of Roni. She stated that she has been trying to reach that GI office to find out more information but has not been successful. She also stated that someone from Dr. Ford's office had scheduled the GI appt.   Ene stated that [phone] had called her but didn't leave a voicemail and still is not able to get into contact with that office. Please advise.    Patient's daughter, Ene's HOME # [phone] ( Try this one first)   Ene's MOBILE # [phone] (Try this one second if there is no answer on the HOME #)

## 2020-01-17 NOTE — TELEPHONE ENCOUNTER
Patients daughter (on JUAN) notified Dr Somers office had tried to contact patient x 2.  Phone number given to patients daughter.

## 2020-01-27 ENCOUNTER — OFFICE VISIT (OUTPATIENT)
Dept: PULMONOLOGY | Facility: CLINIC | Age: 75
End: 2020-01-27

## 2020-01-27 VITALS
TEMPERATURE: 98.9 F | WEIGHT: 180.25 LBS | RESPIRATION RATE: 16 BRPM | DIASTOLIC BLOOD PRESSURE: 96 MMHG | SYSTOLIC BLOOD PRESSURE: 134 MMHG | HEIGHT: 65 IN | HEART RATE: 77 BPM | BODY MASS INDEX: 30.03 KG/M2 | OXYGEN SATURATION: 94 %

## 2020-01-27 DIAGNOSIS — J44.9 CHRONIC OBSTRUCTIVE PULMONARY DISEASE, UNSPECIFIED COPD TYPE (HCC): Primary | ICD-10-CM

## 2020-01-27 DIAGNOSIS — IMO0002 RECURRENT ASPIRATION BRONCHITIS/PNEUMONIA: ICD-10-CM

## 2020-01-27 DIAGNOSIS — J44.9 CHRONIC OBSTRUCTIVE BRONCHITIS (HCC): ICD-10-CM

## 2020-01-27 DIAGNOSIS — J32.0 CHRONIC MAXILLARY SINUSITIS: ICD-10-CM

## 2020-01-27 PROCEDURE — 99214 OFFICE O/P EST MOD 30 MIN: CPT | Performed by: NURSE PRACTITIONER

## 2020-01-27 RX ORDER — CARVEDILOL 6.25 MG/1
TABLET ORAL
COMMUNITY
Start: 2020-01-02 | End: 2020-01-28

## 2020-01-28 NOTE — PROGRESS NOTES
[facility] Pulmonary Follow up    CHIEF COMPLAINT    COPD    HISTORY OF PRESENT ILLNESS    Stef Jones is a 74 y.o.male here today for follow-up of his COPD.  He was last seen by Dr. Glez in December.  At that time he had ordered multiple test and had been referred to ENT and gastroenterology.  Since his appointment he has also been established with a PCP.  He is here today for review of his tests that were ordered.    He remains on Breo daily for his COPD.  He states that he has noticed improvement in his breathing.  He continues to use his Atrovent nebulizers twice a day.  He does have some shortness of breath with activity but recovers quickly at rest.    He saw an ENT prior to this appointment today and was found to need hearing aids and is going to be referred to a voice specialist at .  He has a follow-up scheduled with his ENT in the near future.  He did increase his Protonix to 40 mg twice a day and felt that GERD was contributing to his hoarseness.    He also has a follow-up with gastroenterology soon.  He is yet to make the appointment due to having other appointments already made.  He is hopeful to have this appointment scheduled soon.    He denies a fever, chills, sputum production, hemoptysis, night sweats, weight loss, chest pain or palpitations.  He denies any lower extremity edema or calf tenderness.  He does have chronic postnasal drainage and rhinorrhea.  He is currently not on any medication for his sinuses.  He denies any overt symptoms of reflux and was recently switched to Protonix by his PCP in the last week or so.    He was recommended to have a sleep study performed however this is yet been scheduled because of so many appointments scheduled this month.  His daughter is hopeful to get this scheduled in the next couple of months.    He is accompanied today by his daughter.  Patient Active Problem List   Diagnosis   • Other persistent atrial fibrillation   • COPD (chronic obstructive  pulmonary disease) (CMS/[facility])   • Pharyngoesophageal dysphagia   • Laryngeal mass   • Chronic systolic congestive heart failure (CMS/[facility])   • Essential hypertension   • Tobacco use disorder   • Hoarse voice quality   • Chronic maxillary sinusitis   • GERD with esophagitis   • Recurrent aspiration bronchitis/pneumonia (CMS/[facility])   • Chronic obstructive bronchitis (CMS/[facility])   • Dacryostenosis, acquired, bilateral   • Hypoxia       Allergies   Allergen Reactions   • Amoxicillin Rash   • Penicillins Rash       Current Outpatient Medications:   •  acetaminophen (TYLENOL) 500 MG tablet, Take 500 mg by mouth Every 6 (Six) Hours As Needed for Mild Pain ., Disp: , Rfl:   •  albuterol sulfate  (90 Base) MCG/ACT inhaler, Inhale 2 puffs Every 4 (Four) Hours As Needed for Wheezing., Disp: 1 inhaler, Rfl: 11  •  amitriptyline (ELAVIL) 50 MG tablet, Take 1 tablet by mouth Every Night., Disp: 30 tablet, Rfl: 5  •  apixaban (ELIQUIS) 5 MG tablet tablet, Take 1 tablet by mouth Every 12 (Twelve) Hours., Disp: 60 tablet, Rfl: 11  •  atorvastatin (LIPITOR) 20 MG tablet, Take 1 tablet by mouth Every Night., Disp: 30 tablet, Rfl: 5  •  carvedilol (COREG) 3.125 MG tablet, Take 1 tablet by mouth 2 (Two) Times a Day With Meals., Disp: 30 tablet, Rfl: 11  •  carvedilol (COREG) 6.25 MG tablet, , Disp: , Rfl:   •  furosemide (LASIX) 40 MG tablet, Take 1 tablet by mouth Daily., Disp: 90 tablet, Rfl: 3  •  ipratropium (ATROVENT) 0.02 % nebulizer solution, Take 2.5 mL by nebulization 4 (Four) Times a Day., Disp: 300 mL, Rfl: 5  •  levalbuterol (XOPENEX HFA) 45 MCG/ACT inhaler, Inhale 1-2 puffs Every 6 (Six) Hours As Needed for Wheezing or Shortness of Air., Disp: , Rfl:   •  levalbuterol (XOPENEX HFA) 45 MCG/ACT inhaler, Inhale 1-2 puffs Every 6 (Six) Hours As Needed for Wheezing., Disp: 1 inhaler, Rfl: 11  •  magnesium hydroxide (MILK OF MAGNESIA) 400 MG/5ML suspension, Take 30 mL by mouth Daily As Needed for Constipation., Disp: , Rfl:   •   "O2 (OXYGEN), Inhale 2 L/min Daily., Disp: , Rfl:   •  pantoprazole (PROTONIX) 40 MG EC tablet, Take 1 tablet by mouth Daily., Disp: 90 tablet, Rfl: 1  MEDICATION LIST AND ALLERGIES REVIEWED.    Social History     Tobacco Use   • Smoking status: Former Smoker     Packs/day: 1.50     Years: 60.00     Pack years: 90.00     Types: Cigarettes     Last attempt to quit: 2019     Years since quittin.6   • Smokeless tobacco: Never Used   Substance Use Topics   • Alcohol use: No     Frequency: Never   • Drug use: No       FAMILY AND SOCIAL HISTORY REVIEWED.    Review of Systems   Constitutional: Positive for activity change and fatigue. Negative for appetite change, fever and unexpected weight change.   HENT: Positive for congestion, postnasal drip, rhinorrhea and trouble swallowing. Negative for sinus pressure, sore throat and voice change.    Eyes: Negative for visual disturbance.   Respiratory: Positive for cough and shortness of breath. Negative for chest tightness and wheezing.    Cardiovascular: Negative for chest pain, palpitations and leg swelling.   Gastrointestinal: Negative for abdominal distention, abdominal pain, nausea and vomiting.   Endocrine: Negative for cold intolerance and heat intolerance.   Genitourinary: Negative for difficulty urinating and urgency.   Musculoskeletal: Negative for arthralgias, back pain and neck pain.   Skin: Negative for color change and pallor.   Allergic/Immunologic: Negative for environmental allergies and food allergies.   Neurological: Negative for dizziness, syncope, weakness and light-headedness.   Hematological: Negative for adenopathy. Does not bruise/bleed easily.   Psychiatric/Behavioral: Negative for agitation and behavioral problems.   .    /96 (BP Location: Right arm, Patient Position: Sitting, Cuff Size: Adult)   Pulse 77   Temp 98.9 °F (37.2 °C)   Resp 16   Ht 165.1 cm (65\")   Wt 81.8 kg (180 lb 4 oz)   SpO2 94% Comment: room air at rest  BMI 30.00 " kg/m²       There is no immunization history on file for this patient.    Physical Exam   Constitutional: He is oriented to person, place, and time. He appears well-developed and well-nourished.   HENT:   Head: Normocephalic and atraumatic.   Eyes: Pupils are equal, round, and reactive to light.   Neck: Normal range of motion. Neck supple. No thyromegaly present.   Cardiovascular: Normal rate, regular rhythm, normal heart sounds and intact distal pulses. Exam reveals no gallop and no friction rub.   No murmur heard.  Pulmonary/Chest: Effort normal and breath sounds normal. No respiratory distress. He has no wheezes. He has no rales. He exhibits no tenderness.   Abdominal: Soft. Bowel sounds are normal. There is no tenderness.   Musculoskeletal: Normal range of motion. He exhibits no edema.   Lymphadenopathy:     He has no cervical adenopathy.   Neurological: He is alert and oriented to person, place, and time.   Skin: Skin is warm and dry. Capillary refill takes less than 2 seconds. He is not diaphoretic.   Psychiatric: He has a normal mood and affect. His behavior is normal.   Nursing note and vitals reviewed.        RESULTS    Fl Esophagram Complete    Result Date: 12/30/2019  Small hiatal hernia with gastroesophageal reflux to the thoracic inlet and esophageal dysmotility.    This report was finalized on 12/30/2019 11:51 AM by Karine Bishop M.D..    Fl Video Swallow With Speech Single Contrast    Result Date: 1/15/2020  Penetration with thin liquids from a straw which improved with chin tuck.  Please see speech pathologist's report for further details and dietary recommendations.  This report was finalized on 1/15/2020 12:08 PM by Karine Bishop M.D..    Ct Sinus Without Contrast    Result Date: 12/30/2019  Mild mucosal thickening in the maxillary sinuses consistent with chronic sinusitis.     This study was performed with techniques to keep radiation doses as low as reasonably achievable (ALARA).  Individualized dose reduction techniques using automated exposure control or adjustment of mA and/or kV according to the patient size were employed.   This report was finalized on 12/30/2019 10:46 AM by Karine Bishop M.D..    Xr Chest Pa & Lateral    Result Date: 10/11/2019  Stable chronic changes seen within the lung fields with no evidence of acute parenchymal disease.  D:  10/11/2019 E:  10/11/2019  This report was finalized on 10/11/2019 3:43 PM by Dr. Bessie Camp MD.      PROBLEM LIST    Problem List Items Addressed This Visit        Respiratory    COPD (chronic obstructive pulmonary disease) (CMS/HCC) - Primary    Chronic maxillary sinusitis    Recurrent aspiration bronchitis/pneumonia (CMS/HCC)    Chronic obstructive bronchitis (CMS/HCC)            DISCUSSION    Mr. Jones was here for follow-up after he had seen Dr. Mobley last month.  We reviewed both of his swallow studies that were performed since his last appointment.  He does have some difficulty with his swallowing and he is going to see GI for follow-up of this.  He also has evidence of GERD and a small hiatal hernia.  I do think that GERD is playing a role in his hoarseness and cough.  I did advise him to continue taking Protonix 40 mg twice a day.  We also discussed reflux precautions such as elevating the head of the bed at night, not eating 2 to 3 hours before bed and avoiding foods that trigger reflux symptoms.    His CT of the sinuses did show mucosal thickening in the maxillary sinuses consistent with chronic sinusitis.  This is also contributing to his hoarseness as well.  I did advise him to obtain some Flonase over-the-counter and use 1 spray daily to see if this would help dry up some of his postnasal drip.  He states that he will pick some up today.    He will continue Breo daily for his COPD.  He will continue to use Atrovent nebulizers as needed for shortness of breath.  He seems to be doing well from a pulmonary  standpoint.    He will need to follow-up with ENT and GI as scheduled.  I did advise his daughter that he did not appear to be infected today and did not need antibiotics currently.  I did advise her that until we figure out what is causing the recurrent infections he is gone to keep having them until we figure out the cause.  I did advise him that as long as he was not having high fevers, colored sputum or hemoptysis he would not need to be treated.    He will follow-up in 3 months or sooner with full PFTs with Dr. Glez.  He will call with any additional concerns or questions.  I spent 25 minutes with the patient and family. I spent > 50% percent of this time counseling and discussing diagnosis, prognosis, diagnostic testing, evaluation, current status, treatment options and management.    Rosibel Wall, MAYI  01/27/20208:28 AM  Electronically signed     Please note that portions of this note were completed with a voice recognition program. Efforts were made to edit the dictations, but occasionally words are mistranscribed.      CC: Néstor Ford, DO

## 2020-02-03 ENCOUNTER — TELEPHONE (OUTPATIENT)
Dept: FAMILY MEDICINE CLINIC | Facility: CLINIC | Age: 75
End: 2020-02-03

## 2020-02-03 DIAGNOSIS — I48.19 OTHER PERSISTENT ATRIAL FIBRILLATION (HCC): ICD-10-CM

## 2020-02-03 DIAGNOSIS — E55.9 VITAMIN D DEFICIENCY: ICD-10-CM

## 2020-02-03 DIAGNOSIS — I50.22 CHRONIC SYSTOLIC CONGESTIVE HEART FAILURE (HCC): Primary | ICD-10-CM

## 2020-02-03 DIAGNOSIS — E53.8 VITAMIN B12 DEFICIENCY: ICD-10-CM

## 2020-02-03 DIAGNOSIS — I10 ESSENTIAL HYPERTENSION: ICD-10-CM

## 2020-02-04 ENCOUNTER — RESULTS ENCOUNTER (OUTPATIENT)
Dept: FAMILY MEDICINE CLINIC | Facility: CLINIC | Age: 75
End: 2020-02-04

## 2020-02-04 DIAGNOSIS — I50.22 CHRONIC SYSTOLIC CONGESTIVE HEART FAILURE (HCC): ICD-10-CM

## 2020-02-04 DIAGNOSIS — E55.9 VITAMIN D DEFICIENCY: ICD-10-CM

## 2020-02-04 DIAGNOSIS — I48.19 OTHER PERSISTENT ATRIAL FIBRILLATION (HCC): ICD-10-CM

## 2020-02-04 DIAGNOSIS — E53.8 VITAMIN B12 DEFICIENCY: ICD-10-CM

## 2020-02-04 DIAGNOSIS — I10 ESSENTIAL HYPERTENSION: ICD-10-CM

## 2020-02-07 ENCOUNTER — APPOINTMENT (OUTPATIENT)
Dept: LAB | Facility: HOSPITAL | Age: 75
End: 2020-02-07

## 2020-02-07 ENCOUNTER — OFFICE VISIT (OUTPATIENT)
Dept: FAMILY MEDICINE CLINIC | Facility: CLINIC | Age: 75
End: 2020-02-07

## 2020-02-07 VITALS
HEIGHT: 65 IN | DIASTOLIC BLOOD PRESSURE: 70 MMHG | TEMPERATURE: 97.8 F | BODY MASS INDEX: 30.99 KG/M2 | OXYGEN SATURATION: 94 % | HEART RATE: 62 BPM | WEIGHT: 186 LBS | SYSTOLIC BLOOD PRESSURE: 110 MMHG

## 2020-02-07 DIAGNOSIS — I10 ESSENTIAL HYPERTENSION: ICD-10-CM

## 2020-02-07 DIAGNOSIS — IMO0002 RECURRENT ASPIRATION BRONCHITIS/PNEUMONIA: ICD-10-CM

## 2020-02-07 DIAGNOSIS — R13.14 PHARYNGOESOPHAGEAL DYSPHAGIA: Chronic | ICD-10-CM

## 2020-02-07 DIAGNOSIS — I50.22 CHRONIC SYSTOLIC CONGESTIVE HEART FAILURE (HCC): ICD-10-CM

## 2020-02-07 DIAGNOSIS — K21.00 GERD WITH ESOPHAGITIS: Primary | ICD-10-CM

## 2020-02-07 DIAGNOSIS — J44.9 CHRONIC OBSTRUCTIVE BRONCHITIS (HCC): ICD-10-CM

## 2020-02-07 LAB
25(OH)D3 SERPL-MCNC: 15.7 NG/ML (ref 30–100)
ALBUMIN SERPL-MCNC: 4.1 G/DL (ref 3.5–5.2)
ALBUMIN/GLOB SERPL: 1.4 G/DL
ALP SERPL-CCNC: 89 U/L (ref 39–117)
ALT SERPL W P-5'-P-CCNC: 31 U/L (ref 1–41)
ANION GAP SERPL CALCULATED.3IONS-SCNC: 12.6 MMOL/L (ref 5–15)
AST SERPL-CCNC: 23 U/L (ref 1–40)
BILIRUB SERPL-MCNC: 0.4 MG/DL (ref 0.2–1.2)
BUN BLD-MCNC: 18 MG/DL (ref 8–23)
BUN/CREAT SERPL: 12.9 (ref 7–25)
CALCIUM SPEC-SCNC: 9.4 MG/DL (ref 8.6–10.5)
CHLORIDE SERPL-SCNC: 99 MMOL/L (ref 98–107)
CO2 SERPL-SCNC: 30.4 MMOL/L (ref 22–29)
CREAT BLD-MCNC: 1.4 MG/DL (ref 0.76–1.27)
GFR SERPL CREATININE-BSD FRML MDRD: 49 ML/MIN/1.73
GLOBULIN UR ELPH-MCNC: 2.9 GM/DL
GLUCOSE BLD-MCNC: 108 MG/DL (ref 65–99)
MAGNESIUM SERPL-MCNC: 2.1 MG/DL (ref 1.6–2.4)
POTASSIUM BLD-SCNC: 4.2 MMOL/L (ref 3.5–5.2)
PROT SERPL-MCNC: 7 G/DL (ref 6–8.5)
SODIUM BLD-SCNC: 142 MMOL/L (ref 136–145)
VIT B12 BLD-MCNC: 639 PG/ML (ref 211–946)

## 2020-02-07 PROCEDURE — 83735 ASSAY OF MAGNESIUM: CPT | Performed by: FAMILY MEDICINE

## 2020-02-07 PROCEDURE — 80053 COMPREHEN METABOLIC PANEL: CPT | Performed by: FAMILY MEDICINE

## 2020-02-07 PROCEDURE — 99214 OFFICE O/P EST MOD 30 MIN: CPT | Performed by: FAMILY MEDICINE

## 2020-02-07 PROCEDURE — 82306 VITAMIN D 25 HYDROXY: CPT | Performed by: FAMILY MEDICINE

## 2020-02-07 PROCEDURE — 82607 VITAMIN B-12: CPT | Performed by: FAMILY MEDICINE

## 2020-02-07 PROCEDURE — 36415 COLL VENOUS BLD VENIPUNCTURE: CPT | Performed by: FAMILY MEDICINE

## 2020-02-07 RX ORDER — PANTOPRAZOLE SODIUM 40 MG/1
40 TABLET, DELAYED RELEASE ORAL 2 TIMES DAILY
Qty: 180 TABLET | Refills: 1 | Status: SHIPPED | OUTPATIENT
Start: 2020-02-07 | End: 2020-02-12 | Stop reason: HOSPADM

## 2020-02-07 RX ORDER — OMEPRAZOLE 20 MG/1
CAPSULE, DELAYED RELEASE ORAL
COMMUNITY
Start: 2020-01-27 | End: 2020-02-07 | Stop reason: ALTCHOICE

## 2020-02-07 RX ORDER — ESOMEPRAZOLE MAGNESIUM 40 MG/1
40 CAPSULE, DELAYED RELEASE ORAL
COMMUNITY
End: 2020-02-07

## 2020-02-07 RX ORDER — DEXAMETHASONE 0.5 MG/1
TABLET ORAL
Qty: 21 TABLET | Refills: 0 | Status: SHIPPED | OUTPATIENT
Start: 2020-02-07 | End: 2020-02-12 | Stop reason: HOSPADM

## 2020-02-07 NOTE — PROGRESS NOTES
Established Patient        Chief Complaint:   Chief Complaint   Patient presents with   • Follow-up     GERD; patient was told to take Protonix BID, but misunderstood and started Nexium because Q day was not helping symptoms; c/o nasal congestion; feels like O2 will not flow threw nose due to congestion        Stef MARICRUZ Jones is a 74 y.o. male    History of Present Illness:   Here for scheduled follow-up visit concerning his GERD, with associated pharyngoesophageal dysphagia, as well as continued hoarseness of voice.  He denies any focal aspiration, but does have a history of recurrent aspiration bronchitis/pneumonitis.  He denies any fever or chills.  No night sweats.  He denies any orthopnea or worsened edema to his lower extremities.  Denies any active chest pain or palpitations.    Subjective     The following portions of the patient's history were reviewed and updated as appropriate: allergies, current medications, past family history, past medical history, past social history, past surgical history and problem list.    Allergies   Allergen Reactions   • Amoxicillin Rash   • Penicillins Rash       Review of Systems  Constitutional: Negative for fever. Negative for chills, diaphoresis, fatigue and unexpected weight change.   HENT: Dysphasia to solids paroxysmally; no changes to vision/hearing/smell/taste; no epistaxis.  Hoarse voice as per above.  Eyes: Negative for redness and visual disturbance.   Respiratory: As per above.  Cardiovascular: Negative for chest pain and palpitations.   Gastrointestinal: Negative for abdominal distention, abdominal pain and blood in stool.  Recurrent aspiration and dysphasia.  Endocrine: Negative for cold intolerance and heat intolerance.   Genitourinary: Negative for difficulty urinating, dysuria and frequency.   Musculoskeletal: Chronic arthralgias, back pain and myalgias.   Skin: Negative for color change, rash and wound.   Neurological: Negative for syncope, weakness  "and headaches.   Hematological: Negative for adenopathy. Does not bruise/bleed easily.   Psychiatric/Behavioral: Negative for confusion. The patient is not nervous/anxious.     Objective     Physical Exam   Vital Signs: /70   Pulse 62   Temp 97.8 °F (36.6 °C)   Ht 165.1 cm (65\")   Wt 84.4 kg (186 lb)   SpO2 94%   BMI 30.95 kg/m²     General Appearance: alert, oriented x 3, no acute distress.  Pleasant and interactive during questioning and examination.  Skin: warm and dry.   HEENT: Atraumatic.  pupils round and reactive to light and accommodation, oral mucosa pink and moist.  Nares patent without epistaxis.  External auditory canals are patent tympanic membranes intact.  Hoarse voice with pharyngeal congestion.  Boggy/inflamed nasal turbinates bilaterally with moderate to severe postnasal drainage, posterior pharyngeal erythema without pustules or exudate.  Mildly injected conjunctive a bilaterally.  Neck: supple, no JVD, trachea midline.  No thyromegaly  Lungs: Rhonchus referred congestive changes throughout bilateral lungs, audible air exchange noted all lung fields, unlabored breathing effort.  Uncus congestion is partially cleared with heavy cough.  Heart: RR, normal S1 and S2, no S3, no rub.  Irregularly irregular on palpation and auscultation.  Abdomen: soft, non-tender, no palpable bladder, present bowel sounds to auscultation ×4.  No guarding or rigidity.  Extremities: no clubbing, cyanosis or edema.  Good range of motion actively and passively.  Symmetric muscle strength and development.  Independently ambulatory.  Neuro: normal speech and mental status.  Cranial nerves II through XII intact.  No anosmia. DTR 2+; proprioception intact.  No focal motor/sensory deficits.    Assessment and Plan      Assessment:   Stef was seen today for follow-up.    Diagnoses and all orders for this visit:    GERD with esophagitis  -     Discontinue: pantoprazole (PROTONIX) 40 MG EC tablet; Take 1 tablet by " mouth 2 (Two) Times a Day.    Pharyngoesophageal dysphagia    Chronic obstructive bronchitis (CMS/HCC)    Recurrent aspiration bronchitis/pneumonia (CMS/HCC)  -     Discontinue: pantoprazole (PROTONIX) 40 MG EC tablet; Take 1 tablet by mouth 2 (Two) Times a Day.    Chronic systolic congestive heart failure (CMS/HCC)    Essential hypertension    Other orders  -     loratadine-pseudoephedrine (CLARITIN-D 24 HOUR)  MG per 24 hr tablet; Take 1 tablet by mouth Daily.  -     Discontinue: dexamethasone (DECADRON) 0.5 MG tablet; 6 po x1d; then 5 po x 1d; then 4 po x 1d; then 3 po x 1d; then 2 po x 1d; then 1 po x1d; then STOP        Plan:  Discontinue Protonix from med list, patient currently utilizing Nexium.  Continue on twice daily dosing.    Refill provided for patient's Claritin-D.    Advised that he keep scheduled appointment to see gastroenterology, as he will likely need upper endoscopy.    Continue scheduled follow-up with cardiology as well as ENT.    Blood pressure is at goal.  Vital signs demonstrate hemodynamic stability, heart rate well controlled.  Discussion Summary:    Discussed plan of care in detail with pt today; pt verb understanding and agrees.    I have reviewed and updated all copied forward information, as appropriate.  I attest to the accuracy and relevance of any unchanged information.    Follow up:  Return in about 6 weeks (around 3/20/2020).     There are no Patient Instructions on file for this visit.    Néstor Ford,   02/7/20  4:33 PM          Please note that portions of this note may have been completed with a voice recognition program. Efforts were made to edit the dictations, but occasionally words are mistranscribed.

## 2020-02-12 ENCOUNTER — OFFICE VISIT (OUTPATIENT)
Dept: GASTROENTEROLOGY | Facility: CLINIC | Age: 75
End: 2020-02-12

## 2020-02-12 VITALS
TEMPERATURE: 97.3 F | SYSTOLIC BLOOD PRESSURE: 140 MMHG | BODY MASS INDEX: 30.66 KG/M2 | HEART RATE: 107 BPM | OXYGEN SATURATION: 96 % | DIASTOLIC BLOOD PRESSURE: 100 MMHG | WEIGHT: 184 LBS | HEIGHT: 65 IN

## 2020-02-12 DIAGNOSIS — R14.0 BLOATING: ICD-10-CM

## 2020-02-12 DIAGNOSIS — K21.9 GASTROESOPHAGEAL REFLUX DISEASE, ESOPHAGITIS PRESENCE NOT SPECIFIED: Primary | ICD-10-CM

## 2020-02-12 PROCEDURE — 99204 OFFICE O/P NEW MOD 45 MIN: CPT | Performed by: INTERNAL MEDICINE

## 2020-02-12 RX ORDER — ESOMEPRAZOLE MAGNESIUM 40 MG/1
40 CAPSULE, DELAYED RELEASE ORAL
COMMUNITY
End: 2020-05-27 | Stop reason: ALTCHOICE

## 2020-02-12 NOTE — PROGRESS NOTES
PCP: Néstor Ford DO    Chief Complaint   Patient presents with   • Heartburn       History of Present Illness:   HPI  Mr. Jones is a 74-year-old with a history of atrial fibrillation, COPD and hypertension.  Patient presents for evaluation of issues with heartburn.  He does complain of a sour and bitter taste in the back of the throat at times.  The patient denies any difficult swallowing solid foods.  There is a history of bloating at times in the abdomen.  He states that if he sits straight up he does not have any sense of bloating but he does experience that issue if recumbent.  Mr. Jones states that overall his appetite is good.  He denies any unexplained weight loss.  The patient is on oxygen 2 L usually at nighttime.  He did undergo evaluation by ear nose and throat physician for a lesion on the vocal cord.  The patient states that the final result was negative for any cancer.  There is no abdominal pain with meals or postprandially.  Mr. Jones has been on Nexium in the past but does not take the medication on a regular basis.  He was recently given a different proton pump inhibitor to take twice daily.  Past Medical History:   Diagnosis Date   • Arthritis    • Atrial fibrillation (CMS/HCC)    • Bone disorder    • COPD (chronic obstructive pulmonary disease) (CMS/HCC)    • Hearing loss    • Hiatal hernia    • Hoarse voice quality    • Hyperlipidemia    • Hypertension    • Pacemaker        Past Surgical History:   Procedure Laterality Date   • CARDIAC ELECTROPHYSIOLOGY PROCEDURE Left 6/17/2019    Procedure: AV node ablation + Bi-V PPM implant;  Surgeon: Lucio Chapman MD;  Location: [facility] INVASIVE LOCATION;  Service: Cardiovascular   • CARDIAC ELECTROPHYSIOLOGY PROCEDURE N/A 6/17/2019    Procedure: AV node ablation;  Surgeon: Lucio Chapman MD;  Location:  [facility] INVASIVE LOCATION;  Service: Cardiovascular   • HEMORRHOIDECTOMY     • INSERT / REPLACE / REMOVE PACEMAKER     •  LARYNGOSCOPY N/A 6/12/2019    Procedure: MICRO DIRECT LARYNGOSCOPY WITH BIOPSY;  Surgeon: Glen Gutiérrez MD;  Location: [facility];  Service: ENT         Current Outpatient Medications:   •  acetaminophen (TYLENOL) 500 MG tablet, Take 500 mg by mouth Every 6 (Six) Hours As Needed for Mild Pain ., Disp: , Rfl:   •  albuterol sulfate  (90 Base) MCG/ACT inhaler, Inhale 2 puffs Every 4 (Four) Hours As Needed for Wheezing., Disp: 1 inhaler, Rfl: 11  •  amitriptyline (ELAVIL) 50 MG tablet, Take 1 tablet by mouth Every Night., Disp: 30 tablet, Rfl: 5  •  apixaban (ELIQUIS) 5 MG tablet tablet, Take 1 tablet by mouth Every 12 (Twelve) Hours., Disp: 60 tablet, Rfl: 11  •  atorvastatin (LIPITOR) 20 MG tablet, Take 1 tablet by mouth Every Night., Disp: 30 tablet, Rfl: 5  •  carvedilol (COREG) 3.125 MG tablet, Take 1 tablet by mouth 2 (Two) Times a Day With Meals., Disp: 30 tablet, Rfl: 11  •  esomeprazole (nexIUM) 40 MG capsule, Take 40 mg by mouth Every Morning Before Breakfast., Disp: , Rfl:   •  furosemide (LASIX) 40 MG tablet, Take 1 tablet by mouth Daily., Disp: 90 tablet, Rfl: 3  •  ipratropium (ATROVENT) 0.02 % nebulizer solution, Take 2.5 mL by nebulization 4 (Four) Times a Day., Disp: 300 mL, Rfl: 5  •  levalbuterol (XOPENEX HFA) 45 MCG/ACT inhaler, Inhale 1-2 puffs Every 6 (Six) Hours As Needed for Wheezing or Shortness of Air., Disp: , Rfl:   •  loratadine-pseudoephedrine (CLARITIN-D 24 HOUR)  MG per 24 hr tablet, Take 1 tablet by mouth Daily., Disp: 30 tablet, Rfl: 5  •  magnesium hydroxide (MILK OF MAGNESIA) 400 MG/5ML suspension, Take 30 mL by mouth Daily As Needed for Constipation., Disp: , Rfl:   •  O2 (OXYGEN), Inhale 2 L/min Daily., Disp: , Rfl:     Allergies   Allergen Reactions   • Amoxicillin Rash   • Penicillins Rash       Family History   Problem Relation Age of Onset   • Heart disease Mother    • Other Mother         staph infection   • Heart attack Mother    • Heart attack Sister    •  Heart disease Maternal Grandmother    • Diabetes Maternal Grandmother    • Heart attack Maternal Grandfather    • No Known Problems Paternal Grandmother    • No Known Problems Paternal Grandfather    • Heart failure Maternal Uncle    • Heart failure Maternal Aunt    • No Known Problems Sister    • No Known Problems Sister        Social History     Socioeconomic History   • Marital status:      Spouse name: Not on file   • Number of children: Not on file   • Years of education: Not on file   • Highest education level: Not on file   Tobacco Use   • Smoking status: Former Smoker     Packs/day: 1.50     Years: 60.00     Pack years: 90.00     Types: Cigarettes     Last attempt to quit: 2019     Years since quittin.6   • Smokeless tobacco: Never Used   Substance and Sexual Activity   • Alcohol use: No     Frequency: Never   • Drug use: No   • Sexual activity: Defer   Social History Narrative    Caffeine: De-caf       Review of Systems   Constitutional: Negative for activity change, appetite change, fatigue, fever and unexpected weight change.   HENT: Positive for hearing loss and voice change. Negative for dental problem, mouth sores, postnasal drip, sneezing and trouble swallowing.    Eyes: Negative for pain, redness, itching and visual disturbance.   Respiratory: Positive for cough, choking and shortness of breath. Negative for chest tightness and wheezing.    Cardiovascular: Negative for chest pain, palpitations and leg swelling.   Gastrointestinal: Positive for abdominal distention (bloating). Negative for abdominal pain, anal bleeding, blood in stool, constipation, diarrhea, nausea, rectal pain and vomiting.        Acid reflux   Endocrine: Negative for cold intolerance, heat intolerance, polydipsia, polyphagia and polyuria.   Genitourinary: Positive for frequency. Negative for dysuria, enuresis, flank pain, hematuria and urgency.        Pain while urinating   Musculoskeletal: Negative for arthralgias,  back pain, gait problem, joint swelling and myalgias.   Skin: Negative for color change, pallor and rash.   Allergic/Immunologic: Negative for environmental allergies, food allergies and immunocompromised state.   Neurological: Negative for dizziness, tremors, seizures, facial asymmetry, speech difficulty, numbness and headaches.   Hematological: Negative for adenopathy.   Psychiatric/Behavioral: Negative for behavioral problems, confusion, dysphoric mood, hallucinations and self-injury.       Vitals:    02/12/20 1537   BP: 140/100   Pulse: 107   Temp: 97.3 °F (36.3 °C)   SpO2: 96%       Physical Exam   Constitutional: He is oriented to person, place, and time. He appears well-nourished. No distress.   HENT:   Head: Atraumatic.   Mouth/Throat: Oropharynx is clear and moist. No oropharyngeal exudate.   Poor dentition   Eyes: EOM are normal. No scleral icterus.   Neck: Neck supple. No thyromegaly present.   Cardiovascular: Normal rate, regular rhythm and normal heart sounds. Exam reveals no gallop.   No murmur heard.  Pulmonary/Chest: Effort normal and breath sounds normal. He has no wheezes. He has no rales.   Abdominal: Soft. Bowel sounds are normal. There is no tenderness. There is no guarding.   Musculoskeletal: Normal range of motion. He exhibits no edema or tenderness.   Lymphadenopathy:     He has no cervical adenopathy.   Neurological: He is alert and oriented to person, place, and time. He exhibits normal muscle tone.   Skin: Skin is dry. No erythema.   Psychiatric: He has a normal mood and affect. His behavior is normal. Thought content normal.   Vitals reviewed.      Stef was seen today for heartburn.    Diagnoses and all orders for this visit:    Gastroesophageal reflux disease, esophagitis presence not specified  -     Case Request; Standing  -     Case Request    Bloating  -     Case Request; Standing  -     Case Request    The patient did have a video swallowing study that demonstrated some  penetration that improved with chin tuck.  Esophagram demonstrated hiatal hernia and evidence for reflux up to the thoracic inlet.  There are no concerning signs of progressive dysphagia with solids or weight loss.  However, the patient certainly has an increased risk for Ventura's esophagus given longstanding history of reflux and tobacco use.  The issue of abdominal bloating can be related to Helicobacter pylori gastropathy.  Also need to exclude peptic ulcer disease.      Plan: Will schedule EGD in the hospital endoscopy unit as outpatient given the patient's medical history.           Further recommendations pending endoscopy.

## 2020-02-17 ENCOUNTER — TELEPHONE (OUTPATIENT)
Dept: FAMILY MEDICINE CLINIC | Facility: CLINIC | Age: 75
End: 2020-02-17

## 2020-02-17 NOTE — TELEPHONE ENCOUNTER
Edwin with [facility] stopped by office to report pt pulse ox was qualified and was significant for oxygen use. Wants to see if Dr Ford would sign an order to start pt? He can bring an order by in next few days if he is willing to manage. Please advise.

## 2020-02-20 ENCOUNTER — CLINICAL SUPPORT NO REQUIREMENTS (OUTPATIENT)
Dept: CARDIOLOGY | Facility: CLINIC | Age: 75
End: 2020-02-20

## 2020-02-20 DIAGNOSIS — E55.9 VITAMIN D DEFICIENCY: Primary | ICD-10-CM

## 2020-02-20 DIAGNOSIS — I50.22 CHRONIC SYSTOLIC CONGESTIVE HEART FAILURE (HCC): ICD-10-CM

## 2020-02-20 PROCEDURE — 93294 REM INTERROG EVL PM/LDLS PM: CPT | Performed by: INTERNAL MEDICINE

## 2020-02-20 PROCEDURE — 93296 REM INTERROG EVL PM/IDS: CPT | Performed by: INTERNAL MEDICINE

## 2020-02-20 RX ORDER — ERGOCALCIFEROL 1.25 MG/1
50000 CAPSULE ORAL WEEKLY
Qty: 8 CAPSULE | Refills: 0 | Status: SHIPPED | OUTPATIENT
Start: 2020-02-20 | End: 2020-07-13

## 2020-02-21 ENCOUNTER — TELEPHONE (OUTPATIENT)
Dept: FAMILY MEDICINE CLINIC | Facility: CLINIC | Age: 75
End: 2020-02-21

## 2020-02-21 NOTE — TELEPHONE ENCOUNTER
Patients daughter asked that you order a lipid panel to be discussed at his next appointment.  Please advise.

## 2020-03-05 ENCOUNTER — TELEPHONE (OUTPATIENT)
Dept: GASTROENTEROLOGY | Facility: CLINIC | Age: 75
End: 2020-03-05

## 2020-03-05 NOTE — TELEPHONE ENCOUNTER
Spoke with patient daughter, she is said that patient does not want to do EGD right now. They are concerned about him doing the EGD. They are wanting to see if there are any other tests that they can do before the EGD to find out what is wrong. Patient's father does not want to do EGD right now. I have been trying to get a hold of them since you put in case request. They just called me back today. Please advise.     Thank you

## 2020-03-06 NOTE — TELEPHONE ENCOUNTER
Per Dr. Del Castillo:   He already had a barium esophagram that demonstrated evidence of reflux.  So had the swallowing evaluation by the speech pathology team.  My recommendation would be to continue the Nexium twice daily.  Please send the patient a GERD lifestyle handout.  We can certainly hold on the endoscopy if the patient desires to not proceed.

## 2020-03-06 NOTE — TELEPHONE ENCOUNTER
I CALLED PATIENT'S DAUGHTER BACK BALDOMERO. NO ANSWER; LEFT VOICE MESSAGE. I WILL ALSO SEND GERD DIET LITERATURE TO PATIENT ALSO.

## 2020-03-06 NOTE — TELEPHONE ENCOUNTER
ANIYA,     CAN YOU PLEASE CALL PATIENT AND LET THEM KNOW AND SEND GERD LIFESTYLE PACKET.     THANKS

## 2020-03-18 ENCOUNTER — TELEPHONE (OUTPATIENT)
Dept: CARDIOLOGY | Facility: CLINIC | Age: 75
End: 2020-03-18

## 2020-03-18 ENCOUNTER — TELEPHONE (OUTPATIENT)
Dept: FAMILY MEDICINE CLINIC | Facility: CLINIC | Age: 75
End: 2020-03-18

## 2020-03-18 DIAGNOSIS — E78.5 HYPERLIPIDEMIA LDL GOAL <100: ICD-10-CM

## 2020-03-18 DIAGNOSIS — I50.22 CHRONIC SYSTOLIC CONGESTIVE HEART FAILURE (HCC): Primary | ICD-10-CM

## 2020-03-18 NOTE — TELEPHONE ENCOUNTER
Patients daughter called in and she has a set of questions to ask the doctor about different things and also to see if he should come in for his appointment or if his meds could be called in.    Patient daughter call back [phone]    Please call and advise

## 2020-03-18 NOTE — TELEPHONE ENCOUNTER
Pts daughter called to see if she needs to reschedule apt with [facility]. He is currently not having any cardiac issues and doing well. I encouraged if pt is not having issues to stay home however if any symptoms develop to please call us and we'd be happy to see him. Advised we will mail out new apt date and time.     She mentions that the pt recently had CMP drawn with Dr. Ford and their office informed them he had some mild kidney dysfunction and advised to increase hydration. Creatinine 1.40 (Last creatinine 8/07/19-1.15), BUN 18, and GFR 49.     We increased his Lasix to 40 mg daily on 09/27/19 for slight upper extremity edema. Daughter reports swelling has improved and inquires if we can decrease his lasix.     We will decrease Lasix to 20 mg daily from 40 mg daily. Daughter asked if we could check lipids as well. I will mail orders to pt to recheck BMP in 2 weeks at local lab. I advised if swelling comes back or SOB occurs to call our office. Pts daughter agreeable and verbalized understanding.

## 2020-03-19 ENCOUNTER — TELEPHONE (OUTPATIENT)
Dept: FAMILY MEDICINE CLINIC | Facility: CLINIC | Age: 75
End: 2020-03-19

## 2020-03-19 RX ORDER — AZITHROMYCIN 250 MG/1
TABLET, FILM COATED ORAL
Qty: 6 TABLET | Refills: 0 | Status: SHIPPED | OUTPATIENT
Start: 2020-03-19 | End: 2020-05-27

## 2020-03-19 RX ORDER — AMITRIPTYLINE HYDROCHLORIDE 25 MG/1
25 TABLET, FILM COATED ORAL NIGHTLY
Qty: 30 TABLET | Refills: 2 | Status: SHIPPED | OUTPATIENT
Start: 2020-03-19 | End: 2020-05-27 | Stop reason: SINTOL

## 2020-03-19 RX ORDER — TAMSULOSIN HYDROCHLORIDE 0.4 MG/1
1 CAPSULE ORAL DAILY
Qty: 30 CAPSULE | Refills: 1 | Status: SHIPPED | OUTPATIENT
Start: 2020-03-19 | End: 2020-07-13

## 2020-03-19 NOTE — TELEPHONE ENCOUNTER
PATIENT'S DAUGHTER BALDOMERO CALLED BACK TO [facility] SHIN AT DR CANALES'S OFFICE TO DISCUSS NEXIUM BUT PATIENT CONFIRMED HE WANTS PRIILOSEC 20MG CALLED IN INSTEAD OF THE NEXIUM AND PATIENT IS IMPROVING WITH THE PRILOSEC 20 MG.     PATIENT'S DAUGHTER STATED THAT SHIN REQUESTED THAT SHE CALL BACK AND RELAY TO SHIN.      ANY QUESTIONS PLEASE CALL BALDOMERO [phone]

## 2020-03-19 NOTE — TELEPHONE ENCOUNTER
I sent a Rx for his amitriptyline.    I also sent a Rx for flomax, hospefully that will help his urine output; have them keep us updated as to his response.

## 2020-03-19 NOTE — TELEPHONE ENCOUNTER
Patients daughter said that Dr Solares cut Mr Jones down to Lasix 20mg;     He is also requesting Amitriptyline be decreased to 25mg.  He has been his 50mg in 1/2.    His daughter wanted to let you know that he has been having decreased urination at times and frequent urination at times.    Patient had requested a refill on steroid taper; He did well with the first taper, but  Once he started the second taper he was unable to tolerate it.

## 2020-03-19 NOTE — TELEPHONE ENCOUNTER
Patient is requesting an antibiotic be sent in to [facility] just to have it on stand by so he doesn't have to get out if needs it.

## 2020-03-20 RX ORDER — OMEPRAZOLE 20 MG/1
20 CAPSULE, DELAYED RELEASE ORAL DAILY
Qty: 30 CAPSULE | Refills: 2 | Status: SHIPPED | OUTPATIENT
Start: 2020-03-20 | End: 2020-07-14 | Stop reason: SDUPTHER

## 2020-04-13 ENCOUNTER — TELEPHONE (OUTPATIENT)
Dept: FAMILY MEDICINE CLINIC | Facility: CLINIC | Age: 75
End: 2020-04-13

## 2020-04-13 RX ORDER — LEVOFLOXACIN 500 MG/1
500 TABLET, FILM COATED ORAL DAILY
Qty: 7 TABLET | Refills: 0 | Status: SHIPPED | OUTPATIENT
Start: 2020-04-13 | End: 2020-04-20

## 2020-04-13 NOTE — TELEPHONE ENCOUNTER
PT'S DAUGHTER CALLED TO REQUEST A ANTIBIOTIC CALLED IN FOR THE PT. PT HAS A COUGH.    BALDOMERO CONTACT [phone]   ASKED TO HAVE A CALL BACK IF SOMETHING IS CALLED IN .    PLEASE ADVISE    PHARMACY VERIFIED DEVYN THOMPSON

## 2020-04-14 ENCOUNTER — TELEPHONE (OUTPATIENT)
Dept: NEUROLOGY | Facility: CLINIC | Age: 75
End: 2020-04-14

## 2020-05-27 ENCOUNTER — OFFICE VISIT (OUTPATIENT)
Dept: FAMILY MEDICINE CLINIC | Facility: CLINIC | Age: 75
End: 2020-05-27

## 2020-05-27 VITALS
BODY MASS INDEX: 29.82 KG/M2 | DIASTOLIC BLOOD PRESSURE: 70 MMHG | HEIGHT: 65 IN | SYSTOLIC BLOOD PRESSURE: 140 MMHG | HEART RATE: 80 BPM | WEIGHT: 179 LBS | OXYGEN SATURATION: 96 % | TEMPERATURE: 96.9 F

## 2020-05-27 DIAGNOSIS — H04.553 DACRYOSTENOSIS, ACQUIRED, BILATERAL: ICD-10-CM

## 2020-05-27 DIAGNOSIS — J44.9 CHRONIC OBSTRUCTIVE BRONCHITIS (HCC): ICD-10-CM

## 2020-05-27 DIAGNOSIS — D68.318 COAGULATION DISORDER DUE TO CIRCULATING ANTICOAGULANTS (HCC): ICD-10-CM

## 2020-05-27 DIAGNOSIS — IMO0002 RECURRENT ASPIRATION BRONCHITIS/PNEUMONIA: ICD-10-CM

## 2020-05-27 DIAGNOSIS — I48.19 OTHER PERSISTENT ATRIAL FIBRILLATION (HCC): ICD-10-CM

## 2020-05-27 DIAGNOSIS — R13.14 PHARYNGOESOPHAGEAL DYSPHAGIA: Chronic | ICD-10-CM

## 2020-05-27 DIAGNOSIS — F51.01 PRIMARY INSOMNIA: ICD-10-CM

## 2020-05-27 DIAGNOSIS — I50.22 CHRONIC SYSTOLIC CONGESTIVE HEART FAILURE (HCC): Primary | ICD-10-CM

## 2020-05-27 PROCEDURE — 99214 OFFICE O/P EST MOD 30 MIN: CPT | Performed by: FAMILY MEDICINE

## 2020-05-27 RX ORDER — TEMAZEPAM 15 MG/1
15 CAPSULE ORAL NIGHTLY PRN
Qty: 30 CAPSULE | Refills: 0 | Status: SHIPPED | OUTPATIENT
Start: 2020-05-27 | End: 2020-07-13

## 2020-05-27 RX ORDER — MELATONIN 10 MG
CAPSULE ORAL
COMMUNITY
End: 2020-07-13

## 2020-05-27 RX ORDER — DOXYCYCLINE 100 MG/1
100 CAPSULE ORAL EVERY 12 HOURS SCHEDULED
Qty: 20 CAPSULE | Refills: 0 | Status: SHIPPED | OUTPATIENT
Start: 2020-05-27 | End: 2020-06-06

## 2020-05-27 NOTE — PROGRESS NOTES
"    Established Patient        Chief Complaint:   Chief Complaint   Patient presents with   • Follow-up     c/o constipation; patient dc'd Elavil due to side effects; also c/o insomnia; patient is taking Melatonin 10mg with no relief of symptoms; patient feels like his pace maker wires are \"not right\"  he has an appointment with Dr Solares 06/12/2020; also c/o chronic sinus congestion/infection        Stef MARICRUZ Jones is a 75 y.o. male    History of Present Illness:   Presents today with complaints of insomnia, as well as some questions concerning his pacemaker.  He continues to have difficulty with decreased tinnitus of bilateral eyes, as well as chronic nasal congestion/sinus issues.  He denies any fever, chills or night sweats.  Denies any focal aspiration.  He is hesitant to do any activities requiring use of his upper extremities for fear of dislodging his pacemaker.  He denies any pain at the site of his pacemaker placement.  He does have a biventricular pacemaker.  He denies any syncope or vertigo.  He describes his sleep difficulties as both with induction of sleep and maintenance of sleep.  He has had slight improvement with his symptoms with use of melatonin.  He has titrated down the use of his amitriptyline until no longer taking it.  He is fasting for some labs today.  He denies any epistaxis or hemoptysis.  Denies any hematuria.  No reports of bright red blood or black or tarry stools.    Subjective     The following portions of the patient's history were reviewed and updated as appropriate: allergies, current medications, past family history, past medical history, past social history, past surgical history and problem list.    Allergies   Allergen Reactions   • Amoxicillin Rash   • Penicillins Rash       Review of Systems  Constitutional: Negative for fever. Negative for chills, diaphoresis, fatigue and unexpected weight change.   HENT: No dysphagia; no changes to hearing/smell/taste; no epistaxis.  " "Chronic hoarse voice.  Eyes: Negative for redness and visual disturbance.   Respiratory: Chronic cough with phlegm production.  Denies orthopnea or hemoptysis.  Cardiovascular: Negative for chest pain and palpitations.   Gastrointestinal: Negative for abdominal distention, abdominal pain and blood in stool.   Endocrine: Negative for cold intolerance and heat intolerance.   Genitourinary: Negative for difficulty urinating, dysuria and frequency.   Musculoskeletal: Chronic arthralgias, back pain and myalgias.   Skin: Negative for color change, rash and wound.   Neurological: Negative for syncope, weakness and headaches.   Hematological: Negative for adenopathy. Does not bruise/bleed easily.   Psychiatric/Behavioral: Negative for confusion. The patient is not nervous/anxious.  Sleep difficulties as per above.    Objective     Physical Exam   Vital Signs: /70   Pulse 80   Temp 96.9 °F (36.1 °C)   Ht 165.1 cm (65\")   Wt 81.2 kg (179 lb)   SpO2 96%   BMI 29.79 kg/m²     General Appearance: alert, oriented x 3, no acute distress.  Nourished and developed male.  Skin: warm and dry.   HEENT: Atraumatic.  pupils round and reactive to light and accommodation, oral mucosa pink and moist.  Nares patent without epistaxis.  External auditory canals are patent tympanic membranes intact.  Slightly injected conjunctive a bilaterally.  Moderate postnasal drainage.  Boggy/inflamed nasal turbinates.  Neck: supple, no JVD, trachea midline.  No thyromegaly  Lungs: unlabored breathing effort.  Audible air exchange noted all lung fields.  Rhonchus referred upper airway congestion that is cleared with heavy cough.  Heart: RR, normal S1 and S2, no S3, no rub.  Left subclavian AICD.  Abdomen: soft, non-tender, no palpable bladder, present bowel sounds to auscultation ×4.  No guarding or rigidity.  Extremities: Good range of motion actively and passively.  Symmetric muscle strength and development  Neuro: Chronic slurring speech, " normal mental status.  No new focal motor/sensory deficits.    Assessment and Plan      Assessment:   Stef was seen today for follow-up.    Diagnoses and all orders for this visit:    Chronic systolic congestive heart failure (CMS/HCC)  -     Lipid Panel  -     Comprehensive Metabolic Panel    Chronic obstructive bronchitis (CMS/HCC)  -     doxycycline (MONODOX) 100 MG capsule; Take 1 capsule by mouth Every 12 (Twelve) Hours for 10 days.    Recurrent aspiration bronchitis/pneumonia (CMS/HCC)  -     doxycycline (MONODOX) 100 MG capsule; Take 1 capsule by mouth Every 12 (Twelve) Hours for 10 days.    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        Plan:  Keep scheduled appt with cardiology, as well as planned pacemaker interrogation.    Stop OTC melatonin; start prn use of temazepam.  Plan to titrate dose if needed.  She develop any ill effects to the medication is to discontinue its use and notify the office immediately for potential change in treatment regimen.    Referral to ophthalmology.    Doxycycline Rx sent to his pharmacy today.  Discussion Summary:    Discussed plan of care in detail with pt today; pt verb understanding and agrees.  Follow up:  Return in about 2 months (around 7/27/2020) for Recheck.     There are no Patient Instructions on file for this visit.    Néstor Ford,   05/27/20  12:18          Please note that portions of this note may have been completed with a voice recognition program. Efforts were made to edit the dictations, but occasionally words are mistranscribed.

## 2020-05-28 LAB
ALBUMIN SERPL-MCNC: 4.3 G/DL (ref 3.5–5.2)
ALBUMIN/GLOB SERPL: 2.2 G/DL
ALP SERPL-CCNC: 98 U/L (ref 39–117)
ALT SERPL-CCNC: 27 U/L (ref 1–41)
AST SERPL-CCNC: 22 U/L (ref 1–40)
BASOPHILS # BLD AUTO: 0.05 10*3/MM3 (ref 0–0.2)
BASOPHILS NFR BLD AUTO: 0.5 % (ref 0–1.5)
BILIRUB SERPL-MCNC: 0.4 MG/DL (ref 0.2–1.2)
BUN SERPL-MCNC: 15 MG/DL (ref 8–23)
BUN/CREAT SERPL: 14 (ref 7–25)
CALCIUM SERPL-MCNC: 9.3 MG/DL (ref 8.6–10.5)
CHLORIDE SERPL-SCNC: 101 MMOL/L (ref 98–107)
CHOLEST SERPL-MCNC: 107 MG/DL (ref 0–200)
CO2 SERPL-SCNC: 31.1 MMOL/L (ref 22–29)
CREAT SERPL-MCNC: 1.07 MG/DL (ref 0.76–1.27)
EOSINOPHIL # BLD AUTO: 0.24 10*3/MM3 (ref 0–0.4)
EOSINOPHIL NFR BLD AUTO: 2.5 % (ref 0.3–6.2)
ERYTHROCYTE [DISTWIDTH] IN BLOOD BY AUTOMATED COUNT: 12.9 % (ref 12.3–15.4)
GLOBULIN SER CALC-MCNC: 2 GM/DL
GLUCOSE SERPL-MCNC: 117 MG/DL (ref 65–99)
HCT VFR BLD AUTO: 43.2 % (ref 37.5–51)
HDLC SERPL-MCNC: 44 MG/DL (ref 40–60)
HGB BLD-MCNC: 14.3 G/DL (ref 13–17.7)
IMM GRANULOCYTES # BLD AUTO: 0.03 10*3/MM3 (ref 0–0.05)
IMM GRANULOCYTES NFR BLD AUTO: 0.3 % (ref 0–0.5)
LDLC SERPL CALC-MCNC: 47 MG/DL (ref 0–100)
LYMPHOCYTES # BLD AUTO: 3.15 10*3/MM3 (ref 0.7–3.1)
LYMPHOCYTES NFR BLD AUTO: 33.4 % (ref 19.6–45.3)
MCH RBC QN AUTO: 28.5 PG (ref 26.6–33)
MCHC RBC AUTO-ENTMCNC: 33.1 G/DL (ref 31.5–35.7)
MCV RBC AUTO: 86.1 FL (ref 79–97)
MONOCYTES # BLD AUTO: 0.75 10*3/MM3 (ref 0.1–0.9)
MONOCYTES NFR BLD AUTO: 8 % (ref 5–12)
NEUTROPHILS # BLD AUTO: 5.2 10*3/MM3 (ref 1.7–7)
NEUTROPHILS NFR BLD AUTO: 55.3 % (ref 42.7–76)
NRBC BLD AUTO-RTO: 0 /100 WBC (ref 0–0.2)
PLATELET # BLD AUTO: 185 10*3/MM3 (ref 140–450)
POTASSIUM SERPL-SCNC: 4.4 MMOL/L (ref 3.5–5.2)
PROT SERPL-MCNC: 6.3 G/DL (ref 6–8.5)
RBC # BLD AUTO: 5.02 10*6/MM3 (ref 4.14–5.8)
SODIUM SERPL-SCNC: 140 MMOL/L (ref 136–145)
TRIGL SERPL-MCNC: 79 MG/DL (ref 0–150)
VLDLC SERPL CALC-MCNC: 15.8 MG/DL (ref 5–40)
WBC # BLD AUTO: 9.42 10*3/MM3 (ref 3.4–10.8)

## 2020-05-29 ENCOUNTER — TELEPHONE (OUTPATIENT)
Dept: FAMILY MEDICINE CLINIC | Facility: CLINIC | Age: 75
End: 2020-05-29

## 2020-05-29 DIAGNOSIS — I48.19 OTHER PERSISTENT ATRIAL FIBRILLATION (HCC): ICD-10-CM

## 2020-05-29 DIAGNOSIS — E53.8 VITAMIN B12 DEFICIENCY: ICD-10-CM

## 2020-05-29 DIAGNOSIS — E55.9 VITAMIN D DEFICIENCY: ICD-10-CM

## 2020-05-29 DIAGNOSIS — I10 ESSENTIAL HYPERTENSION: Primary | ICD-10-CM

## 2020-05-29 NOTE — TELEPHONE ENCOUNTER
PT'S DAUGHTER BALDOMERO DE LUNA STATES THAT PT IS REQUESTING HIS LAB RESULTS FROM 5/27 TO MAILED TO HIM.     ALSO, BALDOMERO IS REQUESTING TO HAVE LABS ORDERED TO BE DRAWN BEFORE 6/12. BALDOMERO STATES THAT PT'S BLOOD WAS NOT CHECKED FOR VITAMIN-B, VITAMIN-D, OR MAGNESIUM AND WOULD LIKE TO REQUEST TO HAVE THOSE CHECKED.       CONTACT: [phone]

## 2020-05-29 NOTE — TELEPHONE ENCOUNTER
LM for pt to call to discuss referral.  If pt returns call, please warm transfer to the office to speak with me.

## 2020-05-30 ENCOUNTER — RESULTS ENCOUNTER (OUTPATIENT)
Dept: FAMILY MEDICINE CLINIC | Facility: CLINIC | Age: 75
End: 2020-05-30

## 2020-05-30 DIAGNOSIS — E55.9 VITAMIN D DEFICIENCY: ICD-10-CM

## 2020-05-30 DIAGNOSIS — I48.19 OTHER PERSISTENT ATRIAL FIBRILLATION (HCC): ICD-10-CM

## 2020-05-30 DIAGNOSIS — E53.8 VITAMIN B12 DEFICIENCY: ICD-10-CM

## 2020-05-30 DIAGNOSIS — I10 ESSENTIAL HYPERTENSION: ICD-10-CM

## 2020-06-01 ENCOUNTER — TELEPHONE (OUTPATIENT)
Dept: FAMILY MEDICINE CLINIC | Facility: CLINIC | Age: 75
End: 2020-06-01

## 2020-06-01 NOTE — TELEPHONE ENCOUNTER
PTS DAUGHTER, BALDOMERO CALLED REGARDING PATIENTS REFERRAL TO OPTHATMOLOGY; PLEASE ADVISE    BALDOMERO: [phone]

## 2020-06-05 ENCOUNTER — PATIENT OUTREACH (OUTPATIENT)
Dept: FAMILY MEDICINE CLINIC | Facility: CLINIC | Age: 75
End: 2020-06-05

## 2020-06-05 NOTE — OUTREACH NOTE
Tried calling pt to schedule AWV but no answer and not option to lvm.    Tereza Navarrete    Case Management    [phone]

## 2020-06-08 ENCOUNTER — PATIENT OUTREACH (OUTPATIENT)
Dept: FAMILY MEDICINE CLINIC | Facility: CLINIC | Age: 75
End: 2020-06-08

## 2020-06-08 NOTE — OUTREACH NOTE
LVM for pt to return call to schedule his AWV    Tereza Navarrete    Case Management    [phone]

## 2020-06-24 ENCOUNTER — TELEPHONE (OUTPATIENT)
Dept: CARDIOLOGY | Facility: CLINIC | Age: 75
End: 2020-06-24

## 2020-06-24 NOTE — TELEPHONE ENCOUNTER
PT daughter calls today to report side effects and BP uncontrolled since starting temazepam started by Dr. Ford for sleep. Daughter does not have a list of his BP but she states the pt does keep a record of them. She states they can range between 155-150/ Highest SBP once was 200. Daughter researched side effects of temazepam and the pt is experiencing-tingling, burning, smothering, pt has been off since Friday night. BP came down to 120-130 on Sunday but then has been up to 150-170 SBP.     Advised the daughter to f/u with PCP regarding temazepam and we would like a log of BP in order to manage his BP. Advised to discuss BP with PCP as well or contact us with accurate log of BP. Daughter agreeable and verbalized understanding.

## 2020-07-09 NOTE — PROGRESS NOTES
OFFICE VISIT  NOTE  [facility] CARDIOLOGY      Name: Stef Jones    Date: 2020  MRN:  [id number]  :  1945      REFERRING/PRIMARY PROVIDER:  Néstor Ford DO    Chief Complaint   Patient presents with   • Atrial Fibrillation       HPI: Stef Jones is a 75 y.o. male who presents today for follow-up for chronic systolic heart failure, persistent atrial fibrillation, status post AV node ablation and biventricular permanent pacemaker by Dr. Chapman 2019.  Associate history of severe COPD, hypertension, hyperlipidemia.  Limited echo 2019 shows dramatic improvement in ejection fraction with sinus rhythm, EF 54%.  Multiple complaints today, main complaint is worsening shortness of breath, and occasional chest tightness.  Chest tightness is substernal, non-rating, occurs with exertion associated with shortness of breath, goes away with 5 to 10 minutes of rest.  Also has COPD exacerbations frequently requiring doxycycline.  He is also concerned about his pacemaker becoming dislodged and does not use his left arm very much.  He reports palpitations occasionally.  Denies PND orthopnea.  No ischemic evaluation recently.    Past Medical History:   Diagnosis Date   • Arthritis    • Atrial fibrillation (CMS/HCC)    • Bone disorder    • COPD (chronic obstructive pulmonary disease) (CMS/HCC)    • Hearing loss    • Hiatal hernia    • Hoarse voice quality    • Hyperlipidemia    • Hypertension    • Pacemaker        Past Surgical History:   Procedure Laterality Date   • CARDIAC ELECTROPHYSIOLOGY PROCEDURE Left 2019    Procedure: AV node ablation + Bi-V PPM implant;  Surgeon: Lucio Chapman MD;  Location:  [facility] INVASIVE LOCATION;  Service: Cardiovascular   • CARDIAC ELECTROPHYSIOLOGY PROCEDURE N/A 2019    Procedure: AV node ablation;  Surgeon: Lucio Chapman MD;  Location: [facility]ATION;  Service: Cardiovascular   • HEMORRHOIDECTOMY     •  INSERT / REPLACE / REMOVE PACEMAKER     • LARYNGOSCOPY N/A 2019    Procedure: MICRO DIRECT LARYNGOSCOPY WITH BIOPSY;  Surgeon: Glen Gutiérrez MD;  Location: [facility];  Service: ENT       Social History     Socioeconomic History   • Marital status:      Spouse name: Not on file   • Number of children: Not on file   • Years of education: Not on file   • Highest education level: Not on file   Tobacco Use   • Smoking status: Former Smoker     Packs/day: 1.50     Years: 60.00     Pack years: 90.00     Types: Cigarettes     Last attempt to quit: 2019     Years since quittin.1   • Smokeless tobacco: Never Used   Substance and Sexual Activity   • Alcohol use: No     Frequency: Never   • Drug use: No   • Sexual activity: Defer   Social History Narrative    Caffeine: De-caf       Family History   Problem Relation Age of Onset   • Heart disease Mother    • Other Mother         staph infection   • Heart attack Mother    • Heart attack Sister    • Heart disease Maternal Grandmother    • Diabetes Maternal Grandmother    • Heart attack Maternal Grandfather    • No Known Problems Paternal Grandmother    • No Known Problems Paternal Grandfather    • Heart failure Maternal Uncle    • Heart failure Maternal Aunt    • No Known Problems Sister    • No Known Problems Sister         ROS:   Constitutional no fever,  no weight loss   Skin no rash, no subcutaneous nodules   Otolaryngeal no difficulty swallowing   Cardiovascular See HPI   Pulmonary no cough, no sputum production   Gastrointestinal no constipation, no diarrhea   Genitourinary no dysuria, no hematuria   Hematologic no easy bruisability, no abnormal bleeding   Musculoskeletal no muscle pain   Neurologic no dizziness, no falls         Allergies   Allergen Reactions   • Amoxicillin Rash   • Penicillins Rash         Current Outpatient Medications:   •  acetaminophen (TYLENOL) 500 MG tablet, Take 500 mg by mouth Every 6 (Six) Hours As Needed for Mild  "Pain ., Disp: , Rfl:   •  albuterol sulfate  (90 Base) MCG/ACT inhaler, Inhale 2 puffs Every 4 (Four) Hours As Needed for Wheezing., Disp: 1 inhaler, Rfl: 11  •  apixaban (ELIQUIS) 5 MG tablet tablet, Take 1 tablet by mouth Every 12 (Twelve) Hours. Indications: Atrial Fibrillation, Disp: 60 tablet, Rfl: 5  •  atorvastatin (LIPITOR) 20 MG tablet, Take 1 tablet by mouth Every Night., Disp: 30 tablet, Rfl: 5  •  carvedilol (COREG) 3.125 MG tablet, TAKE 1 TABLET BY MOUTH 2 (TWO) TIMES A DAY WITH MEALS., Disp: 30 tablet, Rfl: 11  •  furosemide (LASIX) 40 MG tablet, Take 1 tablet by mouth Daily. (Patient taking differently: Take 20 mg by mouth Daily As Needed.), Disp: 90 tablet, Rfl: 3  •  ipratropium (ATROVENT) 0.02 % nebulizer solution, Take 2.5 mL by nebulization 4 (Four) Times a Day., Disp: 300 mL, Rfl: 5  •  levalbuterol (XOPENEX HFA) 45 MCG/ACT inhaler, Inhale 1-2 puffs Every 6 (Six) Hours As Needed for Wheezing or Shortness of Air., Disp: , Rfl:   •  loratadine-pseudoephedrine (CLARITIN-D 24 HOUR)  MG per 24 hr tablet, Take 1 tablet by mouth Daily., Disp: 30 tablet, Rfl: 5  •  magnesium hydroxide (MILK OF MAGNESIA) 400 MG/5ML suspension, Take 30 mL by mouth Daily As Needed for Constipation., Disp: , Rfl:   •  O2 (OXYGEN), Inhale 2 L/min Daily., Disp: , Rfl:   •  omeprazole (PrilOSEC) 20 MG capsule, Take 1 capsule by mouth Daily. (Patient taking differently: Take 40 mg by mouth Daily.), Disp: 30 capsule, Rfl: 2  •  psyllium (METAMUCIL) 58.6 % packet, Take 1 packet by mouth Daily., Disp: , Rfl:     Vitals:    07/13/20 1507   BP: 144/92   BP Location: Right arm   Patient Position: Sitting   Pulse: 80   Temp: 97.3 °F (36.3 °C)   SpO2: 94%   Weight: 79.1 kg (174 lb 6.4 oz)   Height: 167.6 cm (66\")     Body mass index is 28.15 kg/m².    PHYSICAL EXAM:    General Appearance:   · well developed  · well nourished  Neck:  · thyroid not enlarged  · supple  Respiratory:  · no respiratory distress  · normal breath " sounds  · no rales  Cardiovascular:  · no jugular venous distention  · regular rhythm  · apical impulse normal  · S1 normal, S2 normal  · no S3, no S4   · no murmur  · no rub, no thrill  · carotid pulses normal; no bruit  · pedal pulses normal  · lower extremity edema: none    Skin:   warm, dry    RESULTS:   Procedures    Results for orders placed during the hospital encounter of 09/27/19   Adult Transthoracic Echo Limited W/ Cont if Necessary Per Protocol    Narrative · Calculated EF = 54.0%.  · Left ventricular wall thickness is consistent with mild-to-moderate   concentric hypertrophy.  · Left ventricular systolic function is normal.  · Right ventricular cavity is moderately dilated.            Labs:  Lab Results   Component Value Date    CHOL 108 06/08/2019    TRIG 79 05/27/2020    HDL 44 05/27/2020    LDL 47 05/27/2020    AST 22 05/27/2020    ALT 27 05/27/2020     Lab Results   Component Value Date    HGBA1C 6.30 (H) 08/07/2019     No components found for: CREATINININE  eGFR Non  Am   Date Value Ref Range Status   05/27/2020 67 >60 mL/min/1.73 Final     Comment:     The MDRD GFR formula is only valid for adults with stable  renal function between ages 18 and 70.       eGFR Non  Amer   Date Value Ref Range Status   02/07/2020 49 (L) >60 mL/min/1.73 Final   08/07/2019 62 >60 mL/min/1.73 Final   06/19/2019 74 >60 mL/min/1.73 Final         ASSESSMENT:  Problem List Items Addressed This Visit        Cardiovascular and Mediastinum    Other persistent atrial fibrillation (CMS/HCC)    Overview     1. Radiofrequency ablation of the AV node.6-17-19. With BS BiV PPM         Chronic systolic congestive heart failure (CMS/HCC) - Primary    Overview     - TTE shows ejection fraction of mid 30s, moderate RV enlargement and RV dysfunction.  - could be tachycardia induced. Will need repeat echo once maintaining NSR, may also need ischemic evaluation.  - DENA: EF 30-35%, Moderate to severe MR, moderate TR, RVSP  35-45mmHg.  - TTE 9/27/19, improved ef to 54%, mod LVH, mod RVE. In NSR           Essential hypertension       Other    Tobacco use disorder      Other Visit Diagnoses     Chest pain, unspecified type        Relevant Orders    Stress Test With Myocardial Perfusion One Day          PLAN:    1.  Chronic systolic heart failure:  EF has improved from 30 to 35% to 54% 9/2019 in normal sinus rhythm  Okay to stop carvedilol given moderate COPD  Continue Lasix at current dose    2.  Paroxysmal atrial fibrillation:  EP following  Continue Eliquis for anticoagulation, patient requesting a different anticoagulant, due to bruising.    3.  Moderate LVH,  Continue close blood pressure monitoring  Long-term goal blood pressure is less than 130/80    4.  Moderate RV enlargement with mildly elevated pulmonary pressures, likely secondary to severe COPD  Referred to pulmonary for COPD management    5.  Chest pain:  Dobutamine nuclear stress test ordered for further stratification.  The patient was advised to call [phone] if chest pain worsens or persist despite nitroglycerin or rest.    Return clinic in 6 months.    Thank you for the opportunity to share in the care of your patient; please do not hesitate to call me with any questions.     Yovanny Solares MD, [facility]  Office: [phone] [address] [facility]  [address] [place] [address]

## 2020-07-10 DIAGNOSIS — I10 ESSENTIAL HYPERTENSION: ICD-10-CM

## 2020-07-10 DIAGNOSIS — I48.19 OTHER PERSISTENT ATRIAL FIBRILLATION (HCC): ICD-10-CM

## 2020-07-13 ENCOUNTER — OFFICE VISIT (OUTPATIENT)
Dept: CARDIOLOGY | Facility: CLINIC | Age: 75
End: 2020-07-13

## 2020-07-13 VITALS
WEIGHT: 174.4 LBS | HEART RATE: 80 BPM | HEIGHT: 66 IN | TEMPERATURE: 97.3 F | BODY MASS INDEX: 28.03 KG/M2 | OXYGEN SATURATION: 94 % | DIASTOLIC BLOOD PRESSURE: 92 MMHG | SYSTOLIC BLOOD PRESSURE: 144 MMHG

## 2020-07-13 DIAGNOSIS — I48.19 OTHER PERSISTENT ATRIAL FIBRILLATION (HCC): ICD-10-CM

## 2020-07-13 DIAGNOSIS — I50.22 CHRONIC SYSTOLIC CONGESTIVE HEART FAILURE (HCC): Primary | ICD-10-CM

## 2020-07-13 DIAGNOSIS — F17.200 TOBACCO USE DISORDER: ICD-10-CM

## 2020-07-13 DIAGNOSIS — R07.9 CHEST PAIN, UNSPECIFIED TYPE: ICD-10-CM

## 2020-07-13 DIAGNOSIS — I10 ESSENTIAL HYPERTENSION: ICD-10-CM

## 2020-07-13 PROCEDURE — 99214 OFFICE O/P EST MOD 30 MIN: CPT | Performed by: INTERNAL MEDICINE

## 2020-07-13 RX ORDER — CARVEDILOL 3.12 MG/1
3.12 TABLET ORAL 2 TIMES DAILY WITH MEALS
Qty: 30 TABLET | Refills: 11 | Status: SHIPPED | OUTPATIENT
Start: 2020-07-13 | End: 2020-07-14 | Stop reason: ALTCHOICE

## 2020-07-14 ENCOUNTER — TELEPHONE (OUTPATIENT)
Dept: FAMILY MEDICINE CLINIC | Facility: CLINIC | Age: 75
End: 2020-07-14

## 2020-07-14 ENCOUNTER — OFFICE VISIT (OUTPATIENT)
Dept: FAMILY MEDICINE CLINIC | Facility: CLINIC | Age: 75
End: 2020-07-14

## 2020-07-14 VITALS
DIASTOLIC BLOOD PRESSURE: 82 MMHG | TEMPERATURE: 97.5 F | WEIGHT: 174 LBS | OXYGEN SATURATION: 98 % | SYSTOLIC BLOOD PRESSURE: 140 MMHG | HEIGHT: 66 IN | HEART RATE: 81 BPM | BODY MASS INDEX: 27.97 KG/M2

## 2020-07-14 DIAGNOSIS — I10 ESSENTIAL HYPERTENSION: ICD-10-CM

## 2020-07-14 DIAGNOSIS — J44.9 CHRONIC OBSTRUCTIVE BRONCHITIS (HCC): ICD-10-CM

## 2020-07-14 DIAGNOSIS — F51.01 PRIMARY INSOMNIA: ICD-10-CM

## 2020-07-14 DIAGNOSIS — J69.0 RECURRENT ASPIRATION PNEUMONIA (HCC): ICD-10-CM

## 2020-07-14 DIAGNOSIS — J32.0 CHRONIC MAXILLARY SINUSITIS: Primary | ICD-10-CM

## 2020-07-14 DIAGNOSIS — I50.22 CHRONIC SYSTOLIC CONGESTIVE HEART FAILURE (HCC): ICD-10-CM

## 2020-07-14 PROCEDURE — 99214 OFFICE O/P EST MOD 30 MIN: CPT | Performed by: FAMILY MEDICINE

## 2020-07-14 RX ORDER — AMITRIPTYLINE HYDROCHLORIDE 50 MG/1
TABLET, FILM COATED ORAL
COMMUNITY
Start: 2020-06-24 | End: 2020-08-07

## 2020-07-14 RX ORDER — OMEPRAZOLE 40 MG/1
40 CAPSULE, DELAYED RELEASE ORAL DAILY
Qty: 30 CAPSULE | Refills: 5 | Status: SHIPPED | OUTPATIENT
Start: 2020-07-14 | End: 2021-01-01 | Stop reason: ALTCHOICE

## 2020-07-14 RX ORDER — ALBUTEROL SULFATE 90 UG/1
2 AEROSOL, METERED RESPIRATORY (INHALATION) EVERY 4 HOURS PRN
Qty: 1 INHALER | Refills: 11 | Status: SHIPPED | OUTPATIENT
Start: 2020-07-14 | End: 2021-01-01 | Stop reason: SDUPTHER

## 2020-07-14 RX ORDER — DOXYCYCLINE HYCLATE 100 MG/1
100 CAPSULE ORAL 2 TIMES DAILY WITH MEALS
Qty: 28 CAPSULE | Refills: 0 | Status: SHIPPED | OUTPATIENT
Start: 2020-07-14 | End: 2020-07-28

## 2020-07-14 RX ORDER — HYDROXYZINE PAMOATE 25 MG/1
25 CAPSULE ORAL NIGHTLY PRN
Qty: 30 CAPSULE | Refills: 0 | Status: SHIPPED | OUTPATIENT
Start: 2020-07-14 | End: 2021-01-01

## 2020-07-14 RX ORDER — ATORVASTATIN CALCIUM 20 MG/1
20 TABLET, FILM COATED ORAL NIGHTLY
Qty: 30 TABLET | Refills: 5 | Status: SHIPPED | OUTPATIENT
Start: 2020-07-14 | End: 2020-10-19 | Stop reason: SDUPTHER

## 2020-07-14 NOTE — PROGRESS NOTES
"    Established Patient        Chief Complaint:   Chief Complaint   Patient presents with   • Follow-up     increased BP; Patient Dc'd Temazepam; while taking this medication patient was having \"tingling\" symptoms have improved since stopping Temazepam; patient has an appointment with Cardiology 07/24/2020 to check pace maker; c/o continued cough/copd; (patient stated that Doxycycline works best)        Stef Jones is a 75 y.o. male    History of Present Illness:   Here for follow-up of his known essential hypertension, chronic systolic congestive heart failure, recurrent aspiration pneumonitis and chronic obstructive bronchitis.  Patient has recently been seen by cardiology, underwent pacemaker interrogation.  He has had sporadic blood pressure readings at home, although clinically in an acceptable range here in the office.  He denies any active chest pain.  He does continue to have cough, as well as other reactive airway changes.  Denies any hemoptysis or epistaxis.  He does continue to have crusty discharge noted to his eyes routinely, having been evaluated by ophthalmology without defined need for intervention at this time.  He continues to have heavy nasal congestion.  Denies any focal aspiration since last visit.  Describes frontal facial pressure at times.  Patient describes some nonspecific difficulties with temazepam, stating that he just feels better since not taking it.  He continues to utilize melatonin at night, although he still has difficulty with sleep despite its use.    Subjective     The following portions of the patient's history were reviewed and updated as appropriate: allergies, current medications, past family history, past medical history, past social history, past surgical history and problem list.    Allergies   Allergen Reactions   • Amoxicillin Rash   • Penicillins Rash       Review of Systems  Constitutional: Negative for fever. Negative for chills, diaphoresis, fatigue and " "unexpected weight change.   HENT: No dysphagia; no changes to vision/hearing/smell/taste; no epistaxis  Eyes: Negative for redness and visual disturbance.   Respiratory: As per above.  Cardiovascular: Negative for chest pain and palpitations.   Gastrointestinal: Negative for abdominal distention, abdominal pain and blood in stool.   Endocrine: Negative for cold intolerance and heat intolerance.   Genitourinary: Negative for difficulty urinating, dysuria and frequency.   Musculoskeletal: Negative for arthralgias, back pain and myalgias.   Skin: Negative for color change, rash and wound.   Neurological: Negative for syncope, weakness and headaches.   Hematological: Negative for adenopathy. Does not bruise/bleed easily.   Psychiatric/Behavioral: Negative for confusion. The patient is not nervous/anxious.    Objective     Physical Exam   Vital Signs: /82   Pulse 81   Temp 97.5 °F (36.4 °C)   Ht 167.6 cm (66\")   Wt 78.9 kg (174 lb)   SpO2 98%   BMI 28.08 kg/m²     General Appearance: alert, oriented x 3, no acute distress.  Skin: warm and dry.   HEENT: Atraumatic.  pupils round and reactive to light and accommodation, oral mucosa pink and moist.  Nares patent without epistaxis.  External auditory canals are patent tympanic membranes intact.  Slightly injected conjunctival bilaterally.  Boggy/inflamed nasal turbinates bilaterally, mild tenderness on palpation of maxillary sinuses.  Neck: supple, no JVD, trachea midline.  No thyromegaly  Lungs: Rhonchus referred upper airway congestion bilaterally that is partially cleared with cough, unlabored breathing effort.  Prolonged expiratory phase bilaterally.  Heart: RR, normal S1 and S2, no S3, no rub.  Subclavian pacemaker noted.  Abdomen: soft, non-tender, no palpable bladder, present bowel sounds to auscultation ×4.  No guarding or rigidity.  Extremities: no clubbing, cyanosis or edema.  Good range of motion actively and passively.  Symmetric muscle strength and " development  Neuro: normal speech and mental status.  Cranial nerves II through XII intact.  No anosmia. DTR 2+; proprioception intact.  No focal motor/sensory deficits.    Assessment and Plan      Assessment:   Stef was seen today for follow-up.    Diagnoses and all orders for this visit:    Chronic maxillary sinusitis    Essential hypertension    Chronic systolic congestive heart failure (CMS/HCC)  -     atorvastatin (LIPITOR) 20 MG tablet; Take 1 tablet by mouth Every Night.    Recurrent aspiration pneumonia (CMS/HCC)    Chronic obstructive bronchitis (CMS/HCC)  -     albuterol sulfate  (90 Base) MCG/ACT inhaler; Inhale 2 puffs Every 4 (Four) Hours As Needed for Wheezing.    Primary insomnia  -     hydrOXYzine pamoate (VISTARIL) 25 MG capsule; Take 1 capsule by mouth At Night As Needed (insomnia).    Other orders  -     doxycycline (VIBRAMYCIN) 100 MG capsule; Take 1 capsule by mouth 2 (Two) Times a Day With Meals for 14 days.  -     omeprazole (priLOSEC) 40 MG capsule; Take 1 capsule by mouth Daily.  -     apixaban (ELIQUIS) 5 MG tablet tablet; Take 1 tablet by mouth Every 12 (Twelve) Hours. Indications: Atrial Fibrillation        Plan:  Patient would like a second opinion concerning his chronic maxillary sinusitis.  I will place a referral for ENT consult, they understand this will likely need to be done in [place].  I will treat patient with a 14-day course of doxycycline at this time.    I recommended trial of hydroxyzine to aid in his sleep disturbance, dosing will be adjusted based on clinical need and therapeutic response.    Refill provided for his PPI therapy.    Continue current anticoagulation regimen, heart rate well controlled.    His blood pressure is at goal today in the office.    Continue statin therapy.  Discussion Summary:    Discussed plan of care in detail with pt today; pt verb understanding and agrees.  Follow up:  Return in about 3 months (around 10/14/2020) for Recheck, Med  Change/New Meds.     There are no Patient Instructions on file for this visit.    Néstor Ford DO  07/15/20  10:35          Please note that portions of this note may have been completed with a voice recognition program. Efforts were made to edit the dictations, but occasionally words are mistranscribed.

## 2020-07-15 DIAGNOSIS — R49.0 HOARSE VOICE QUALITY: ICD-10-CM

## 2020-07-15 DIAGNOSIS — J32.0 CHRONIC MAXILLARY SINUSITIS: Primary | ICD-10-CM

## 2020-07-15 LAB
25(OH)D3+25(OH)D2 SERPL-MCNC: 27.3 NG/ML (ref 30–100)
MAGNESIUM SERPL-MCNC: 2 MG/DL (ref 1.6–2.4)
VIT B12 SERPL-MCNC: 379 PG/ML (ref 211–946)

## 2020-07-17 ENCOUNTER — TELEPHONE (OUTPATIENT)
Dept: FAMILY MEDICINE CLINIC | Facility: CLINIC | Age: 75
End: 2020-07-17

## 2020-07-17 NOTE — TELEPHONE ENCOUNTER
Patients daughter had questions about Melatonin.  She wanted to know the highest dose he could take.  He has not started the Vistaril yet.  He wanted to wait until his antibiotic is completed before started a new medication.  As far as I know the highest dose is 10mg, but she said she had read that you could take up to 60mg.  Please advise.

## 2020-07-22 ENCOUNTER — TELEPHONE (OUTPATIENT)
Dept: FAMILY MEDICINE CLINIC | Facility: CLINIC | Age: 75
End: 2020-07-22

## 2020-07-23 NOTE — PROGRESS NOTES
[facility]   OFFICE NOTE      Stef Jones  1945  PCP: Néstor Ford DO    SUBJECTIVE:   Stef Jones is a 75 y.o. male seen for a follow up visit regarding the following:     CC:SHF    HPI:   75-year-old gentleman presents today follow-up regarding chronic congestive heart failure, persistent atrial fibrillation, hypertension.  Since last office  Mr. Jones states he has had some shortness of breath but he is also antibiotics for recent bronchitis/COPD exacerbation.  Dr. Solares has recently seen the patient is recommended follow-up stress test to rule out progressive ischemic heart disease as he has been experiencing some chest pain with some atypical features..  The patient denies any worsening orthopnea PND or peripheral edema.    Cardiac PMH: (Old records have been reviewed and summarized below)  1. SHF  a. DENA 30%, Moderate to Severe MR, Moderate TR  b. [facility] [facility] BIVPM + AV RFA 6/17/19  c. Echocardiogram 9/27/19 EF 54%  2. Persistent Afib  a. AVN RFA 6/19  b. Amiodarone discontinued secondary to family request  c. Minimally symptomatic  d. Chadsvasc=4, Anticoagulation with Eliquis.   3. HTN  4. COPD-Advanced, bronchitis         Past Medical History, Past Surgical History, Family history, Social History, and Medications were all reviewed with the patient today and updated as necessary.       Current Outpatient Medications:   •  acetaminophen (TYLENOL) 500 MG tablet, Take 500 mg by mouth Every 6 (Six) Hours As Needed for Mild Pain ., Disp: , Rfl:   •  albuterol sulfate  (90 Base) MCG/ACT inhaler, Inhale 2 puffs Every 4 (Four) Hours As Needed for Wheezing., Disp: 1 inhaler, Rfl: 11  •  amitriptyline (ELAVIL) 50 MG tablet, , Disp: , Rfl:   •  apixaban (ELIQUIS) 5 MG tablet tablet, Take 1 tablet by mouth Every 12 (Twelve) Hours. Indications: Atrial Fibrillation, Disp: 60 tablet, Rfl: 5  •  atorvastatin (LIPITOR) 20 MG tablet, Take 1 tablet by mouth  Every Night., Disp: 30 tablet, Rfl: 5  •  doxycycline (VIBRAMYCIN) 100 MG capsule, Take 1 capsule by mouth 2 (Two) Times a Day With Meals for 14 days., Disp: 28 capsule, Rfl: 0  •  furosemide (LASIX) 40 MG tablet, Take 1 tablet by mouth Daily. (Patient taking differently: Take 20 mg by mouth Daily As Needed.), Disp: 90 tablet, Rfl: 3  •  hydrOXYzine pamoate (VISTARIL) 25 MG capsule, Take 1 capsule by mouth At Night As Needed (insomnia)., Disp: 30 capsule, Rfl: 0  •  ipratropium (ATROVENT) 0.02 % nebulizer solution, Take 2.5 mL by nebulization 4 (Four) Times a Day., Disp: 300 mL, Rfl: 5  •  levalbuterol (XOPENEX HFA) 45 MCG/ACT inhaler, Inhale 1-2 puffs Every 6 (Six) Hours As Needed for Wheezing or Shortness of Air., Disp: , Rfl:   •  O2 (OXYGEN), Inhale 2 L/min Daily., Disp: , Rfl:   •  omeprazole (priLOSEC) 40 MG capsule, Take 1 capsule by mouth Daily., Disp: 30 capsule, Rfl: 5  •  psyllium (METAMUCIL) 58.6 % packet, Take 1 packet by mouth Daily., Disp: , Rfl:       Allergies   Allergen Reactions   • Amoxicillin Rash   • Penicillins Rash     Patient Active Problem List   Diagnosis   • Other persistent atrial fibrillation (CMS/HCC)   • COPD (chronic obstructive pulmonary disease) (CMS/HCC)   • Pharyngoesophageal dysphagia   • Laryngeal mass   • Chronic systolic congestive heart failure (CMS/HCC)   • Essential hypertension   • Tobacco use disorder   • Hoarse voice quality   • Chronic maxillary sinusitis   • GERD with esophagitis   • Recurrent aspiration pneumonia (CMS/HCC)   • Chronic obstructive bronchitis (CMS/HCC)   • Dacryostenosis, acquired, bilateral   • Hypoxia   • Vitamin D deficiency   • Vitamin B12 deficiency   • 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 (CMS/HCC)   • Primary insomnia     Past Medical History:   Diagnosis Date   • Arthritis    • Atrial fibrillation (CMS/HCC)    • Bone disorder    • COPD (chronic obstructive pulmonary disease) (CMS/HCC)    • Hearing loss    • Hiatal hernia    •  Hoarse voice quality    • Hyperlipidemia    • Hypertension    • Pacemaker      Past Surgical History:   Procedure Laterality Date   • CARDIAC ELECTROPHYSIOLOGY PROCEDURE Left 2019    Procedure: AV node ablation + Bi-V PPM implant;  Surgeon: Lucio Chapman MD;  Location:  [facility];  Service: Cardiovascular   • CARDIAC ELECTROPHYSIOLOGY PROCEDURE N/A 2019    Procedure: AV node ablation;  Surgeon: Lucio Chapman MD;  Location:  [facility];  Service: Cardiovascular   • HEMORRHOIDECTOMY     • INSERT / REPLACE / REMOVE PACEMAKER     • LARYNGOSCOPY N/A 2019    Procedure: MICRO DIRECT LARYNGOSCOPY WITH BIOPSY;  Surgeon: Glen Gutiérrez MD;  Location:  [facility];  Service: ENT     Family History   Problem Relation Age of Onset   • Heart disease Mother    • Other Mother         staph infection   • Heart attack Mother    • Heart attack Sister    • Heart disease Maternal Grandmother    • Diabetes Maternal Grandmother    • Heart attack Maternal Grandfather    • No Known Problems Paternal Grandmother    • No Known Problems Paternal Grandfather    • Heart failure Maternal Uncle    • Heart failure Maternal Aunt    • No Known Problems Sister    • No Known Problems Sister      Social History     Tobacco Use   • Smoking status: Former Smoker     Packs/day: 1.50     Years: 60.00     Pack years: 90.00     Types: Cigarettes     Last attempt to quit: 2019     Years since quittin.1   • Smokeless tobacco: Never Used   Substance Use Topics   • Alcohol use: No     Frequency: Never       ROS:  Review of Symptoms:  General: no recent weight loss/gain, weakness or fatigue  Skin: no rashes, lumps, or other skin changes  HEENT: no dizziness, lightheadedness, or vision changes  Respiratory: + SOB  Cardiovascular: no palpitations, and tachycardia  Gastrointestinal: no black/tarry stools or diarrhea  Urinary: no change in frequency or urgency  Peripheral Vascular: no claudication  "or leg cramps  Musculoskeletal:OA  Psychiatric: no depression or excessive stress  Neurological: no sensory or motor loss, no syncope  Hematologic: no anemia, easy bruising or bleeding  Endocrine: no thyroid problems, nor heat or cold intolerance    PHYSICAL EXAM:    /74 (BP Location: Left arm, Patient Position: Sitting)   Pulse 78   Ht 167.6 cm (66\")   Wt 80.3 kg (177 lb)   SpO2 95%   BMI 28.57 kg/m²        Wt Readings from Last 5 Encounters:   07/24/20 80.3 kg (177 lb)   07/14/20 78.9 kg (174 lb)   07/13/20 79.1 kg (174 lb 6.4 oz)   05/27/20 81.2 kg (179 lb)   02/12/20 83.5 kg (184 lb)       BP Readings from Last 5 Encounters:   07/24/20 162/74   07/14/20 140/82   07/13/20 144/92   05/27/20 140/70   02/12/20 140/100       General appearance - Alert, well appearing, and in no distress   Mental status - Affect appropriate to mood.  Eyes - Sclerae anicteric,  ENMT - Hearing grossly normal bilaterally, Dental hygiene good.  Neck - Carotids upstroke normal bilaterally, no bruits, no JVD.  Resp - Crackles in bases   Heart - Normal rate, regular rhythm, normal S1, S2, no murmurs, rubs, clicks or gallops.  GI - Soft, nontender, nondistended, no masses or organomegaly.  Neurological - Grossly intact - normal speech, no focal findings  Musculoskeletal - No joint tenderness, deformity or swelling, no muscular tenderness noted.  Extremities - Peripheral pulses normal, trace pedal edema, no clubbing or cyanosis.  Skin - Normal coloration and turgor.  Psych -  oriented to person, place, and time.    Medical problems and test results were reviewed with the patient today.     Recent Results (from the past 672 hour(s))   Magnesium    Collection Time: 07/14/20  2:43 PM   Result Value Ref Range    Magnesium 2.0 1.6 - 2.4 mg/dL   Vitamin D 25 Hydroxy    Collection Time: 07/14/20  2:43 PM   Result Value Ref Range    25 Hydroxy, Vitamin D 27.3 (L) 30.0 - 100.0 ng/ml   Vitamin B12    Collection Time: 07/14/20  2:43 PM "   Result Value Ref Range    Vitamin B-12 379 211 - 946 pg/mL         EKG: (EKG has been independently visualized by me and summarized below)    ECG 12 Lead  Date/Time: 7/24/2020 3:14 PM  Performed by: Lino Cárdenas PA  Authorized by: Lino Cárdenas PA   Comparison: compared with previous ECG from 10/2/2019  Similar to previous ECG  Rhythm: atrial fibrillation  Rate: normal  Conduction: conduction normal  ST Segments: ST segments normal  QRS axis: normal    Clinical impression: abnormal EKG            Device Interrogation:  Please see device interrogation form which has been signed and updated for full details.  [employer] BIV pacemaker.  DDDR 70.  A paced 9.5%.  RV LV paced 99%.  Septal sensing thresholds.  Battery voltage 2.98 V.  Persistent atrial fibrillation for the past 22 days.  Rate controlled (previous AV node )    ASSESSMENT   1. Persistent AFib: S/pAVN + BIVPPM 6/19  2. Chronic SHF,EF 54% by recent Echo  3. HTN: Controlled.   4. COPD/Chest pain: Recent antibiotics for COPD exacerbation.   5. Anticoagulation: Eliquis 5mg BID  6. Chest pain with atypical features, plan for Stress test with Dr. Solares.     PLAN  · Continue current medical therapy follow-up Dr. Solares following stress test.  · Patient remains in persistent atrial fibrillation with minimal symptoms  at this time will monitor.  · Return follow-up Adela 6 months or sooner as needed.    7/24/2020  14:48    Will Melia SAMAYOA

## 2020-07-24 ENCOUNTER — OFFICE VISIT (OUTPATIENT)
Dept: CARDIOLOGY | Facility: CLINIC | Age: 75
End: 2020-07-24

## 2020-07-24 VITALS
HEART RATE: 78 BPM | DIASTOLIC BLOOD PRESSURE: 74 MMHG | WEIGHT: 177 LBS | HEIGHT: 66 IN | SYSTOLIC BLOOD PRESSURE: 162 MMHG | OXYGEN SATURATION: 95 % | BODY MASS INDEX: 28.45 KG/M2

## 2020-07-24 DIAGNOSIS — I10 ESSENTIAL HYPERTENSION: Primary | ICD-10-CM

## 2020-07-24 DIAGNOSIS — I50.22 CHRONIC SYSTOLIC CONGESTIVE HEART FAILURE (HCC): ICD-10-CM

## 2020-07-24 DIAGNOSIS — I48.19 OTHER PERSISTENT ATRIAL FIBRILLATION (HCC): ICD-10-CM

## 2020-07-24 PROCEDURE — 93281 PM DEVICE PROGR EVAL MULTI: CPT | Performed by: PHYSICIAN ASSISTANT

## 2020-07-24 PROCEDURE — 99213 OFFICE O/P EST LOW 20 MIN: CPT | Performed by: PHYSICIAN ASSISTANT

## 2020-07-28 NOTE — TELEPHONE ENCOUNTER
Tried calling pt daughter with referral appointment information. No answer/ VM full. Unable to LM.  If she returns call, please warm transfer to the office to speak with me.

## 2020-08-03 ENCOUNTER — HOSPITAL ENCOUNTER (OUTPATIENT)
Dept: CARDIOLOGY | Facility: HOSPITAL | Age: 75
End: 2020-08-03

## 2020-08-07 RX ORDER — AMITRIPTYLINE HYDROCHLORIDE 50 MG/1
TABLET, FILM COATED ORAL
Qty: 30 TABLET | Refills: 2 | Status: SHIPPED | OUTPATIENT
Start: 2020-08-07 | End: 2020-10-19

## 2020-08-12 ENCOUNTER — TELEPHONE (OUTPATIENT)
Dept: FAMILY MEDICINE CLINIC | Facility: CLINIC | Age: 75
End: 2020-08-12

## 2020-08-12 NOTE — TELEPHONE ENCOUNTER
RAZ A RESPIRATORY THERAPIST IS REQUESTING A CONTINUED ORDER FOR THE PATIENT'S OXYGEN  FOR 12 MONTHS 2 LITERS PER MINUTE THROUGH NASAL CANNULA      RON CALL BACK [phone] OR OFFICE [phone]    [phone]

## 2020-09-09 ENCOUNTER — HOSPITAL ENCOUNTER (OUTPATIENT)
Dept: CARDIOLOGY | Facility: HOSPITAL | Age: 75
End: 2020-09-09

## 2020-09-23 ENCOUNTER — TELEPHONE (OUTPATIENT)
Dept: CARDIOLOGY | Facility: CLINIC | Age: 75
End: 2020-09-23

## 2020-09-23 NOTE — TELEPHONE ENCOUNTER
Daughter called with concerns of rash and hives all over Mr Jones and he thinks it is his pacemaker.  Remote reading is normal with ongoing afib that has been reported.  Encouraged to see family doctor.  Daughter report he will go to urgent care.

## 2020-10-09 ENCOUNTER — TELEPHONE (OUTPATIENT)
Dept: FAMILY MEDICINE CLINIC | Facility: CLINIC | Age: 75
End: 2020-10-09

## 2020-10-09 RX ORDER — DOXYCYCLINE 100 MG/1
100 CAPSULE ORAL EVERY 12 HOURS SCHEDULED
Qty: 20 CAPSULE | Refills: 0 | Status: SHIPPED | OUTPATIENT
Start: 2020-10-09 | End: 2020-10-19

## 2020-10-09 NOTE — TELEPHONE ENCOUNTER
Caller: Ene Crisostomo - WILL MAKE HIS APPOINTMENTS    Relationship: Emergency Contact    Best call back number: [phone]    What medication are you requesting: DOXYCYCLINE    What are your current symptoms: COPD    How long have you been experiencing symptoms:     Have you had these symptoms before:    [x] Yes  [] No    Have you been treated for these symptoms before:   [x] Yes  [] No    If a prescription is needed, what is your preferred pharmacy and phone number: [facility] - [address] [phone] [facility] [phone]      Additional notes: Ene stated that this medication worked the best for the patient's COPD.

## 2020-10-14 ENCOUNTER — OFFICE VISIT (OUTPATIENT)
Dept: CARDIOLOGY | Facility: CLINIC | Age: 75
End: 2020-10-14

## 2020-10-14 VITALS
BODY MASS INDEX: 27.97 KG/M2 | WEIGHT: 174 LBS | HEART RATE: 82 BPM | OXYGEN SATURATION: 98 % | HEIGHT: 66 IN | SYSTOLIC BLOOD PRESSURE: 138 MMHG | DIASTOLIC BLOOD PRESSURE: 76 MMHG

## 2020-10-14 DIAGNOSIS — I50.22 CHRONIC SYSTOLIC CONGESTIVE HEART FAILURE (HCC): ICD-10-CM

## 2020-10-14 DIAGNOSIS — I48.21 PERMANENT ATRIAL FIBRILLATION (HCC): Primary | ICD-10-CM

## 2020-10-14 DIAGNOSIS — I10 ESSENTIAL HYPERTENSION: ICD-10-CM

## 2020-10-14 DIAGNOSIS — J42 CHRONIC BRONCHITIS, UNSPECIFIED CHRONIC BRONCHITIS TYPE (HCC): ICD-10-CM

## 2020-10-14 PROCEDURE — 93281 PM DEVICE PROGR EVAL MULTI: CPT | Performed by: INTERNAL MEDICINE

## 2020-10-14 PROCEDURE — 99213 OFFICE O/P EST LOW 20 MIN: CPT | Performed by: INTERNAL MEDICINE

## 2020-10-14 RX ORDER — DOXYCYCLINE HYCLATE 100 MG
100 TABLET ORAL AS NEEDED
COMMUNITY
Start: 2020-07-14 | End: 2020-10-19

## 2020-10-14 NOTE — PROGRESS NOTES
Stef Jones  1945  [phone]    10/14/2020    [facility] CARDIOLOGY     Logan, Néstor GOMEZ, DO  [address] [place] [address]    Chief Complaint   Patient presents with   • Atrial Fibrillation       Problem List:   1. SHF  a. DENA 30%, Moderate to Severe MR, Moderate TR  b. [facility] BIVPM + AV RFA 6/17/19  c. Echocardiogram 9/27/19 EF 54%  2. Persistent Afib  a. AVN RFA 6/19  b. Amiodarone discontinued secondary to family request  c. Minimally symptomatic  d. Chadsvasc=4, Anticoagulation with Eliquis.   e. 100% atrial fibrillation by device check 10/2020  3. HTN  4. COPD-Advanced, bronchitis     Allergies  Allergies   Allergen Reactions   • Amoxicillin Rash   • Penicillins Rash       Current Medications    Current Outpatient Medications:   •  acetaminophen (TYLENOL) 500 MG tablet, Take 500 mg by mouth Every 6 (Six) Hours As Needed for Mild Pain ., Disp: , Rfl:   •  albuterol sulfate  (90 Base) MCG/ACT inhaler, Inhale 2 puffs Every 4 (Four) Hours As Needed for Wheezing., Disp: 1 inhaler, Rfl: 11  •  amitriptyline (ELAVIL) 50 MG tablet, TAKE 1 TABLET BY MOUTH EVERY NIGHT., Disp: 30 tablet, Rfl: 2  •  apixaban (ELIQUIS) 5 MG tablet tablet, Take 1 tablet by mouth Every 12 (Twelve) Hours. Indications: Atrial Fibrillation, Disp: 60 tablet, Rfl: 5  •  atorvastatin (LIPITOR) 20 MG tablet, Take 1 tablet by mouth Every Night., Disp: 30 tablet, Rfl: 5  •  doxycycline (MONODOX) 100 MG capsule, Take 1 capsule by mouth Every 12 (Twelve) Hours for 10 days., Disp: 20 capsule, Rfl: 0  •  doxycycline (VIBRAMYICN) 100 MG tablet, Take 100 mg by mouth As Needed., Disp: , Rfl:   •  furosemide (LASIX) 40 MG tablet, Take 1 tablet by mouth Daily. (Patient taking differently: Take 20 mg by mouth Daily As Needed.), Disp: 90 tablet, Rfl: 3  •  hydrOXYzine pamoate (VISTARIL) 25 MG capsule, Take 1 capsule by mouth At Night As Needed (insomnia)., Disp: 30 capsule, Rfl: 0  •  ipratropium (ATROVENT)  "0.02 % nebulizer solution, Take 2.5 mL by nebulization 4 (Four) Times a Day., Disp: 300 mL, Rfl: 5  •  levalbuterol (XOPENEX HFA) 45 MCG/ACT inhaler, Inhale 1-2 puffs Every 6 (Six) Hours As Needed for Wheezing or Shortness of Air., Disp: , Rfl:   •  O2 (OXYGEN), Inhale 2 L/min Daily., Disp: , Rfl:   •  omeprazole (priLOSEC) 40 MG capsule, Take 1 capsule by mouth Daily., Disp: 30 capsule, Rfl: 5  •  psyllium (METAMUCIL) 58.6 % packet, Take 1 packet by mouth Daily., Disp: , Rfl:     History of Present Illness     Pt presents for follow up of atrial fibrillation, chronic CHF, BiV PM check, HTN. Since we last saw the pt, he did not have his stress test done as he cancelled it. He states it was ordered back in the summer by Dr. Solares. He has apparently been started on a sleep pill. He stopped the medication. He occasionally feels short lived palpitations.  He denies any CP. He has chronic SOB with COPD. No syncope. Denies any hospitalizations, ER visits, bleeding in his stool/urine on Eliquis, or TIA/CVA symptoms. Overall is stable. BP has been controlled for the most part.     ROS:  General:  + fatigue, weight gain + loss (5 lbs)  Cardiovascular:  Denies CP, PND, syncope, near syncope, edema or palpitations.  Pulmonary:  + BLACKWELL, + cough, + wheezing      Vitals:    10/14/20 1149   BP: 138/76   BP Location: Left arm   Patient Position: Sitting   Pulse: 82   SpO2: 98%   Weight: 78.9 kg (174 lb)   Height: 167.6 cm (66\")     Body mass index is 28.08 kg/m².  PE:  General: NAD. A & O x 3   Neck: no JVD, no carotid bruits, no TM  Heart RRR, NL S1, S2, S3,  no rubs, murmurs  Lungs: CTA, + wheezes  Abd: soft, non-tender, NL BS  Ext: No musculoskeletal deformities, no edema, cyanosis, or clubbing  Psych: normal mood and affect    Diagnostic Data:    BiV PM check: 99% BiV paced. 100% atrial fibrillation. 6.3 years on battery.       Procedures    1. Permanent atrial fibrillation (CMS/HCC)    2. Chronic systolic congestive heart " failure (CMS/[facility])    3. Essential hypertension    4. Chronic bronchitis, unspecified chronic bronchitis type (CMS/[facility])          Plan:  1. Persistent AFib: S/pAVN + BIVPPM 6/19- now in 100% atrial fibrillation, overall asymptomatic  2. Chronic SHF, EF 54% by recent Echo  3. HTN: Controlled.   4. Anticoagulation: Eliquis 5mg BID  5. COPD:  -stable      F/up in 6 months    Scribed for Lucio Chapman MD by Latricia Mckee PA-C. 10/14/2020  12:08 EDT     I, Lucio Chapman MD, personally performed the services described in this documentation as scribed by the above named individual in my presence, and it is both accurate and complete.  10/14/2020  12:08 EDT

## 2020-10-19 ENCOUNTER — OFFICE VISIT (OUTPATIENT)
Dept: FAMILY MEDICINE CLINIC | Facility: CLINIC | Age: 75
End: 2020-10-19

## 2020-10-19 VITALS
DIASTOLIC BLOOD PRESSURE: 70 MMHG | TEMPERATURE: 97.3 F | OXYGEN SATURATION: 97 % | HEIGHT: 66 IN | HEART RATE: 90 BPM | WEIGHT: 178 LBS | SYSTOLIC BLOOD PRESSURE: 130 MMHG | BODY MASS INDEX: 28.61 KG/M2

## 2020-10-19 DIAGNOSIS — J44.9 CHRONIC OBSTRUCTIVE BRONCHITIS (HCC): ICD-10-CM

## 2020-10-19 DIAGNOSIS — D68.318 COAGULATION DISORDER DUE TO CIRCULATING ANTICOAGULANTS (HCC): ICD-10-CM

## 2020-10-19 DIAGNOSIS — K21.00 GASTROESOPHAGEAL REFLUX DISEASE WITH ESOPHAGITIS WITHOUT HEMORRHAGE: ICD-10-CM

## 2020-10-19 DIAGNOSIS — E55.9 VITAMIN D DEFICIENCY: ICD-10-CM

## 2020-10-19 DIAGNOSIS — I48.19 OTHER PERSISTENT ATRIAL FIBRILLATION (HCC): ICD-10-CM

## 2020-10-19 DIAGNOSIS — E78.5 DYSLIPIDEMIA: ICD-10-CM

## 2020-10-19 DIAGNOSIS — I50.22 CHRONIC SYSTOLIC CONGESTIVE HEART FAILURE (HCC): ICD-10-CM

## 2020-10-19 DIAGNOSIS — Z00.00 ROUTINE GENERAL MEDICAL EXAMINATION AT HEALTH CARE FACILITY: Primary | ICD-10-CM

## 2020-10-19 PROCEDURE — G0439 PPPS, SUBSEQ VISIT: HCPCS | Performed by: FAMILY MEDICINE

## 2020-10-19 RX ORDER — ATORVASTATIN CALCIUM 10 MG/1
10 TABLET, FILM COATED ORAL NIGHTLY
Qty: 90 TABLET | Refills: 2 | Status: SHIPPED | OUTPATIENT
Start: 2020-10-19 | End: 2021-01-01 | Stop reason: SDUPTHER

## 2020-10-19 NOTE — PATIENT INSTRUCTIONS
Exercising to Stay Healthy  To become healthy and stay healthy, it is recommended that you do moderate-intensity and vigorous-intensity exercise. You can tell that you are exercising at a moderate intensity if your heart starts beating faster and you start breathing faster but can still hold a conversation. You can tell that you are exercising at a vigorous intensity if you are breathing much harder and faster and cannot hold a conversation while exercising.  Exercising regularly is important. It has many health benefits, such as:  · Improving overall fitness, flexibility, and endurance.  · Increasing bone density.  · Helping with weight control.  · Decreasing body fat.  · Increasing muscle strength.  · Reducing stress and tension.  · Improving overall health.  How often should I exercise?  Choose an activity that you enjoy, and set realistic goals. Your health care provider can help you make an activity plan that works for you.  Exercise regularly as told by your health care provider. This may include:  · Doing strength training two times a week, such as:  ? Lifting weights.  ? Using resistance bands.  ? Push-ups.  ? Sit-ups.  ? Yoga.  · Doing a certain intensity of exercise for a given amount of time. Choose from these options:  ? A total of 150 minutes of moderate-intensity exercise every week.  ? A total of 75 minutes of vigorous-intensity exercise every week.  ? A mix of moderate-intensity and vigorous-intensity exercise every week.  Children, pregnant women, people who have not exercised regularly, people who are overweight, and older adults may need to talk with a health care provider about what activities are safe to do. If you have a medical condition, be sure to talk with your health care provider before you start a new exercise program.  What are some exercise ideas?  Moderate-intensity exercise ideas include:  · Walking 1 mile (1.6 km) in about 15  minutes.  · Biking.  · Hiking.  · Golfing.  · Dancing.  · Water aerobics.  Vigorous-intensity exercise ideas include:  · Walking 4.5 miles (7.2 km) or more in about 1 hour.  · Jogging or running 5 miles (8 km) in about 1 hour.  · Biking 10 miles (16.1 km) or more in about 1 hour.  · Lap swimming.  · Roller-skating or in-line skating.  · Cross-country skiing.  · Vigorous competitive sports, such as football, basketball, and soccer.  · Jumping rope.  · Aerobic dancing.  What are some everyday activities that can help me to get exercise?  · Yard work, such as:  ? Pushing a .  ? Raking and bagging leaves.  · Washing your car.  · Pushing a stroller.  · Shoveling snow.  · Gardening.  · Washing windows or floors.  How can I be more active in my day-to-day activities?  · Use stairs instead of an elevator.  · Take a walk during your lunch break.  · If you drive, park your car farther away from your work or school.  · If you take public transportation, get off one stop early and walk the rest of the way.  · Stand up or walk around during all of your indoor phone calls.  · Get up, stretch, and walk around every 30 minutes throughout the day.  · Enjoy exercise with a friend. Support to continue exercising will help you keep a regular routine of activity.  What guidelines can I follow while exercising?  · Before you start a new exercise program, talk with your health care provider.  · Do not exercise so much that you hurt yourself, feel dizzy, or get very short of breath.  · Wear comfortable clothes and wear shoes with good support.  · Drink plenty of water while you exercise to prevent dehydration or heat stroke.  · Work out until your breathing and your heartbeat get faster.  Where to find more information  · [place] Department of Health and Human Services: www.hhs.gov  · Centers for Disease Control and Prevention (CDC): www.cdc.gov  Summary  · Exercising regularly is important. It will improve your overall fitness,  flexibility, and endurance.  · Regular exercise also will improve your overall health. It can help you control your weight, reduce stress, and improve your bone density.  · Do not exercise so much that you hurt yourself, feel dizzy, or get very short of breath.  · Before you start a new exercise program, talk with your health care provider.  This information is not intended to replace advice given to you by your health care provider. Make sure you discuss any questions you have with your health care provider.  Document Released: 01/20/2012 Document Revised: 11/30/2018 Document Reviewed: 11/08/2018  Elsevier Patient Education © 2020 [employer]      Fall Prevention in the Home, Adult  Falls can cause injuries and can affect people from all age groups. There are many simple things that you can do to make your home safe and to help prevent falls. Ask for help when making these changes, if needed.  What actions can I take to prevent falls?  General instructions  · Use good lighting in all rooms. Replace any light bulbs that burn out.  · Turn on lights if it is dark. Use night-lights.  · Place frequently used items in easy-to-reach places. Lower the shelves around your home if necessary.  · Set up furniture so that there are clear paths around it. Avoid moving your furniture around.  · Remove throw rugs and other tripping hazards from the floor.  · Avoid walking on wet floors.  · Fix any uneven floor surfaces.  · Add color or contrast paint or tape to grab bars and handrails in your home. Place contrasting color strips on the first and last steps of stairways.  · When you use a stepladder, make sure that it is completely opened and that the sides are firmly locked. Have someone hold the ladder while you are using it. Do not climb a closed stepladder.  · Be aware of any and all pets.  What can I do in the bathroom?         · Keep the floor dry. Immediately clean up any water that spills onto the floor.  · Remove soap  buildup in the tub or shower on a regular basis.  · Use non-skid mats or decals on the floor of the tub or shower.  · Attach bath mats securely with double-sided, non-slip rug tape.  · If you need to sit down while you are in the shower, use a plastic, non-slip stool.  · Install grab bars by the toilet and in the tub and shower. Do not use towel bars as grab bars.  What can I do in the bedroom?  · Make sure that a bedside light is easy to reach.  · Do not use oversized bedding that drapes onto the floor.  · Have a firm chair that has side arms to use for getting dressed.  What can I do in the kitchen?  · Clean up any spills right away.  · If you need to reach for something above you, use a sturdy step stool that has a grab bar.  · Keep electrical cables out of the way.  · Do not use floor polish or wax that makes floors slippery. If you must use wax, make sure that it is non-skid floor wax.  What can I do in the stairways?  · Do not leave any items on the stairs.  · Make sure that you have a light switch at the top of the stairs and the bottom of the stairs. Have them installed if you do not have them.  · Make sure that there are handrails on both sides of the stairs. Fix handrails that are broken or loose. Make sure that handrails are as long as the stairways.  · Install non-slip stair treads on all stairs in your home.  · Avoid having throw rugs at the top or bottom of stairways, or secure the rugs with carpet tape to prevent them from moving.  · Choose a carpet design that does not hide the edge of steps on the stairway.  · Check any carpeting to make sure that it is firmly attached to the stairs. Fix any carpet that is loose or worn.  What can I do on the outside of my home?  · Use bright outdoor lighting.  · Regularly repair the edges of walkways and driveways and fix any cracks.  · Remove high doorway thresholds.  · Trim any shrubbery on the main path into your home.  · Regularly check that handrails are  securely fastened and in good repair. Both sides of any steps should have handrails.  · Install guardrails along the edges of any raised decks or porches.  · Clear walkways of debris and clutter, including tools and rocks.  · Have leaves, snow, and ice cleared regularly.  · Use sand or salt on walkways during winter months.  · In the garage, clean up any spills right away, including grease or oil spills.  What other actions can I take?  · Wear closed-toe shoes that fit well and support your feet. Wear shoes that have rubber soles or low heels.  · Use mobility aids as needed, such as canes, walkers, scooters, and crutches.  · Review your medicines with your health care provider. Some medicines can cause dizziness or changes in blood pressure, which increase your risk of falling.  Talk with your health care provider about other ways that you can decrease your risk of falls. This may include working with a physical therapist or  to improve your strength, balance, and endurance.  Where to find more information  · Centers for Disease Control and Prevention, [place]: https://www.cdc.gov  · National [place] [employer] Aging: https://px7wmsu.[place].nih.gov  Contact a health care provider if:  · You are afraid of falling at home.  · You feel weak, drowsy, or dizzy at home.  · You fall at home.  Summary  · There are many simple things that you can do to make your home safe and to help prevent falls.  · Ways to make your home safe include removing tripping hazards and installing grab bars in the bathroom.  · Ask for help when making these changes in your home.  This information is not intended to replace advice given to you by your health care provider. Make sure you discuss any questions you have with your health care provider.  Document Released: 12/08/2003 Document Revised: 11/30/2018 Document Reviewed: 08/02/2018  Elsevier Patient Education © 2020 [employer]      Fall Prevention in the Home, Adult  Falls can cause  injuries. They can happen to people of all ages. There are many things you can do to make your home safe and to help prevent falls. Ask for help when making these changes, if needed.  What actions can I take to prevent falls?  General Instructions  · Use good lighting in all rooms. Replace any light bulbs that burn out.  · Turn on the lights when you go into a dark area. Use night-lights.  · Keep items that you use often in easy-to-reach places. Lower the shelves around your home if necessary.  · Set up your furniture so you have a clear path. Avoid moving your furniture around.  · Do not have throw rugs and other things on the floor that can make you trip.  · Avoid walking on wet floors.  · If any of your floors are uneven, fix them.  · Add color or contrast paint or tape to clearly román and help you see:  ? Any grab bars or handrails.  ? First and last steps of stairways.  ? Where the edge of each step is.  · If you use a stepladder:  ? Make sure that it is fully opened. Do not climb a closed stepladder.  ? Make sure that both sides of the stepladder are locked into place.  ? Ask someone to hold the stepladder for you while you use it.  · If there are any pets around you, be aware of where they are.  What can I do in the bathroom?         · Keep the floor dry. Clean up any water that spills onto the floor as soon as it happens.  · Remove soap buildup in the tub or shower regularly.  · Use non-skid mats or decals on the floor of the tub or shower.  · Attach bath mats securely with double-sided, non-slip rug tape.  · If you need to sit down in the shower, use a plastic, non-slip stool.  · Install grab bars by the toilet and in the tub and shower. Do not use towel bars as grab bars.  What can I do in the bedroom?  · Make sure that you have a light by your bed that is easy to reach.  · Do not use any sheets or blankets that are too big for your bed. They should not hang down onto the floor.  · Have a firm chair that  has side arms. You can use this for support while you get dressed.  What can I do in the kitchen?  · Clean up any spills right away.  · If you need to reach something above you, use a strong step stool that has a grab bar.  · Keep electrical cords out of the way.  · Do not use floor polish or wax that makes floors slippery. If you must use wax, use non-skid floor wax.  What can I do with my stairs?  · Do not leave any items on the stairs.  · Make sure that you have a light switch at the top of the stairs and the bottom of the stairs. If you do not have them, ask someone to add them for you.  · Make sure that there are handrails on both sides of the stairs, and use them. Fix handrails that are broken or loose. Make sure that handrails are as long as the stairways.  · Install non-slip stair treads on all stairs in your home.  · Avoid having throw rugs at the top or bottom of the stairs. If you do have throw rugs, attach them to the floor with carpet tape.  · Choose a carpet that does not hide the edge of the steps on the stairway.  · Check any carpeting to make sure that it is firmly attached to the stairs. Fix any carpet that is loose or worn.  What can I do on the outside of my home?  · Use bright outdoor lighting.  · Regularly fix the edges of walkways and driveways and fix any cracks.  · Remove anything that might make you trip as you walk through a door, such as a raised step or threshold.  · Trim any bushes or trees on the path to your home.  · Regularly check to see if handrails are loose or broken. Make sure that both sides of any steps have handrails.  · Install guardrails along the edges of any raised decks and porches.  · Clear walking paths of anything that might make someone trip, such as tools or rocks.  · Have any leaves, snow, or ice cleared regularly.  · Use sand or salt on walking paths during winter.  · Clean up any spills in your garage right away. This includes grease or oil spills.  What other  actions can I take?  · Wear shoes that:  ? Have a low heel. Do not wear high heels.  ? Have rubber bottoms.  ? Are comfortable and fit you well.  ? Are closed at the toe. Do not wear open-toe sandals.  · Use tools that help you move around (mobility aids) if they are needed. These include:  ? Canes.  ? Walkers.  ? Scooters.  ? Crutches.  · Review your medicines with your doctor. Some medicines can make you feel dizzy. This can increase your chance of falling.  Ask your doctor what other things you can do to help prevent falls.  Where to find more information  · Centers for Disease Control and Prevention, [place]: https://cdc.gov  · National [place] on Aging: https://hy1yshf.[place].nih.gov  Contact a doctor if:  · You are afraid of falling at home.  · You feel weak, drowsy, or dizzy at home.  · You fall at home.  Summary  · There are many simple things that you can do to make your home safe and to help prevent falls.  · Ways to make your home safe include removing tripping hazards and installing grab bars in the bathroom.  · Ask for help when making these changes in your home.  This information is not intended to replace advice given to you by your health care provider. Make sure you discuss any questions you have with your health care provider.  Document Released: 10/14/2010 Document Revised: 04/09/2020 Document Reviewed: 08/02/2018  Elsevier Patient Education © 2020 [employer]

## 2020-10-19 NOTE — PROGRESS NOTES
The ABCs of the Annual Wellness Visit  Subsequent Medicare Wellness Visit    Chief Complaint   Patient presents with   • Medicare Wellness-subsequent       Subjective   History of Present Illness:  Stef Jones is a 75 y.o. male who presents for a Subsequent Medicare Wellness Visit.    HEALTH RISK ASSESSMENT    Recent Hospitalizations:  No hospitalization(s) within the last year.    Current Medical Providers:  Patient Care Team:  Néstor Ford DO as PCP - General (Family Medicine)  Néstor Ford DO as Consulting Physician (Family Medicine)    Smoking Status:  Social History     Tobacco Use   Smoking Status Former Smoker   • Packs/day: 1.50   • Years: 60.00   • Pack years: 90.00   • Types: Cigarettes   • Start date:    • Quit date: 2019   • Years since quittin.3   Smokeless Tobacco Never Used       Alcohol Consumption:  Social History     Substance and Sexual Activity   Alcohol Use No   • Frequency: Never       Depression Screen:   PHQ-2/PHQ-9 Depression Screening 10/19/2020   Little interest or pleasure in doing things 0   Feeling down, depressed, or hopeless 0   Total Score 0       Fall Risk Screen:  GEOVANNA Fall Risk Assessment was completed, and patient is at LOW risk for falls.Assessment completed on:10/19/2020    Health Habits and Functional and Cognitive Screening:  Functional & Cognitive Status 10/19/2020   Do you have difficulty preparing food and eating? No   Do you have difficulty bathing yourself, getting dressed or grooming yourself? No   Do you have difficulty using the toilet? No   Do you have difficulty moving around from place to place? No   Do you have trouble with steps or getting out of a bed or a chair? No   Current Diet Well Balanced Diet   Dental Exam Not up to date   Eye Exam Up to date   Exercise (times per week) 7 times per week   Current Exercise Activities Include Walking   Do you need help using the phone?  No   Are you deaf or do you have serious difficulty  hearing?  Yes   Do you need help with transportation? No   Do you need help shopping? No   Do you need help preparing meals?  No   Do you need help with housework?  No   Do you need help with laundry? No   Do you need help taking your medications? No   Do you need help managing money? No   Do you ever drive or ride in a car without wearing a seat belt? No   Have you felt unusual stress, anger or loneliness in the last month? No   Who do you live with? Alone   If you need help, do you have trouble finding someone available to you? No   Do you have difficulty concentrating, remembering or making decisions? No         Does the patient have evidence of cognitive impairment? No    Asprin use counseling:Does not need ASA (and currently is not on it)    Age-appropriate Screening Schedule:  Refer to the list below for future screening recommendations based on patient's age, sex and/or medical conditions. Orders for these recommended tests are listed in the plan section. The patient has been provided with a written plan.    Health Maintenance   Topic Date Due   • URINE MICROALBUMIN  1945   • ZOSTER VACCINE (1 of 2) 03/20/1993   • DIABETIC EYE EXAM  06/08/2019   • HEMOGLOBIN A1C  02/07/2020   • INFLUENZA VACCINE  10/19/2021 (Originally 8/1/2020)   • LIPID PANEL  05/27/2021   • TDAP/TD VACCINES (2 - Td) 06/17/2024          The following portions of the patient's history were reviewed and updated as appropriate: allergies, current medications, past family history, past medical history, past social history, past surgical history and problem list.    Outpatient Medications Prior to Visit   Medication Sig Dispense Refill   • acetaminophen (TYLENOL) 500 MG tablet Take 500 mg by mouth Every 6 (Six) Hours As Needed for Mild Pain .     • albuterol sulfate  (90 Base) MCG/ACT inhaler Inhale 2 puffs Every 4 (Four) Hours As Needed for Wheezing. 1 inhaler 11   • apixaban (ELIQUIS) 5 MG tablet tablet Take 1 tablet by mouth  Every 12 (Twelve) Hours. Indications: Atrial Fibrillation 60 tablet 5   • doxycycline (MONODOX) 100 MG capsule Take 1 capsule by mouth Every 12 (Twelve) Hours for 10 days. 20 capsule 0   • furosemide (LASIX) 40 MG tablet Take 1 tablet by mouth Daily. (Patient taking differently: Take 20 mg by mouth Daily As Needed.) 90 tablet 3   • hydrOXYzine pamoate (VISTARIL) 25 MG capsule Take 1 capsule by mouth At Night As Needed (insomnia). 30 capsule 0   • levalbuterol (XOPENEX HFA) 45 MCG/ACT inhaler Inhale 1-2 puffs Every 6 (Six) Hours As Needed for Wheezing or Shortness of Air.     • O2 (OXYGEN) Inhale 2 L/min Daily.     • omeprazole (priLOSEC) 40 MG capsule Take 1 capsule by mouth Daily. 30 capsule 5   • amitriptyline (ELAVIL) 50 MG tablet TAKE 1 TABLET BY MOUTH EVERY NIGHT. 30 tablet 2   • atorvastatin (LIPITOR) 20 MG tablet Take 1 tablet by mouth Every Night. 30 tablet 5   • ipratropium (ATROVENT) 0.02 % nebulizer solution Take 2.5 mL by nebulization 4 (Four) Times a Day. 300 mL 5   • psyllium (METAMUCIL) 58.6 % packet Take 1 packet by mouth Daily.     • doxycycline (VIBRAMYICN) 100 MG tablet Take 100 mg by mouth As Needed.       No facility-administered medications prior to visit.        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 Patient does not have an advance directive, information provided.    Review of Systems  1. Constitutional: Negative for fever. Negative for chills, diaphoresis, fatigue and unexpected weight change.   2. HENT: No dysphagia; no changes to vision/hearing/smell/taste; no epistaxis  3. Eyes: Negative for redness and visual disturbance.   4. Respiratory: As per above.  5. Cardiovascular: Negative for chest pain and palpitations.   6. Gastrointestinal: Negative for abdominal distention, abdominal pain and blood in stool.   7. Endocrine: Negative for cold intolerance and heat intolerance.   8. Genitourinary: Negative for difficulty urinating, dysuria and frequency.   9. Musculoskeletal: Negative for arthralgias, back pain and myalgias.   10. Skin: Negative for color change, rash and wound.   11. Neurological: Negative for syncope, weakness and headaches.   12. Hematological: Negative for adenopathy. Does not bruise/bleed easily.   13. Psychiatric/Behavioral: Negative for confusion. The patient is not nervous/anxious      Compared to one year ago, the patient feels his physical health is better.  Compared to one year ago, the patient feels his mental health is better.    Reviewed chart for potential of high risk medication in the elderly: yes  Reviewed chart for potential of harmful drug interactions in the elderly:yes    Objective         Vitals:    10/19/20 1409   BP: 130/70   Pulse: 90   Temp: 97.3 °F (36.3 °C)   SpO2: 97%   Weight: 80.7 kg (178 lb)   Height: 167.6 cm (66\")   PainSc:   4       Body mass index is 28.73 kg/m².  Discussed the patient's BMI with him. The BMI is in the acceptable range.    Physical Exam  General Appearance: alert, oriented x 3, no acute distress.  Skin: warm and dry.   HEENT: Atraumatic.  pupils round and reactive to light and accommodation, oral mucosa pink and moist.  Nares patent without epistaxis.  External auditory canals are patent tympanic membranes intact.  " Slightly injected conjunctival bilaterally.  Boggy/inflamed nasal turbinates bilaterally, mild tenderness on palpation of maxillary sinuses.  Neck: supple, no JVD, trachea midline.  No thyromegaly  Lungs: Rhonchus referred upper airway congestion bilaterally that is partially cleared with cough, unlabored breathing effort.  Prolonged expiratory phase bilaterally.  Heart: RR, normal S1 and S2, no S3, no rub.  Subclavian pacemaker noted.  Abdomen: soft, non-tender, no palpable bladder, present bowel sounds to auscultation ×4.  No guarding or rigidity.  Extremities: no clubbing, cyanosis or edema.  Good range of motion actively and passively.  Symmetric muscle strength and development  Neuro: normal speech and mental status.  Cranial nerves II through XII intact.  No anosmia. DTR 2+; proprioception intact.  No focal motor/sensory deficits.        Assessment/Plan   Medicare Risks and Personalized Health Plan  CMS Preventative Services Quick Reference  Advance Directive Discussion  Cardiovascular risk  Fall Risk  Inactivity/Sedentary    The above risks/problems have been discussed with the patient.  Pertinent information has been shared with the patient in the After Visit Summary.  Follow up plans and orders are seen below in the Assessment/Plan Section.    Diagnoses and all orders for this visit:    1. Routine general medical examination at health care facility (Primary)  -     CBC & Differential  -     Comprehensive Metabolic Panel    2. Chronic obstructive bronchitis (CMS/HCC)  -     ipratropium (ATROVENT) 0.02 % nebulizer solution; Take 2.5 mL by nebulization 4 (Four) Times a Day.  Dispense: 300 mL; Refill: 5    3. Chronic systolic congestive heart failure (CMS/HCC)  -     atorvastatin (LIPITOR) 10 MG tablet; Take 1 tablet by mouth Every Night.  Dispense: 90 tablet; Refill: 2  -     Comprehensive Metabolic Panel; Future  -     CBC & Differential; Future  -     Lipid Panel; Future    4. Other persistent atrial  fibrillation (CMS/HCC)    5. Gastroesophageal reflux disease with esophagitis without hemorrhage    6. 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 Dyslipidemia  -     Comprehensive Metabolic Panel; Future  -     Lipid Panel; Future    8. Vitamin D deficiency  -     Vitamin D 25 Hydroxy      Follow Up:  Return in about 6 months (around 4/19/2021) for Recheck.     An After Visit Summary and PPPS were given to the patient.       Patient has been erroneously marked as diabetic. Based on the available clinical information, he does not have diabetes and should therefore be excluded from diabetic health maintenance and quality measures for the remainder of the reporting period.

## 2020-10-20 LAB
25(OH)D3+25(OH)D2 SERPL-MCNC: 39.7 NG/ML (ref 30–100)
ALBUMIN SERPL-MCNC: 3.9 G/DL (ref 3.7–4.7)
ALBUMIN/GLOB SERPL: 1.6 {RATIO} (ref 1.2–2.2)
ALP SERPL-CCNC: 98 IU/L (ref 39–117)
ALT SERPL-CCNC: 30 IU/L (ref 0–44)
AST SERPL-CCNC: 25 IU/L (ref 0–40)
BASOPHILS # BLD AUTO: 0.1 X10E3/UL (ref 0–0.2)
BASOPHILS NFR BLD AUTO: 1 %
BILIRUB SERPL-MCNC: 0.4 MG/DL (ref 0–1.2)
BUN SERPL-MCNC: 14 MG/DL (ref 8–27)
BUN/CREAT SERPL: 13 (ref 10–24)
CALCIUM SERPL-MCNC: 9.3 MG/DL (ref 8.6–10.2)
CHLORIDE SERPL-SCNC: 102 MMOL/L (ref 96–106)
CO2 SERPL-SCNC: 28 MMOL/L (ref 20–29)
CREAT SERPL-MCNC: 1.1 MG/DL (ref 0.76–1.27)
EOSINOPHIL # BLD AUTO: 0.2 X10E3/UL (ref 0–0.4)
EOSINOPHIL NFR BLD AUTO: 2 %
ERYTHROCYTE [DISTWIDTH] IN BLOOD BY AUTOMATED COUNT: 13.9 % (ref 11.6–15.4)
GLOBULIN SER CALC-MCNC: 2.4 G/DL (ref 1.5–4.5)
GLUCOSE SERPL-MCNC: 107 MG/DL (ref 65–99)
HCT VFR BLD AUTO: 39.3 % (ref 37.5–51)
HGB BLD-MCNC: 13.3 G/DL (ref 13–17.7)
IMM GRANULOCYTES # BLD AUTO: 0 X10E3/UL (ref 0–0.1)
IMM GRANULOCYTES NFR BLD AUTO: 0 %
LYMPHOCYTES # BLD AUTO: 2.6 X10E3/UL (ref 0.7–3.1)
LYMPHOCYTES NFR BLD AUTO: 24 %
MCH RBC QN AUTO: 27.2 PG (ref 26.6–33)
MCHC RBC AUTO-ENTMCNC: 33.8 G/DL (ref 31.5–35.7)
MCV RBC AUTO: 80 FL (ref 79–97)
MONOCYTES # BLD AUTO: 1 X10E3/UL (ref 0.1–0.9)
MONOCYTES NFR BLD AUTO: 9 %
NEUTROPHILS # BLD AUTO: 7 X10E3/UL (ref 1.4–7)
NEUTROPHILS NFR BLD AUTO: 64 %
PLATELET # BLD AUTO: 247 X10E3/UL (ref 150–450)
POTASSIUM SERPL-SCNC: 4.7 MMOL/L (ref 3.5–5.2)
PROT SERPL-MCNC: 6.3 G/DL (ref 6–8.5)
RBC # BLD AUTO: 4.89 X10E6/UL (ref 4.14–5.8)
SODIUM SERPL-SCNC: 143 MMOL/L (ref 134–144)
WBC # BLD AUTO: 11 X10E3/UL (ref 3.4–10.8)

## 2020-12-21 NOTE — TELEPHONE ENCOUNTER
Caller: KranthiEne davison - WILL MAKE HIS APPOINTMENTS    Relationship: Emergency Contact    Best call back number: [phone]    What medication are you requesting: antibiotics    What are your current symptoms: congestion, productive cough    How long have you been experiencing symptoms: 2-3 weeks    Have you had these symptoms before:    [x] Yes  [] No    Have you been treated for these symptoms before:   [x] Yes  [] No    If a prescription is needed, what is your preferred pharmacy and phone number: [facility] - [address] [phone] [facility] [phone]      Additional notes:  Ene also would like to know if a referral to a different ENT could be placed as the patient does not like Dr. Arthur

## 2020-12-21 NOTE — TELEPHONE ENCOUNTER
PATIENT'S DAUGHTER CALLED TO FOLLOW UP ON PREVIOUS REQUEST FOR ANTIBIOTIC.  IS CONCERNED THAT IS COULD TURN TO PNEUMONIA IF NOT TREATED SOON, STATED IT IS LIKE A HEAD COLD AT THIS POINT. PATIENT HAS STARTED TAKING MUCINEX AND LAST TIME HE DID THIS IT COUNTER ACTED WITH OTHER MEDICATION.   AND IS TRYING TO AVOID GOING TO DOCTOR OR HOSPITAL.     STATED THAT THE DOXYCYCL HYC CAPSULE 100MG MEDICATION 14 DAY THAT WAS GIVEN LAST HAS WORKED THE BEST.      PLEASE CALL AND ADVISE: [phone]    [facility] - [address] [phone]

## 2021-01-01 ENCOUNTER — TELEPHONE (OUTPATIENT)
Dept: CARDIOLOGY | Facility: CLINIC | Age: 76
End: 2021-01-01

## 2021-01-01 ENCOUNTER — TELEPHONE (OUTPATIENT)
Dept: FAMILY MEDICINE CLINIC | Facility: CLINIC | Age: 76
End: 2021-01-01

## 2021-01-01 ENCOUNTER — OFFICE VISIT (OUTPATIENT)
Dept: FAMILY MEDICINE CLINIC | Facility: CLINIC | Age: 76
End: 2021-01-01

## 2021-01-01 ENCOUNTER — TELEPHONE (OUTPATIENT)
Dept: CARDIAC SURGERY | Facility: CLINIC | Age: 76
End: 2021-01-01

## 2021-01-01 ENCOUNTER — HOSPITAL ENCOUNTER (OUTPATIENT)
Dept: CT IMAGING | Facility: HOSPITAL | Age: 76
Discharge: HOME | End: 2021-05-14

## 2021-01-01 ENCOUNTER — TELEPHONE (OUTPATIENT)
Dept: OTOLARYNGOLOGY | Facility: CLINIC | Age: 76
End: 2021-01-01

## 2021-01-01 ENCOUNTER — OFFICE VISIT (OUTPATIENT)
Dept: OTOLARYNGOLOGY | Facility: CLINIC | Age: 76
End: 2021-01-01

## 2021-01-01 ENCOUNTER — OFFICE VISIT (OUTPATIENT)
Dept: PULMONOLOGY | Facility: CLINIC | Age: 76
End: 2021-01-01

## 2021-01-01 ENCOUNTER — OFFICE VISIT (OUTPATIENT)
Dept: CARDIOLOGY | Facility: CLINIC | Age: 76
End: 2021-01-01

## 2021-01-01 ENCOUNTER — PRIOR AUTHORIZATION (OUTPATIENT)
Dept: FAMILY MEDICINE CLINIC | Facility: CLINIC | Age: 76
End: 2021-01-01

## 2021-01-01 ENCOUNTER — OFFICE VISIT (OUTPATIENT)
Dept: CARDIAC SURGERY | Facility: CLINIC | Age: 76
End: 2021-01-01

## 2021-01-01 VITALS
BODY MASS INDEX: 27.64 KG/M2 | HEIGHT: 66 IN | SYSTOLIC BLOOD PRESSURE: 154 MMHG | WEIGHT: 168 LBS | DIASTOLIC BLOOD PRESSURE: 80 MMHG | DIASTOLIC BLOOD PRESSURE: 80 MMHG | BODY MASS INDEX: 27 KG/M2 | SYSTOLIC BLOOD PRESSURE: 160 MMHG | HEART RATE: 80 BPM | TEMPERATURE: 95.8 F | OXYGEN SATURATION: 95 % | HEART RATE: 71 BPM | OXYGEN SATURATION: 93 % | TEMPERATURE: 96.9 F | HEIGHT: 66 IN | WEIGHT: 172 LBS

## 2021-01-01 VITALS
TEMPERATURE: 97 F | HEIGHT: 66 IN | HEART RATE: 79 BPM | OXYGEN SATURATION: 96 % | DIASTOLIC BLOOD PRESSURE: 90 MMHG | SYSTOLIC BLOOD PRESSURE: 140 MMHG | WEIGHT: 162 LBS | BODY MASS INDEX: 26.03 KG/M2

## 2021-01-01 VITALS
DIASTOLIC BLOOD PRESSURE: 74 MMHG | HEIGHT: 66 IN | SYSTOLIC BLOOD PRESSURE: 134 MMHG | HEART RATE: 74 BPM | OXYGEN SATURATION: 95 % | BODY MASS INDEX: 27.93 KG/M2 | WEIGHT: 173.8 LBS

## 2021-01-01 VITALS
SYSTOLIC BLOOD PRESSURE: 138 MMHG | WEIGHT: 173 LBS | TEMPERATURE: 97.2 F | BODY MASS INDEX: 27.8 KG/M2 | DIASTOLIC BLOOD PRESSURE: 80 MMHG | HEART RATE: 81 BPM | OXYGEN SATURATION: 98 % | HEIGHT: 66 IN

## 2021-01-01 VITALS
HEART RATE: 81 BPM | DIASTOLIC BLOOD PRESSURE: 70 MMHG | WEIGHT: 174 LBS | HEIGHT: 66 IN | TEMPERATURE: 96.9 F | BODY MASS INDEX: 27.97 KG/M2 | SYSTOLIC BLOOD PRESSURE: 136 MMHG | OXYGEN SATURATION: 97 %

## 2021-01-01 VITALS
WEIGHT: 171 LBS | SYSTOLIC BLOOD PRESSURE: 134 MMHG | TEMPERATURE: 96.9 F | DIASTOLIC BLOOD PRESSURE: 72 MMHG | HEIGHT: 66 IN | BODY MASS INDEX: 27.48 KG/M2

## 2021-01-01 VITALS
HEIGHT: 66 IN | HEART RATE: 80 BPM | TEMPERATURE: 97.7 F | SYSTOLIC BLOOD PRESSURE: 147 MMHG | DIASTOLIC BLOOD PRESSURE: 91 MMHG | OXYGEN SATURATION: 96 % | BODY MASS INDEX: 27.64 KG/M2 | WEIGHT: 172 LBS

## 2021-01-01 VITALS
WEIGHT: 175 LBS | HEIGHT: 66 IN | OXYGEN SATURATION: 96 % | HEART RATE: 89 BPM | SYSTOLIC BLOOD PRESSURE: 140 MMHG | BODY MASS INDEX: 28.12 KG/M2 | DIASTOLIC BLOOD PRESSURE: 90 MMHG

## 2021-01-01 VITALS
HEART RATE: 48 BPM | DIASTOLIC BLOOD PRESSURE: 90 MMHG | OXYGEN SATURATION: 93 % | BODY MASS INDEX: 28.19 KG/M2 | TEMPERATURE: 97.8 F | HEIGHT: 66 IN | WEIGHT: 175.4 LBS | SYSTOLIC BLOOD PRESSURE: 148 MMHG

## 2021-01-01 DIAGNOSIS — I71.40 AAA (ABDOMINAL AORTIC ANEURYSM) WITHOUT RUPTURE (HCC): Primary | ICD-10-CM

## 2021-01-01 DIAGNOSIS — R63.0 LOSS OF APPETITE: ICD-10-CM

## 2021-01-01 DIAGNOSIS — R13.14 PHARYNGOESOPHAGEAL DYSPHAGIA: Chronic | ICD-10-CM

## 2021-01-01 DIAGNOSIS — I50.22 CHRONIC SYSTOLIC CONGESTIVE HEART FAILURE (HCC): ICD-10-CM

## 2021-01-01 DIAGNOSIS — F43.20 ADULT SITUATIONAL STRESS DISORDER: ICD-10-CM

## 2021-01-01 DIAGNOSIS — J44.9 CHRONIC OBSTRUCTIVE BRONCHITIS (HCC): Primary | ICD-10-CM

## 2021-01-01 DIAGNOSIS — R07.9 CHEST PAIN, UNSPECIFIED TYPE: ICD-10-CM

## 2021-01-01 DIAGNOSIS — J69.0 RECURRENT ASPIRATION PNEUMONIA (HCC): ICD-10-CM

## 2021-01-01 DIAGNOSIS — M46.1 BILATERAL SACROILIITIS (HCC): ICD-10-CM

## 2021-01-01 DIAGNOSIS — M54.50 LOW BACK PAIN WITHOUT SCIATICA, UNSPECIFIED BACK PAIN LATERALITY, UNSPECIFIED CHRONICITY: Primary | ICD-10-CM

## 2021-01-01 DIAGNOSIS — K21.00 GASTROESOPHAGEAL REFLUX DISEASE WITH ESOPHAGITIS WITHOUT HEMORRHAGE: ICD-10-CM

## 2021-01-01 DIAGNOSIS — I10 ESSENTIAL HYPERTENSION: ICD-10-CM

## 2021-01-01 DIAGNOSIS — J38.7 LARYNGEAL MASS: ICD-10-CM

## 2021-01-01 DIAGNOSIS — D68.318 COAGULATION DISORDER DUE TO CIRCULATING ANTICOAGULANTS (HCC): ICD-10-CM

## 2021-01-01 DIAGNOSIS — J44.9 CHRONIC OBSTRUCTIVE BRONCHITIS (HCC): ICD-10-CM

## 2021-01-01 DIAGNOSIS — Z00.6 EXAMINATION FOR NORMAL COMPARISON FOR CLINICAL RESEARCH: Primary | ICD-10-CM

## 2021-01-01 DIAGNOSIS — I50.22 CHRONIC SYSTOLIC CONGESTIVE HEART FAILURE (HCC): Primary | ICD-10-CM

## 2021-01-01 DIAGNOSIS — I71.40 ABDOMINAL AORTIC ANEURYSM (AAA) WITHOUT RUPTURE (HCC): Primary | ICD-10-CM

## 2021-01-01 DIAGNOSIS — R09.81 SINUS CONGESTION: ICD-10-CM

## 2021-01-01 DIAGNOSIS — R49.0 HOARSE VOICE QUALITY: ICD-10-CM

## 2021-01-01 DIAGNOSIS — H57.89 EYE DRAINAGE: ICD-10-CM

## 2021-01-01 DIAGNOSIS — J34.2 NASAL SEPTAL DEVIATION: ICD-10-CM

## 2021-01-01 DIAGNOSIS — I48.19 OTHER PERSISTENT ATRIAL FIBRILLATION (HCC): ICD-10-CM

## 2021-01-01 DIAGNOSIS — E55.9 VITAMIN D INSUFFICIENCY: ICD-10-CM

## 2021-01-01 DIAGNOSIS — J32.0 CHRONIC MAXILLARY SINUSITIS: ICD-10-CM

## 2021-01-01 DIAGNOSIS — K21.9 GERD WITHOUT ESOPHAGITIS: ICD-10-CM

## 2021-01-01 DIAGNOSIS — I10 ESSENTIAL HYPERTENSION: Primary | ICD-10-CM

## 2021-01-01 DIAGNOSIS — J32.0 CHRONIC MAXILLARY SINUSITIS: Primary | ICD-10-CM

## 2021-01-01 DIAGNOSIS — I71.40 AAA (ABDOMINAL AORTIC ANEURYSM) WITHOUT RUPTURE (HCC): ICD-10-CM

## 2021-01-01 LAB
25(OH)D3+25(OH)D2 SERPL-MCNC: 39.7 NG/ML (ref 30–100)
ALBUMIN SERPL-MCNC: 4.1 G/DL (ref 3.7–4.7)
ALBUMIN/GLOB SERPL: 1.6 {RATIO} (ref 1.2–2.2)
ALP SERPL-CCNC: 100 IU/L (ref 48–121)
ALT SERPL-CCNC: 19 IU/L (ref 0–44)
AST SERPL-CCNC: 24 IU/L (ref 0–40)
BILIRUB BLD-MCNC: NEGATIVE MG/DL
BILIRUB SERPL-MCNC: 0.6 MG/DL (ref 0–1.2)
BUN SERPL-MCNC: 19 MG/DL (ref 8–27)
BUN/CREAT SERPL: 14 (ref 10–24)
CALCIUM SERPL-MCNC: 9.3 MG/DL (ref 8.6–10.2)
CHLORIDE SERPL-SCNC: 103 MMOL/L (ref 96–106)
CLARITY, POC: CLEAR
CO2 SERPL-SCNC: 24 MMOL/L (ref 20–29)
COLOR UR: YELLOW
CREAT SERPL-MCNC: 1.39 MG/DL (ref 0.76–1.27)
GLOBULIN SER CALC-MCNC: 2.5 G/DL (ref 1.5–4.5)
GLUCOSE SERPL-MCNC: 102 MG/DL (ref 65–99)
GLUCOSE UR STRIP-MCNC: NEGATIVE MG/DL
KETONES UR QL: NEGATIVE
LEUKOCYTE EST, POC: NEGATIVE
NITRITE UR-MCNC: NEGATIVE MG/ML
PH UR: 6 [PH] (ref 5–8)
POTASSIUM SERPL-SCNC: 4.7 MMOL/L (ref 3.5–5.2)
PROT SERPL-MCNC: 6.6 G/DL (ref 6–8.5)
PROT UR STRIP-MCNC: NEGATIVE MG/DL
RBC # UR STRIP: NEGATIVE /UL
SODIUM SERPL-SCNC: 140 MMOL/L (ref 134–144)
SP GR UR: 1.01 (ref 1–1.03)
UROBILINOGEN UR QL: NORMAL

## 2021-01-01 PROCEDURE — 99214 OFFICE O/P EST MOD 30 MIN: CPT | Performed by: FAMILY MEDICINE

## 2021-01-01 PROCEDURE — 96372 THER/PROPH/DIAG INJ SC/IM: CPT | Performed by: FAMILY MEDICINE

## 2021-01-01 PROCEDURE — 93296 REM INTERROG EVL PM/IDS: CPT | Performed by: INTERNAL MEDICINE

## 2021-01-01 PROCEDURE — 93294 REM INTERROG EVL PM/LDLS PM: CPT | Performed by: INTERNAL MEDICINE

## 2021-01-01 PROCEDURE — 70486 CT MAXILLOFACIAL W/O DYE: CPT

## 2021-01-01 PROCEDURE — 81003 URINALYSIS AUTO W/O SCOPE: CPT | Performed by: FAMILY MEDICINE

## 2021-01-01 PROCEDURE — 99214 OFFICE O/P EST MOD 30 MIN: CPT | Performed by: INTERNAL MEDICINE

## 2021-01-01 PROCEDURE — 25010000002 IOPAMIDOL 61 % SOLUTION: Performed by: THORACIC SURGERY (CARDIOTHORACIC VASCULAR SURGERY)

## 2021-01-01 PROCEDURE — 99204 OFFICE O/P NEW MOD 45 MIN: CPT | Performed by: THORACIC SURGERY (CARDIOTHORACIC VASCULAR SURGERY)

## 2021-01-01 PROCEDURE — 93281 PM DEVICE PROGR EVAL MULTI: CPT | Performed by: PHYSICIAN ASSISTANT

## 2021-01-01 PROCEDURE — 74174 CTA ABD&PLVS W/CONTRAST: CPT

## 2021-01-01 PROCEDURE — 99214 OFFICE O/P EST MOD 30 MIN: CPT | Performed by: PHYSICIAN ASSISTANT

## 2021-01-01 PROCEDURE — 94375 RESPIRATORY FLOW VOLUME LOOP: CPT | Performed by: INTERNAL MEDICINE

## 2021-01-01 PROCEDURE — 99204 OFFICE O/P NEW MOD 45 MIN: CPT | Performed by: OTOLARYNGOLOGY

## 2021-01-01 PROCEDURE — 31575 DIAGNOSTIC LARYNGOSCOPY: CPT | Performed by: OTOLARYNGOLOGY

## 2021-01-01 RX ORDER — APIXABAN 5 MG/1
TABLET, FILM COATED ORAL
Qty: 60 TABLET | Refills: 5 | Status: SHIPPED | OUTPATIENT
Start: 2021-01-01 | End: 2021-01-01 | Stop reason: SDUPTHER

## 2021-01-01 RX ORDER — DOXYCYCLINE HYCLATE 100 MG/1
100 CAPSULE ORAL 2 TIMES DAILY
Qty: 20 CAPSULE | Refills: 0 | Status: SHIPPED | OUTPATIENT
Start: 2021-01-01 | End: 2021-01-01

## 2021-01-01 RX ORDER — MIRTAZAPINE 7.5 MG/1
7.5 TABLET, FILM COATED ORAL NIGHTLY
Qty: 30 TABLET | Refills: 2 | Status: SHIPPED | OUTPATIENT
Start: 2021-01-01 | End: 2021-01-01 | Stop reason: SDUPTHER

## 2021-01-01 RX ORDER — PANTOPRAZOLE SODIUM 40 MG/1
40 TABLET, DELAYED RELEASE ORAL DAILY
Qty: 90 TABLET | Refills: 1 | Status: SHIPPED | OUTPATIENT
Start: 2021-01-01 | End: 2021-01-01

## 2021-01-01 RX ORDER — OMEPRAZOLE 20 MG/1
20 CAPSULE, DELAYED RELEASE ORAL DAILY
Qty: 90 CAPSULE | Refills: 1 | Status: SHIPPED | OUTPATIENT
Start: 2021-01-01

## 2021-01-01 RX ORDER — FLUTICASONE PROPIONATE 50 MCG
2 SPRAY, SUSPENSION (ML) NASAL DAILY
Qty: 48 G | Refills: 3 | Status: SHIPPED | OUTPATIENT
Start: 2021-01-01

## 2021-01-01 RX ORDER — ALBUTEROL SULFATE 90 UG/1
2 AEROSOL, METERED RESPIRATORY (INHALATION) EVERY 4 HOURS PRN
Qty: 18 G | Refills: 5 | Status: SHIPPED | OUTPATIENT
Start: 2021-01-01

## 2021-01-01 RX ORDER — OMEPRAZOLE 40 MG/1
40 CAPSULE, DELAYED RELEASE ORAL DAILY
COMMUNITY
End: 2021-01-01 | Stop reason: SDUPTHER

## 2021-01-01 RX ORDER — DOXYCYCLINE 100 MG/1
100 CAPSULE ORAL EVERY 12 HOURS SCHEDULED
Qty: 20 CAPSULE | Refills: 0 | Status: SHIPPED | OUTPATIENT
Start: 2021-01-01 | End: 2021-01-01

## 2021-01-01 RX ORDER — DOXYCYCLINE 50 MG/1
50 CAPSULE ORAL 2 TIMES DAILY
Qty: 60 CAPSULE | Refills: 0 | Status: SHIPPED | OUTPATIENT
Start: 2021-01-01 | End: 2021-01-01

## 2021-01-01 RX ORDER — LEVOFLOXACIN 500 MG/1
500 TABLET, FILM COATED ORAL DAILY
Qty: 5 TABLET | Refills: 0 | Status: SHIPPED | OUTPATIENT
Start: 2021-01-01 | End: 2021-01-01

## 2021-01-01 RX ORDER — BUDESONIDE 0.5 MG/2ML
0.5 INHALANT ORAL
Qty: 30 EACH | Refills: 3 | Status: SHIPPED | OUTPATIENT
Start: 2021-01-01 | End: 2021-01-01

## 2021-01-01 RX ORDER — OMEPRAZOLE 20 MG/1
20 CAPSULE, DELAYED RELEASE ORAL DAILY
Qty: 90 CAPSULE | Refills: 1 | Status: SHIPPED | OUTPATIENT
Start: 2021-01-01 | End: 2021-01-01 | Stop reason: SDUPTHER

## 2021-01-01 RX ORDER — DEXAMETHASONE SODIUM PHOSPHATE 10 MG/ML
5 INJECTION INTRAMUSCULAR; INTRAVENOUS ONCE
Status: COMPLETED | OUTPATIENT
Start: 2021-01-01 | End: 2021-01-01

## 2021-01-01 RX ORDER — LISINOPRIL 2.5 MG/1
2.5 TABLET ORAL DAILY
Qty: 90 TABLET | Refills: 3 | Status: SHIPPED | OUTPATIENT
Start: 2021-01-01 | End: 2021-01-01 | Stop reason: SINTOL

## 2021-01-01 RX ORDER — METHYLPREDNISOLONE ACETATE 80 MG/ML
80 INJECTION, SUSPENSION INTRA-ARTICULAR; INTRALESIONAL; INTRAMUSCULAR; SOFT TISSUE ONCE
Status: COMPLETED | OUTPATIENT
Start: 2021-01-01 | End: 2021-01-01

## 2021-01-01 RX ORDER — AMLODIPINE BESYLATE 5 MG/1
5 TABLET ORAL DAILY
Qty: 30 TABLET | Refills: 11 | Status: SHIPPED | OUTPATIENT
Start: 2021-01-01 | End: 2021-01-01 | Stop reason: ALTCHOICE

## 2021-01-01 RX ORDER — ATORVASTATIN CALCIUM 10 MG/1
10 TABLET, FILM COATED ORAL NIGHTLY
Qty: 90 TABLET | Refills: 2 | Status: SHIPPED | OUTPATIENT
Start: 2021-01-01

## 2021-01-01 RX ORDER — MIRTAZAPINE 15 MG/1
15 TABLET, FILM COATED ORAL NIGHTLY
Qty: 90 TABLET | Refills: 1 | Status: SHIPPED | OUTPATIENT
Start: 2021-01-01

## 2021-01-01 RX ORDER — LOSARTAN POTASSIUM 25 MG/1
25 TABLET ORAL DAILY
Qty: 90 TABLET | Refills: 3 | Status: SHIPPED | OUTPATIENT
Start: 2021-01-01 | End: 2021-01-01 | Stop reason: SINTOL

## 2021-01-01 RX ORDER — ATORVASTATIN CALCIUM 20 MG/1
20 TABLET, FILM COATED ORAL NIGHTLY
Qty: 30 TABLET | Refills: 5 | Status: SHIPPED | OUTPATIENT
Start: 2021-01-01 | End: 2021-01-01 | Stop reason: SDUPTHER

## 2021-01-01 RX ADMIN — DEXAMETHASONE SODIUM PHOSPHATE 5 MG: 10 INJECTION INTRAMUSCULAR; INTRAVENOUS at 15:05

## 2021-01-01 RX ADMIN — IOPAMIDOL 100 ML: 612 INJECTION, SOLUTION INTRAVENOUS at 18:10

## 2021-01-01 RX ADMIN — METHYLPREDNISOLONE ACETATE 80 MG: 80 INJECTION, SUSPENSION INTRA-ARTICULAR; INTRALESIONAL; INTRAMUSCULAR; SOFT TISSUE at 16:43

## 2021-01-07 NOTE — TELEPHONE ENCOUNTER
Caller: Baldomero Crisostomo - WILL MAKE HIS APPOINTMENTS    Relationship: Emergency Contact    Best call back number: [phone]     Medication needed:   Requested Prescriptions     Pending Prescriptions Disp Refills   • albuterol sulfate  (90 Base) MCG/ACT inhaler       Sig: Inhale 2 puffs Every 4 (Four) Hours As Needed for Wheezing.   • ipratropium (ATROVENT) 0.02 % nebulizer solution 300 mL 5     Sig: Take 2.5 mL by nebulization 4 (Four) Times a Day.       When do you need the refill by: WITHIN ONE WEEK    What details did the patient provide when requesting the medication: PATIENT HAS ONE WEEK LEFT    Does the patient have less than a 3 day supply:  [] Yes  [x] No    What is the patient's preferred pharmacy: [facility] - [address] [phone] [place] [phone] [facility]       WILL DR. BARNETT PRESCRIBE HIM AZYTHROMYCIN FOR HIS COPD.  BALDOMERO HAD READ AN ARTICLE IN THE [employer] ON IT.  SHE CAN BE REACHED [phone]

## 2021-01-11 NOTE — TELEPHONE ENCOUNTER
PATIENTS DAUGHTERCHANNING CALLED TO CHECK STATUS OF ENT REFERRAL FOR PATIENT.     PATIENTS DAUGHTER REQUESTING [place] [facility].      PATIENTS DAUGHTERCHANNING STATED THE hydrOXYzine pamoate (VISTARIL) 25 MG capsule, DID NOT CLEAR HIM UP, THROAT STILL CRACKLING, AND FEELS SWOLLEN.    PATIENTS DAUGHTER REQUESTING IF PATIENT CAN TAKE azithromycin.    PATIENTS DAUGHTER REQUESTING NEW PRESCRIPTION ORDER FOR omeprazole (priLOSEC) 20MG.        PATIENTS DAUGHTER STATED PATIENT HAS BEEN BUYING OVER THE COUNTER FOR AWHILE AND PATIENT STATED TO DAUGHTER THAT THE 20MG HAS BEEN WORKING FOR HIM.      PATIENT NOW ON [facility].    PLEASE ADVISE: [phone]      PHARMACY:    [facility] - [address] - [phone]  - [phone]   [phone]

## 2021-02-08 PROBLEM — R07.9 CHEST PAIN: Status: ACTIVE | Noted: 2021-01-01

## 2021-02-08 NOTE — PROGRESS NOTES
OFFICE VISIT  NOTE  [facility] CARDIOLOGY      Name: Stef Jones    Date: 2021  MRN:  [id number]  :  1945      REFERRING/PRIMARY PROVIDER:  Néstor Ford DO    Chief Complaint   Patient presents with   • Chronic systolic congestive heart failure   • Paroxysmal atrial fibrillation       HPI: Stef Jones is a 75 y.o. male who presents today for follow-up for chronic systolic heart failure, persistent atrial fibrillation, status post AV node ablation and biventricular permanent pacemaker by Dr. Chapman 2019.  Associate history of severe COPD, hypertension, hyperlipidemia.  Limited echo 2019 shows dramatic improvement in ejection fraction with sinus rhythm, EF 54%.  No further chest pain patient did not schedule his stress test as ordered.  Main complaint is cough shortness of breath related to COPD exacerbation, being treated by Dr. Ford.  He has follow-up with Dr. Newton castro next week.  Denies chest pain, or palpitations.    Past Medical History:   Diagnosis Date   • Arthritis    • Aspiration of food    • Atrial fibrillation (CMS/HCC)    • Bone disorder    • Chronic sinusitis    • COPD (chronic obstructive pulmonary disease) (CMS/HCC)    • Hearing loss    • Hiatal hernia    • Hoarse voice quality    • Hyperlipidemia    • Hypertension    • Pacemaker        Past Surgical History:   Procedure Laterality Date   • CARDIAC ELECTROPHYSIOLOGY PROCEDURE Left 2019    Procedure: AV node ablation + Bi-V PPM implant;  Surgeon: Lucio Chapman MD;  Location: [facility] INVASIVE LOCATION;  Service: Cardiovascular   • CARDIAC ELECTROPHYSIOLOGY PROCEDURE N/A 2019    Procedure: AV node ablation;  Surgeon: Lucio Chapman MD;  Location: [facility] INVASIVE LOCATION;  Service: Cardiovascular   • HEMORRHOIDECTOMY     • INSERT / REPLACE / REMOVE PACEMAKER     • LARYNGOSCOPY N/A 2019    Procedure: MICRO DIRECT LARYNGOSCOPY WITH BIOPSY;  Surgeon:  Glen Gutiérrez MD;  Location: [facility];  Service: ENT       Social History     Socioeconomic History   • Marital status:      Spouse name: Not on file   • Number of children: Not on file   • Years of education: Not on file   • Highest education level: Not on file   Tobacco Use   • Smoking status: Former Smoker     Packs/day: 1.50     Years: 60.00     Pack years: 90.00     Types: Cigarettes     Start date:      Quit date: 2019     Years since quittin.6   • Smokeless tobacco: Never Used   Substance and Sexual Activity   • Alcohol use: No     Frequency: Never   • Drug use: No   • Sexual activity: Defer   Social History Narrative    Caffeine: De-caf       Family History   Problem Relation Age of Onset   • Heart disease Mother    • Other Mother         staph infection   • Heart attack Mother    • Heart attack Sister    • Heart disease Maternal Grandmother    • Diabetes Maternal Grandmother    • Heart attack Maternal Grandfather    • No Known Problems Paternal Grandmother    • No Known Problems Paternal Grandfather    • Heart failure Maternal Uncle    • Heart failure Maternal Aunt    • No Known Problems Sister    • No Known Problems Sister         ROS:   Constitutional no fever,  no weight loss   Skin no rash, no subcutaneous nodules   Otolaryngeal no difficulty swallowing   Cardiovascular See HPI   Pulmonary no cough, no sputum production   Gastrointestinal no constipation, no diarrhea   Genitourinary no dysuria, no hematuria   Hematologic no easy bruisability, no abnormal bleeding   Musculoskeletal no muscle pain   Neurologic no dizziness, no falls         Allergies   Allergen Reactions   • Amoxicillin Rash   • Penicillins Rash         Current Outpatient Medications:   •  acetaminophen (TYLENOL) 500 MG tablet, Take 500 mg by mouth Every 6 (Six) Hours As Needed for Mild Pain ., Disp: , Rfl:   •  albuterol sulfate  (90 Base) MCG/ACT inhaler, Inhale 2 puffs Every 4 (Four) Hours As Needed  "for Wheezing., Disp: 18 g, Rfl: 5  •  apixaban (ELIQUIS) 5 MG tablet tablet, Take 1 tablet by mouth Every 12 (Twelve) Hours. Indications: Atrial Fibrillation, Disp: 60 tablet, Rfl: 5  •  atorvastatin (LIPITOR) 10 MG tablet, Take 1 tablet by mouth Every Night., Disp: 90 tablet, Rfl: 2  •  ipratropium (ATROVENT) 0.02 % nebulizer solution, Take 2.5 mL by nebulization 4 (Four) Times a Day., Disp: 300 mL, Rfl: 5  •  O2 (OXYGEN), Inhale 2 L/min Daily., Disp: , Rfl:   •  omeprazole (priLOSEC) 40 MG capsule, Take 40 mg by mouth Daily., Disp: , Rfl:     Vitals:    02/08/21 1430   BP: 134/74   BP Location: Left arm   Patient Position: Sitting   Pulse: 74   SpO2: 95%   Weight: 78.8 kg (173 lb 12.8 oz)   Height: 167.6 cm (66\")     Body mass index is 28.05 kg/m².    PHYSICAL EXAM:    General Appearance:   · well developed  · well nourished  Neck:  · thyroid not enlarged  · supple  Respiratory:  · no respiratory distress  · normal breath sounds  · no rales  Cardiovascular:  · no jugular venous distention  · regular rhythm  · apical impulse normal  · S1 normal, S2 normal  · no S3, no S4   · no murmur  · no rub, no thrill  · carotid pulses normal; no bruit  · pedal pulses normal  · lower extremity edema: none pacemaker site examined,  · No abnormality.  Skin:   warm, dry    RESULTS:   Procedures    Results for orders placed during the hospital encounter of 09/27/19   Adult Transthoracic Echo Limited W/ Cont if Necessary Per Protocol    Narrative · Calculated EF = 54.0%.  · Left ventricular wall thickness is consistent with mild-to-moderate   concentric hypertrophy.  · Left ventricular systolic function is normal.  · Right ventricular cavity is moderately dilated.            Labs:  Lab Results   Component Value Date    CHOL 108 06/08/2019    TRIG 79 05/27/2020    HDL 44 05/27/2020    LDL 47 05/27/2020    AST 25 10/19/2020    ALT 30 10/19/2020     Lab Results   Component Value Date    HGBA1C 6.30 (H) 08/07/2019     No components " found for: CREATINININE  eGFR Non  Am   Date Value Ref Range Status   10/19/2020 64 >59 mL/min/1.73 Final   05/27/2020 67 >60 mL/min/1.73 Final     Comment:     The MDRD GFR formula is only valid for adults with stable  renal function between ages 18 and 70.       eGFR Non  Amer   Date Value Ref Range Status   02/07/2020 49 (L) >60 mL/min/1.73 Final   08/07/2019 62 >60 mL/min/1.73 Final   06/19/2019 74 >60 mL/min/1.73 Final         ASSESSMENT:  Problem List Items Addressed This Visit        Other    Other persistent atrial fibrillation (CMS/HCC)    Overview     1. Radiofrequency ablation of the AV node.6-17-19. With BS BiV PPM         Chronic systolic congestive heart failure (CMS/HCC) - Primary    Overview     - TTE shows ejection fraction of mid 30s, moderate RV enlargement and RV dysfunction.  - could be tachycardia induced. Will need repeat echo once maintaining NSR, may also need ischemic evaluation.  - DENA: EF 30-35%, Moderate to severe MR, moderate TR, RVSP 35-45mmHg.  - TTE 9/27/19, improved ef to 54%, mod LVH, mod RVE. In NSR           Essential hypertension    Chest pain          PLAN:    1.  Chronic systolic heart failure:  EF has improved from 30 % to 54% 9/2019 in normal sinus rhythm  Carvedilol stopped due to severe COPD  Continue Lasix at current dose    2.  Permanent atrial fibrillation:  100% atrial fibrillation on pacemaker interrogation  Sees Dr. Chapman routinely  Continue Eliquis for anticoagulation    3.  Moderate LVH,  Continue close blood pressure monitoring  Long-term goal blood pressure is less than 130/80    4.  Moderate RV enlargement with mildly elevated pulmonary pressures, likely secondary to severe COPD  Referred to pulmonary for COPD management    5.  Chest pain:  Symptoms resolved.  Dobutamine nuclear stress ordered but patient did not complete it.  The patient was advised to call [phone] if chest pain worsens or persist despite nitroglycerin or rest.    6. Severe  COPD:  Has follow-up with pulmonary    Return clinic in 6 months.    Thank you for the opportunity to share in the care of your patient; please do not hesitate to call me with any questions.     Yovanny Solares MD, [facility]  Office: [phone] [address] [place] [address]

## 2021-02-09 PROBLEM — K21.9 GERD WITHOUT ESOPHAGITIS: Status: ACTIVE | Noted: 2020-01-03

## 2021-02-10 NOTE — TELEPHONE ENCOUNTER
Caller: Ene     Relationship: daughter     Best call back number: [phone]    What medications are you currently taking:   Current Outpatient Medications on File Prior to Visit   Medication Sig Dispense Refill   • acetaminophen (TYLENOL) 500 MG tablet Take 500 mg by mouth Every 6 (Six) Hours As Needed for Mild Pain .     • albuterol sulfate  (90 Base) MCG/ACT inhaler Inhale 2 puffs Every 4 (Four) Hours As Needed for Wheezing. 18 g 5   • apixaban (ELIQUIS) 5 MG tablet tablet Take 1 tablet by mouth Every 12 (Twelve) Hours. Indications: Atrial Fibrillation 60 tablet 5   • atorvastatin (LIPITOR) 10 MG tablet Take 1 tablet by mouth Every Night. 90 tablet 2   • budesonide (Pulmicort) 0.5 MG/2ML nebulizer solution Take 2 mL by nebulization Daily. 30 each 3   • doxycycline (MONODOX) 50 MG capsule Take 1 capsule by mouth 2 (Two) Times a Day. 60 capsule 0   • ipratropium (ATROVENT) 0.02 % nebulizer solution Take 2.5 mL by nebulization 4 (Four) Times a Day. 300 mL 5   • O2 (OXYGEN) Inhale 2 L/min Daily.     • omeprazole (priLOSEC) 20 MG capsule Take 1 capsule by mouth Daily. 90 capsule 1     No current facility-administered medications on file prior to visit.         When did you start taking these medications:     Which medication are you concerned about: budesonide (Pulmicort) 0.5 MG/2ML nebulizer solution    Who prescribed you this medication: ERICKA    What are your concerns: Patient needs a pre-authorization    How long have you been taking these medications:     How long have you had these concerns: Medicare stated that she would send a fax for this     Ene would like a call back with directions on what happens after the pre-authorization is sent.  Patient asked for Renetta to call back.

## 2021-02-11 NOTE — PROGRESS NOTES
Established Patient        Chief Complaint:   Chief Complaint   Patient presents with   • Follow-up     GERD (patient unable to tolerate Protonix); c/o swollen throat in the AM; c/o headaches; c/o congestion; ENT referral        Stef MARICRUZ Jones is a 75 y.o. male    History of Present Illness:   Here for scheduled follow-up visit concerning his GERD and chronic obstructive bronchitis.  Patient was unable to tolerate Protonix, currently taking omeprazole 40 mg daily.  He denies any focal aspiration.  Would like to consider reduction in dosing of omeprazole to 20 mg daily.  He has been more compliant with its use, as well as with the dietary/lifestyle modifications discussed at previous visits.    Patient reports significant improvement to his upper airway congestion when on antibiotics, as well as immediately after Depo-Medrol injection in the office.  He states his symptoms worsened approximately 1 week after discontinuation of antibiotics.    Subjective     The following portions of the patient's history were reviewed and updated as appropriate: allergies, current medications, past family history, past medical history, past social history, past surgical history and problem list.    Allergies   Allergen Reactions   • Amoxicillin Rash   • Penicillins Rash       Review of Systems  Constitutional: Negative for fever. Negative for chills, diaphoresis, fatigue and unexpected weight change.   HENT: No dysphagia; no changes to vision/hearing/smell/taste; occasional epistaxis, not persistent or severe.  Eyes: Negative for redness and visual disturbance.   Respiratory: As per above.  Cardiovascular: Negative for chest pain and palpitations.   Gastrointestinal: Negative for abdominal distention, abdominal pain and blood in stool.   Endocrine: Negative for cold intolerance and heat intolerance.   Genitourinary: Negative for difficulty urinating, dysuria and frequency.   Musculoskeletal: Negative for arthralgias, back pain  "and myalgias.   Skin: Negative for color change, rash and wound.   Neurological: Negative for syncope, weakness and headaches.   Hematological: Negative for adenopathy. Does not bruise/bleed easily.   Psychiatric/Behavioral: Negative for confusion. The patient is not nervous/anxious.    Objective     Physical Exam   Vital Signs: /80   Pulse 81   Temp 97.2 °F (36.2 °C)   Ht 167.6 cm (66\")   Wt 78.5 kg (173 lb)   SpO2 98%   BMI 27.92 kg/m²     General Appearance: alert, oriented x 3, no acute distress.  Skin: warm and dry.   HEENT: Atraumatic.  pupils round and reactive to light and accommodation, oral mucosa pink and moist.  Nares patent without epistaxis.  External auditory canals are patent tympanic membranes intact.  Slightly injected conjunctival bilaterally.  Boggy/inflamed nasal turbinates bilaterally, mild tenderness on palpation of maxillary sinuses.  Neck: supple, no JVD, trachea midline.  No thyromegaly  Lungs: Rhonchus referred upper airway congestion bilaterally that is partially cleared with cough, unlabored breathing effort.  Prolonged expiratory phase bilaterally.  Heart: RR, normal S1 and S2, no S3, no rub.  Subclavian pacemaker noted.  Abdomen: soft, non-tender, no palpable bladder, present bowel sounds to auscultation ×4.  No guarding or rigidity.  Extremities: no clubbing, cyanosis or edema.  Good range of motion actively and passively.  Symmetric muscle strength and development  Neuro: normal speech and mental status.  Cranial nerves II through XII intact.  No anosmia. DTR 2+; proprioception intact.  No focal motor/sensory deficits.    Assessment and Plan      Assessment:   Diagnoses and all orders for this visit:    1. Chronic obstructive bronchitis (CMS/HCC) (Primary)  -     doxycycline (MONODOX) 50 MG capsule; Take 1 capsule by mouth 2 (Two) Times a Day.  Dispense: 60 capsule; Refill: 0  -     budesonide (Pulmicort) 0.5 MG/2ML nebulizer solution; Take 2 mL by nebulization Daily.  " Dispense: 30 each; Refill: 3    2. GERD without esophagitis  -     omeprazole (priLOSEC) 20 MG capsule; Take 1 capsule by mouth Daily.  Dispense: 90 capsule; Refill: 1        Plan:  Patient already has a nebulizer at home.  I recommended once daily Pulmicort treatments.  I have also recommended a prolonged course of low-dose doxycycline.  Hopeful that this regimen will provide some meaningful and sustained improvement of symptoms.    Decreased dose of omeprazole to 20 mg daily.  I have stressed the importance of compliance with medication, as well as with dietary/lifestyle modifications.  Discussion Summary:    Discussed plan of care in detail with pt today; pt verb understanding and agrees.    I spent 30 minutes caring for Stef on this date of service. This time includes time spent by me in the following activities:preparing for the visit, performing a medically appropriate examination and/or evaluation , counseling and educating the patient/family/caregiver, ordering medications, tests, or procedures, documenting information in the medical record and care coordination    I have reviewed and updated all copied forward information, as appropriate.  I attest to the accuracy and relevance of any unchanged information.    Follow up:  No follow-ups on file.     There are no Patient Instructions on file for this visit.    Néstor Ford DO  02/11/21  13:39 EST          Please note that portions of this note may have been completed with a voice recognition program. Efforts were made to edit the dictations, but occasionally words are mistranscribed.

## 2021-02-22 NOTE — TELEPHONE ENCOUNTER
[facility] CALLING ABOUT budesonide (Pulmicort) 0.5 MG/2ML nebulizer solution.  STILL NOT AT [facility].      PLEASE CALL [phone]

## 2021-02-22 NOTE — TELEPHONE ENCOUNTER
PA has been submitted; Pharmacy had received medication however it was not covered.  Awaiting response from insurance company

## 2021-02-23 NOTE — TELEPHONE ENCOUNTER
I have attempted twice to have Pulmicort neb solution PA'd for patient.  It has been denied.  Would you like to change this medication.

## 2021-02-23 NOTE — TELEPHONE ENCOUNTER
Spoke with patients daughter.  She will call back with Medicare Part D card information.  Please transfer to office.

## 2021-02-23 NOTE — TELEPHONE ENCOUNTER
PA request was sent from pharmacy under [employer] (RX coverage card) after multiple failed attempts over cover my meds and speaking with the insurance company, I contacted the pharmacy (that originally gave me the insurance information) they had ran the medication through the wrong insurance.  Medication was approved under Medicare A & B

## 2021-02-23 NOTE — TELEPHONE ENCOUNTER
Caller: Ene Crisostomo - WILL MAKE HIS APPOINTMENTS    Relationship to patient: Emergency Contact    Best call back number: [phone]    Patient is needing: PATIENTS DAUGHTER STATES THAT SHE'S READY FOR SEAN ABDUL TO CALL HER.

## 2021-03-18 NOTE — PROGRESS NOTES
And headaches.  He states that Pulmonary Medicine Follow-up    Chief Complaint     Follow-up of chronic obstructive pulmonary disease, oxygen dependence, and allergies.    History of Present Illness/History Review     Mr. Jones returns the clinic today in the company of his family.  He was last seen by my nurse practitioner in January 2020.  He states that since that time his breathing has actually been a little bit worse.  He complains of problems with sinus congestion and postnasal drip.  He has previously been on Breo but has now been taken off of this.  He also was tried on Pulmicort and felt that this did not help him much.  He has been using Atrovent 2-3 times per day and feels that this is helpful.  He also continues to use albuterol. .  He also remains on oxygen at 2 L nasal cannula as needed with activity and with sleep.  He is complaining of sinus pressure as well as a lot of headaches for last 6 to 8 months.  He has been placed on rotating antibiotics intermittently for  sinus congestion. He did have a recent steroid shot which I believe was Depo-Medrol.  He has denied any visual changes.  He denies any focal weakness.  He has not had any recent fevers or chills.  He feels that his gastroesophageal reflux disease is much improved.    Pulmonary function test was completed today.  The patient did have quite a bit of difficulty forming a good seal and was not able to perform the lung volume and diffusing capacity section of the test.  He also had a hard time understanding the instructions and following the instructions.  Spirometry shows moderate obstruction with an FEV1 of 1.52 L which is 64% of predicted.  Compared to the prior examination of 10/10/2019, there has been no significant decrease in the flows.  This does represent moderate obstruction.      Review of Systems  A full review of systems has been completed with the patient and it is negative except as mentioned expressly in HPI and the scanned  review sheet.    Allergies   Allergen Reactions   • Amoxicillin Rash   • Penicillins Rash       Current Outpatient Medications:   •  acetaminophen (TYLENOL) 500 MG tablet, Take 500 mg by mouth Every 6 (Six) Hours As Needed for Mild Pain ., Disp: , Rfl:   •  albuterol sulfate  (90 Base) MCG/ACT inhaler, Inhale 2 puffs Every 4 (Four) Hours As Needed for Wheezing., Disp: 18 g, Rfl: 5  •  apixaban (ELIQUIS) 5 MG tablet tablet, Take 1 tablet by mouth Every 12 (Twelve) Hours. Indications: Atrial Fibrillation, Disp: 60 tablet, Rfl: 5  •  atorvastatin (LIPITOR) 10 MG tablet, Take 1 tablet by mouth Every Night., Disp: 90 tablet, Rfl: 2  •  budesonide (Pulmicort) 0.5 MG/2ML nebulizer solution, Take 2 mL by nebulization Daily., Disp: 30 each, Rfl: 3  •  doxycycline (MONODOX) 50 MG capsule, Take 1 capsule by mouth 2 (Two) Times a Day., Disp: 60 capsule, Rfl: 0  •  ipratropium (ATROVENT) 0.02 % nebulizer solution, Take 2.5 mL by nebulization 4 (Four) Times a Day., Disp: 300 mL, Rfl: 5  •  O2 (OXYGEN), Inhale 2 L/min Daily., Disp: , Rfl:   •  omeprazole (priLOSEC) 20 MG capsule, Take 1 capsule by mouth Daily., Disp: 90 capsule, Rfl: 1    Past Medical History:   Diagnosis Date   • Arthritis    • Aspiration of food    • Atrial fibrillation (CMS/HCC)    • Bone disorder    • Chronic sinusitis    • COPD (chronic obstructive pulmonary disease) (CMS/HCC)    • Hearing loss    • Hiatal hernia    • Hoarse voice quality    • Hyperlipidemia    • Hypertension    • Pacemaker      Family History   Problem Relation Age of Onset   • Heart disease Mother    • Other Mother         staph infection   • Heart attack Mother    • Heart attack Sister    • Heart disease Maternal Grandmother    • Diabetes Maternal Grandmother    • Heart attack Maternal Grandfather    • No Known Problems Paternal Grandmother    • No Known Problems Paternal Grandfather    • Heart failure Maternal Uncle    • Heart failure Maternal Aunt    • No Known Problems Sister   "  • No Known Problems Sister      Social History     Tobacco Use   • Smoking status: Former Smoker     Packs/day: 1.50     Years: 60.00     Pack years: 90.00     Types: Cigarettes     Start date:      Quit date: 2019     Years since quittin.7   • Smokeless tobacco: Never Used   Vaping Use   • Vaping Use: Never used   Substance Use Topics   • Alcohol use: No   • Drug use: No       /90   Pulse (!) 48   Temp 97.8 °F (36.6 °C)   Ht 167.6 cm (66\")   Wt 79.6 kg (175 lb 6.4 oz)   SpO2 93% Comment: resting at room air  BMI 28.31 kg/m²   Physical Exam  Vitals reviewed.   Constitutional:       General: He is not in acute distress.     Appearance: He is ill-appearing. He is not toxic-appearing or diaphoretic.   HENT:      Head:      Comments: Voice continues to be hoarse     Nose: Congestion present.      Comments: No tenderness to palpation or percussion over the frontal or maxillary sinuses.  Eyes:      General: No scleral icterus.     Extraocular Movements: Extraocular movements intact.      Pupils: Pupils are equal, round, and reactive to light.   Neck:      Vascular: No carotid bruit.   Cardiovascular:      Rate and Rhythm: Regular rhythm. Bradycardia present.      Pulses: Normal pulses.      Heart sounds: No murmur heard.   No gallop.    Pulmonary:      Effort: Pulmonary effort is normal. No respiratory distress.      Comments: Poor air movement bilaterally.  No wheezes are heard.  No rhonchi.  No rales.  Abdominal:      General: Abdomen is flat. Bowel sounds are normal. There is no distension.   Musculoskeletal:         General: Normal range of motion.      Cervical back: Normal range of motion and neck supple. No rigidity or tenderness.   Lymphadenopathy:      Cervical: No cervical adenopathy.   Skin:     General: Skin is warm.      Capillary Refill: Capillary refill takes less than 2 seconds.   Neurological:      General: No focal deficit present.      Mental Status: He is alert and oriented to " person, place, and time.   Psychiatric:         Mood and Affect: Mood normal.         Behavior: Behavior normal.           Problem List       ICD-10-CM ICD-9-CM   1. Chronic obstructive bronchitis (CMS/[facility])  J44.9 491.20   2. Sinus congestion  R09.81 478.19   3. Chronic systolic congestive heart failure (CMS/[facility])  I50.22 428.22     428.0       Impression and Plan     Mr. Jones continues to have some problems.  A lot of this seems to be associated with sinus congestion that I believe is contributing to his headaches.  He is currently on summer can take antibiotics for sinuses and sinus congestion.  He also complains of a lot of watery eyes in the morning.  He did state that the most recent steroid shot helped him.    Overall, he feels that his breathing is a little bit worse.  He has tried Pulmicort and this did not help much and he is no longer on Breo.    At this time, I would like him to try Stiolto.  I have given him samples of this to be used 2 inhalations daily.  I did demonstrate the use.  He will keep his follow-up with ear nose and throat surgery.  I will also get him a flutter valve to help with mucus clearance.  He will continue on with as needed albuterol and Atrovent.  Continue with oxygen with activity.  I will plan to see him back in the next month or so.  I have asked him to give me a call if he has any questions or concerns in the meantime.          I have spent a total of 30 minutes with Mr. Jones and his family with greater than 70% that time spent in consultation regarding his diagnoses, review the data, and formulation of plan moving forward.                  Aroldo Glez MD, [facility]    CC: Néstor Ford DO

## 2021-04-20 NOTE — PROGRESS NOTES
Established Patient        Chief Complaint:   Chief Complaint   Patient presents with   • Back Pain     patient c/o back pain x 1 month; patient was given Stiloto inhaler sample at Dr Gray office.  He read that side effect could be back pain   • Bleeding/Bruising     patient c/o easily bruising        Stef Jones is a 76 y.o. male    History of Present Illness:   Here today for evaluation of bilateral low back pain, slightly worse to the right sacroiliac joint location.  He denies any single episode of trauma or injury.  Denies any saddle anesthesia.  No bowel/bladder incontinence.  Area of discomfort is symmetric to bilateral lower back, again as mentioned above slightly worse to the right side.  He denies any BRB/BTS.  Denies any epistaxis or hemoptysis.  Without focal aspiration since last visit.    He denies any edematous changes to his lower extremities, denies any orthopnea.  Without reports of syncope, vertigo or palpitations.    Patient also reports easy bruising, lengthy history of this however.    Subjective     The following portions of the patient's history were reviewed and updated as appropriate: allergies, current medications, past family history, past medical history, past social history, past surgical history and problem list.    Allergies   Allergen Reactions   • Amoxicillin Rash   • Penicillins Rash       Review of Systems  Constitutional: Negative for fever. Negative for chills, diaphoresis, fatigue and unexpected weight change.   HENT: No dysphagia; no changes to vision/hearing/smell/taste.  Eyes: Negative for redness and visual disturbance.   Respiratory: Chronic upper airway congestion, no active wheezing.  Cardiovascular: Negative for chest pain and palpitations.   Gastrointestinal: Negative for abdominal distention, abdominal pain and blood in stool.   Endocrine: Negative for cold intolerance and heat intolerance.   Genitourinary: Negative for difficulty urinating, dysuria and  "frequency.   Musculoskeletal: As per above, chronic arthralgias, back pain and myalgias.   Skin: Negative for color change, rash and wound.   Neurological: Negative for syncope, weakness and headaches.   Hematological: Negative for adenopathy. Does not bruise/bleed easily.   Psychiatric/Behavioral: Negative for confusion. The patient is not nervous/anxious.    Objective     Physical Exam   Vital Signs: /90   Pulse 89   Ht 167.6 cm (66\")   Wt 79.4 kg (175 lb)   SpO2 96%   BMI 28.25 kg/m²     General Appearance: alert, oriented x 3, no acute distress.  Skin: warm and dry.   HEENT: Atraumatic.  pupils round and reactive to light and accommodation, oral mucosa pink and moist.  Nares patent without epistaxis.  External auditory canals are patent tympanic membranes intact.  Non-injected conjunctival bilaterally.  Boggy/inflamed nasal turbinates bilaterally.  Moderate postnasal drainage without pustules or exudate.  Neck: supple, no JVD, trachea midline.  No thyromegaly  Lungs: Rhonchus referred upper airway congestion bilaterally that is partially cleared with cough, unlabored breathing effort.  Prolonged expiratory phase bilaterally.  Heart: RR, normal S1 and S2, no S3, no rub.  Subclavian pacemaker noted.  Abdomen: soft, non-tender, no palpable bladder, present bowel sounds to auscultation ×4.  No guarding or rigidity.  Extremities: no clubbing, cyanosis or edema.  Good range of motion actively and passively.  Symmetric muscle strength and development.  Patient ambulates independently.  No leg length discrepancies.  Focal tenderness over bilateral sacroiliac joints, slightly worse to the right.  There is associated paravertebral muscular spasticity of the right side when compared to the left.  This is more noticeable in the lumbosacral area, predominantly around the right sacroiliac joint.  Neuro: normal speech and mental status.  Cranial nerves II through XII intact.  No anosmia. DTR 2+; proprioception " intact.  No focal motor/sensory deficits.    Assessment and Plan      Assessment:   Diagnoses and all orders for this visit:    1. Low back pain without sciatica, unspecified back pain laterality, unspecified chronicity (Primary)  -     POCT urinalysis dipstick, automated  -     Ambulatory Referral to Pain Management  -     XR sacrum and coccyx; Future  -     XR Spine Lumbar Complete With Flex & Ext; Future    2. Chronic systolic congestive heart failure (CMS/HCC)  -     atorvastatin (LIPITOR) 10 MG tablet; Take 1 tablet by mouth Every Night.  Dispense: 90 tablet; Refill: 2    3. Bilateral sacroiliitis (CMS/HCC)  -     Ambulatory Referral to Pain Management  -     XR sacrum and coccyx; Future  -     XR Spine Lumbar Complete With Flex & Ext; Future    4. Other persistent atrial fibrillation (CMS/HCC)    5. 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 Chronic maxillary sinusitis    7. GERD without esophagitis        Plan:  Referral to pain management, Dr. Palmer, for consideration of x-ray guided sacroiliac joint injection.  I have discussed activity modifications as well as formal ice compress therapy to the affected area.  Patient and his daughter verbalized understanding.  X-rays ordered of the sacrum/coccyx and lumbar spine with stress views.    I have refilled patient's statin medication today.  Demonstrates no findings of acute exacerbation of underlying chronic systolic congestive heart failure.    Surveillance labs today.    Continue current anticoagulation.  Continue PPI therapy for GI prophylaxis additionally given his underlying GERD.  I have asked that they continue dietary/lifestyle modifications additionally.    Keep scheduled appointment with ENT.    Urinalysis was normal.  Discussion Summary:    Discussed plan of care in detail with pt, and his daughter, today; they verb understanding and agree.    I spent 30 minutes caring for Stef on this date of service. This time  includes time spent by me in the following activities:preparing for the visit, performing a medically appropriate examination and/or evaluation , counseling and educating the patient/family/caregiver, ordering medications, tests, or procedures, documenting information in the medical record and care coordination    I have reviewed and updated all copied forward information, as appropriate.  I attest to the accuracy and relevance of any unchanged information.    Follow up:  No follow-ups on file.     There are no Patient Instructions on file for this visit.    Néstor Ford DO  04/21/21  09:28 EDT          Please note that portions of this note may have been completed with a voice recognition program. Efforts were made to edit the dictations, but occasionally words are mistranscribed.

## 2021-04-23 NOTE — TELEPHONE ENCOUNTER
[facility] called to report a 6cm abdominal mid aorta aneurysm shown on L-Spine x-ray.  They will fax official report.

## 2021-04-26 NOTE — TELEPHONE ENCOUNTER
[facility] CALLED TO SAY THAT THE ULTRASOUND IS DONE AND TO CALL HER BACK -[phone].    BALDOMERO ALSO SAID PATIENT HAD AN X-RAY AND NOTHING SHOWED UP ON THERE AND THINKING PATIENT MAY NEED AN MRI.

## 2021-04-26 NOTE — TELEPHONE ENCOUNTER
I called [facility] and they are sending over a preliminary report now. She said that patient was supplied with a copy of the preliminary report and US on disc.

## 2021-04-27 NOTE — PROGRESS NOTES
[facility] at [facility]   OFFICE NOTE      Stef Jones  1945  PCP: Néstor Ford DO    SUBJECTIVE:   Stef Jones is a 76 y.o. male seen for a follow up visit regarding the following:     CC:Afib    HPI:   Pleasant 76 year old male presents for follow up with his daugheter regarding permenant Afib, Chronic SHf, HTN, and [facility] BIVPM with prior AVN RFA. He has chronic COPD followed by Dr. Mobley. He denies any chest pain suggesting anginas. He denies any Orthopnea, edema. He states his bp is usually controlled and he stopped coreg last year per Pulmonary concerns 7/13/2020 r secondary to COPD.  He states he has some lower back pain and had a Xray. This revealed an AAA that was now confirmed by U/S. He is scheulded to see CT surgery for consideration of intervention.     Cardiac PMH: (Old records have been reviewed and summarized below)  1. SHF  a. DENA 30%, Moderate to Severe MR, Moderate TR  b. [facility] BIVPM + AV RFA 6/17/19  c. Echocardiogram 9/27/19 EF 54%  2. Persistent Afib  a. AVN RFA 6/19  b. Amiodarone discontinued secondary to family request  c. Minimally symptomatic  d. Chadsvasc=4, Anticoagulation with Eliquis.   e. 100% atrial fibrillation by device check 10/2020  3. HTN  4. COPD-Advanced, bronchitis   5. AAA - 6cm by U/S Plan for CT sugery consult    Past Medical History, Past Surgical History, Family history, Social History, and Medications were all reviewed with the patient today and updated as necessary.       Current Outpatient Medications:   •  acetaminophen (TYLENOL) 500 MG tablet, Take 500 mg by mouth Every 6 (Six) Hours As Needed for Mild Pain ., Disp: , Rfl:   •  albuterol sulfate  (90 Base) MCG/ACT inhaler, Inhale 2 puffs Every 4 (Four) Hours As Needed for Wheezing., Disp: 18 g, Rfl: 5  •  atorvastatin (LIPITOR) 10 MG tablet, Take 1 tablet by mouth Every Night., Disp: 90 tablet, Rfl: 2  •  Eliquis 5 MG tablet tablet, TAKE ONE TABLET EVERY  12 HOURS, Disp: 60 tablet, Rfl: 5  •  ipratropium (ATROVENT) 0.02 % nebulizer solution, Take 2.5 mL by nebulization 4 (Four) Times a Day., Disp: 300 mL, Rfl: 5  •  O2 (OXYGEN), Inhale 2 L/min Daily., Disp: , Rfl:   •  omeprazole (priLOSEC) 20 MG capsule, Take 1 capsule by mouth Daily., Disp: 90 capsule, Rfl: 1      Allergies   Allergen Reactions   • Amoxicillin Rash   • Penicillins Rash     Patient Active Problem List   Diagnosis   • Other persistent atrial fibrillation (CMS/HCC)   • COPD (chronic obstructive pulmonary disease) (CMS/HCC)   • Pharyngoesophageal dysphagia   • Laryngeal mass   • Chronic systolic congestive heart failure (CMS/HCC)   • Essential hypertension   • Tobacco use disorder   • Hoarse voice quality   • Chronic maxillary sinusitis   • GERD without esophagitis   • Recurrent aspiration pneumonia (CMS/HCC)   • Chronic obstructive bronchitis (CMS/HCC)   • Dacryostenosis, acquired, bilateral   • Hypoxia   • Vitamin D deficiency   • Vitamin B12 deficiency   • 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 (CMS/HCC)   • Primary insomnia   • Chest pain     Past Medical History:   Diagnosis Date   • Aneurysm (CMS/HCC)     level 6   • Arthritis    • Aspiration of food    • Atrial fibrillation (CMS/HCC)    • Bone disorder    • Chronic sinusitis    • COPD (chronic obstructive pulmonary disease) (CMS/HCC)    • Hearing loss    • Hiatal hernia    • Hoarse voice quality    • Hyperlipidemia    • Hypertension    • Pacemaker      Past Surgical History:   Procedure Laterality Date   • CARDIAC ELECTROPHYSIOLOGY PROCEDURE Left 6/17/2019    Procedure: AV node ablation + Bi-V PPM implant;  Surgeon: Lucio Chapman MD;  Location: [facility] EP INVASIVE LOCATION;  Service: Cardiovascular   • CARDIAC ELECTROPHYSIOLOGY PROCEDURE N/A 6/17/2019    Procedure: AV node ablation;  Surgeon: Lucio Chapman MD;  Location:  [facility] INVASIVE LOCATION;  Service: Cardiovascular   • HEMORRHOIDECTOMY     • INSERT / REPLACE /  "REMOVE PACEMAKER     • LARYNGOSCOPY N/A 2019    Procedure: MICRO DIRECT LARYNGOSCOPY WITH BIOPSY;  Surgeon: Glen Gutiérrez MD;  Location: [facility];  Service: ENT     Family History   Problem Relation Age of Onset   • Heart disease Mother    • Other Mother         staph infection   • Heart attack Mother    • Heart attack Sister    • Heart disease Maternal Grandmother    • Diabetes Maternal Grandmother    • Heart attack Maternal Grandfather    • No Known Problems Paternal Grandmother    • No Known Problems Paternal Grandfather    • Heart failure Maternal Uncle    • Heart failure Maternal Aunt    • No Known Problems Sister    • No Known Problems Sister      Social History     Tobacco Use   • Smoking status: Former Smoker     Packs/day: 1.50     Years: 60.00     Pack years: 90.00     Types: Cigarettes     Start date:      Quit date: 2019     Years since quittin.8   • Smokeless tobacco: Never Used   Substance Use Topics   • Alcohol use: No       ROS:  Review of Symptoms:  General: no recent weight loss/gain, weakness or fatigue  Skin: no rashes, lumps, or other skin changes  HEENT: no dizziness, lightheadedness, or vision changes  Respiratory: no cough or hemoptysis  Cardiovascular: no palpitations, and tachycardia  Gastrointestinal: no black/tarry stools or diarrhea  Urinary: no change in frequency or urgency  Peripheral Vascular: no claudication or leg cramps  Musculoskeletal: no muscle or joint pain/stiffness  Psychiatric: no depression or excessive stress  Neurological: no sensory or motor loss, no syncope  Hematologic: no anemia, easy bruising or bleeding  Endocrine: no thyroid problems, nor heat or cold intolerance    PHYSICAL EXAM:    /80 (BP Location: Left arm, Patient Position: Sitting)   Pulse 80   Temp 96.9 °F (36.1 °C)   Ht 167.6 cm (66\")   Wt 78 kg (172 lb)   SpO2 95%   BMI 27.76 kg/m²        Wt Readings from Last 5 Encounters:   21 78 kg (172 lb)   21 79.4 kg " (175 lb)   03/11/21 79.6 kg (175 lb 6.4 oz)   02/09/21 78.5 kg (173 lb)   02/08/21 78.8 kg (173 lb 12.8 oz)       BP Readings from Last 5 Encounters:   04/27/21 160/80   04/20/21 140/90   03/11/21 148/90   02/09/21 138/80   02/08/21 134/74       General appearance - Alert, well appearing, and in no distress   Mental status - Affect appropriate to mood.  Eyes - Sclerae anicteric,  ENMT - Hearing grossly normal bilaterally, Dental hygiene good.  Neck - Carotids upstroke normal bilaterally, no bruits, no JVD.  Resp - Clear to auscultation, no wheezes, rales or rhonchi, symmetric air entry.  Heart - Normal rate, regular rhythm, normal S1, S2, no murmurs, rubs, clicks or gallops.  GI - Soft, nontender, nondistended, no masses or organomegaly.  Neurological - Grossly intact - normal speech, no focal findings  Musculoskeletal - No joint tenderness, deformity or swelling, no muscular tenderness noted.  Extremities - Peripheral pulses normal, no pedal edema, no clubbing or cyanosis.  Skin - Normal coloration and turgor.  Psych -  oriented to person, place, and time.    Medical problems and test results were reviewed with the patient today.     Recent Results (from the past 672 hour(s))   POCT urinalysis dipstick, automated    Collection Time: 04/20/21  1:41 PM    Specimen: Urine   Result Value Ref Range    Color Yellow Yellow, Straw, Dark Yellow, Lisha    Clarity, UA Clear Clear    Specific Gravity  1.015 1.005 - 1.030    pH, Urine 6.0 5.0 - 8.0    Leukocytes Negative Negative    Nitrite, UA Negative Negative    Protein, POC Negative Negative mg/dL    Glucose, UA Negative Negative, 1000 mg/dL (3+) mg/dL    Ketones, UA Negative Negative    Urobilinogen, UA Normal Normal    Bilirubin Negative Negative    Blood, UA Negative Negative   Comprehensive Metabolic Panel    Collection Time: 04/20/21  2:08 PM    Specimen: Blood   Result Value Ref Range    Glucose 90 65 - 99 mg/dL    BUN 13 8 - 23 mg/dL    Creatinine 1.11 0.76 - 1.27  mg/dL    eGFR Non African Am 64 >60 mL/min/1.73    eGFR African Am 78 >60 mL/min/1.73    BUN/Creatinine Ratio 11.7 7.0 - 25.0    Sodium 137 136 - 145 mmol/L    Potassium 4.8 3.5 - 5.2 mmol/L    Chloride 102 98 - 107 mmol/L    Total CO2 25.3 22.0 - 29.0 mmol/L    Calcium 9.4 8.6 - 10.5 mg/dL    Total Protein 6.6 6.0 - 8.5 g/dL    Albumin 4.30 3.50 - 5.20 g/dL    Globulin 2.3 gm/dL    A/G Ratio 1.9 g/dL    Total Bilirubin 0.4 0.0 - 1.2 mg/dL    Alkaline Phosphatase 102 39 - 117 U/L    AST (SGOT) 17 1 - 40 U/L    ALT (SGPT) 17 1 - 41 U/L   CBC & Differential    Collection Time: 04/20/21  2:08 PM    Specimen: Blood   Result Value Ref Range    WBC 8.95 3.40 - 10.80 10*3/mm3    RBC 5.54 4.14 - 5.80 10*6/mm3    Hemoglobin 15.5 13.0 - 17.7 g/dL    Hematocrit 45.8 37.5 - 51.0 %    MCV 82.7 79.0 - 97.0 fL    MCH 28.0 26.6 - 33.0 pg    MCHC 33.8 31.5 - 35.7 g/dL    RDW 14.0 12.3 - 15.4 %    Platelets 215 140 - 450 10*3/mm3    Neutrophil Rel % 59.9 42.7 - 76.0 %    Lymphocyte Rel % 28.9 19.6 - 45.3 %    Monocyte Rel % 7.9 5.0 - 12.0 %    Eosinophil Rel % 2.5 0.3 - 6.2 %    Basophil Rel % 0.6 0.0 - 1.5 %    Neutrophils Absolute 5.36 1.70 - 7.00 10*3/mm3    Lymphocytes Absolute 2.59 0.70 - 3.10 10*3/mm3    Monocytes Absolute 0.71 0.10 - 0.90 10*3/mm3    Eosinophils Absolute 0.22 0.00 - 0.40 10*3/mm3    Basophils Absolute 0.05 0.00 - 0.20 10*3/mm3    Immature Granulocyte Rel % 0.2 0.0 - 0.5 %    Immature Grans Absolute 0.02 0.00 - 0.05 10*3/mm3    nRBC 0.0 0.0 - 0.2 /100 WBC   Lipid Panel    Collection Time: 04/20/21  2:08 PM    Specimen: Blood   Result Value Ref Range    Total Cholesterol 134 0 - 200 mg/dL    Triglycerides 94 0 - 150 mg/dL    HDL Cholesterol 54 40 - 60 mg/dL    VLDL Cholesterol Flex 18 5 - 40 mg/dL    LDL Chol Calc ([facility]) 62 0 - 100 mg/dL           Device Interrogation:  Please see device interrogation form which has been signed and updated for full details. [place] [facility] DDDR 70/130.99% BIV paced. Chronic Afib. NOE 6  years. No VT.     ASSESSMENT   1. Persistent Afib: AVN RFA and BIVPPM 6/19, Now 100% Afib.  2. Chronic SHF EF 54% by Echo  3. HTN: Need to add Zestril  4. COPD    PLAN  · Norvasc 5mg dialy check BP regular bases-Intolerance to BB and ACE/ARV  secondary to COPD, kidney disease  · Follow up with CT surgery regarding AAA  · Follow up as scheduled.       4/27/2021  15:09 EDT  Advance Care Planning   ACP discussion was held with the patient during this visit. Patient does not have an advance directive, declines further assistance.   Will Melia SAMAYOA

## 2021-04-27 NOTE — TELEPHONE ENCOUNTER
Provider: LISA BARNETT  Caller: JEANNIE LEY  Relationship to Patient: DAUGHTER  Phone Number: [phone]  Reason for Call: PATIENT'S DAUGHTER CALLED AND SHE WOULD LIKE TO KNOW THE RESULTS FROM HER DAD'S ULTRASOUND FROM YESTERDAY. P[LEASE GIVE DAUGHTER A CALL BACK.

## 2021-04-29 NOTE — TELEPHONE ENCOUNTER
Patients daughter wants to know if we can have an CT Scan of Stef' back due to severe pain.  I told her that you may want wait until he sees the Vascular Surgeon.  She would like for this to be done ASAP.  Please advise.

## 2021-04-29 NOTE — TELEPHONE ENCOUNTER
Caller: SHIRLEY    Relationship: DAUGHTER    Best call back number: [phone]    What medication are you requesting: PAIN MEDICATION    What are your current symptoms: LOW BACK AND HIP PAIN CAN BARELY WALK    How long have you been experiencing symptoms: 1 WEEK    Have you had these symptoms before:    [] Yes  [x] No    Have you been treated for these symptoms before:   [] Yes  [x] No    If a prescription is needed, what is your preferred pharmacy and phone number: [facility] - [address] [phone] [facility] [phone] FX     Additional notes:

## 2021-05-04 PROBLEM — I71.40 AAA (ABDOMINAL AORTIC ANEURYSM) WITHOUT RUPTURE (HCC): Status: ACTIVE | Noted: 2021-01-01

## 2021-05-04 NOTE — PROGRESS NOTES
05/04/2021  Patient Information  Stef Jones                                                                                          [address]  [facility] [address]   1945  'PCP/Referring Physician'  Néstor Ford   [phone]  No ref. provider found    Chief Complaint   Patient presents with   • Consult     Np referred for a 6 cm abdominal aortic aneurysm,complains of right lower back pain.   • Aortic Aneurysm       History of Present Illness: 76-year-old  male with a history of hypertension, hyperlipidemia, congestive heart failure, atrial fibrillation, COPD and previous tobacco abuse who presents with an abdominal aortic aneurysm.  The patient recently developed right lower back pain that prompted a CT scan of the abdomen and pelvis.  This demonstrated a 6 cm abdominal aortic aneurysm.  This right lower back pain has spontaneously improved with time.  He denies any abdominal pain.  The patient's father had an abdominal aortic aneurysm along with his son.       Patient Active Problem List   Diagnosis   • Other persistent atrial fibrillation (CMS/HCC)   • COPD (chronic obstructive pulmonary disease) (CMS/HCC)   • Pharyngoesophageal dysphagia   • Laryngeal mass   • Chronic systolic congestive heart failure (CMS/HCC)   • Essential hypertension   • Tobacco use disorder   • Hoarse voice quality   • Chronic maxillary sinusitis   • GERD without esophagitis   • Recurrent aspiration pneumonia (CMS/HCC)   • Chronic obstructive bronchitis (CMS/HCC)   • Dacryostenosis, acquired, bilateral   • Hypoxia   • Vitamin D deficiency   • Vitamin B12 deficiency   • 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 (CMS/HCC)   • Primary insomnia   • Chest pain     Past Medical History:   Diagnosis Date   • Aneurysm (CMS/HCC)     level 6   • Arthritis    • Aspiration of food    • Atrial fibrillation (CMS/HCC)    • Bone disorder    • CHF (congestive heart failure) (CMS/HCC)    • Chronic  sinusitis    • COPD (chronic obstructive pulmonary disease) (CMS/HCC)    • GERD (gastroesophageal reflux disease)    • Hearing loss    • Hemorrhoids    • Hiatal hernia    • Hoarse voice quality    • Hyperlipidemia    • Hypertension    • Pacemaker    • Sick sinus syndrome (CMS/HCC)      Past Surgical History:   Procedure Laterality Date   • CARDIAC ELECTROPHYSIOLOGY PROCEDURE Left 6/17/2019    Procedure: AV node ablation + Bi-V PPM implant;  Surgeon: Lucio Chapman MD;  Location:  [facility] INVASIVE LOCATION;  Service: Cardiovascular   • CARDIAC ELECTROPHYSIOLOGY PROCEDURE N/A 6/17/2019    Procedure: AV node ablation;  Surgeon: Lucio Chapman MD;  Location:  [facility] EP INVASIVE LOCATION;  Service: Cardiovascular   • HEMORRHOIDECTOMY     • INSERT / REPLACE / REMOVE PACEMAKER     • LARYNGOSCOPY N/A 6/12/2019    Procedure: MICRO DIRECT LARYNGOSCOPY WITH BIOPSY;  Surgeon: Glen Gutiérrez MD;  Location:  [facility];  Service: ENT       Current Outpatient Medications:   •  acetaminophen (TYLENOL) 500 MG tablet, Take 500 mg by mouth Every 6 (Six) Hours As Needed for Mild Pain ., Disp: , Rfl:   •  albuterol sulfate  (90 Base) MCG/ACT inhaler, Inhale 2 puffs Every 4 (Four) Hours As Needed for Wheezing., Disp: 18 g, Rfl: 5  •  atorvastatin (LIPITOR) 10 MG tablet, Take 1 tablet by mouth Every Night., Disp: 90 tablet, Rfl: 2  •  Eliquis 5 MG tablet tablet, TAKE ONE TABLET EVERY 12 HOURS, Disp: 60 tablet, Rfl: 5  •  ipratropium (ATROVENT) 0.02 % nebulizer solution, Take 2.5 mL by nebulization 4 (Four) Times a Day., Disp: 300 mL, Rfl: 5  •  lisinopril (PRINIVIL,ZESTRIL) 2.5 MG tablet, Take 1 tablet by mouth Daily., Disp: 90 tablet, Rfl: 3  •  O2 (OXYGEN), Inhale 2 L/min Daily., Disp: , Rfl:   •  omeprazole (priLOSEC) 20 MG capsule, Take 1 capsule by mouth Daily., Disp: 90 capsule, Rfl: 1  Allergies   Allergen Reactions   • Amoxicillin Rash   • Penicillins Rash     Social History     Socioeconomic History   •  Marital status:      Spouse name: Not on file   • Number of children: 7   • Years of education: Not on file   • Highest education level: Not on file   Tobacco Use   • Smoking status: Former Smoker     Packs/day: 1.50     Years: 60.00     Pack years: 90.00     Types: Cigarettes     Start date:      Quit date: 2019     Years since quittin.9   • Smokeless tobacco: Never Used   Vaping Use   • Vaping Use: Never used   Substance and Sexual Activity   • Alcohol use: No   • Drug use: No   • Sexual activity: Defer     Family History   Problem Relation Age of Onset   • Heart disease Mother    • Other Mother         staph infection   • Heart attack Mother    • Aortic aneurysm Father    • Heart attack Sister    • Heart disease Maternal Grandmother    • Diabetes Maternal Grandmother    • Heart attack Maternal Grandfather    • No Known Problems Paternal Grandmother    • No Known Problems Paternal Grandfather    • Heart failure Maternal Uncle    • Heart failure Maternal Aunt    • No Known Problems Sister    • No Known Problems Sister      Review of Systems   Constitutional: Positive for malaise/fatigue. Negative for chills, fever, night sweats and weight loss.   HENT: Positive for hearing loss (wears hearing aids) and hoarse voice. Negative for odynophagia and sore throat.    Cardiovascular: Positive for dyspnea on exertion. Negative for chest pain, leg swelling, orthopnea and palpitations.   Respiratory: Negative.  Negative for cough and hemoptysis.    Endocrine: Negative for cold intolerance, heat intolerance, polydipsia, polyphagia and polyuria.   Hematologic/Lymphatic: Bruises/bleeds easily.   Skin: Negative.  Negative for itching and rash.   Musculoskeletal: Positive for back pain and joint pain. Negative for joint swelling and myalgias.   Gastrointestinal: Positive for nausea. Negative for abdominal pain, constipation, diarrhea, hematemesis, hematochezia, melena and vomiting.   Genitourinary: Negative.   "Negative for dysuria, frequency and hematuria.   Neurological: Positive for headaches. Negative for focal weakness, numbness and seizures.   Psychiatric/Behavioral: Negative for suicidal ideas. The patient has insomnia.    All other systems reviewed and are negative.    Vitals:    05/04/21 1440   BP: 147/91   BP Location: Right arm   Patient Position: Sitting   Pulse: 80   Temp: 97.7 °F (36.5 °C)   SpO2: 96%   Weight: 78 kg (172 lb)   Height: 167.6 cm (66\")      Physical Exam  Vitals reviewed.   Constitutional:       General: He is not in acute distress.     Appearance: He is well-developed. He is not diaphoretic.      Comments:  male who appears stated age and is present with his son   HENT:      Head: Normocephalic and atraumatic.   Eyes:      General: No scleral icterus.     Conjunctiva/sclera: Conjunctivae normal.   Neck:      Vascular: No JVD.      Trachea: No tracheal deviation.   Cardiovascular:      Rate and Rhythm: Normal rate and regular rhythm.      Heart sounds: Normal heart sounds. No murmur heard.   No friction rub. No gallop.    Pulmonary:      Effort: Pulmonary effort is normal. No respiratory distress.      Breath sounds: Normal breath sounds. No wheezing or rales.   Abdominal:      General: There is no distension.      Palpations: Abdomen is soft. There is no mass.      Tenderness: There is no abdominal tenderness. There is no guarding or rebound.   Musculoskeletal:         General: Normal range of motion.      Cervical back: Neck supple.   Skin:     General: Skin is warm and dry.      Findings: No erythema or rash.   Neurological:      Mental Status: He is alert and oriented to person, place, and time.   Psychiatric:         Behavior: Behavior normal.         Thought Content: Thought content normal.         Judgment: Judgment normal.         The ROS, past medical history, surgical history, family history, social history and vitals were reviewed by myself and corrected as needed.  "     Labs/Imaging:  -Abdominal ultrasound performed 4/26/2021, personally reviewed, demonstrates a 6 cm abdominal aortic aneurysm.    Assessment/Plan:  76-year-old  male with a history of hypertension, hyperlipidemia, congestive heart failure, atrial fibrillation, COPD and previous tobacco abuse who presents with a 6 cm abdominal aortic aneurysm.  A CTA of the abdomen and pelvis will need to be obtained for operative planning.  Assuming the patient's anatomy is satisfactory, the patient is a reasonable candidate for endovascular abdominal aortic aneurysm repair.  The risks and benefits of surgery were discussed with the patient including pain, bleeding, infection, renal dysfunction/failure, myocardial infarction and death.  The patient understood these risks and wished to proceed with surgery.     Patient Active Problem List   Diagnosis   • Other persistent atrial fibrillation (CMS/HCC)   • COPD (chronic obstructive pulmonary disease) (CMS/HCC)   • Pharyngoesophageal dysphagia   • Laryngeal mass   • Chronic systolic congestive heart failure (CMS/HCC)   • Essential hypertension   • Tobacco use disorder   • Hoarse voice quality   • Chronic maxillary sinusitis   • GERD without esophagitis   • Recurrent aspiration pneumonia (CMS/HCC)   • Chronic obstructive bronchitis (CMS/HCC)   • Dacryostenosis, acquired, bilateral   • Hypoxia   • Vitamin D deficiency   • Vitamin B12 deficiency   • 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 (CMS/HCC)   • Primary insomnia   • Chest pain

## 2021-05-05 PROBLEM — H57.89 EYE DRAINAGE: Status: ACTIVE | Noted: 2021-01-01

## 2021-05-05 PROBLEM — J34.2 NASAL SEPTAL DEVIATION: Status: ACTIVE | Noted: 2021-01-01

## 2021-05-05 NOTE — PROGRESS NOTES
"       ENT Office Consult Note     Date of Consult: 2021     Patient Name: Stef Jones  MRN: [id number]   : 1945     Referring Provider: Néstor Ford DO    Care Team: Patient Care Team:  Néstor Ford DO as PCP - General (Family Medicine)  Néstor Ford DO as Consulting Physician (Family Medicine)     Chief Complaint:    Chief Complaint   Patient presents with   • Hoarse   • Sinus Problem     New Patient here for evaluation of chronic maxillary sinustis, and Laryngeal Mass       History of Present Illness: Stef Jones is a 76 y.o. male who presents in consultation today for nasal drainage, shortness. Watery eyes. Worse in AM when it gets up.   States thinks has sinus infections chronically.   On going for a couple.   Has COPD and cough. Dr. Marquez for COPD.   Has seen eye physicians - nothing wrong with eyes per them. Has tried different eye drops and not helped.     Trouble breathing through nose.     States seen Dr. ESTRADA, saw \"something in back of throat\". Biopsy was benign. Since has not given him any issues.  They think it may have been related to bronchitis infections.    Gets aspiration/infections. On infections frequently.     Does wear O2 at night.       Subjective      Review of Systems:   Review of Systems   Constitutional: Positive for fatigue.   HENT: Positive for congestion, ear discharge, hearing loss, sinus pressure, sneezing and voice change.    Eyes: Positive for discharge, redness and itching.   Respiratory: Positive for cough and wheezing.    Neurological: Positive for speech difficulty, weakness and headache.   Hematological: Bruises/bleeds easily.      I have reviewed and confirmed the accuracy of the ROS as documented by the MA/LPN/RN Jayme Zendejas MD     Pertinent items are noted in HPI.     Past Medical History:   Past Medical History:   Diagnosis Date   • Aneurysm (CMS/HCC)     level 6   • Arthritis    • Aspiration of food    • Atrial fibrillation " (CMS/[facility])    • Bone disorder    • CHF (congestive heart failure) (CMS/[facility])    • Chronic sinusitis    • COPD (chronic obstructive pulmonary disease) (CMS/[facility])    • GERD (gastroesophageal reflux disease)    • Headache    • Hearing loss    • Hemorrhoids    • Hiatal hernia    • Hoarse voice quality    • Hyperlipidemia    • Hypertension    • Hypertension    • Pacemaker    • Sick sinus syndrome (CMS/[facility])        Past Surgical History:   Past Surgical History:   Procedure Laterality Date   • CARDIAC ELECTROPHYSIOLOGY PROCEDURE Left 6/17/2019    Procedure: AV node ablation + Bi-V PPM implant;  Surgeon: Lucio Chapman MD;  Location:  [facility];  Service: Cardiovascular   • CARDIAC ELECTROPHYSIOLOGY PROCEDURE N/A 6/17/2019    Procedure: AV node ablation;  Surgeon: Lucio Chapman MD;  Location:  [facility];  Service: Cardiovascular   • HEMORRHOIDECTOMY     • INSERT / REPLACE / REMOVE PACEMAKER     • LARYNGOSCOPY N/A 6/12/2019    Procedure: MICRO DIRECT LARYNGOSCOPY WITH BIOPSY;  Surgeon: Glen Gutiérrez MD;  Location:  [facility];  Service: ENT       Family History:   Family History   Problem Relation Age of Onset   • Heart disease Mother    • Other Mother         staph infection   • Heart attack Mother    • Aortic aneurysm Father    • Heart attack Sister    • Heart disease Maternal Grandmother    • Diabetes Maternal Grandmother    • Heart attack Maternal Grandfather    • No Known Problems Paternal Grandmother    • No Known Problems Paternal Grandfather    • Heart failure Maternal Uncle    • Heart failure Maternal Aunt    • No Known Problems Sister    • No Known Problems Sister    • Cancer Brother        Social History:   Social History     Socioeconomic History   • Marital status:      Spouse name: Not on file   • Number of children: 7   • Years of education: Not on file   • Highest education level: Not on file   Tobacco Use   • Smoking status: Former Smoker     Packs/day:  "1.50     Years: 60.00     Pack years: 90.00     Types: Cigarettes     Start date:      Quit date: 2019     Years since quittin.9   • Smokeless tobacco: Never Used   Vaping Use   • Vaping Use: Never used   Substance and Sexual Activity   • Alcohol use: No   • Drug use: No   • Sexual activity: Defer       Medications:     Current Outpatient Medications:   •  albuterol sulfate  (90 Base) MCG/ACT inhaler, Inhale 2 puffs Every 4 (Four) Hours As Needed for Wheezing., Disp: 18 g, Rfl: 5  •  atorvastatin (LIPITOR) 10 MG tablet, Take 1 tablet by mouth Every Night., Disp: 90 tablet, Rfl: 2  •  Eliquis 5 MG tablet tablet, TAKE ONE TABLET EVERY 12 HOURS, Disp: 60 tablet, Rfl: 5  •  O2 (OXYGEN), Inhale 2 L/min Daily., Disp: , Rfl:   •  omeprazole (priLOSEC) 20 MG capsule, Take 1 capsule by mouth Daily., Disp: 90 capsule, Rfl: 1  •  acetaminophen (TYLENOL) 500 MG tablet, Take 500 mg by mouth Every 6 (Six) Hours As Needed for Mild Pain ., Disp: , Rfl:   •  fluticasone (FLONASE) 50 MCG/ACT nasal spray, 2 sprays into the nostril(s) as directed by provider Daily., Disp: 48 g, Rfl: 3  •  ipratropium (ATROVENT) 0.02 % nebulizer solution, Take 2.5 mL by nebulization 4 (Four) Times a Day., Disp: 300 mL, Rfl: 5  •  lisinopril (PRINIVIL,ZESTRIL) 2.5 MG tablet, Take 1 tablet by mouth Daily., Disp: 90 tablet, Rfl: 3    Allergies:   Allergies   Allergen Reactions   • Amoxicillin Rash   • Penicillins Rash       Objective     Physical Exam:  Vital Signs:   Vitals:    21 1337   BP: 134/72   Temp: 96.9 °F (36.1 °C)   Weight: 77.6 kg (171 lb)   Height: 167.6 cm (66\")     Body mass index is 27.6 kg/m².     General Appearance:  healthy-appearing, well-nourished, and in no acute distress  Course   Head:  Normocephalic, without obvious abnormality, atraumatic   Sinus: No maxillary tenderness   No frontal tenderness    Salivary Glands: No tenderness of the parotid glands or the submandibular glands and no parotid masses or " submandibular masses  Normal saliva expressed from glands   Eyes:          Conjunctivae and sclerae normal, EOMI, no drainage or evidence of infection   Ear -  Left: EAC clear  TM WNL, no perfs, no effusion      Ear - Right:  EAC clear  TM WNL, no perfs, no effusion    Nose: Septum left-sided spur   no lesions   IT normal bilaterally   No mucosal inflammation or edema   No drainage    Mouth: Lips WNL  MMM  No buccal lesions   Tongue, FOM WNL, no lesions, soft to palpation  Tonsils normal   No palatal lesions   OP clear, no erythema or exudates   TMJ: No TMJ tenderness, no popping or cracking, symmetric opening   Neck: symmetrical and trachea midline  No thyroid nodules, no thyromegaly    Lungs:    Rales bilaterally    Heart: Regular rhythm and normal rate   Skin: Warm   Lymph nodes: No palpable cervical adenopathy   Neurologic: Cranial nerves 2 - 12 grossly intact     Alert and oriented   Appropriate mood and affect        Procedure:   Flexible Laryngoscopy Diagnostic  Verbal consent obtained.  Patient's bilateral nares decongested and anesthetized.  No abnormalities seen in the nasal passage or nasopharynx.  Left septal spur.  No drainage from either middle meatus  No abnormalities seen in the oropharynx.  No abnormalities seen on the base of tongue.    Bilateral interarytenoid edema and pachydermia  No abnormalities seen in the hypopharynx.  No abnormalities seen in the glottis.  TVC mobile bilaterally.  Smoking and reflux related changes but otherwise no specific lesions  No abnormalities seen in the immediate subglottis.      Assessment / Plan      Assessment/Plan:   Diagnoses and all orders for this visit:    1. Chronic obstructive bronchitis (CMS/HCC) (Primary)    2. Chronic maxillary sinusitis  -     CT Sinus Without Contrast; Future    3. Hoarse voice quality    4. Nasal septal deviation    5. Eye drainage    Other orders  -     fluticasone (FLONASE) 50 MCG/ACT nasal spray; 2 sprays into the nostril(s) as  directed by provider Daily.  Dispense: 48 g; Refill: 3         76-year-old male seen today primarily for eye drainage that takes him about an hour to clear every morning.  He has seen numerous eye doctors about this previously and various eyedrops have not helped anything.  Possible his nasal cannula oxygen that is blowing on things at night is contributing.  They think he may have chronic sinus disease but I reviewed the sinus CT from 2019 that was essentially unremarkable.  Very minimal at best inflammations maxillary sinuses but certainly nothing that appears to be a chronic or acute sinusitis.  Given his continued issues that we can certainly recheck a sinus scan and we will set him up for this.  Start him on Flonase more regularly here in the interim.    Separately I reviewed old clinic notes from Dr. ESTRADA at another clinic.  He had a laryngeal mass per report that was biopsied and benign.  No evidence of any further lesions in his throat.  He does have reflux related and previous tobacco abuse related changes to his larynx but no specific mass or lesion to warrant biopsy at this time.      Follow Up:   Return in about 1 month (around 6/5/2021) for Radiology before next visit.    Jayme Zendejas MD

## 2021-05-21 NOTE — TELEPHONE ENCOUNTER
----- Message from Paul June MD sent at 5/20/2021 11:52 PM EDT -----  Aneurysm only 5.1 cm on CT.  If he no longer has pain, ok to obtain repeat CT in 6 months and have him follow-up in clinic.  If he has pain, book for EVAR with [place].  Just let me know.  Thanks  [facility]  ----- Message -----  From: Tariq Blackwood  Sent: 5/18/2021   9:42 AM EDT  To: MD Dr. Shalyee Muro, you saw this pt on 05/04 for an AAA-pt was had a CTA ABD/Pelvis-results now final in Epic for review. Is it ok to book surgery? Thanks.

## 2021-05-27 NOTE — TELEPHONE ENCOUNTER
----- Message from Paul June MD sent at 5/26/2021 12:49 PM EDT -----  Small inguinal hernias on CT.    [facility]  ----- Message -----  From: Tariq Blackwood  Sent: 5/21/2021   4:23 PM EDT  To: Paul June MD    The pt's daughter is aware of this information and is going to discuss this information with the pt. She will call me back. But when I was talking to her she is now wanting to know if his ct scan shows that the pt has a Hernia? Please advise. Thanks  ----- Message -----  From: Paul June MD  Sent: 5/20/2021  11:52 PM EDT  To: Tariq Blackwood    Aneurysm only 5.1 cm on CT.  If he no longer has pain, ok to obtain repeat CT in 6 months and have him follow-up in clinic.  If he has pain, book for EVAR with [place].  Just let me know.  Thanks  [facility]  ----- Message -----  From: Tariq Blackwood  Sent: 5/18/2021   9:42 AM EDT  To: MD Dr. Shaylee Muro, you saw this pt on 05/04 for an AAA-pt was had a CTA ABD/Pelvis-results now final in Epic for review. Is it ok to book surgery? Thanks.

## 2021-06-01 NOTE — TELEPHONE ENCOUNTER
CALLED YESTERDAY TO CONFIRM PTS FOLLOW UP APPT FOR 06/02-SPOKE W/ BALDOMERO. SHE HAD TO RESCHEDULE HIS APPT BUT IS ALSO WONDERING IF SOMEONE CAN JUST CALL W/ THE CT RESULTS. SHE STATES THAT THEY [place] OUT OF TOWN AND THIS WAS THE ONLY REASON FOR THE F/U.    PLEASE ADVISE.

## 2021-06-02 NOTE — TELEPHONE ENCOUNTER
----- Message from Paul June MD sent at 5/26/2021 12:49 PM EDT -----  Small inguinal hernias on CT.    [facility]  ----- Message -----  From: Tariq Blackwood  Sent: 5/21/2021   4:23 PM EDT  To: Paul June MD    The pt's daughter is aware of this information and is going to discuss this information with the pt. She will call me back. But when I was talking to her she is now wanting to know if his ct scan shows that the pt has a Hernia? Please advise. Thanks  ----- Message -----  From: Paul June MD  Sent: 5/20/2021  11:52 PM EDT  To: Tariq Blackwood    Aneurysm only 5.1 cm on CT.  If he no longer has pain, ok to obtain repeat CT in 6 months and have him follow-up in clinic.  If he has pain, book for EVAR with [place].  Just let me know.  Thanks  [facility]  ----- Message -----  From: Tariq Blackwood  Sent: 5/18/2021   9:42 AM EDT  To: MD Dr. Shaylee Muro, you saw this pt on 05/04 for an AAA-pt was had a CTA ABD/Pelvis-results now final in Epic for review. Is it ok to book surgery? Thanks.

## 2021-06-02 NOTE — TELEPHONE ENCOUNTER
I have tried to reach Mr. Jones daughter, Ene, for a few days now. I am not able to leave a VM due to her VM box being full.

## 2021-06-08 NOTE — TELEPHONE ENCOUNTER
I was able to reach the pt's daughter, Ene, today regarding the information below. Mr. Jones would like to return to see Dr. June in 6 months w/ a repeat scan. I have made that apt for 12/07/2021 and our office will take care of scheduling the test closer to that apt. Ene is aware of this information.         ----- Message from Paul June MD sent at 5/26/2021 12:49 PM EDT -----  Small inguinal hernias on CT.    [facility]  ----- Message -----  From: Tariq Blackwood  Sent: 5/21/2021   4:23 PM EDT  To: Paul June MD    The pt's daughter is aware of this information and is going to discuss this information with the pt. She will call me back. But when I was talking to her she is now wanting to know if his ct scan shows that the pt has a Hernia? Please advise. Thanks  ----- Message -----  From: Paul June MD  Sent: 5/20/2021  11:52 PM EDT  To: Tariq Blackwood    Aneurysm only 5.1 cm on CT.  If he no longer has pain, ok to obtain repeat CT in 6 months and have him follow-up in clinic.  If he has pain, book for EVAR with [place].  Just let me know.  Thanks  [facility]  ----- Message -----  From: Tariq Blackwood  Sent: 5/18/2021   9:42 AM EDT  To: MD Dr. Shaylee Muro, you saw this pt on 05/04 for an AAA-pt was had a CTA ABD/Pelvis-results now final in Epic for review. Is it ok to book surgery? Thanks.

## 2021-07-27 NOTE — PROGRESS NOTES
Established Patient        Chief Complaint:   Chief Complaint   Patient presents with   • Follow-up     BACK PAIN        Stef MARICRUZ Jones is a 76 y.o. male    History of Present Illness:   Here for scheduled follow-up visit of his known abdominal aortic aneurysm, chronic obstructive bronchitis, recurrent aspiration pneumonitis, pharyngoesophageal dysphagia, atrial fibrillation, hypertension, chronic systolic just of heart failure and vitamin D insufficiency.    Patient denies any hematuria or dysuria.  Denies any BRB/BTS.  Denies any epistaxis or hemoptysis.  Continues to deal with recurrent aspiration pneumonitis, reports mild worsening of symptoms at present.  He denies any focal aspiration.  Denies any orthopnea or swelling to his extremities.  He has had a reduction in his appetite secondary to depressed mood, denies any SI/HI.  He has renovations planned to his current residence, this will require him to be displaced for approximately 6 weeks, choosing to live with his daughters during that time.  He attributes his loss of appetite to this.    Denies any saddle anesthesia, no bowel/bladder incontinence.  Denies syncope, vertigo or palpitations.    Subjective     The following portions of the patient's history were reviewed and updated as appropriate: allergies, current medications, past family history, past medical history, past social history, past surgical history and problem list.    Allergies   Allergen Reactions   • Amoxicillin Rash   • Penicillins Rash       Review of Systems  Constitutional: Negative for fever. Negative for chills, diaphoresis, fatigue and unexpected weight change.   HENT: No dysphagia; no changes to vision/hearing/smell/taste.  Eyes: Negative for redness and visual disturbance.   Respiratory: Chronic upper airway congestion, no active wheezing.  Cardiovascular: Negative for chest pain and palpitations.   Gastrointestinal: Negative for abdominal distention, abdominal pain and blood  "in stool.  As per above otherwise.  Endocrine: Negative for cold intolerance and heat intolerance.   Genitourinary: Negative for difficulty urinating, dysuria and frequency.   Musculoskeletal: As per above, chronic arthralgias, back pain and myalgias.   Skin: Negative for color change, rash and wound.   Neurological: Negative for syncope, weakness and headaches.   Hematological: Negative for adenopathy. Does not bruise/bleed easily.   Psychiatric/Behavioral: Negative for confusion. The patient is not nervous/anxious.  Otherwise, as per above.    Objective     Physical Exam   Vital Signs: /80   Pulse 71   Temp 95.8 °F (35.4 °C)   Ht 167.6 cm (66\")   Wt 76.2 kg (168 lb)   SpO2 93%   BMI 27.12 kg/m²     General Appearance: alert, oriented x 3, no acute distress.  Skin: warm and dry.   HEENT: Atraumatic.  pupils round and reactive to light and accommodation, oral mucosa pink and moist.  Nares patent without epistaxis.  External auditory canals are patent tympanic membranes intact.  Non-injected conjunctival bilaterally.  Boggy/inflamed nasal turbinates bilaterally.  Moderate postnasal drainage without pustules or exudate.  Neck: supple, no JVD, trachea midline.  No thyromegaly  Lungs: Rhonchus referred upper airway congestion bilaterally that is partially cleared with cough, unlabored breathing effort.  Prolonged expiratory phase bilaterally.  Heart: RR, normal S1 and S2, no S3, no rub.  Left subclavian pacemaker noted.  Does not demonstrate any findings of complication.  Abdomen: soft, non-tender, no palpable bladder, present bowel sounds to auscultation ×4.  No guarding or rigidity.  Extremities: no clubbing, cyanosis or edema.  Good range of motion actively and passively.  Symmetric muscle strength and development.  Patient ambulates independently.  No leg length discrepancies.    Neuro: normal speech and mental status.  Cranial nerves II through XII intact.  No anosmia. DTR 2+; proprioception intact.  No " focal motor/sensory deficits.    Advance Care Planning   ACP discussion was held with the patient during this visit. Patient does not have an advance directive, information provided.    Assessment and Plan      Assessment:   Diagnoses and all orders for this visit:    1. AAA (abdominal aortic aneurysm) without rupture (CMS/HCC) (Primary)    2. Chronic obstructive bronchitis (CMS/HCC)  -     Cancel: XR Ribs Left With PA Chest; Future  -     doxycycline (MONODOX) 100 MG capsule; Take 1 capsule by mouth Every 12 (Twelve) Hours for 10 days.  Dispense: 20 capsule; Refill: 0  -     XR Chest PA & Lateral; Future    3. Recurrent aspiration pneumonia (CMS/HCC)  -     Cancel: XR Ribs Left With PA Chest; Future  -     doxycycline (MONODOX) 100 MG capsule; Take 1 capsule by mouth Every 12 (Twelve) Hours for 10 days.  Dispense: 20 capsule; Refill: 0  -     XR Chest PA & Lateral; Future    4. Pharyngoesophageal dysphagia  -     Cancel: XR Ribs Left With PA Chest; Future    5. Other persistent atrial fibrillation (CMS/HCC)    6. Essential hypertension  -     Comprehensive Metabolic Panel    7. Chronic systolic congestive heart failure (CMS/HCC)  -     Comprehensive Metabolic Panel    8. Loss of appetite  -     mirtazapine (REMERON) 7.5 MG tablet; Take 1 tablet by mouth Every Night.  Dispense: 30 tablet; Refill: 2  -     Comprehensive Metabolic Panel    9. Adult situational stress disorder  -     mirtazapine (REMERON) 7.5 MG tablet; Take 1 tablet by mouth Every Night.  Dispense: 30 tablet; Refill: 2    10. Vitamin D insufficiency  -     Comprehensive Metabolic Panel  -     Vitamin D 25 Hydroxy        Plan:  Chest Xray ordered today.  Plan to follow clinically.    Course of doxycycline prescribed today.    Adding mirtazapine 7.5 mg; will follow clinically, inc dose to 15 mg daily if needed.  Hopeful for an improvement to his mood, certainly demonstrated situational depression secondary to expected 6 weeks of being displaced from his  normal living environment, he will be staying with his daughters during that time while they are renovating his residence.    Pacemaker appears well-positioned.  I see no ill effects of its current location, vital signs demonstrate hemodynamic stability.  Keep scheduled appointment with cardiology.    Surveillance CMP and vitamin D level evaluated today.  Discussion Summary:    Discussed plan of care in detail with pt, and his daughter, today; they verb understanding and agree.    I spent 35 minutes caring for Stef on this date of service. This time includes time spent by me in the following activities:preparing for the visit, performing a medically appropriate examination and/or evaluation , counseling and educating the patient/family/caregiver, ordering medications, tests, or procedures, documenting information in the medical record and care coordination    I have reviewed and updated all copied forward information, as appropriate.  I attest to the accuracy and relevance of any unchanged information.    Follow up:  No follow-ups on file.     There are no Patient Instructions on file for this visit.    Néstor Ford DO  07/27/21  15:28 EDT          Please note that portions of this note may have been completed with a voice recognition program. Efforts were made to edit the dictations, but occasionally words are mistranscribed.

## 2021-07-30 NOTE — TELEPHONE ENCOUNTER
Caller: Ene Crisostomo - WILL MAKE HIS APPOINTMENTS    Relationship: Emergency Contact    Best call back number: [phone]     What is the best time to reach you: ANYTIME    Who are you requesting to speak with (clinical staff, provider,  specific staff member): CLINICAL STAFF        What was the call regarding: PATIENT IS TRYING TO RETURN A CALL FROM THE OFFICE REGARDING LAB WORK . PATIENTS DAUGHTER WOULD LIKE A COPY OF HIS TEST RESULTS MAILED TO THE ADDRESS ON FILE     Do you require a callback:YES

## 2021-08-23 NOTE — TELEPHONE ENCOUNTER
Dtr called for our fax number to send me a list of his BP. Tried to call her back x2, no answer and VM full.

## 2021-08-24 NOTE — TELEPHONE ENCOUNTER
Pt daughter calling after faxing BP log. After reviewing his log he averages around 150-180/70-90. Will Melia suggested during LOV on 4/27 to start amlodipine 5 mg daily but pt and dtr had mentioned he was previously on amlodipine but had stopped it. He was on coreg last year but d/c by pulmonary. Will started lisinopril 2.5 mg daily instead. Pt has a hx of COPD with most recent, July- creatinine 1.39 and GFR 48 (previous creatinine in April 1.11 GFR 64).    Dtr reports a persistent cough- she is unsure if this started before or after lisinopril. He has been taking Mucinex consistently for the cough and sometimes it is productive. He is also using his inhaler and nebulizer more than prescribed. We went over those directions and advised to reach out to Dr. Mobley. Dtr advised that Mucinex could be keeping his blood pressure higher. She reports he avoids salt and is compliant with diet. Pt would like to try a different BP medication. I will forward BP log to [facility] and call tomorrow with his recommendations.

## 2021-09-02 NOTE — TELEPHONE ENCOUNTER
Caller: Baldomero Crisostomo - WILL MAKE HIS APPOINTMENTS    Relationship: Emergency Contact    Best call back number: [phone]    Caller requesting test results: BALDOMERO     What test was performed: XRAYS     Additional notes: ASKING FOR SHIN    2-3 WEEKS AT [facility]

## 2021-09-03 NOTE — TELEPHONE ENCOUNTER
I have looked in patients chart to try to assist patient and call back, but I can't find any indication of what Renetta called about.

## 2021-09-07 NOTE — TELEPHONE ENCOUNTER
I was returning patients daughters call per her request.  The original message says she is calling about patients cxr from 2-3 weeks ago.  I had went over the cxr with her on 08/03/2021 @ 5:30pm

## 2021-09-09 NOTE — TELEPHONE ENCOUNTER
Add 65341 Cpt? (Important Note: In 2017 The Use Of 45432 Is Being Tracked By Cms To Determine Future Global Period Reimbursement For Global Periods): no
Pt daughter called to update on BP readings since starting Losartan 25 mg daily. BP ranges around 140-160/60-90 highest . Repeat BMP scanned in, renal function stable from last labs in July. Dtr reports the pt has experienced some side effects the day after he started taking Losartan- smothering, headache, leg cramps. Pt has been having GI issues recently with acid reflux, constipation, and diarrhea. He has a f/u with PCP 9/28- advised dtr to keep that apt and discuss those symptoms with Dr. Ford to r/o any other causes. We will increase Losartan to 50 mg daily and she will call with readings in 2 weeks or sooner if side effects worsen with increased dose. Dtr agreeable and verbalized understanding   
Detail Level: Detailed

## 2021-09-20 NOTE — TELEPHONE ENCOUNTER
Caller: Ene Crisostomo - WILL MAKE HIS APPOINTMENTS    Relationship: Emergency Contact    Best call back number: [phone]    Medication needed:   Requested Prescriptions     Pending Prescriptions Disp Refills   • apixaban (Eliquis) 5 MG tablet tablet 60 tablet 5     Sig: Take 1 tablet by mouth Every 12 (Twelve) Hours.       When do you need the refill by: 09/21/2021    What additional details did the patient provide when requesting the medication: PATIENT'S DAUGHTER IS WANTING A CALL BACK ONCE THIS HAS BEEN SENT. PATIENT HAS A FEW DAYS LEFT     Does the patient have less than a 3 day supply:  [x] Yes  [] No    What is the patient's preferred pharmacy: [address] [phone] [facility] [phone] FX

## 2021-09-28 NOTE — PROGRESS NOTES
"    Established Patient        Chief Complaint:   Chief Complaint   Patient presents with   • Follow-up     HTN; patient dc'd Losartan due to side effects BP in office 140/90        Stef Jones is a 76 y.o. male    History of Present Illness:     Pt experienced \"smothering\" sensation with use of losartan.  Has discontinued med, reports symptoms have improved.    Pt reports sig improvement to constipation.    No F/C.  Reports no focal aspiration; does continue to have cough productive of heavy phlegm at times; no hemoptysis.    Has returned to his normal residence, long delay in renovations prompted staying with family for longer than anticipated.  Subjective     The following portions of the patient's history were reviewed and updated as appropriate: allergies, current medications, past family history, past medical history, past social history, past surgical history and problem list.    Allergies   Allergen Reactions   • Amoxicillin Rash   • Penicillins Rash       Review of Systems  Constitutional: Negative for fever. Negative for chills, diaphoresis, fatigue and unexpected weight change.   HENT: No dysphagia; no changes to vision/hearing/smell/taste.  Eyes: Negative for redness and visual disturbance.   Respiratory: Chronic upper airway congestion, no active wheezing.  Cardiovascular: Negative for chest pain and palpitations.   Gastrointestinal: Negative for abdominal distention, abdominal pain and blood in stool.  As per above otherwise.  Endocrine: Negative for cold intolerance and heat intolerance.   Genitourinary: Negative for difficulty urinating, dysuria and frequency.   Musculoskeletal: As per above, chronic arthralgias, back pain and myalgias.   Skin: Negative for color change, rash and wound.   Neurological: Negative for syncope, weakness and headaches.   Hematological: Negative for adenopathy. Does not bruise/bleed easily.   Psychiatric/Behavioral: Negative for confusion. The patient is not " "nervous/anxious.  Otherwise, as per above.    Objective     Physical Exam   Vital Signs: /90   Pulse 79   Temp 97 °F (36.1 °C)   Ht 167.6 cm (66\")   Wt 73.5 kg (162 lb)   SpO2 96%   BMI 26.15 kg/m²     General Appearance: alert, oriented x 3, no acute distress.  Skin: warm and dry.   HEENT: Atraumatic.  pupils round and reactive to light and accommodation, oral mucosa pink and moist.  Nares patent without epistaxis.  External auditory canals are patent tympanic membranes intact.  Non-injected conjunctival bilaterally.  Boggy/inflamed nasal turbinates bilaterally.  Moderate postnasal drainage without pustules or exudate.  Neck: supple, no JVD, trachea midline.  No thyromegaly  Lungs: Rhonchus referred upper airway congestion bilaterally that is partially cleared with cough, unlabored breathing effort.  Prolonged expiratory phase bilaterally.  Heart: RR, normal S1 and S2, no S3, no rub.  Left subclavian pacemaker noted.    Abdomen: soft, non-tender, no palpable bladder, present bowel sounds to auscultation ×4.  No guarding or rigidity.  Extremities: no clubbing, cyanosis or edema.  Good range of motion actively and passively.  Symmetric muscle strength and development.  Patient ambulates independently.  No leg length discrepancies.    Neuro: normal speech and mental status.  Cranial nerves II through XII intact.  No anosmia. DTR 2+; proprioception intact.  No focal motor/sensory deficits.    Advance Care Planning   ACP discussion was held with the patient during this visit. Patient does not have an advance directive, information provided.    Assessment and Plan      Assessment:   Diagnoses and all orders for this visit:    1. Chronic obstructive bronchitis (CMS/HCC) (Primary)  -     doxycycline (VIBRAMYCIN) 100 MG capsule; Take 1 capsule by mouth 2 (Two) Times a Day for 10 days.  Dispense: 20 capsule; Refill: 0  -     dexamethasone (DECADRON) injection 5 mg    2. Loss of appetite  -     mirtazapine " (REMERON) 15 MG tablet; Take 1 tablet by mouth Every Night.  Dispense: 90 tablet; Refill: 1    3. Adult situational stress disorder  -     mirtazapine (REMERON) 15 MG tablet; Take 1 tablet by mouth Every Night.  Dispense: 90 tablet; Refill: 1    4. GERD without esophagitis    5. Pharyngoesophageal dysphagia    6. Other persistent atrial fibrillation (CMS/HCC)    7. Chronic systolic congestive heart failure (CMS/HCC)    8. Essential hypertension    9. AAA (abdominal aortic aneurysm) without rupture (CMS/HCC)    10. 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 Recurrent aspiration pneumonia (CMS/HCC)        Plan:  Inc dose of mirtazapine to 15 mg; plan to follow clinically; hopeful for improved appetite.    Keep scheduled appt for repeat angiography in surveillance of AAA.  Followed by Dr. June in [place], [place].    VSS, appears HD asymptomatic today; continue home BP monitoring; continue daily wt eval; use low sodium/salt dietary intake.    Decadron 5 mg IM today.  10 day course of doxycycline.    Discussion Summary:    Discussed plan of care in detail with pt, and his daughter, today; they verb understanding and agree.    I spent 30 minutes caring for Stef on this date of service. This time includes time spent by me in the following activities:preparing for the visit, performing a medically appropriate examination and/or evaluation , counseling and educating the patient/family/caregiver, ordering medications, tests, or procedures, documenting information in the medical record and care coordination    I have reviewed and updated all copied forward information, as appropriate.  I attest to the accuracy and relevance of any unchanged information.    Follow up:  No follow-ups on file.     There are no Patient Instructions on file for this visit.    Néstor Ford DO  09/28/21  14:58 EDT          Please note that portions of this note may have been completed with a voice recognition  program. Efforts were made to edit the dictations, but occasionally words are mistranscribed.

## 2021-10-11 NOTE — TELEPHONE ENCOUNTER
Spoke to Ene, and she said patient is still having a lot of congestion and coughing up thick phlegm. She is requesting levaquin 500mg. She stated patient previously took this and it worked well for him. Please advise?

## 2021-10-11 NOTE — TELEPHONE ENCOUNTER
Caller: Baldomero Crisostomo - WILL MAKE HIS APPOINTMENTS    Relationship: Emergency Contact    Best call back number: [phone]    What was the call regarding:   PATIENT'S (DAUGHTER) BALDOMERO STATED THAT SHE WOULD LIKE A CALL BACK REGARDING PATIENT'S LEGS SWELLING AND STATED THAT SHE HAS QUESTION REGARDING THE STEROID SHOT PATIENT RECEIVED LAST TIME IN THE OFFICE. PATIENT'S (DAUGHTER) BALDOMERO STATED THAT PATIENT'S ANTIBIOTIC IS NOT HELPING AND PATIENT IS STILL COUGHING WITH CONGESTION AND WOULD LIKE A CALL BACK REGARDING PATIENT A NEW MEDICATION CALLED INTO THE PHARMACY     [facility] - [address] [phone]  - [phone]   [phone]    Do you require a callback: YES

## 2021-10-11 NOTE — TELEPHONE ENCOUNTER
Has he been swabbed for covid? He really needs to be seen. I recommend that he see Virgilio in the walk in clinic. Ric has heart interactions and I do not feel comfortable sending this when I have never seen the patient

## 2021-10-11 NOTE — TELEPHONE ENCOUNTER
Called and spoke to daughter. She said pt wouldn't want to come to walk-in clinic at [place]. She said that pt might go to the walk-in clinic where he lives, [facility]. She said she would try to get that antibiotic from one of his other doctors. I told her if she needed anything else, to let us know.

## 2021-10-18 NOTE — TELEPHONE ENCOUNTER
Caller: Ene Crisostomo - WILL MAKE HIS APPOINTMENTS    Relationship: Emergency Contact    Best call back number: [phone] CELL [phone] HOME     What medication are you requesting: THE OTHER ANTIBIOTIC THAT THE PATIENT IS TYPICALLY PRESCRIBED FOR COPD THE CALLER STATES THAT IT IS USUALLY 500MG   What are your current symptoms: COUGHING UP THICK WHITE MUCUS       Have you had these symptoms before:    [x] Yes  [] No    Have you been treated for these symptoms before:   [x] Yes  [] No    If a prescription is needed, what is your preferred pharmacy and phone number: [facility] - [place], [place] - [address] [place] [address] [phone]  - [phone] FX     Additional notes:THE PATIENTS DAUGHTER STATES THAT HE WAS SEEN ON 09/28/2021 AND RECEIVED A 100MG ANTIBIOTIC AND AN INJECTION. THE PATIENTS DAUGHTER STATES THAT IT DID NOT HELP WITH HIS COUGHING AND MUCUS. PLEASE CALL THE PATIENTS DAUGHTER TO LET HER KNOW IF ANOTHER MEDICATION CAN BE CALLED IN

## 2021-10-19 NOTE — TELEPHONE ENCOUNTER
Patients daughter called and said he was given Doxycycline 100mg on 09/28/2021; She said that he is still symptomatic.  She said he had previously been given Levaquin 500mg that worked better.  She wants to know if something else can be sent in for cough and congestion.

## 2021-10-28 RX ORDER — LEVOFLOXACIN 500 MG/1
500 TABLET, FILM COATED ORAL DAILY
Qty: 7 TABLET | Refills: 0 | Status: SHIPPED | OUTPATIENT
Start: 2021-10-28
